# Patient Record
Sex: FEMALE | Race: WHITE | NOT HISPANIC OR LATINO | Employment: OTHER | URBAN - METROPOLITAN AREA
[De-identification: names, ages, dates, MRNs, and addresses within clinical notes are randomized per-mention and may not be internally consistent; named-entity substitution may affect disease eponyms.]

---

## 2017-01-20 ENCOUNTER — ALLSCRIPTS OFFICE VISIT (OUTPATIENT)
Dept: OTHER | Facility: OTHER | Age: 77
End: 2017-01-20

## 2017-01-20 ENCOUNTER — TRANSCRIBE ORDERS (OUTPATIENT)
Dept: ADMINISTRATIVE | Facility: HOSPITAL | Age: 77
End: 2017-01-20

## 2017-01-20 DIAGNOSIS — R33.9 RETENTION OF URINE, UNSPECIFIED: Primary | ICD-10-CM

## 2017-05-03 ENCOUNTER — APPOINTMENT (EMERGENCY)
Dept: RADIOLOGY | Facility: HOSPITAL | Age: 77
End: 2017-05-03
Payer: MEDICARE

## 2017-05-03 ENCOUNTER — HOSPITAL ENCOUNTER (EMERGENCY)
Facility: HOSPITAL | Age: 77
Discharge: HOME | End: 2017-05-03
Attending: EMERGENCY MEDICINE | Admitting: EMERGENCY MEDICINE
Payer: MEDICARE

## 2017-05-03 VITALS
WEIGHT: 216.05 LBS | BODY MASS INDEX: 38.27 KG/M2 | OXYGEN SATURATION: 97 % | DIASTOLIC BLOOD PRESSURE: 72 MMHG | HEART RATE: 71 BPM | SYSTOLIC BLOOD PRESSURE: 156 MMHG | TEMPERATURE: 98 F | RESPIRATION RATE: 18 BRPM

## 2017-05-03 DIAGNOSIS — R26.2 AMBULATORY DYSFUNCTION: ICD-10-CM
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
## 2017-05-03 LAB
ANION GAP SERPL CALCULATED.3IONS-SCNC: 2 MMOL/L (ref 4–13)
ATRIAL RATE: 277 BPM
BASOPHILS # BLD AUTO: 0.02 THOUSANDS/ΜL (ref 0–0.1)
BASOPHILS NFR BLD AUTO: 0 % (ref 0–1)
BUN SERPL-MCNC: 9 MG/DL (ref 5–25)
CALCIUM SERPL-MCNC: 8.8 MG/DL (ref 8.3–10.1)
CHLORIDE SERPL-SCNC: 102 MMOL/L (ref 100–108)
CO2 SERPL-SCNC: 33 MMOL/L (ref 21–32)
CREAT SERPL-MCNC: 0.93 MG/DL (ref 0.6–1.3)
EOSINOPHIL # BLD AUTO: 0.2 THOUSAND/ΜL (ref 0–0.61)
EOSINOPHIL NFR BLD AUTO: 4 % (ref 0–6)
ERYTHROCYTE [DISTWIDTH] IN BLOOD BY AUTOMATED COUNT: 14.3 % (ref 11.6–15.1)
GFR SERPL CREATININE-BSD FRML MDRD: 58.3 ML/MIN/1.73SQ M
GLUCOSE SERPL-MCNC: 125 MG/DL (ref 65–140)
HCT VFR BLD AUTO: 37.4 % (ref 34.8–46.1)
HGB BLD-MCNC: 12.3 G/DL (ref 11.5–15.4)
LYMPHOCYTES # BLD AUTO: 1.56 THOUSANDS/ΜL (ref 0.6–4.47)
LYMPHOCYTES NFR BLD AUTO: 33 % (ref 14–44)
MCH RBC QN AUTO: 28.9 PG (ref 26.8–34.3)
MCHC RBC AUTO-ENTMCNC: 32.9 G/DL (ref 31.4–37.4)
MCV RBC AUTO: 88 FL (ref 82–98)
MONOCYTES # BLD AUTO: 0.37 THOUSAND/ΜL (ref 0.17–1.22)
MONOCYTES NFR BLD AUTO: 8 % (ref 4–12)
NEUTROPHILS # BLD AUTO: 2.54 THOUSANDS/ΜL (ref 1.85–7.62)
NEUTS SEG NFR BLD AUTO: 55 % (ref 43–75)
NRBC BLD AUTO-RTO: 0 /100 WBCS
PLATELET # BLD AUTO: 126 THOUSANDS/UL (ref 149–390)
PMV BLD AUTO: 10.2 FL (ref 8.9–12.7)
POTASSIUM SERPL-SCNC: 4.2 MMOL/L (ref 3.5–5.3)
QRS AXIS: 39 DEGREES
QRSD INTERVAL: 74 MS
QT INTERVAL: 412 MS
QTC INTERVAL: 425 MS
RBC # BLD AUTO: 4.26 MILLION/UL (ref 3.81–5.12)
SODIUM SERPL-SCNC: 137 MMOL/L (ref 136–145)
T WAVE AXIS: 35 DEGREES
VENTRICULAR RATE: 64 BPM
WBC # BLD AUTO: 4.69 THOUSAND/UL (ref 4.31–10.16)

## 2017-05-03 PROCEDURE — 36415 COLL VENOUS BLD VENIPUNCTURE: CPT | Performed by: EMERGENCY MEDICINE

## 2017-05-03 PROCEDURE — 80048 BASIC METABOLIC PNL TOTAL CA: CPT | Performed by: EMERGENCY MEDICINE

## 2017-05-03 PROCEDURE — 99284 EMERGENCY DEPT VISIT MOD MDM: CPT

## 2017-05-03 PROCEDURE — 93005 ELECTROCARDIOGRAM TRACING: CPT | Performed by: EMERGENCY MEDICINE

## 2017-05-03 PROCEDURE — 71020 HB CHEST X-RAY 2VW FRONTAL&LATL: CPT

## 2017-05-03 PROCEDURE — 85025 COMPLETE CBC W/AUTO DIFF WBC: CPT | Performed by: EMERGENCY MEDICINE

## 2017-05-23 ENCOUNTER — APPOINTMENT (EMERGENCY)
Dept: RADIOLOGY | Facility: HOSPITAL | Age: 77
End: 2017-05-23
Payer: MEDICARE

## 2017-05-23 ENCOUNTER — HOSPITAL ENCOUNTER (EMERGENCY)
Facility: HOSPITAL | Age: 77
Discharge: HOME | End: 2017-05-23
Attending: EMERGENCY MEDICINE | Admitting: EMERGENCY MEDICINE
Payer: MEDICARE

## 2017-05-23 VITALS
OXYGEN SATURATION: 97 % | SYSTOLIC BLOOD PRESSURE: 139 MMHG | HEART RATE: 73 BPM | RESPIRATION RATE: 18 BRPM | DIASTOLIC BLOOD PRESSURE: 78 MMHG | TEMPERATURE: 98.4 F

## 2017-05-23 DIAGNOSIS — M79.672 BILATERAL FOOT PAIN: ICD-10-CM

## 2017-05-23 DIAGNOSIS — S92.302A FRACTURE OF METATARSAL BONE OF LEFT FOOT: ICD-10-CM

## 2017-05-23 DIAGNOSIS — M25.562 BILATERAL KNEE PAIN: ICD-10-CM
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
## 2017-05-23 DIAGNOSIS — M79.671 BILATERAL FOOT PAIN: ICD-10-CM

## 2017-05-23 DIAGNOSIS — M25.561 BILATERAL KNEE PAIN: ICD-10-CM

## 2017-05-23 LAB
AMORPH URATE CRY URNS QL MICRO: ABNORMAL /HPF
ANION GAP SERPL CALCULATED.3IONS-SCNC: 3 MMOL/L (ref 4–13)
BACTERIA UR QL AUTO: ABNORMAL /HPF
BASOPHILS # BLD AUTO: 0.02 THOUSANDS/ΜL (ref 0–0.1)
BASOPHILS NFR BLD AUTO: 0 % (ref 0–1)
BILIRUB UR QL STRIP: NEGATIVE
BUN SERPL-MCNC: 10 MG/DL (ref 5–25)
CALCIUM SERPL-MCNC: 8.7 MG/DL (ref 8.3–10.1)
CHLORIDE SERPL-SCNC: 100 MMOL/L (ref 100–108)
CLARITY UR: CLEAR
CO2 SERPL-SCNC: 35 MMOL/L (ref 21–32)
COLOR UR: YELLOW
CREAT SERPL-MCNC: 0.91 MG/DL (ref 0.6–1.3)
EOSINOPHIL # BLD AUTO: 0.18 THOUSAND/ΜL (ref 0–0.61)
EOSINOPHIL NFR BLD AUTO: 4 % (ref 0–6)
ERYTHROCYTE [DISTWIDTH] IN BLOOD BY AUTOMATED COUNT: 13.7 % (ref 11.6–15.1)
GFR SERPL CREATININE-BSD FRML MDRD: 59.8 ML/MIN/1.73SQ M
GLUCOSE SERPL-MCNC: 121 MG/DL (ref 65–140)
GLUCOSE UR STRIP-MCNC: NEGATIVE MG/DL
HCT VFR BLD AUTO: 38.9 % (ref 34.8–46.1)
HGB BLD-MCNC: 13 G/DL (ref 11.5–15.4)
HGB UR QL STRIP.AUTO: ABNORMAL
KETONES UR STRIP-MCNC: NEGATIVE MG/DL
LEUKOCYTE ESTERASE UR QL STRIP: ABNORMAL
LYMPHOCYTES # BLD AUTO: 1.48 THOUSANDS/ΜL (ref 0.6–4.47)
LYMPHOCYTES NFR BLD AUTO: 29 % (ref 14–44)
MCH RBC QN AUTO: 29.2 PG (ref 26.8–34.3)
MCHC RBC AUTO-ENTMCNC: 33.4 G/DL (ref 31.4–37.4)
MCV RBC AUTO: 87 FL (ref 82–98)
MONOCYTES # BLD AUTO: 0.41 THOUSAND/ΜL (ref 0.17–1.22)
MONOCYTES NFR BLD AUTO: 8 % (ref 4–12)
NEUTROPHILS # BLD AUTO: 2.97 THOUSANDS/ΜL (ref 1.85–7.62)
NEUTS SEG NFR BLD AUTO: 59 % (ref 43–75)
NITRITE UR QL STRIP: POSITIVE
NON-SQ EPI CELLS URNS QL MICRO: ABNORMAL /HPF
NRBC BLD AUTO-RTO: 0 /100 WBCS
PH UR STRIP.AUTO: 5 [PH] (ref 4.5–8)
PLATELET # BLD AUTO: 128 THOUSANDS/UL (ref 149–390)
PMV BLD AUTO: 10.8 FL (ref 8.9–12.7)
POTASSIUM SERPL-SCNC: 4.1 MMOL/L (ref 3.5–5.3)
PROT UR STRIP-MCNC: NEGATIVE MG/DL
RBC # BLD AUTO: 4.45 MILLION/UL (ref 3.81–5.12)
RBC #/AREA URNS AUTO: ABNORMAL /HPF
SODIUM SERPL-SCNC: 138 MMOL/L (ref 136–145)
SP GR UR STRIP.AUTO: <=1.005 (ref 1–1.03)
UROBILINOGEN UR QL STRIP.AUTO: 0.2 E.U./DL
WBC # BLD AUTO: 5.06 THOUSAND/UL (ref 4.31–10.16)
WBC #/AREA URNS AUTO: ABNORMAL /HPF

## 2017-05-23 PROCEDURE — 85025 COMPLETE CBC W/AUTO DIFF WBC: CPT | Performed by: EMERGENCY MEDICINE

## 2017-05-23 PROCEDURE — 80048 BASIC METABOLIC PNL TOTAL CA: CPT | Performed by: EMERGENCY MEDICINE

## 2017-05-23 PROCEDURE — 36415 COLL VENOUS BLD VENIPUNCTURE: CPT | Performed by: EMERGENCY MEDICINE

## 2017-05-23 PROCEDURE — 93005 ELECTROCARDIOGRAM TRACING: CPT | Performed by: EMERGENCY MEDICINE

## 2017-05-23 PROCEDURE — 73564 X-RAY EXAM KNEE 4 OR MORE: CPT

## 2017-05-23 PROCEDURE — 73590 X-RAY EXAM OF LOWER LEG: CPT

## 2017-05-23 PROCEDURE — 99284 EMERGENCY DEPT VISIT MOD MDM: CPT

## 2017-05-23 PROCEDURE — 73630 X-RAY EXAM OF FOOT: CPT

## 2017-05-23 PROCEDURE — 81001 URINALYSIS AUTO W/SCOPE: CPT

## 2017-05-23 PROCEDURE — 81002 URINALYSIS NONAUTO W/O SCOPE: CPT | Performed by: EMERGENCY MEDICINE

## 2017-05-23 RX ORDER — NAPROXEN 500 MG/1
500 TABLET ORAL 2 TIMES DAILY PRN
Qty: 14 TABLET | Refills: 0 | Status: SHIPPED | OUTPATIENT
Start: 2017-05-23 | End: 2018-08-02 | Stop reason: ALTCHOICE

## 2017-05-23 RX ORDER — ACETAMINOPHEN 325 MG/1
650 TABLET ORAL ONCE
Status: COMPLETED | OUTPATIENT
Start: 2017-05-23 | End: 2017-05-23

## 2017-05-23 RX ADMIN — ACETAMINOPHEN 650 MG: 325 TABLET, FILM COATED ORAL at 13:06

## 2017-05-24 LAB
ATRIAL RATE: 234 BPM
QRS AXIS: -7 DEGREES
QRSD INTERVAL: 74 MS
QT INTERVAL: 424 MS
QTC INTERVAL: 437 MS
T WAVE AXIS: 15 DEGREES
VENTRICULAR RATE: 64 BPM

## 2017-06-05 ENCOUNTER — APPOINTMENT (EMERGENCY)
Dept: RADIOLOGY | Facility: HOSPITAL | Age: 77
DRG: 563 | End: 2017-06-05
Payer: MEDICARE

## 2017-06-05 ENCOUNTER — HOSPITAL ENCOUNTER (INPATIENT)
Facility: HOSPITAL | Age: 77
LOS: 3 days | Discharge: NURSING, REHAB OR LONG-TERM CARE | DRG: 563 | End: 2017-06-08
Attending: EMERGENCY MEDICINE | Admitting: INTERNAL MEDICINE
Payer: MEDICARE

## 2017-06-05 DIAGNOSIS — S92.919A FRACTURE OF TOE: ICD-10-CM

## 2017-06-05 DIAGNOSIS — N39.0 UTI (URINARY TRACT INFECTION): Primary | ICD-10-CM

## 2017-06-05 DIAGNOSIS — M21.961 DEFORMITY OF RIGHT FOOT: Chronic | ICD-10-CM

## 2017-06-05 DIAGNOSIS — R26.2 AMBULATORY DYSFUNCTION: ICD-10-CM
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
## 2017-06-05 PROBLEM — Z97.8 CHRONIC INDWELLING FOLEY CATHETER: Status: ACTIVE | Noted: 2017-06-05

## 2017-06-05 LAB
ANION GAP SERPL CALCULATED.3IONS-SCNC: 5 MMOL/L (ref 4–13)
APTT PPP: 35 SECONDS (ref 23–35)
BACTERIA UR QL AUTO: ABNORMAL /HPF
BASOPHILS # BLD AUTO: 0.02 THOUSANDS/ΜL (ref 0–0.1)
BASOPHILS NFR BLD AUTO: 0 % (ref 0–1)
BILIRUB UR QL STRIP: NEGATIVE
BUN SERPL-MCNC: 11 MG/DL (ref 5–25)
CALCIUM SERPL-MCNC: 8.9 MG/DL (ref 8.3–10.1)
CHLORIDE SERPL-SCNC: 97 MMOL/L (ref 100–108)
CLARITY UR: ABNORMAL
CO2 SERPL-SCNC: 32 MMOL/L (ref 21–32)
COLOR UR: YELLOW
COLOR, POC: ABNORMAL
COLOR, POC: NORMAL
CREAT SERPL-MCNC: 0.9 MG/DL (ref 0.6–1.3)
EOSINOPHIL # BLD AUTO: 0.17 THOUSAND/ΜL (ref 0–0.61)
EOSINOPHIL NFR BLD AUTO: 2 % (ref 0–6)
ERYTHROCYTE [DISTWIDTH] IN BLOOD BY AUTOMATED COUNT: 13.1 % (ref 11.6–15.1)
GFR SERPL CREATININE-BSD FRML MDRD: >60 ML/MIN/1.73SQ M
GLUCOSE SERPL-MCNC: 139 MG/DL (ref 65–140)
GLUCOSE UR STRIP-MCNC: NEGATIVE MG/DL
HCT VFR BLD AUTO: 37.9 % (ref 34.8–46.1)
HGB BLD-MCNC: 12.8 G/DL (ref 11.5–15.4)
HGB UR QL STRIP.AUTO: ABNORMAL
INR PPP: 1.23 (ref 0.86–1.16)
KETONES UR STRIP-MCNC: NEGATIVE MG/DL
LEUKOCYTE ESTERASE UR QL STRIP: ABNORMAL
LYMPHOCYTES # BLD AUTO: 1.7 THOUSANDS/ΜL (ref 0.6–4.47)
LYMPHOCYTES NFR BLD AUTO: 19 % (ref 14–44)
MCH RBC QN AUTO: 28.7 PG (ref 26.8–34.3)
MCHC RBC AUTO-ENTMCNC: 33.8 G/DL (ref 31.4–37.4)
MCV RBC AUTO: 85 FL (ref 82–98)
MONOCYTES # BLD AUTO: 0.6 THOUSAND/ΜL (ref 0.17–1.22)
MONOCYTES NFR BLD AUTO: 7 % (ref 4–12)
NEUTROPHILS # BLD AUTO: 6.28 THOUSANDS/ΜL (ref 1.85–7.62)
NEUTS SEG NFR BLD AUTO: 72 % (ref 43–75)
NITRITE UR QL STRIP: NEGATIVE
NITRITE UR QL STRIP: POSITIVE
NITRITE UR QL STRIP: POSITIVE
NON-SQ EPI CELLS URNS QL MICRO: ABNORMAL /HPF
NRBC BLD AUTO-RTO: 0 /100 WBCS
PH UR STRIP.AUTO: 7 [PH] (ref 4.5–8)
PH UR STRIP.AUTO: 7.5 [PH] (ref 4.5–8)
PH UR STRIP.AUTO: 8 [PH] (ref 4.5–8)
PLATELET # BLD AUTO: 121 THOUSANDS/UL (ref 149–390)
PMV BLD AUTO: 10.9 FL (ref 8.9–12.7)
POTASSIUM SERPL-SCNC: 3.9 MMOL/L (ref 3.5–5.3)
PROT UR STRIP-MCNC: ABNORMAL MG/DL
PROTHROMBIN TIME: 15.6 SECONDS (ref 12.1–14.4)
RBC # BLD AUTO: 4.46 MILLION/UL (ref 3.81–5.12)
RBC #/AREA URNS AUTO: ABNORMAL /HPF
SODIUM SERPL-SCNC: 134 MMOL/L (ref 136–145)
SP GR UR STRIP.AUTO: 1.01 (ref 1–1.03)
UROBILINOGEN UR QL STRIP.AUTO: 1 E.U./DL
UROBILINOGEN UR QL STRIP.AUTO: 1 E.U./DL
UROBILINOGEN UR QL STRIP.AUTO: 2 E.U./DL
WBC # BLD AUTO: 8.77 THOUSAND/UL (ref 4.31–10.16)
WBC #/AREA URNS AUTO: ABNORMAL /HPF

## 2017-06-05 PROCEDURE — 73630 X-RAY EXAM OF FOOT: CPT

## 2017-06-05 PROCEDURE — 36415 COLL VENOUS BLD VENIPUNCTURE: CPT | Performed by: EMERGENCY MEDICINE

## 2017-06-05 PROCEDURE — 87186 SC STD MICRODIL/AGAR DIL: CPT

## 2017-06-05 PROCEDURE — 73564 X-RAY EXAM KNEE 4 OR MORE: CPT

## 2017-06-05 PROCEDURE — 85610 PROTHROMBIN TIME: CPT | Performed by: EMERGENCY MEDICINE

## 2017-06-05 PROCEDURE — 81001 URINALYSIS AUTO W/SCOPE: CPT

## 2017-06-05 PROCEDURE — 87086 URINE CULTURE/COLONY COUNT: CPT

## 2017-06-05 PROCEDURE — 0T2BX0Z CHANGE DRAINAGE DEVICE IN BLADDER, EXTERNAL APPROACH: ICD-10-PCS | Performed by: INTERNAL MEDICINE

## 2017-06-05 PROCEDURE — 85025 COMPLETE CBC W/AUTO DIFF WBC: CPT | Performed by: EMERGENCY MEDICINE

## 2017-06-05 PROCEDURE — 87040 BLOOD CULTURE FOR BACTERIA: CPT | Performed by: EMERGENCY MEDICINE

## 2017-06-05 PROCEDURE — 72100 X-RAY EXAM L-S SPINE 2/3 VWS: CPT

## 2017-06-05 PROCEDURE — 81002 URINALYSIS NONAUTO W/O SCOPE: CPT | Performed by: EMERGENCY MEDICINE

## 2017-06-05 PROCEDURE — 87077 CULTURE AEROBIC IDENTIFY: CPT

## 2017-06-05 PROCEDURE — 80048 BASIC METABOLIC PNL TOTAL CA: CPT | Performed by: EMERGENCY MEDICINE

## 2017-06-05 PROCEDURE — 85730 THROMBOPLASTIN TIME PARTIAL: CPT | Performed by: EMERGENCY MEDICINE

## 2017-06-05 PROCEDURE — 72170 X-RAY EXAM OF PELVIS: CPT

## 2017-06-05 RX ADMIN — CEFAZOLIN SODIUM 2000 MG: 2 SOLUTION INTRAVENOUS at 22:59

## 2017-06-06 PROBLEM — S92.919A FRACTURE OF TOE: Status: ACTIVE | Noted: 2017-06-06

## 2017-06-06 LAB
ANION GAP SERPL CALCULATED.3IONS-SCNC: 6 MMOL/L (ref 4–13)
BUN SERPL-MCNC: 9 MG/DL (ref 5–25)
CALCIUM SERPL-MCNC: 8.4 MG/DL (ref 8.3–10.1)
CHLORIDE SERPL-SCNC: 99 MMOL/L (ref 100–108)
CO2 SERPL-SCNC: 30 MMOL/L (ref 21–32)
CREAT SERPL-MCNC: 0.87 MG/DL (ref 0.6–1.3)
ERYTHROCYTE [DISTWIDTH] IN BLOOD BY AUTOMATED COUNT: 13.3 % (ref 11.6–15.1)
GFR SERPL CREATININE-BSD FRML MDRD: >60 ML/MIN/1.73SQ M
GLUCOSE SERPL-MCNC: 109 MG/DL (ref 65–140)
HCT VFR BLD AUTO: 34.7 % (ref 34.8–46.1)
HGB BLD-MCNC: 11.6 G/DL (ref 11.5–15.4)
MCH RBC QN AUTO: 28.5 PG (ref 26.8–34.3)
MCHC RBC AUTO-ENTMCNC: 33.4 G/DL (ref 31.4–37.4)
MCV RBC AUTO: 85 FL (ref 82–98)
PLATELET # BLD AUTO: 122 THOUSANDS/UL (ref 149–390)
PMV BLD AUTO: 11.1 FL (ref 8.9–12.7)
POTASSIUM SERPL-SCNC: 3.8 MMOL/L (ref 3.5–5.3)
RBC # BLD AUTO: 4.07 MILLION/UL (ref 3.81–5.12)
SODIUM SERPL-SCNC: 135 MMOL/L (ref 136–145)
WBC # BLD AUTO: 8.28 THOUSAND/UL (ref 4.31–10.16)

## 2017-06-06 PROCEDURE — G8988 SELF CARE GOAL STATUS: HCPCS

## 2017-06-06 PROCEDURE — G8987 SELF CARE CURRENT STATUS: HCPCS

## 2017-06-06 PROCEDURE — 99285 EMERGENCY DEPT VISIT HI MDM: CPT

## 2017-06-06 PROCEDURE — 80048 BASIC METABOLIC PNL TOTAL CA: CPT | Performed by: INTERNAL MEDICINE

## 2017-06-06 PROCEDURE — 97166 OT EVAL MOD COMPLEX 45 MIN: CPT

## 2017-06-06 PROCEDURE — 97163 PT EVAL HIGH COMPLEX 45 MIN: CPT

## 2017-06-06 PROCEDURE — G8978 MOBILITY CURRENT STATUS: HCPCS

## 2017-06-06 PROCEDURE — G8979 MOBILITY GOAL STATUS: HCPCS

## 2017-06-06 PROCEDURE — 97110 THERAPEUTIC EXERCISES: CPT

## 2017-06-06 PROCEDURE — 85027 COMPLETE CBC AUTOMATED: CPT | Performed by: INTERNAL MEDICINE

## 2017-06-06 RX ORDER — BUSPIRONE HYDROCHLORIDE 5 MG/1
5 TABLET ORAL 3 TIMES DAILY
Status: DISCONTINUED | OUTPATIENT
Start: 2017-06-06 | End: 2017-06-08 | Stop reason: HOSPADM

## 2017-06-06 RX ORDER — ACETAMINOPHEN 325 MG/1
650 TABLET ORAL EVERY 6 HOURS PRN
Status: DISCONTINUED | OUTPATIENT
Start: 2017-06-06 | End: 2017-06-08 | Stop reason: HOSPADM

## 2017-06-06 RX ORDER — PANTOPRAZOLE SODIUM 20 MG/1
20 TABLET, DELAYED RELEASE ORAL
Status: DISCONTINUED | OUTPATIENT
Start: 2017-06-06 | End: 2017-06-08 | Stop reason: HOSPADM

## 2017-06-06 RX ORDER — ASPIRIN 81 MG/1
81 TABLET, CHEWABLE ORAL DAILY
Status: DISCONTINUED | OUTPATIENT
Start: 2017-06-06 | End: 2017-06-08 | Stop reason: HOSPADM

## 2017-06-06 RX ORDER — SPIRONOLACTONE 25 MG/1
25 TABLET ORAL DAILY
Status: DISCONTINUED | OUTPATIENT
Start: 2017-06-06 | End: 2017-06-08 | Stop reason: HOSPADM

## 2017-06-06 RX ORDER — DOCUSATE SODIUM 100 MG/1
100 CAPSULE, LIQUID FILLED ORAL 2 TIMES DAILY
Status: DISCONTINUED | OUTPATIENT
Start: 2017-06-06 | End: 2017-06-08 | Stop reason: HOSPADM

## 2017-06-06 RX ORDER — AMLODIPINE BESYLATE 5 MG/1
5 TABLET ORAL DAILY
Status: DISCONTINUED | OUTPATIENT
Start: 2017-06-06 | End: 2017-06-08 | Stop reason: HOSPADM

## 2017-06-06 RX ORDER — OXYCODONE HCL 10 MG/1
10 TABLET, FILM COATED, EXTENDED RELEASE ORAL EVERY 12 HOURS SCHEDULED
Status: DISCONTINUED | OUTPATIENT
Start: 2017-06-06 | End: 2017-06-08 | Stop reason: HOSPADM

## 2017-06-06 RX ORDER — ONDANSETRON 2 MG/ML
4 INJECTION INTRAMUSCULAR; INTRAVENOUS EVERY 6 HOURS PRN
Status: DISCONTINUED | OUTPATIENT
Start: 2017-06-06 | End: 2017-06-08 | Stop reason: HOSPADM

## 2017-06-06 RX ORDER — PROPRANOLOL HYDROCHLORIDE 10 MG/1
10 TABLET ORAL EVERY 12 HOURS SCHEDULED
Status: DISCONTINUED | OUTPATIENT
Start: 2017-06-06 | End: 2017-06-08 | Stop reason: HOSPADM

## 2017-06-06 RX ORDER — SODIUM CHLORIDE 9 MG/ML
100 INJECTION, SOLUTION INTRAVENOUS CONTINUOUS
Status: DISCONTINUED | OUTPATIENT
Start: 2017-06-06 | End: 2017-06-07

## 2017-06-06 RX ORDER — POTASSIUM CHLORIDE 750 MG/1
10 TABLET, EXTENDED RELEASE ORAL DAILY
Status: DISCONTINUED | OUTPATIENT
Start: 2017-06-06 | End: 2017-06-08 | Stop reason: HOSPADM

## 2017-06-06 RX ADMIN — PROPRANOLOL HYDROCHLORIDE 10 MG: 10 TABLET ORAL at 21:08

## 2017-06-06 RX ADMIN — CEFAZOLIN SODIUM 1000 MG: 1 SOLUTION INTRAVENOUS at 07:58

## 2017-06-06 RX ADMIN — BUSPIRONE HYDROCHLORIDE 5 MG: 5 TABLET ORAL at 08:17

## 2017-06-06 RX ADMIN — CEFAZOLIN SODIUM 1000 MG: 1 SOLUTION INTRAVENOUS at 15:38

## 2017-06-06 RX ADMIN — DOCUSATE SODIUM 100 MG: 100 CAPSULE, LIQUID FILLED ORAL at 17:09

## 2017-06-06 RX ADMIN — SODIUM CHLORIDE 100 ML/HR: 0.9 INJECTION, SOLUTION INTRAVENOUS at 22:39

## 2017-06-06 RX ADMIN — ACETAMINOPHEN 650 MG: 325 TABLET, FILM COATED ORAL at 14:37

## 2017-06-06 RX ADMIN — ENOXAPARIN SODIUM 40 MG: 40 INJECTION SUBCUTANEOUS at 08:17

## 2017-06-06 RX ADMIN — PROPRANOLOL HYDROCHLORIDE 10 MG: 10 TABLET ORAL at 08:16

## 2017-06-06 RX ADMIN — BUSPIRONE HYDROCHLORIDE 5 MG: 5 TABLET ORAL at 15:38

## 2017-06-06 RX ADMIN — POTASSIUM CHLORIDE 10 MEQ: 750 TABLET, EXTENDED RELEASE ORAL at 08:16

## 2017-06-06 RX ADMIN — SPIRONOLACTONE 25 MG: 25 TABLET, FILM COATED ORAL at 08:17

## 2017-06-06 RX ADMIN — OXYCODONE HYDROCHLORIDE 10 MG: 10 TABLET, FILM COATED, EXTENDED RELEASE ORAL at 08:16

## 2017-06-06 RX ADMIN — PROPRANOLOL HYDROCHLORIDE 10 MG: 10 TABLET ORAL at 01:30

## 2017-06-06 RX ADMIN — AMLODIPINE BESYLATE 5 MG: 5 TABLET ORAL at 08:16

## 2017-06-06 RX ADMIN — BUSPIRONE HYDROCHLORIDE 5 MG: 5 TABLET ORAL at 21:08

## 2017-06-06 RX ADMIN — ACETAMINOPHEN 650 MG: 325 TABLET, FILM COATED ORAL at 03:30

## 2017-06-06 RX ADMIN — OXYCODONE HYDROCHLORIDE 10 MG: 10 TABLET, FILM COATED, EXTENDED RELEASE ORAL at 21:07

## 2017-06-06 RX ADMIN — DOCUSATE SODIUM 100 MG: 100 CAPSULE, LIQUID FILLED ORAL at 08:17

## 2017-06-06 RX ADMIN — OXYCODONE HYDROCHLORIDE 10 MG: 10 TABLET, FILM COATED, EXTENDED RELEASE ORAL at 01:30

## 2017-06-06 RX ADMIN — CEFAZOLIN SODIUM 1000 MG: 1 SOLUTION INTRAVENOUS at 22:57

## 2017-06-06 RX ADMIN — PANTOPRAZOLE SODIUM 20 MG: 20 TABLET, DELAYED RELEASE ORAL at 07:58

## 2017-06-06 RX ADMIN — SODIUM CHLORIDE 100 ML/HR: 0.9 INJECTION, SOLUTION INTRAVENOUS at 01:08

## 2017-06-06 RX ADMIN — ASPIRIN 81 MG 81 MG: 81 TABLET ORAL at 08:17

## 2017-06-06 RX ADMIN — SODIUM CHLORIDE 100 ML/HR: 0.9 INJECTION, SOLUTION INTRAVENOUS at 11:40

## 2017-06-07 PROBLEM — I50.32: Status: ACTIVE | Noted: 2017-06-07

## 2017-06-07 RX ORDER — CEPHALEXIN 250 MG/1
250 CAPSULE ORAL EVERY 8 HOURS SCHEDULED
Qty: 10 CAPSULE | Refills: 0 | Status: SHIPPED | OUTPATIENT
Start: 2017-06-07 | End: 2017-06-10

## 2017-06-07 RX ORDER — CEPHALEXIN 250 MG/1
250 CAPSULE ORAL EVERY 8 HOURS SCHEDULED
Status: DISCONTINUED | OUTPATIENT
Start: 2017-06-07 | End: 2017-06-08 | Stop reason: HOSPADM

## 2017-06-07 RX ADMIN — DOCUSATE SODIUM 100 MG: 100 CAPSULE, LIQUID FILLED ORAL at 09:29

## 2017-06-07 RX ADMIN — SPIRONOLACTONE 25 MG: 25 TABLET, FILM COATED ORAL at 09:30

## 2017-06-07 RX ADMIN — PANTOPRAZOLE SODIUM 20 MG: 20 TABLET, DELAYED RELEASE ORAL at 06:30

## 2017-06-07 RX ADMIN — BUSPIRONE HYDROCHLORIDE 5 MG: 5 TABLET ORAL at 09:30

## 2017-06-07 RX ADMIN — ENOXAPARIN SODIUM 40 MG: 40 INJECTION SUBCUTANEOUS at 09:30

## 2017-06-07 RX ADMIN — POTASSIUM CHLORIDE 10 MEQ: 750 TABLET, EXTENDED RELEASE ORAL at 09:29

## 2017-06-07 RX ADMIN — AMLODIPINE BESYLATE 5 MG: 5 TABLET ORAL at 09:29

## 2017-06-07 RX ADMIN — BUSPIRONE HYDROCHLORIDE 5 MG: 5 TABLET ORAL at 15:59

## 2017-06-07 RX ADMIN — ASPIRIN 81 MG 81 MG: 81 TABLET ORAL at 09:30

## 2017-06-07 RX ADMIN — PROPRANOLOL HYDROCHLORIDE 10 MG: 10 TABLET ORAL at 09:30

## 2017-06-07 RX ADMIN — CEPHALEXIN 250 MG: 250 CAPSULE ORAL at 21:33

## 2017-06-07 RX ADMIN — CEFAZOLIN SODIUM 1000 MG: 1 SOLUTION INTRAVENOUS at 06:30

## 2017-06-07 RX ADMIN — OXYCODONE HYDROCHLORIDE 10 MG: 10 TABLET, FILM COATED, EXTENDED RELEASE ORAL at 21:33

## 2017-06-07 RX ADMIN — OXYCODONE HYDROCHLORIDE 10 MG: 10 TABLET, FILM COATED, EXTENDED RELEASE ORAL at 09:29

## 2017-06-07 RX ADMIN — DOCUSATE SODIUM 100 MG: 100 CAPSULE, LIQUID FILLED ORAL at 17:40

## 2017-06-07 RX ADMIN — PROPRANOLOL HYDROCHLORIDE 10 MG: 10 TABLET ORAL at 21:33

## 2017-06-07 RX ADMIN — CEPHALEXIN 250 MG: 250 CAPSULE ORAL at 15:59

## 2017-06-07 RX ADMIN — ACETAMINOPHEN 650 MG: 325 TABLET, FILM COATED ORAL at 15:59

## 2017-06-07 RX ADMIN — BUSPIRONE HYDROCHLORIDE 5 MG: 5 TABLET ORAL at 21:33

## 2017-06-07 RX ADMIN — SODIUM CHLORIDE 100 ML/HR: 0.9 INJECTION, SOLUTION INTRAVENOUS at 09:39

## 2017-06-08 ENCOUNTER — APPOINTMENT (INPATIENT)
Dept: PHYSICAL THERAPY | Facility: HOSPITAL | Age: 77
DRG: 563 | End: 2017-06-08
Payer: MEDICARE

## 2017-06-08 VITALS
BODY MASS INDEX: 38.16 KG/M2 | DIASTOLIC BLOOD PRESSURE: 73 MMHG | SYSTOLIC BLOOD PRESSURE: 134 MMHG | OXYGEN SATURATION: 94 % | WEIGHT: 215.39 LBS | HEIGHT: 63 IN | HEART RATE: 66 BPM | RESPIRATION RATE: 16 BRPM | TEMPERATURE: 97.5 F

## 2017-06-08 LAB — BACTERIA UR CULT: NORMAL

## 2017-06-08 PROCEDURE — 97530 THERAPEUTIC ACTIVITIES: CPT

## 2017-06-08 PROCEDURE — 97110 THERAPEUTIC EXERCISES: CPT

## 2017-06-08 RX ADMIN — DOCUSATE SODIUM 100 MG: 100 CAPSULE, LIQUID FILLED ORAL at 08:48

## 2017-06-08 RX ADMIN — PROPRANOLOL HYDROCHLORIDE 10 MG: 10 TABLET ORAL at 08:48

## 2017-06-08 RX ADMIN — AMLODIPINE BESYLATE 5 MG: 5 TABLET ORAL at 08:48

## 2017-06-08 RX ADMIN — POTASSIUM CHLORIDE 10 MEQ: 750 TABLET, EXTENDED RELEASE ORAL at 08:48

## 2017-06-08 RX ADMIN — OXYCODONE HYDROCHLORIDE 10 MG: 10 TABLET, FILM COATED, EXTENDED RELEASE ORAL at 08:47

## 2017-06-08 RX ADMIN — ACETAMINOPHEN 650 MG: 325 TABLET, FILM COATED ORAL at 16:39

## 2017-06-08 RX ADMIN — CEPHALEXIN 250 MG: 250 CAPSULE ORAL at 13:37

## 2017-06-08 RX ADMIN — SPIRONOLACTONE 25 MG: 25 TABLET, FILM COATED ORAL at 08:48

## 2017-06-08 RX ADMIN — BUSPIRONE HYDROCHLORIDE 5 MG: 5 TABLET ORAL at 08:48

## 2017-06-08 RX ADMIN — ACETAMINOPHEN 650 MG: 325 TABLET, FILM COATED ORAL at 00:57

## 2017-06-08 RX ADMIN — ENOXAPARIN SODIUM 40 MG: 40 INJECTION SUBCUTANEOUS at 08:48

## 2017-06-08 RX ADMIN — BUSPIRONE HYDROCHLORIDE 5 MG: 5 TABLET ORAL at 16:38

## 2017-06-08 RX ADMIN — CEPHALEXIN 250 MG: 250 CAPSULE ORAL at 05:41

## 2017-06-08 RX ADMIN — PANTOPRAZOLE SODIUM 20 MG: 20 TABLET, DELAYED RELEASE ORAL at 05:41

## 2017-06-08 RX ADMIN — ASPIRIN 81 MG 81 MG: 81 TABLET ORAL at 08:48

## 2017-06-11 LAB
BACTERIA BLD CULT: NORMAL
BACTERIA BLD CULT: NORMAL

## 2017-07-20 ENCOUNTER — HOSPITAL ENCOUNTER (OUTPATIENT)
Dept: ULTRASOUND IMAGING | Facility: HOSPITAL | Age: 77
Discharge: HOME | End: 2017-07-20
Attending: UROLOGY
Payer: COMMERCIAL

## 2017-07-20 DIAGNOSIS — R33.9 RETENTION OF URINE: ICD-10-CM

## 2017-07-20 DIAGNOSIS — R33.9 RETENTION OF URINE, UNSPECIFIED: ICD-10-CM

## 2017-07-20 PROCEDURE — 76770 US EXAM ABDO BACK WALL COMP: CPT

## 2017-08-04 ENCOUNTER — ALLSCRIPTS OFFICE VISIT (OUTPATIENT)
Dept: OTHER | Facility: OTHER | Age: 77
End: 2017-08-04

## 2017-08-21 ENCOUNTER — GENERIC CONVERSION - ENCOUNTER (OUTPATIENT)
Dept: OTHER | Facility: OTHER | Age: 77
End: 2017-08-21

## 2017-09-11 ENCOUNTER — GENERIC CONVERSION - ENCOUNTER (OUTPATIENT)
Dept: OTHER | Facility: OTHER | Age: 77
End: 2017-09-11

## 2018-01-13 VITALS
HEIGHT: 63 IN | SYSTOLIC BLOOD PRESSURE: 132 MMHG | BODY MASS INDEX: 35.48 KG/M2 | HEART RATE: 76 BPM | DIASTOLIC BLOOD PRESSURE: 84 MMHG | WEIGHT: 200.25 LBS

## 2018-01-13 VITALS — HEART RATE: 60 BPM | SYSTOLIC BLOOD PRESSURE: 122 MMHG | DIASTOLIC BLOOD PRESSURE: 80 MMHG

## 2018-01-16 NOTE — MISCELLANEOUS
Recorded as Task  Date: 01/04/2017 11:11 AM, Created By: John Key  Task Name: Care Coordination  Assigned To: Ann Phan  Regarding Patient: Helena Mata, Status: Active  Comment:   Kenisha Vickers - 04 Jan 2017  11:11 AM    TASK CREATED  Caller: jesus manuel ; Care Coordination  chest pain yesterday 2-3minutes or less when she put on her socks on  absolutely no distress today & vital signs were good  Simi Marrufo from [facility] visiting nurses just wanted on  record        Electronically signed by:Johana Tesfaye DO  Jan 4 2017  1:57PM EST

## 2018-01-17 NOTE — MISCELLANEOUS
Message   Recorded as Task   Date: 09/20/2017 03:19 PM, Created By: Renan Fitzpatrick   Task Name: Call Back   Assigned To:  Hallie Quiles   Regarding Patient: Fallon Hunt, Status: Active   CommentBecka Syed - 20 Sep 2017 3:19 PM     TASK CREATED  FYI :    PT VISITING NURSE CALLED IN AND STATED PT FELL THIS MORNING IN HER HOUSE TRYING TO GET OUT OF BED AND BURSED RIGHT ELBOW AND RIGHT KNEE AND REDNESS ON RIGHT LEG SHE IS SORE AND DOES NOT WISH  TO MAKE A APPT AT THIS TIME   Hallie Quiles - 20 Sep 2017 3:34 PM     TASK EDITED        Signatures   Electronically signed by : Jona Bradford DO; Sep 20 2017  3:35PM EST                       (Author)

## 2018-01-24 ENCOUNTER — DOCUMENTATION (OUTPATIENT)
Dept: FAMILY MEDICINE CLINIC | Facility: CLINIC | Age: 78
End: 2018-01-24

## 2018-02-16 ENCOUNTER — TELEPHONE (OUTPATIENT)
Dept: FAMILY MEDICINE CLINIC | Facility: CLINIC | Age: 78
End: 2018-02-16

## 2018-02-16 NOTE — TELEPHONE ENCOUNTER
Lakeshia from [facility] called  She wanted to make you aware that patient has two dime sized fluid filled blisters on left thigh  She is unsure of what to do  She might think it could be a reaction to her catheter  Please advise

## 2018-02-23 DIAGNOSIS — N32.81 OAB (OVERACTIVE BLADDER): Primary | ICD-10-CM

## 2018-02-23 RX ORDER — OXYBUTYNIN CHLORIDE 5 MG/1
TABLET, EXTENDED RELEASE ORAL
Qty: 30 TABLET | Refills: 5 | Status: SHIPPED | OUTPATIENT
Start: 2018-02-23 | End: 2018-09-17 | Stop reason: SDUPTHER

## 2018-03-01 ENCOUNTER — OFFICE VISIT (OUTPATIENT)
Dept: UROLOGY | Facility: CLINIC | Age: 78
End: 2018-03-01
Payer: MEDICARE

## 2018-03-01 VITALS — DIASTOLIC BLOOD PRESSURE: 74 MMHG | SYSTOLIC BLOOD PRESSURE: 130 MMHG

## 2018-03-01 DIAGNOSIS — R33.9 URINARY RETENTION: Primary | ICD-10-CM

## 2018-03-01 PROCEDURE — 99213 OFFICE O/P EST LOW 20 MIN: CPT | Performed by: PHYSICIAN ASSISTANT

## 2018-03-01 NOTE — PROGRESS NOTES
3/1/2018      Chief Complaint   Patient presents with    Urinary Retention     has wells- changed around 2/22/18       Assessment and Plan    76 y o  female managed by Dr Jim Carter    1  Urinary retention with chronic indwelling urethral Wells catheter  - maintain catheter to gravity drainage and continue monthly changes with the VNA    2  Left leg lesion  - discussed with the patient that this does not appear to be infected, she would have her PCP evaluate at her follow-up visit in 1 week    FU 6 months      History of Present Illness  Satya Moreno is a 76 y o  female here for follow up evaluation of urinary retention status post multiple failed void trials  The patient is doing well with her Wells catheter in continues to have monthly changes with her visiting nurse  Is still wheelchair-bound with limited mobility  States she has a blood blister on her left thigh for which the VNA has asked us to evaluate  Review of Systems   Constitutional: Negative for activity change, chills and fever  Gastrointestinal: Negative for abdominal distention and abdominal pain  Musculoskeletal: Negative for back pain and gait problem  Psychiatric/Behavioral: Negative for behavioral problems and confusion       Past Medical History  Past Medical History:   Diagnosis Date    Anxiety     Atrial fibrillation (HCC)     CHF (congestive heart failure) (HCC)     Depression     Opioid dependence (HCC)     Pneumonia        Past Social History  Past Surgical History:   Procedure Laterality Date    APPENDECTOMY      CHOLECYSTECTOMY      TONSILLECTOMY      TOTAL KNEE ARTHROPLASTY Bilateral     TUBAL LIGATION       History   Smoking Status    Former Smoker    Types: Cigarettes    Quit date: 6/6/1980   Smokeless Tobacco    Former User    Quit date: 1/1/2017       Past Family History  Family History   Problem Relation Age of Onset    Cancer Father     Hypertension Sister        Past Social history  Social History     Social History    Marital status: /Civil Union     Spouse name: N/A    Number of children: 3    Years of education: N/A     Occupational History    Not on file  Social History Main Topics    Smoking status: Former Smoker     Types: Cigarettes     Quit date: 6/6/1980    Smokeless tobacco: Former User     Quit date: 1/1/2017    Alcohol use No    Drug use: No    Sexual activity: Not on file     Other Topics Concern    Not on file     Social History Narrative    ACTIVE ADVANCED DIRECTIVES    ALWAYS USES SEATBELTS    CAFFEINE USE     DENIED HX OF DOMESTIC VIOLENCE     LACK OF EXERCISE     LIVES WITH     PT HAS LIVING WILL AND POA     SUPPORTIVE AND SAFE     NO Anglican STATUS            Current Medications  Current Outpatient Prescriptions   Medication Sig Dispense Refill    busPIRone (BUSPAR) 5 mg tablet Take 5 mg by mouth 3 (three) times a day        furosemide (LASIX) 40 mg tablet Take 40 mg by mouth daily   naproxen (NAPROSYN) 500 mg tablet Take 1 tablet by mouth 2 (two) times a day as needed for mild pain or moderate pain 14 tablet 0    Omeprazole 20 MG TBEC Omeprazole 20 MG Oral Capsule Delayed Release  TAKE 1 CAPSULE EVERY DAY   Quantity: 30;  Refills: 5       Christina Both DO;  Started 13-Dec-2013  Active      oxybutynin (DITROPAN-XL) 5 mg 24 hr tablet TAKE 1 TABLET DAILY 30 tablet 5    oxyCODONE (OXYCONTIN) 10 mg 12 hr tablet OxyCONTIN 10 MG Oral Tablet ER 12 Hour Abuse-Deterrent  TAKE 1 TABLET EVERY 12 HOURS DAILY  Quantity: 60;  Refills: 0       Christina Both DO;  Started 10-Sep-2014  Active      potassium chloride (KLOR-CON 10) 10 mEq tablet Take 10 mEq by mouth daily   propranolol (INDERAL) 10 mg tablet Propranolol HCl - 10 MG Oral Tablet  TAKE 1 TABLET TWICE DAILY  Quantity: 60;  Refills: 5       Christina Both DO;  Active      spironolactone (ALDACTONE) 25 mg tablet Spironolactone 25 MG Oral Tablet  Take 1 tablet daily   Quantity: 30;  Refills: 5 Nereyda Sarah ;  Started 26-Mar-2015  Active      amLODIPine (NORVASC) 5 mg tablet Take 1 tablet by mouth daily for 30 days 30 tablet 0    aspirin 81 mg chewable tablet Chew 1 tablet daily for 30 days 30 tablet 0     No current facility-administered medications for this visit  Allergies  Allergies   Allergen Reactions    Aspirin GI Intolerance    Warfarin          The following portions of the patient's history were reviewed and updated as appropriate: allergies, current medications, past medical history, past social history, past surgical history and problem list       Vitals  Vitals:    03/01/18 0954   BP: 130/74   BP Location: Left arm   Patient Position: Sitting       Physical Exam    Constitutional   General appearance: Patient is seated and in no acute distress, well appearing and well nourished  Head and Face   Head and face: Normal     Eyes   Conjunctiva and lids: No erythema, swelling or discharge  Ears, Nose, Mouth, and Throat   Hearing: Normal     Pulmonary   Respiratory effort: No increased work of breathing or signs of respiratory distress  Cardiovascular   Examination of extremities for edema and/or varicosities: Normal     Abdomen   Abdomen: Non-tender, no masses  Musculoskeletal   Gait and station: Wheelchair bound  Right left with a 3cm hematoma with no erythema, edema, or drainage  Skin   Skin and subcutaneous tissue: Warm, dry, and intact  No visible lesions or rashes  Psychiatric   Judgment and insight: Normal  Recent and remote memory:  Normal  Mood and affect: Normal        Results  No results found for this or any previous visit (from the past 1 hour(s))  ]  No results found for: PSA  Lab Results   Component Value Date    GLUCOSE 109 06/06/2017    CALCIUM 8 4 06/06/2017     (L) 06/06/2017    K 3 8 06/06/2017    CO2 30 06/06/2017    CL 99 (L) 06/06/2017    BUN 9 06/06/2017    CREATININE 0 87 06/06/2017     Lab Results   Component Value Date    WBC 8 28 06/06/2017 HGB 11 6 06/06/2017    HCT 34 7 (L) 06/06/2017    MCV 85 06/06/2017     (L) 06/06/2017           Orders  No orders of the defined types were placed in this encounter

## 2018-03-05 ENCOUNTER — OFFICE VISIT (OUTPATIENT)
Dept: FAMILY MEDICINE CLINIC | Facility: CLINIC | Age: 78
End: 2018-03-05
Payer: MEDICARE

## 2018-03-05 VITALS
RESPIRATION RATE: 16 BRPM | TEMPERATURE: 98 F | DIASTOLIC BLOOD PRESSURE: 90 MMHG | HEART RATE: 78 BPM | SYSTOLIC BLOOD PRESSURE: 122 MMHG

## 2018-03-05 DIAGNOSIS — T14.8XXA WOUND OF SKIN: ICD-10-CM

## 2018-03-05 DIAGNOSIS — G89.29 OTHER CHRONIC PAIN: Primary | ICD-10-CM

## 2018-03-05 PROCEDURE — 99213 OFFICE O/P EST LOW 20 MIN: CPT | Performed by: NURSE PRACTITIONER

## 2018-03-05 RX ORDER — POTASSIUM CHLORIDE 750 MG/1
1 TABLET, FILM COATED, EXTENDED RELEASE ORAL DAILY
COMMUNITY
Start: 2014-09-16 | End: 2018-09-14 | Stop reason: SDUPTHER

## 2018-03-05 RX ORDER — SENNA PLUS 8.6 MG/1
1 TABLET ORAL
COMMUNITY
End: 2020-03-20 | Stop reason: HOSPADM

## 2018-03-05 RX ORDER — OXYCODONE HCL 10 MG/1
10 TABLET, FILM COATED, EXTENDED RELEASE ORAL EVERY 12 HOURS SCHEDULED
Qty: 60 TABLET | Refills: 0 | Status: SHIPPED | OUTPATIENT
Start: 2018-03-05 | End: 2018-04-04

## 2018-03-05 RX ORDER — OMEPRAZOLE 20 MG/1
2 CAPSULE, DELAYED RELEASE ORAL DAILY
COMMUNITY
Start: 2013-12-13 | End: 2018-04-21 | Stop reason: SDUPTHER

## 2018-03-05 RX ORDER — TRAMADOL HYDROCHLORIDE 50 MG/1
50 TABLET ORAL EVERY 8 HOURS PRN
Qty: 30 TABLET | Refills: 0 | Status: SHIPPED | OUTPATIENT
Start: 2018-03-05 | End: 2018-04-09 | Stop reason: SDUPTHER

## 2018-03-05 RX ORDER — TRAMADOL HYDROCHLORIDE 50 MG/1
1 TABLET ORAL AS NEEDED
COMMUNITY
Start: 2016-08-09 | End: 2018-03-05 | Stop reason: SDUPTHER

## 2018-03-05 RX ORDER — NYSTATIN 100000 [USP'U]/G
1 POWDER TOPICAL 3 TIMES DAILY
COMMUNITY
End: 2018-03-25 | Stop reason: SDUPTHER

## 2018-03-05 RX ORDER — LORAZEPAM 0.5 MG/1
1 TABLET ORAL 3 TIMES DAILY
COMMUNITY
End: 2018-08-02 | Stop reason: ALTCHOICE

## 2018-03-05 NOTE — PROGRESS NOTES
Chief Complaint   Patient presents with    Follow-up     Pt here for hospital and [facility] follow up  Pt also states she is breaking out in blood blisters  Assessment/Plan:  PDMP checked, verified  Prescription request appropriate  Wound of skin  Appears to be adhesive tape abrasions of her left thigh  Chronic pain  Uses tramadol as needed and oxycontin 10mg every 12 hours       Diagnoses and all orders for this visit:    Other chronic pain  -     traMADol (ULTRAM) 50 mg tablet; Take 1 tablet (50 mg total) by mouth every 8 (eight) hours as needed for moderate pain Every 4-6 hours  -     oxyCODONE (OXYCONTIN) 10 mg 12 hr tablet; Take 1 tablet (10 mg total) by mouth every 12 (twelve) hours for 30 days Max Daily Amount: 20 mg    Wound of skin  -     neomycin-polymyxin-pramoxine (ANTIBIOTIC CREAM PLUS PAIN RELIEF) 1 % cream; Apply topically 2 (two) times a day To left thigh wound and other open areas          Subjective:      Patient ID: Constantine Tello is a 76 y o  female  Patient  states patient is due for pain medication  Per patient she has "blood blisters" they have appeared on her legs  She thought it was related to tape so she states she changed tape and still they appear  The following portions of the patient's history were reviewed and updated as appropriate: allergies, current medications, past family history, past medical history, past social history, past surgical history and problem list     Review of Systems   Constitutional: Negative for chills and fatigue  Respiratory: Negative for cough and chest tightness  Skin: Positive for wound  Hematological: Bruises/bleeds easily  Objective:      /90 (BP Location: Left arm, Patient Position: Sitting, Cuff Size: Adult)   Pulse 78   Temp 98 °F (36 7 °C) (Temporal)   Resp 16          Physical Exam   Constitutional: Vital signs are normal  She appears well-developed and well-nourished     Cardiovascular: Normal rate, regular rhythm, S1 normal and S2 normal     Pulmonary/Chest: Effort normal and breath sounds normal    Lymphadenopathy:     She has no cervical adenopathy  Skin: Abrasion (left thigh, dry and healed  ) noted  No bruising and no ecchymosis noted  There is erythema (healed)  Skin wound of the left thigh, suspect tape abrasion from adhesive and fragility of skin  Recommend less abrasive tape  Even with paper tape patient still gets abrasion  Recommend using adhesive remover at all times

## 2018-03-05 NOTE — PATIENT INSTRUCTIONS
Problem List Items Addressed This Visit        Musculoskeletal and Integument    Wound of skin     Appears to be adhesive tape abrasions of her left thigh            Relevant Medications    neomycin-polymyxin-pramoxine (ANTIBIOTIC CREAM PLUS PAIN RELIEF) 1 % cream       Other    Chronic pain - Primary     Uses tramadol as needed and oxycontin 10mg every 12 hours         Relevant Medications    traMADol (ULTRAM) 50 mg tablet    oxyCODONE (OXYCONTIN) 10 mg 12 hr tablet

## 2018-03-13 DIAGNOSIS — I10 ESSENTIAL HYPERTENSION: Primary | ICD-10-CM

## 2018-03-14 ENCOUNTER — TELEPHONE (OUTPATIENT)
Dept: FAMILY MEDICINE CLINIC | Facility: CLINIC | Age: 78
End: 2018-03-14

## 2018-03-14 RX ORDER — SPIRONOLACTONE 25 MG/1
TABLET ORAL
Qty: 30 TABLET | Refills: 5 | Status: SHIPPED | OUTPATIENT
Start: 2018-03-14 | End: 2018-09-17 | Stop reason: SDUPTHER

## 2018-03-14 RX ORDER — PROPRANOLOL HYDROCHLORIDE 10 MG/1
TABLET ORAL
Qty: 60 TABLET | Refills: 5 | Status: SHIPPED | OUTPATIENT
Start: 2018-03-14 | End: 2018-09-17 | Stop reason: SDUPTHER

## 2018-03-16 DIAGNOSIS — Z74.2 NEED FOR HOME HEALTH CARE: Primary | ICD-10-CM

## 2018-03-16 NOTE — TELEPHONE ENCOUNTER
I looked under pt 's media tab to see what facility was involved in her home care and it was [facility]

## 2018-03-19 NOTE — TELEPHONE ENCOUNTER
[place] paperwork has been printed out, needs to be completed sign and faxed  Dr Alison Byers can you pls advise, paperwork in your misc folder

## 2018-03-21 ENCOUNTER — TELEPHONE (OUTPATIENT)
Dept: FAMILY MEDICINE CLINIC | Facility: CLINIC | Age: 78
End: 2018-03-21

## 2018-03-21 NOTE — TELEPHONE ENCOUNTER
Brayden Ruano from [facility] called following up on referral for re-certification for pt to continue home care for pt  Please generate script and fax to [phone]  Any questions Brayden Ruano can be reached at [phone]  Pls advise

## 2018-03-22 ENCOUNTER — TELEPHONE (OUTPATIENT)
Dept: FAMILY MEDICINE CLINIC | Facility: CLINIC | Age: 78
End: 2018-03-22

## 2018-03-22 NOTE — TELEPHONE ENCOUNTER
Plan of care Form was completed and faxed to [facility]  Patient was notified  Patient requested a copy of the form which was mailed to her

## 2018-03-25 DIAGNOSIS — B36.9 FUNGAL DERMATITIS: Primary | ICD-10-CM

## 2018-03-27 RX ORDER — NYSTATIN 100000 [USP'U]/G
POWDER TOPICAL
Qty: 15 G | Refills: 2 | Status: SHIPPED | OUTPATIENT
Start: 2018-03-27 | End: 2018-08-02 | Stop reason: ALTCHOICE

## 2018-04-09 DIAGNOSIS — G89.29 OTHER CHRONIC PAIN: ICD-10-CM

## 2018-04-09 DIAGNOSIS — E87.6 HYPOKALEMIA: Primary | ICD-10-CM

## 2018-04-09 RX ORDER — POTASSIUM CHLORIDE 750 MG/1
CAPSULE, EXTENDED RELEASE ORAL
Qty: 30 CAPSULE | Refills: 5 | Status: SHIPPED | OUTPATIENT
Start: 2018-04-09 | End: 2018-10-10 | Stop reason: SDUPTHER

## 2018-04-09 RX ORDER — TRAMADOL HYDROCHLORIDE 50 MG/1
50 TABLET ORAL EVERY 8 HOURS PRN
Qty: 90 TABLET | Refills: 5 | Status: SHIPPED | OUTPATIENT
Start: 2018-04-09 | End: 2018-05-09

## 2018-04-09 NOTE — TELEPHONE ENCOUNTER
Patient's  came in requesting medication refill on Tramadol 50 Mg  To be sent to [facility] on [address]

## 2018-04-12 DIAGNOSIS — R60.9 EDEMA, UNSPECIFIED TYPE: Primary | ICD-10-CM

## 2018-04-12 RX ORDER — FUROSEMIDE 40 MG/1
TABLET ORAL
Qty: 45 TABLET | Refills: 2 | Status: SHIPPED | OUTPATIENT
Start: 2018-04-12 | End: 2018-10-10 | Stop reason: SDUPTHER

## 2018-04-21 DIAGNOSIS — K21.9 GASTROESOPHAGEAL REFLUX DISEASE, ESOPHAGITIS PRESENCE NOT SPECIFIED: Primary | ICD-10-CM

## 2018-04-23 RX ORDER — OMEPRAZOLE 20 MG/1
CAPSULE, DELAYED RELEASE ORAL
Qty: 60 CAPSULE | Refills: 3 | Status: SHIPPED | OUTPATIENT
Start: 2018-04-23 | End: 2018-08-15 | Stop reason: SDUPTHER

## 2018-05-18 ENCOUNTER — TELEPHONE (OUTPATIENT)
Dept: FAMILY MEDICINE CLINIC | Facility: CLINIC | Age: 78
End: 2018-05-18

## 2018-05-18 DIAGNOSIS — I10 ESSENTIAL HYPERTENSION: Primary | ICD-10-CM

## 2018-05-18 DIAGNOSIS — R52 PAIN: Primary | ICD-10-CM

## 2018-05-18 RX ORDER — BUSPIRONE HYDROCHLORIDE 5 MG/1
TABLET ORAL
Qty: 90 TABLET | Refills: 5 | Status: SHIPPED | OUTPATIENT
Start: 2018-05-18 | End: 2018-09-18 | Stop reason: SDUPTHER

## 2018-05-18 RX ORDER — OXYCODONE HCL 10 MG/1
10 TABLET, FILM COATED, EXTENDED RELEASE ORAL EVERY 12 HOURS SCHEDULED
Qty: 60 TABLET | Refills: 0 | Status: SHIPPED | OUTPATIENT
Start: 2018-05-18 | End: 2018-05-18 | Stop reason: SDUPTHER

## 2018-05-18 RX ORDER — OXYCODONE HCL 10 MG/1
10 TABLET, FILM COATED, EXTENDED RELEASE ORAL EVERY 12 HOURS SCHEDULED
Qty: 60 TABLET | Refills: 0 | Status: SHIPPED | OUTPATIENT
Start: 2018-05-18 | End: 2018-07-16 | Stop reason: SDUPTHER

## 2018-05-18 RX ORDER — AMLODIPINE BESYLATE 5 MG/1
TABLET ORAL
Qty: 30 TABLET | Refills: 3 | Status: SHIPPED | OUTPATIENT
Start: 2018-05-18 | End: 2018-09-17 | Stop reason: SDUPTHER

## 2018-05-18 NOTE — TELEPHONE ENCOUNTER
Needs a refill on her Oxycodone 10mg [facility] [address] [facility]  Patient would like a call back when the Doctor calls in  He is used to picking up the prescription  I told him, Dr Thiago Salinas can now call it in

## 2018-05-23 ENCOUNTER — TELEPHONE (OUTPATIENT)
Dept: FAMILY MEDICINE CLINIC | Facility: CLINIC | Age: 78
End: 2018-05-23

## 2018-05-23 NOTE — TELEPHONE ENCOUNTER
Kendra's from [facility] called she is re-certifying pt  For 60 days for wells catheter management  They wanted to make you aware

## 2018-07-13 ENCOUNTER — TELEPHONE (OUTPATIENT)
Dept: FAMILY MEDICINE CLINIC | Facility: CLINIC | Age: 78
End: 2018-07-13

## 2018-07-13 DIAGNOSIS — R33.9 URINARY RETENTION: Primary | ICD-10-CM

## 2018-07-13 DIAGNOSIS — Z46.6 ENCOUNTER FOR FITTING AND ADJUSTMENT OF URINARY DEVICE: ICD-10-CM

## 2018-07-13 NOTE — TELEPHONE ENCOUNTER
[facility] to [facility] pt for another 60 days of care  She does accept verbals but if you could please generate script and fax it to [phone]

## 2018-07-16 DIAGNOSIS — F11.20 UNCOMPLICATED OPIOID DEPENDENCE (HCC): Primary | Chronic | ICD-10-CM

## 2018-07-16 DIAGNOSIS — R52 PAIN: ICD-10-CM

## 2018-07-16 NOTE — TELEPHONE ENCOUNTER
Pt called in for refill on   oxyCODONE (OxyCONTIN) 10 mg 12 hr tablet  30 days 60 qty   Tramadol 50 mg  30 days 90 qty   Sent to [facility] on file   Last ov was 03/05/18 with rober

## 2018-07-17 RX ORDER — TRAMADOL HYDROCHLORIDE 50 MG/1
50 TABLET ORAL EVERY 6 HOURS PRN
Qty: 90 TABLET | Refills: 0 | Status: SHIPPED | OUTPATIENT
Start: 2018-07-17 | End: 2018-10-04 | Stop reason: CLARIF

## 2018-07-17 RX ORDER — OXYCODONE HCL 10 MG/1
10 TABLET, FILM COATED, EXTENDED RELEASE ORAL EVERY 12 HOURS SCHEDULED
Qty: 60 TABLET | Refills: 0 | Status: SHIPPED | OUTPATIENT
Start: 2018-07-17 | End: 2018-09-20 | Stop reason: SDUPTHER

## 2018-07-18 ENCOUNTER — TELEPHONE (OUTPATIENT)
Dept: FAMILY MEDICINE CLINIC | Facility: CLINIC | Age: 78
End: 2018-07-18

## 2018-07-18 NOTE — TELEPHONE ENCOUNTER
Lakeshia from home care called requesting a new order to re-certify pt for continueous home care visit with priscilla loving management  New order should be faxed to [phone]  Pls advise when order has been generated

## 2018-07-18 NOTE — TELEPHONE ENCOUNTER
Pt spouse called following up med refill request  Spouse was advised that request was approved ans sent to [facility] pharmacy

## 2018-08-02 DIAGNOSIS — K59.00 CONSTIPATION, UNSPECIFIED CONSTIPATION TYPE: Primary | ICD-10-CM

## 2018-08-02 DIAGNOSIS — I10 HYPERTENSION, UNSPECIFIED TYPE: ICD-10-CM

## 2018-08-02 DIAGNOSIS — R05.9 COUGH: ICD-10-CM

## 2018-08-02 DIAGNOSIS — K30 INDIGESTION: ICD-10-CM

## 2018-08-02 DIAGNOSIS — K59.00 CONSTIPATION, UNSPECIFIED CONSTIPATION TYPE: ICD-10-CM

## 2018-08-02 DIAGNOSIS — R52 MILD PAIN: ICD-10-CM

## 2018-08-02 RX ORDER — GUAIFENESIN DEXTROMETHORPHAN HYDROBROMIDE ORAL SOLUTION 10; 100 MG/5ML; MG/5ML
5 SOLUTION ORAL EVERY 12 HOURS
Qty: 10 ML | Refills: 0 | Status: CANCELLED | OUTPATIENT
Start: 2018-08-02

## 2018-08-02 RX ORDER — POLYETHYLENE GLYCOL 3350 17 G/17G
17 POWDER, FOR SOLUTION ORAL DAILY
Qty: 14 EACH | Refills: 0 | Status: CANCELLED | OUTPATIENT
Start: 2018-08-02

## 2018-08-02 RX ORDER — DOCUSATE SODIUM 100 MG/1
100 CAPSULE, LIQUID FILLED ORAL 2 TIMES DAILY
Qty: 10 CAPSULE | Refills: 0 | Status: CANCELLED | OUTPATIENT
Start: 2018-08-02

## 2018-08-02 RX ORDER — FAMOTIDINE 10 MG
10 TABLET ORAL 2 TIMES DAILY
Qty: 30 TABLET | Refills: 0 | Status: CANCELLED | OUTPATIENT
Start: 2018-08-02

## 2018-08-15 DIAGNOSIS — K21.9 GASTROESOPHAGEAL REFLUX DISEASE, ESOPHAGITIS PRESENCE NOT SPECIFIED: ICD-10-CM

## 2018-08-15 RX ORDER — OMEPRAZOLE 20 MG/1
CAPSULE, DELAYED RELEASE ORAL
Qty: 60 CAPSULE | Refills: 3 | Status: SHIPPED | OUTPATIENT
Start: 2018-08-15 | End: 2018-11-08 | Stop reason: SDUPTHER

## 2018-08-24 DIAGNOSIS — B36.9 FUNGAL DERMATITIS: ICD-10-CM

## 2018-08-24 RX ORDER — NYSTATIN 100000 [USP'U]/G
POWDER TOPICAL
Qty: 15 G | Refills: 2 | Status: SHIPPED | OUTPATIENT
Start: 2018-08-24 | End: 2018-12-10 | Stop reason: SDUPTHER

## 2018-09-13 DIAGNOSIS — N32.81 OAB (OVERACTIVE BLADDER): ICD-10-CM

## 2018-09-13 DIAGNOSIS — I10 ESSENTIAL HYPERTENSION: ICD-10-CM

## 2018-09-14 ENCOUNTER — OFFICE VISIT (OUTPATIENT)
Dept: UROLOGY | Facility: CLINIC | Age: 78
End: 2018-09-14
Payer: MEDICARE

## 2018-09-14 VITALS — DIASTOLIC BLOOD PRESSURE: 70 MMHG | SYSTOLIC BLOOD PRESSURE: 130 MMHG | HEART RATE: 60 BPM

## 2018-09-14 DIAGNOSIS — I63.9 CEREBROVASCULAR ACCIDENT (CVA), UNSPECIFIED MECHANISM (HCC): ICD-10-CM

## 2018-09-14 DIAGNOSIS — R33.9 URINARY RETENTION: Primary | ICD-10-CM

## 2018-09-14 PROCEDURE — 99213 OFFICE O/P EST LOW 20 MIN: CPT | Performed by: UROLOGY

## 2018-09-14 RX ORDER — SPIRONOLACTONE 25 MG/1
TABLET ORAL
Qty: 30 TABLET | Refills: 5 | OUTPATIENT
Start: 2018-09-14

## 2018-09-14 RX ORDER — OXYBUTYNIN CHLORIDE 5 MG/1
TABLET, EXTENDED RELEASE ORAL
Qty: 30 TABLET | Refills: 5 | OUTPATIENT
Start: 2018-09-14

## 2018-09-14 RX ORDER — PROPRANOLOL HYDROCHLORIDE 10 MG/1
TABLET ORAL
Qty: 60 TABLET | Refills: 5 | OUTPATIENT
Start: 2018-09-14

## 2018-09-14 RX ORDER — AMLODIPINE BESYLATE 5 MG/1
TABLET ORAL
Qty: 30 TABLET | Refills: 3 | OUTPATIENT
Start: 2018-09-14

## 2018-09-14 NOTE — PROGRESS NOTES
UROLOGY NEW CONSULT NOTE     CHIEF COMPLAINT   Rajni Arriaga is a 76 y o  female with a complaint of   Chief Complaint   Patient presents with    Urinary Retention       History of Present Illness:     76 y o  female with a history of stroke and fall  She initially presented from her nursing facility  She is wheelchair bound and non ambulatory  She has been catheter dependent since 2016 and been doing very well  There is a visiting nurse that comes to her house for catheter exchanges  Patient has not had significant urinary tract infection is and is relatively comfortable with the catheter  She does have some occasional mild bladder spasms which is treated with Ditropan  Her bowels are regular  Past Medical History:     Past Medical History:   Diagnosis Date    Anxiety     Atrial fibrillation ([facility] )     CHF (congestive heart failure) ([facility] )     Depression     Opioid dependence (HCC)     Pneumonia        PAST SURGICAL HISTORY:     Past Surgical History:   Procedure Laterality Date    APPENDECTOMY      CHOLECYSTECTOMY      TONSILLECTOMY      TOTAL KNEE ARTHROPLASTY Bilateral     TUBAL LIGATION         CURRENT MEDICATIONS:     Current Outpatient Prescriptions   Medication Sig Dispense Refill    amLODIPine (NORVASC) 5 mg tablet TAKE 1 TABLET BY MOUTH EVERY DAY 30 tablet 3    aspirin 81 MG tablet Take 1 tablet by mouth daily      busPIRone (BUSPAR) 5 mg tablet TAKE 1 TABLET 3 TIMES DAILY 90 tablet 5    furosemide (LASIX) 40 mg tablet TAKE 1 5 TABLETS DAILY 45 tablet 2    nystatin (MYCOSTATIN) powder APPLY 2-3 TIMES DAILY TO AFFECTED AREA(S)   15 g 2    omeprazole (PriLOSEC) 20 mg delayed release capsule TAKE 2 CAPSULES EVERY DAY 60 capsule 3    oxybutynin (DITROPAN-XL) 5 mg 24 hr tablet TAKE 1 TABLET DAILY 30 tablet 5    oxyCODONE (OxyCONTIN) 10 mg 12 hr tablet Take 1 tablet (10 mg total) by mouth every 12 (twelve) hours Max Daily Amount: 20 mg 60 tablet 0    potassium chloride (MICRO-K) 10 MEQ CR capsule TAKE 1 CAPSULE DAILY 30 capsule 5    propranolol (INDERAL) 10 mg tablet TAKE 1 TABLET TWICE A DAY 60 tablet 5    senna (SENOKOT) 8 6 MG tablet Take 2 tablets by mouth daily at bedtime      spironolactone (ALDACTONE) 25 mg tablet TAKE 1 TABLET DAILY 30 tablet 5    traMADol (ULTRAM) 50 mg tablet Take 1 tablet (50 mg total) by mouth every 6 (six) hours as needed for moderate pain 90 tablet 0     No current facility-administered medications for this visit  ALLERGIES:     Allergies   Allergen Reactions    Aspirin GI Intolerance    Warfarin        SOCIAL HISTORY:     Social History     Social History    Marital status: /Civil Union     Spouse name: N/A    Number of children: 3    Years of education: N/A     Occupational History    flower       Social History Main Topics    Smoking status: Former Smoker     Types: Cigarettes     Quit date: 6/6/1980    Smokeless tobacco: Former User     Quit date: 1/1/2017    Alcohol use No    Drug use: No    Sexual activity: Not Asked     Other Topics Concern    None     Social History Narrative    ACTIVE ADVANCED DIRECTIVES    ALWAYS USES SEATBELTS    CAFFEINE USE     DENIED HX OF DOMESTIC VIOLENCE     LACK OF EXERCISE     LIVES WITH     PT HAS LIVING WILL AND POA     SUPPORTIVE AND SAFE     NO Roman Catholic STATUS            SOCIAL HISTORY:     Family History   Problem Relation Age of Onset    Cancer Father     Hypertension Sister        REVIEW OF SYSTEMS:     Review of Systems   Constitutional: Positive for activity change  Respiratory: Negative  Cardiovascular: Positive for leg swelling  Gastrointestinal: Positive for constipation  Musculoskeletal: Positive for gait problem  Skin: Negative  Psychiatric/Behavioral: Negative  PHYSICAL EXAM:     /70   Pulse 60     General:  Elderly wheelchair-bound female They have a normal affect    There is not appear to be any gross neurologic defects or abnormalities  HEENT:  Normocephalic, atraumatic  Neck is supple without any palpable lymphadenopathy  Cardiovascular:  Patient has normal palpable distal radial pulses  There is no significant peripheral edema  No JVD is noted  Respiratory:  Patient has unlabored respirations  There is no audible wheeze or rhonchi  Abdomen:   Abdomen is soft and nontender  There is no tympany  Inguinal and umbilical hernia are not appreciated  Genitourinary:  Catheter in place draining clear urine    Musculoskeletal:  Wheelchair-bound  Dermatologic:  Patient has no skin abnormalities or rashes  LABS:     CBC:   Lab Results   Component Value Date    WBC 8 28 2017    HGB 11 6 2017    HCT 34 7 (L) 2017    MCV 85 2017     (L) 2017       BMP:   Lab Results   Component Value Date    GLUCOSE 123 09/15/2014    CALCIUM 8 4 2017     (L) 2017    K 3 8 2017    CO2 30 2017    CL 99 (L) 2017    BUN 9 2017    CREATININE 0 87 2017       IMAGIN/20/17  RENAL ULTRASOUND     INDICATION: Urinary urgency and frequency  Davies catheter placed over the last several months      COMPARISON: None      TECHNIQUE:   Ultrasound of the retroperitoneum was performed with a curvilinear transducer utilizing volumetric sweeps and still imaging techniques       FINDINGS:     KIDNEYS:  Symmetric and normal size      Right kidney:  10 1 x 5 0 cm  Normal echogenicity and contour  No suspicious masses detected  No hydronephrosis  No shadowing calculi  No perinephric fluid collections      Left kidney:  11 0 x 5 7 cm  Normal echogenicity and contour  No suspicious masses detected  No hydronephrosis  No shadowing calculi  No perinephric fluid collections      URETERS:  Nonvisualized      BLADDER:   There is a Davies catheter within a collapsed bladder  The bladder cannot be evaluated      IMPRESSION:     No hydronephrosis  No nephrolithiasis  No discrete mass      Incomplete evaluation of the bladder which is decompressed by a Davies catheter  ASSESSMENT:     76 y o  female status post stroke now catheter dependent    PLAN:     Patient remains catheter dependent as she is non ambulatory  Her catheter was exchanged yesterday  This should be continued on a monthly basis  Patient has not had a recent renal ultrasound so I will recommend 1 now and again in 1 year when she can return for follow-up  She can remain on Ditropan extended release as this helped to control spasms  Should her constipation worsen, we would consider transitioning her to Myrbetriq    She is not having significant infections and will return to see me in 1 year

## 2018-09-14 NOTE — TELEPHONE ENCOUNTER
Can do house call a week from [address] [facility] [address] she want 30day rxes to hold her over until then?

## 2018-09-17 DIAGNOSIS — I10 ESSENTIAL HYPERTENSION: ICD-10-CM

## 2018-09-17 DIAGNOSIS — N32.81 OAB (OVERACTIVE BLADDER): ICD-10-CM

## 2018-09-17 RX ORDER — OXYBUTYNIN CHLORIDE 5 MG/1
5 TABLET, EXTENDED RELEASE ORAL DAILY
Qty: 30 TABLET | Refills: 0 | Status: SHIPPED | OUTPATIENT
Start: 2018-09-17 | End: 2018-10-18 | Stop reason: SDUPTHER

## 2018-09-17 RX ORDER — SPIRONOLACTONE 25 MG/1
25 TABLET ORAL DAILY
Qty: 30 TABLET | Refills: 0 | Status: SHIPPED | OUTPATIENT
Start: 2018-09-17 | End: 2018-10-18 | Stop reason: SDUPTHER

## 2018-09-17 RX ORDER — PROPRANOLOL HYDROCHLORIDE 10 MG/1
10 TABLET ORAL 2 TIMES DAILY
Qty: 60 TABLET | Refills: 0 | Status: SHIPPED | OUTPATIENT
Start: 2018-09-17 | End: 2018-10-18 | Stop reason: SDUPTHER

## 2018-09-17 RX ORDER — AMLODIPINE BESYLATE 5 MG/1
5 TABLET ORAL DAILY
Qty: 30 TABLET | Refills: 0 | Status: SHIPPED | OUTPATIENT
Start: 2018-09-17 | End: 2018-10-18 | Stop reason: SDUPTHER

## 2018-09-18 DIAGNOSIS — F41.9 ANXIETY: Primary | Chronic | ICD-10-CM

## 2018-09-18 DIAGNOSIS — I10 ESSENTIAL HYPERTENSION: ICD-10-CM

## 2018-09-18 NOTE — TELEPHONE ENCOUNTER
Patient's pharmacy called in requesting medication refill on her Buspirone to be sent to the [facility] pharmacy on file  Last OV was 3/5/2018

## 2018-09-19 ENCOUNTER — HOSPITAL ENCOUNTER (OUTPATIENT)
Dept: ULTRASOUND IMAGING | Facility: HOSPITAL | Age: 78
Discharge: HOME | End: 2018-09-19
Attending: UROLOGY
Payer: MEDICARE

## 2018-09-19 DIAGNOSIS — R33.9 URINARY RETENTION: ICD-10-CM

## 2018-09-19 PROCEDURE — 76770 US EXAM ABDO BACK WALL COMP: CPT

## 2018-09-19 RX ORDER — BUSPIRONE HYDROCHLORIDE 5 MG/1
5 TABLET ORAL 3 TIMES DAILY
Qty: 270 TABLET | Refills: 0 | Status: SHIPPED | OUTPATIENT
Start: 2018-09-19 | End: 2018-12-13 | Stop reason: SDUPTHER

## 2018-09-20 ENCOUNTER — TELEPHONE (OUTPATIENT)
Dept: FAMILY MEDICINE CLINIC | Facility: CLINIC | Age: 78
End: 2018-09-20

## 2018-09-20 DIAGNOSIS — R52 PAIN: ICD-10-CM

## 2018-09-20 DIAGNOSIS — F11.20 UNCOMPLICATED OPIOID DEPENDENCE (HCC): Chronic | ICD-10-CM

## 2018-09-20 RX ORDER — OXYCODONE HCL 10 MG/1
10 TABLET, FILM COATED, EXTENDED RELEASE ORAL EVERY 12 HOURS SCHEDULED
Qty: 60 TABLET | Refills: 0 | Status: SHIPPED | OUTPATIENT
Start: 2018-09-20 | End: 2018-11-19 | Stop reason: SDUPTHER

## 2018-09-20 NOTE — TELEPHONE ENCOUNTER
Pt spouse called for pt med refill for oxycodone 10mg to be sent to [facility] pharmacy   Pt last ov 3/5/18 last bw 6/6/17

## 2018-09-20 NOTE — TELEPHONE ENCOUNTER
Home care service called to let you know patient was re certify for home care for the next 60 days for folic acid management

## 2018-09-24 ENCOUNTER — TELEPHONE (OUTPATIENT)
Dept: FAMILY MEDICINE CLINIC | Facility: CLINIC | Age: 78
End: 2018-09-24

## 2018-09-24 NOTE — TELEPHONE ENCOUNTER
Froilan  from [facility] called following up on physician order faxed for Dr Zamzam Harrington to sign  Froilan  was advised order has not been received as of yet

## 2018-09-27 ENCOUNTER — OFFICE VISIT (OUTPATIENT)
Dept: FAMILY MEDICINE CLINIC | Facility: CLINIC | Age: 78
End: 2018-09-27
Payer: MEDICARE

## 2018-09-27 DIAGNOSIS — G89.29 OTHER CHRONIC PAIN: ICD-10-CM

## 2018-09-27 DIAGNOSIS — M79.672 LEFT FOOT PAIN: ICD-10-CM

## 2018-09-27 DIAGNOSIS — R26.2 AMBULATORY DYSFUNCTION: Chronic | ICD-10-CM

## 2018-09-27 DIAGNOSIS — I10 ESSENTIAL HYPERTENSION: Primary | ICD-10-CM

## 2018-09-27 PROCEDURE — 99348 HOME/RES VST EST LOW MDM 30: CPT | Performed by: FAMILY MEDICINE

## 2018-10-04 ENCOUNTER — TELEPHONE (OUTPATIENT)
Dept: FAMILY MEDICINE CLINIC | Facility: CLINIC | Age: 78
End: 2018-10-04

## 2018-10-04 VITALS — DIASTOLIC BLOOD PRESSURE: 80 MMHG | HEART RATE: 72 BPM | RESPIRATION RATE: 16 BRPM | SYSTOLIC BLOOD PRESSURE: 120 MMHG

## 2018-10-04 PROBLEM — T14.8XXA WOUND OF SKIN: Status: RESOLVED | Noted: 2017-08-08 | Resolved: 2018-10-04

## 2018-10-04 RX ORDER — DOCUSATE SODIUM 100 MG/1
CAPSULE, LIQUID FILLED ORAL EVERY 24 HOURS
COMMUNITY
End: 2020-03-20 | Stop reason: HOSPADM

## 2018-10-04 RX ORDER — SULFAMETHOXAZOLE AND TRIMETHOPRIM 400; 80 MG/1; MG/1
TABLET ORAL EVERY 12 HOURS
COMMUNITY
End: 2018-12-17 | Stop reason: CLARIF

## 2018-10-04 RX ORDER — TAMSULOSIN HYDROCHLORIDE 0.4 MG/1
CAPSULE ORAL EVERY 24 HOURS
COMMUNITY

## 2018-10-04 NOTE — PATIENT INSTRUCTIONS
Pt is doing well , needs lab, await podiatry f/u for foot injury-will get  an ACE wrap, rec ice and elevation

## 2018-10-10 ENCOUNTER — TELEPHONE (OUTPATIENT)
Dept: FAMILY MEDICINE CLINIC | Facility: CLINIC | Age: 78
End: 2018-10-10

## 2018-10-10 DIAGNOSIS — R60.9 EDEMA, UNSPECIFIED TYPE: ICD-10-CM

## 2018-10-10 DIAGNOSIS — E87.6 HYPOKALEMIA: ICD-10-CM

## 2018-10-10 RX ORDER — POTASSIUM CHLORIDE 750 MG/1
10 CAPSULE, EXTENDED RELEASE ORAL DAILY
Qty: 30 CAPSULE | Refills: 5 | Status: SHIPPED | OUTPATIENT
Start: 2018-10-10 | End: 2019-04-03 | Stop reason: SDUPTHER

## 2018-10-10 RX ORDER — FUROSEMIDE 40 MG/1
60 TABLET ORAL DAILY
Qty: 145 TABLET | Refills: 0 | Status: SHIPPED | OUTPATIENT
Start: 2018-10-10 | End: 2019-01-15 | Stop reason: SDUPTHER

## 2018-10-10 NOTE — TELEPHONE ENCOUNTER
----- Message from Walker Adames MD sent at 10/10/2018  6:13 PM EDT -----  Lab stable,no changes meds, recheck TSH, CMP, CBC  6  months-arrange for home draw in April

## 2018-10-18 DIAGNOSIS — N32.81 OAB (OVERACTIVE BLADDER): ICD-10-CM

## 2018-10-18 DIAGNOSIS — I10 ESSENTIAL HYPERTENSION: ICD-10-CM

## 2018-10-18 RX ORDER — AMLODIPINE BESYLATE 5 MG/1
TABLET ORAL
Qty: 30 TABLET | Refills: 5 | Status: SHIPPED | OUTPATIENT
Start: 2018-10-18 | End: 2018-10-19 | Stop reason: SDUPTHER

## 2018-10-18 RX ORDER — SPIRONOLACTONE 25 MG/1
TABLET ORAL
Qty: 30 TABLET | Refills: 5 | Status: SHIPPED | OUTPATIENT
Start: 2018-10-18 | End: 2018-10-19 | Stop reason: SDUPTHER

## 2018-10-18 RX ORDER — PROPRANOLOL HYDROCHLORIDE 10 MG/1
TABLET ORAL
Qty: 60 TABLET | Refills: 5 | Status: SHIPPED | OUTPATIENT
Start: 2018-10-18 | End: 2018-10-19 | Stop reason: SDUPTHER

## 2018-10-18 RX ORDER — OXYBUTYNIN CHLORIDE 5 MG/1
TABLET, EXTENDED RELEASE ORAL
Qty: 30 TABLET | Refills: 5 | Status: SHIPPED | OUTPATIENT
Start: 2018-10-18 | End: 2018-10-19 | Stop reason: SDUPTHER

## 2018-10-19 DIAGNOSIS — I10 ESSENTIAL HYPERTENSION: ICD-10-CM

## 2018-10-19 DIAGNOSIS — N32.81 OAB (OVERACTIVE BLADDER): ICD-10-CM

## 2018-10-19 RX ORDER — PROPRANOLOL HYDROCHLORIDE 10 MG/1
TABLET ORAL
Qty: 60 TABLET | Refills: 5 | Status: SHIPPED | OUTPATIENT
Start: 2018-10-19 | End: 2018-10-23 | Stop reason: SDUPTHER

## 2018-10-19 RX ORDER — SPIRONOLACTONE 25 MG/1
TABLET ORAL
Qty: 30 TABLET | Refills: 5 | Status: SHIPPED | OUTPATIENT
Start: 2018-10-19 | End: 2018-10-23 | Stop reason: SDUPTHER

## 2018-10-19 RX ORDER — AMLODIPINE BESYLATE 5 MG/1
TABLET ORAL
Qty: 30 TABLET | Refills: 5 | Status: SHIPPED | OUTPATIENT
Start: 2018-10-19 | End: 2018-10-23 | Stop reason: SDUPTHER

## 2018-10-19 RX ORDER — OXYBUTYNIN CHLORIDE 5 MG/1
TABLET, EXTENDED RELEASE ORAL
Qty: 30 TABLET | Refills: 5 | Status: SHIPPED | OUTPATIENT
Start: 2018-10-19 | End: 2018-10-23 | Stop reason: SDUPTHER

## 2018-10-23 DIAGNOSIS — I10 ESSENTIAL HYPERTENSION: ICD-10-CM

## 2018-10-23 DIAGNOSIS — N32.81 OAB (OVERACTIVE BLADDER): ICD-10-CM

## 2018-10-23 RX ORDER — PROPRANOLOL HYDROCHLORIDE 10 MG/1
10 TABLET ORAL 2 TIMES DAILY
Qty: 60 TABLET | Refills: 5 | Status: SHIPPED | OUTPATIENT
Start: 2018-10-23 | End: 2019-04-16 | Stop reason: SDUPTHER

## 2018-10-23 RX ORDER — SPIRONOLACTONE 25 MG/1
25 TABLET ORAL DAILY
Qty: 30 TABLET | Refills: 5 | Status: SHIPPED | OUTPATIENT
Start: 2018-10-23 | End: 2019-04-24 | Stop reason: SDUPTHER

## 2018-10-23 RX ORDER — AMLODIPINE BESYLATE 5 MG/1
5 TABLET ORAL DAILY
Qty: 30 TABLET | Refills: 0 | OUTPATIENT
Start: 2018-10-23

## 2018-10-23 RX ORDER — OXYBUTYNIN CHLORIDE 5 MG/1
5 TABLET, EXTENDED RELEASE ORAL DAILY
Qty: 30 TABLET | Refills: 0 | OUTPATIENT
Start: 2018-10-23

## 2018-10-23 RX ORDER — OXYBUTYNIN CHLORIDE 5 MG/1
5 TABLET, EXTENDED RELEASE ORAL DAILY
Qty: 30 TABLET | Refills: 5 | Status: SHIPPED | OUTPATIENT
Start: 2018-10-23 | End: 2019-04-24 | Stop reason: SDUPTHER

## 2018-10-23 RX ORDER — PROPRANOLOL HYDROCHLORIDE 10 MG/1
10 TABLET ORAL 2 TIMES DAILY
Qty: 60 TABLET | Refills: 0 | OUTPATIENT
Start: 2018-10-23

## 2018-10-23 RX ORDER — SPIRONOLACTONE 25 MG/1
25 TABLET ORAL DAILY
Qty: 30 TABLET | Refills: 0 | OUTPATIENT
Start: 2018-10-23

## 2018-10-23 RX ORDER — AMLODIPINE BESYLATE 5 MG/1
5 TABLET ORAL DAILY
Qty: 30 TABLET | Refills: 5 | Status: SHIPPED | OUTPATIENT
Start: 2018-10-23 | End: 2019-04-24 | Stop reason: SDUPTHER

## 2018-10-23 NOTE — TELEPHONE ENCOUNTER
Pt called for Rx refill  Confirmed medication, sig, quantity and pharmacy on file with pt   Last seen: 09/2018

## 2018-11-08 DIAGNOSIS — K21.9 GASTROESOPHAGEAL REFLUX DISEASE, ESOPHAGITIS PRESENCE NOT SPECIFIED: ICD-10-CM

## 2018-11-08 RX ORDER — OMEPRAZOLE 20 MG/1
20 CAPSULE, DELAYED RELEASE ORAL 2 TIMES DAILY
Qty: 60 CAPSULE | Refills: 2 | Status: SHIPPED | OUTPATIENT
Start: 2018-11-08 | End: 2018-11-08 | Stop reason: SDUPTHER

## 2018-11-08 RX ORDER — OMEPRAZOLE 20 MG/1
20 CAPSULE, DELAYED RELEASE ORAL 2 TIMES DAILY
Qty: 60 CAPSULE | Refills: 3 | Status: SHIPPED | OUTPATIENT
Start: 2018-11-08 | End: 2018-11-08 | Stop reason: SDUPTHER

## 2018-11-08 RX ORDER — OMEPRAZOLE 20 MG/1
20 CAPSULE, DELAYED RELEASE ORAL 2 TIMES DAILY
Qty: 60 CAPSULE | Refills: 2 | Status: SHIPPED | OUTPATIENT
Start: 2018-11-08 | End: 2019-02-04 | Stop reason: SDUPTHER

## 2018-11-19 DIAGNOSIS — F11.20 UNCOMPLICATED OPIOID DEPENDENCE (HCC): Chronic | ICD-10-CM

## 2018-11-19 DIAGNOSIS — R52 PAIN: ICD-10-CM

## 2018-11-19 RX ORDER — OXYCODONE HCL 10 MG/1
10 TABLET, FILM COATED, EXTENDED RELEASE ORAL EVERY 12 HOURS SCHEDULED
Qty: 60 TABLET | Refills: 0 | Status: SHIPPED | OUTPATIENT
Start: 2018-11-19 | End: 2018-12-17 | Stop reason: CLARIF

## 2018-11-19 NOTE — TELEPHONE ENCOUNTER
Patient's  called in requesting medication refill on her Oxycodone to be sent to her [facility] pharmacy on [address]    Last ov was 9/27/2018

## 2018-11-29 DIAGNOSIS — F41.9 ANXIETY: Chronic | ICD-10-CM

## 2018-11-29 NOTE — TELEPHONE ENCOUNTER
Pt pharmacy faxed med refill request for pt buspirone hcl 5 mg to be sent to [facility] pharmacy   Pt last ov 9/27/18

## 2018-11-30 DIAGNOSIS — F41.9 ANXIETY: Primary | ICD-10-CM

## 2018-11-30 DIAGNOSIS — F41.9 ANXIETY: ICD-10-CM

## 2018-11-30 RX ORDER — ESCITALOPRAM OXALATE 5 MG/1
5 TABLET ORAL DAILY
Qty: 30 TABLET | Refills: 5 | Status: SHIPPED | OUTPATIENT
Start: 2018-11-30 | End: 2018-11-30 | Stop reason: SDUPTHER

## 2018-11-30 RX ORDER — ESCITALOPRAM OXALATE 5 MG/1
5 TABLET ORAL DAILY
Qty: 30 TABLET | Refills: 5 | Status: SHIPPED | OUTPATIENT
Start: 2018-11-30 | End: 2020-02-21 | Stop reason: HOSPADM

## 2018-11-30 RX ORDER — BUSPIRONE HYDROCHLORIDE 5 MG/1
5 TABLET ORAL 3 TIMES DAILY
Qty: 270 TABLET | Refills: 0 | OUTPATIENT
Start: 2018-11-30

## 2018-11-30 NOTE — TELEPHONE ENCOUNTER
I have no idea how long it will  Be on back order-only pharmacy will know that, does pt want to see how she does off med?

## 2018-11-30 NOTE — TELEPHONE ENCOUNTER
Patient would like to know how long the medication is going to be on backorder would like to stay on this medication called pharmacy and they do not have a date that it will be in

## 2018-12-10 ENCOUNTER — TELEPHONE (OUTPATIENT)
Dept: FAMILY MEDICINE CLINIC | Facility: CLINIC | Age: 78
End: 2018-12-10

## 2018-12-10 DIAGNOSIS — B36.9 FUNGAL DERMATITIS: ICD-10-CM

## 2018-12-10 RX ORDER — NYSTATIN 100000 [USP'U]/G
1 POWDER TOPICAL 2 TIMES DAILY
Qty: 15 G | Refills: 5 | Status: SHIPPED | OUTPATIENT
Start: 2018-12-10 | End: 2019-11-07 | Stop reason: SDUPTHER

## 2018-12-13 DIAGNOSIS — F41.9 ANXIETY: Chronic | ICD-10-CM

## 2018-12-14 RX ORDER — BUSPIRONE HYDROCHLORIDE 5 MG/1
5 TABLET ORAL 3 TIMES DAILY
Qty: 270 TABLET | Refills: 0 | Status: SHIPPED | OUTPATIENT
Start: 2018-12-14 | End: 2019-05-06 | Stop reason: SDUPTHER

## 2018-12-14 NOTE — TELEPHONE ENCOUNTER
Helio Charles in requesting medication refill on patient's Tramadol 50 mg to be sent to her [facility] pharmacy on [facility] for a 90 day supply    Last ov was:9/27/2018

## 2018-12-16 RX ORDER — TRAMADOL HYDROCHLORIDE 50 MG/1
50 TABLET ORAL EVERY 6 HOURS PRN
Qty: 360 TABLET | Refills: 0 | OUTPATIENT
Start: 2018-12-16 | End: 2019-03-16

## 2018-12-17 DIAGNOSIS — G89.4 CHRONIC PAIN SYNDROME: Primary | ICD-10-CM

## 2018-12-17 RX ORDER — TRAMADOL HYDROCHLORIDE 50 MG/1
50 TABLET ORAL EVERY 6 HOURS PRN
Qty: 120 TABLET | Refills: 0 | Status: SHIPPED | OUTPATIENT
Start: 2018-12-17 | End: 2019-03-15 | Stop reason: SDUPTHER

## 2018-12-17 NOTE — TELEPHONE ENCOUNTER
How often is pt taking tramadol so I know how much to send, cannot do this med for 90 day supply anymore

## 2018-12-17 NOTE — TELEPHONE ENCOUNTER
We do not have record of tramadol only percocet-confirm with pharmacy who rxed tramadol and when rxed

## 2018-12-17 NOTE — TELEPHONE ENCOUNTER
As per [employer] this Rx was prescribed by Dr Shawna Kelley for M25 569 Acute Knee Pain 30 day 60 qty 1 refill

## 2018-12-17 NOTE — TELEPHONE ENCOUNTER
Pt's  called back to check on med refill  Pt's  confirmed that it is the Tramadol the Pt needs  He stated that Pt does not need/use the Percocet  Pt's  requested a return call once the Tramadol is ordered    Thank you

## 2018-12-20 ENCOUNTER — TELEPHONE (OUTPATIENT)
Dept: FAMILY MEDICINE CLINIC | Facility: CLINIC | Age: 78
End: 2018-12-20

## 2018-12-24 DIAGNOSIS — M79.606 PAIN OF LOWER EXTREMITY, UNSPECIFIED LATERALITY: Primary | ICD-10-CM

## 2019-01-04 ENCOUNTER — TELEPHONE (OUTPATIENT)
Dept: FAMILY MEDICINE CLINIC | Facility: CLINIC | Age: 79
End: 2019-01-04

## 2019-01-04 NOTE — TELEPHONE ENCOUNTER
Karuna Beauchamp pt PT called [facility] that pt has slid of her bed and landed on her left leg on 1/1/19  Pt has been taking pain med and icing  Pt has complained of pain during PT exercise   Just and FYI

## 2019-01-07 ENCOUNTER — TELEPHONE (OUTPATIENT)
Dept: FAMILY MEDICINE CLINIC | Facility: CLINIC | Age: 79
End: 2019-01-07

## 2019-01-07 DIAGNOSIS — Z46.6 URINARY CATHETER (FOLEY) CHANGE REQUIRED: Primary | ICD-10-CM

## 2019-01-09 ENCOUNTER — TELEPHONE (OUTPATIENT)
Dept: FAMILY MEDICINE CLINIC | Facility: CLINIC | Age: 79
End: 2019-01-09

## 2019-01-09 DIAGNOSIS — G89.4 CHRONIC PAIN SYNDROME: Primary | ICD-10-CM

## 2019-01-09 RX ORDER — OXYCODONE HCL 10 MG/1
10 TABLET, FILM COATED, EXTENDED RELEASE ORAL 2 TIMES DAILY PRN
Qty: 60 TABLET | Refills: 0 | Status: SHIPPED | OUTPATIENT
Start: 2019-01-09 | End: 2019-01-31 | Stop reason: SDUPTHER

## 2019-01-09 NOTE — TELEPHONE ENCOUNTER
Pt came in for oxycontin 10mg script, it was D/C in the system by error but pt still takes on a PRN basis   Last fill per pdmp was 11/19/2018

## 2019-01-14 ENCOUNTER — TELEPHONE (OUTPATIENT)
Dept: FAMILY MEDICINE CLINIC | Facility: CLINIC | Age: 79
End: 2019-01-14

## 2019-01-14 NOTE — TELEPHONE ENCOUNTER
Kendra from [facility] called to notify you that the patient is going to continue [address] and they will provide a POC soon

## 2019-01-15 ENCOUNTER — TELEPHONE (OUTPATIENT)
Dept: FAMILY MEDICINE CLINIC | Facility: CLINIC | Age: 79
End: 2019-01-15

## 2019-01-15 DIAGNOSIS — R60.9 EDEMA, UNSPECIFIED TYPE: ICD-10-CM

## 2019-01-15 RX ORDER — FUROSEMIDE 40 MG/1
60 TABLET ORAL DAILY
Qty: 135 TABLET | Refills: 0 | Status: SHIPPED | OUTPATIENT
Start: 2019-01-15 | End: 2019-07-17 | Stop reason: SDUPTHER

## 2019-01-15 NOTE — TELEPHONE ENCOUNTER
Lety Boyer called for his wife regarding the Oxycontin 10 mg  He went to the pharmacy to pick it up and it was $47 00  He said if we call [facility] to verify that she does need this medication, they will give them a discount  He mentioned that this is done every year

## 2019-01-31 DIAGNOSIS — G89.4 CHRONIC PAIN SYNDROME: ICD-10-CM

## 2019-02-04 DIAGNOSIS — K21.9 GASTROESOPHAGEAL REFLUX DISEASE, ESOPHAGITIS PRESENCE NOT SPECIFIED: ICD-10-CM

## 2019-02-04 RX ORDER — OXYCODONE HCL 10 MG/1
10 TABLET, FILM COATED, EXTENDED RELEASE ORAL 2 TIMES DAILY PRN
Qty: 60 TABLET | Refills: 0 | Status: SHIPPED | OUTPATIENT
Start: 2019-02-04 | End: 2019-03-15 | Stop reason: SDUPTHER

## 2019-02-04 RX ORDER — OMEPRAZOLE 20 MG/1
20 CAPSULE, DELAYED RELEASE ORAL 2 TIMES DAILY
Qty: 60 CAPSULE | Refills: 2 | Status: SHIPPED | OUTPATIENT
Start: 2019-02-04 | End: 2019-04-29 | Stop reason: SDUPTHER

## 2019-02-28 ENCOUNTER — OFFICE VISIT (OUTPATIENT)
Dept: FAMILY MEDICINE CLINIC | Facility: CLINIC | Age: 79
End: 2019-02-28
Payer: MEDICARE

## 2019-02-28 DIAGNOSIS — M54.5 CHRONIC BILATERAL LOW BACK PAIN, WITH SCIATICA PRESENCE UNSPECIFIED: Primary | ICD-10-CM

## 2019-02-28 DIAGNOSIS — G89.29 CHRONIC BILATERAL LOW BACK PAIN, WITH SCIATICA PRESENCE UNSPECIFIED: Primary | ICD-10-CM

## 2019-02-28 DIAGNOSIS — F11.20 UNCOMPLICATED OPIOID DEPENDENCE (HCC): Chronic | ICD-10-CM

## 2019-02-28 DIAGNOSIS — I10 ESSENTIAL HYPERTENSION: ICD-10-CM

## 2019-02-28 PROCEDURE — 99348 HOME/RES VST EST LOW MDM 30: CPT | Performed by: FAMILY MEDICINE

## 2019-03-11 ENCOUNTER — TELEPHONE (OUTPATIENT)
Dept: FAMILY MEDICINE CLINIC | Facility: CLINIC | Age: 79
End: 2019-03-11

## 2019-03-11 VITALS — HEART RATE: 80 BPM | DIASTOLIC BLOOD PRESSURE: 80 MMHG | SYSTOLIC BLOOD PRESSURE: 120 MMHG

## 2019-03-11 PROBLEM — S92.919A FRACTURE OF TOE: Status: RESOLVED | Noted: 2017-06-06 | Resolved: 2019-03-11

## 2019-03-11 NOTE — PROGRESS NOTES
Assessment/Plan:    Back pain  Sx stable    Uncomplicated opioid dependence (HCC)  Stable on meds    Essential hypertension  BP stable    Chronic diastolic heart failure of unknown etiology ([facility] [address] )  Stable on meds       Diagnoses and all orders for this visit:    Chronic bilateral low back pain, with sciatica presence unspecified    Essential hypertension    Uncomplicated opioid dependence ([facility] [address] )    Other orders  -     Cancel: PNEUMOCOCCAL POLYSACCHARIDE VACCINE 23-VALENT =>1YO SQ IM  -     Cancel: PREFERRED: influenza vaccine, [id number], quadrivalent, recombinant, PF, 0 5 mL, for patients 18 yr+ (FLUBLOK)          Subjective:      Patient ID: Felipa Cervantes is a [de-identified] y o  female  F/u htn and chronic low back pain-sx stable, denies CP, SOB, headache      The following portions of the patient's history were reviewed and updated as appropriate: allergies, current medications, past family history, past medical history, past social history, past surgical history and problem list     Review of Systems   Constitutional: Negative for activity change and appetite change  Eyes: Negative for visual disturbance  Genitourinary: Positive for difficulty urinating  Musculoskeletal: Positive for back pain  Neurological: Negative for headaches  Objective: There were no vitals taken for this visit  Physical Exam   Constitutional: She appears well-developed and well-nourished  Neck: No thyromegaly present  Cardiovascular: Normal rate and regular rhythm  Pulmonary/Chest: Effort normal and breath sounds normal    Musculoskeletal: She exhibits no edema  Lymphadenopathy:     She has no cervical adenopathy  Neurological: She displays normal reflexes  She exhibits normal muscle tone  Vitals reviewed

## 2019-03-11 NOTE — TELEPHONE ENCOUNTER
Two things    1) pt not  No show 2/28-I made house call-please fix this      2) needs home draw as wella s lab-since [facility]  Goes in to do catheter changes can they do lab and urine?

## 2019-03-14 ENCOUNTER — TELEPHONE (OUTPATIENT)
Dept: FAMILY MEDICINE CLINIC | Facility: CLINIC | Age: 79
End: 2019-03-14

## 2019-03-14 DIAGNOSIS — I63.9 CEREBROVASCULAR ACCIDENT (CVA), UNSPECIFIED MECHANISM (HCC): ICD-10-CM

## 2019-03-14 DIAGNOSIS — R33.9 URINARY RETENTION: Primary | ICD-10-CM

## 2019-03-14 NOTE — TELEPHONE ENCOUNTER
MARNIE FROM [facility] CALLED & SAID THAT SHE WOULD NEED A NEW ORDER FROM US TO [facility] FOR MANAGEMENT OF PATIENT'S SUNSHINE CATHETER  WE CAN FAX THIS BACK TO HER -[phone]  THANK YOU!

## 2019-03-15 DIAGNOSIS — G89.4 CHRONIC PAIN SYNDROME: ICD-10-CM

## 2019-03-15 RX ORDER — OXYCODONE HCL 10 MG/1
10 TABLET, FILM COATED, EXTENDED RELEASE ORAL 2 TIMES DAILY PRN
Qty: 60 TABLET | Refills: 0 | Status: SHIPPED | OUTPATIENT
Start: 2019-03-15 | End: 2019-05-13 | Stop reason: SDUPTHER

## 2019-03-15 RX ORDER — TRAMADOL HYDROCHLORIDE 50 MG/1
50 TABLET ORAL EVERY 6 HOURS PRN
Qty: 120 TABLET | Refills: 0 | Status: SHIPPED | OUTPATIENT
Start: 2019-03-15 | End: 2019-08-15 | Stop reason: SDUPTHER

## 2019-03-15 NOTE — TELEPHONE ENCOUNTER
Please sign/auth Rx, thanks    Please make  aware when this has been completed at [phone], thanks

## 2019-03-19 ENCOUNTER — TELEPHONE (OUTPATIENT)
Dept: FAMILY MEDICINE CLINIC | Facility: CLINIC | Age: 79
End: 2019-03-19

## 2019-03-19 NOTE — TELEPHONE ENCOUNTER
[facility] to inform you the she will recertify pt for home care service for wells cathater care   Will faxed orders

## 2019-03-29 ENCOUNTER — TELEPHONE (OUTPATIENT)
Dept: FAMILY MEDICINE CLINIC | Facility: CLINIC | Age: 79
End: 2019-03-29

## 2019-04-01 ENCOUNTER — TELEPHONE (OUTPATIENT)
Dept: UROLOGY | Facility: MEDICAL CENTER | Age: 79
End: 2019-04-01

## 2019-04-03 DIAGNOSIS — E87.6 HYPOKALEMIA: ICD-10-CM

## 2019-04-03 RX ORDER — POTASSIUM CHLORIDE 750 MG/1
10 CAPSULE, EXTENDED RELEASE ORAL DAILY
Qty: 90 CAPSULE | Refills: 1 | Status: SHIPPED | OUTPATIENT
Start: 2019-04-03 | End: 2019-07-08 | Stop reason: SDUPTHER

## 2019-04-03 RX ORDER — POTASSIUM CHLORIDE 750 MG/1
10 CAPSULE, EXTENDED RELEASE ORAL DAILY
Qty: 90 CAPSULE | Refills: 1 | OUTPATIENT
Start: 2019-04-03

## 2019-04-16 DIAGNOSIS — I10 ESSENTIAL HYPERTENSION: ICD-10-CM

## 2019-04-16 RX ORDER — PROPRANOLOL HYDROCHLORIDE 10 MG/1
10 TABLET ORAL 2 TIMES DAILY
Qty: 180 TABLET | Refills: 1 | Status: SHIPPED | OUTPATIENT
Start: 2019-04-16 | End: 2019-07-08 | Stop reason: SDUPTHER

## 2019-04-24 DIAGNOSIS — N32.81 OAB (OVERACTIVE BLADDER): ICD-10-CM

## 2019-04-24 DIAGNOSIS — I10 ESSENTIAL HYPERTENSION: ICD-10-CM

## 2019-04-24 RX ORDER — AMLODIPINE BESYLATE 5 MG/1
5 TABLET ORAL DAILY
Qty: 30 TABLET | Refills: 5 | Status: SHIPPED | OUTPATIENT
Start: 2019-04-24 | End: 2019-10-12 | Stop reason: SDUPTHER

## 2019-04-24 RX ORDER — OXYBUTYNIN CHLORIDE 5 MG/1
5 TABLET, EXTENDED RELEASE ORAL DAILY
Qty: 30 TABLET | Refills: 5 | Status: SHIPPED | OUTPATIENT
Start: 2019-04-24 | End: 2019-10-11 | Stop reason: SDUPTHER

## 2019-04-24 RX ORDER — SPIRONOLACTONE 25 MG/1
25 TABLET ORAL DAILY
Qty: 30 TABLET | Refills: 5 | Status: SHIPPED | OUTPATIENT
Start: 2019-04-24 | End: 2019-10-11 | Stop reason: SDUPTHER

## 2019-04-29 DIAGNOSIS — K21.9 GASTROESOPHAGEAL REFLUX DISEASE, ESOPHAGITIS PRESENCE NOT SPECIFIED: ICD-10-CM

## 2019-04-29 RX ORDER — OMEPRAZOLE 20 MG/1
CAPSULE, DELAYED RELEASE ORAL
Qty: 60 CAPSULE | Refills: 2 | Status: SHIPPED | OUTPATIENT
Start: 2019-04-29 | End: 2019-05-06 | Stop reason: SDUPTHER

## 2019-05-06 DIAGNOSIS — K21.9 GASTROESOPHAGEAL REFLUX DISEASE, ESOPHAGITIS PRESENCE NOT SPECIFIED: ICD-10-CM

## 2019-05-06 DIAGNOSIS — F41.9 ANXIETY: Chronic | ICD-10-CM

## 2019-05-06 RX ORDER — OMEPRAZOLE 20 MG/1
20 CAPSULE, DELAYED RELEASE ORAL 2 TIMES DAILY
Qty: 90 CAPSULE | Refills: 1 | Status: SHIPPED | OUTPATIENT
Start: 2019-05-06 | End: 2020-01-14 | Stop reason: SDUPTHER

## 2019-05-06 RX ORDER — BUSPIRONE HYDROCHLORIDE 5 MG/1
5 TABLET ORAL 3 TIMES DAILY
Qty: 270 TABLET | Refills: 1 | Status: SHIPPED | OUTPATIENT
Start: 2019-05-06 | End: 2019-11-03 | Stop reason: SDUPTHER

## 2019-05-13 DIAGNOSIS — G89.4 CHRONIC PAIN SYNDROME: ICD-10-CM

## 2019-05-13 RX ORDER — OXYCODONE HCL 10 MG/1
10 TABLET, FILM COATED, EXTENDED RELEASE ORAL 2 TIMES DAILY PRN
Qty: 60 TABLET | Refills: 0 | Status: SHIPPED | OUTPATIENT
Start: 2019-05-13 | End: 2019-07-08 | Stop reason: SDUPTHER

## 2019-05-18 NOTE — PROGRESS NOTES
Assessment/Plan:    Ambulatory dysfunction  Getting around house with walker, still with occasional fall    HTN (hypertension)  BP stable       Diagnoses and all orders for this visit:    Essential hypertension    Ambulatory dysfunction    Other chronic pain    Left foot pain  Comments:  took fall, firemen came out to help her up a few days before I came out and one of them accidentally stepped on 1st toe-pt has podiatry coming out to house 10/4    Other orders  -     sulfamethoxazole-trimethoprim (BACTRIM) 400-80 mg per tablet; every 12 (twelve) hours  -     docusate sodium (COLACE) 100 mg capsule; every 24 hours  -     tamsulosin (FLOMAX) 0 4 mg; every 24 hours          Subjective:      Patient ID: Yvonne Reyna is a 76 y o  female  F/u BP and urinary issues-these are stable, has foot pain from  stepping on toe while helping her up after fall-has podiatry coming to house 104      Hypertension   This is a chronic problem  The current episode started more than 1 year ago  The problem is unchanged  The problem is controlled  Associated symptoms include anxiety and peripheral edema  Pertinent negatives include no blurred vision, chest pain, headaches or shortness of breath  There are no associated agents to hypertension  Risk factors for coronary artery disease include sedentary lifestyle, obesity and post-menopausal state  Past treatments include beta blockers and diuretics  The current treatment provides moderate improvement  There are no compliance problems  The following portions of the patient's history were reviewed and updated as appropriate: allergies, current medications, past family history, past medical history, past social history, past surgical history and problem list     Review of Systems   Constitutional: Negative for activity change, appetite change and unexpected weight change  Eyes: Negative for blurred vision  Respiratory: Negative for shortness of breath      Cardiovascular: Negative for chest pain  Genitourinary: Positive for difficulty urinating  Has indwelling catheter due to urinary retention   Musculoskeletal: Positive for arthralgias  Foot pain   Neurological: Positive for weakness  Negative for headaches  Psychiatric/Behavioral: The patient is not nervous/anxious  Objective: There were no vitals taken for this visit  Physical Exam   Constitutional: She appears well-developed and well-nourished  Neck: No thyromegaly present  Cardiovascular: Normal rate, regular rhythm and normal heart sounds  Pulmonary/Chest: Breath sounds normal    Abdominal: Soft  Bowel sounds are normal    Musculoskeletal: She exhibits edema and tenderness  Bruising and swelling of l 1st toe, good rom , mildly tender   Lymphadenopathy:     She has no cervical adenopathy  Vitals reviewed  70

## 2019-06-13 ENCOUNTER — TELEPHONE (OUTPATIENT)
Dept: UROLOGY | Facility: MEDICAL CENTER | Age: 79
End: 2019-06-13

## 2019-07-08 DIAGNOSIS — G89.4 CHRONIC PAIN SYNDROME: ICD-10-CM

## 2019-07-08 DIAGNOSIS — I10 ESSENTIAL HYPERTENSION: ICD-10-CM

## 2019-07-08 DIAGNOSIS — E87.6 HYPOKALEMIA: ICD-10-CM

## 2019-07-08 RX ORDER — OXYCODONE HCL 10 MG/1
10 TABLET, FILM COATED, EXTENDED RELEASE ORAL 2 TIMES DAILY PRN
Qty: 60 TABLET | Refills: 0 | Status: SHIPPED | OUTPATIENT
Start: 2019-07-08 | End: 2019-09-06 | Stop reason: SDUPTHER

## 2019-07-08 RX ORDER — PROPRANOLOL HYDROCHLORIDE 10 MG/1
10 TABLET ORAL 2 TIMES DAILY
Qty: 180 TABLET | Refills: 1 | Status: SHIPPED | OUTPATIENT
Start: 2019-07-08 | End: 2020-01-09

## 2019-07-08 RX ORDER — POTASSIUM CHLORIDE 750 MG/1
10 CAPSULE, EXTENDED RELEASE ORAL DAILY
Qty: 90 CAPSULE | Refills: 1 | Status: SHIPPED | OUTPATIENT
Start: 2019-07-08 | End: 2020-03-04 | Stop reason: HOSPADM

## 2019-07-11 ENCOUNTER — TELEPHONE (OUTPATIENT)
Dept: FAMILY MEDICINE CLINIC | Facility: CLINIC | Age: 79
End: 2019-07-11

## 2019-07-11 NOTE — TELEPHONE ENCOUNTER
Call from Constantine Dela Cruz with [facility] requesting a new order to recertify pt for home care wells catheter care    Fax to [phone]

## 2019-07-12 DIAGNOSIS — R33.9 URINARY RETENTION: ICD-10-CM

## 2019-07-12 DIAGNOSIS — N32.81 OAB (OVERACTIVE BLADDER): Primary | ICD-10-CM

## 2019-07-12 DIAGNOSIS — Z46.6 URINARY CATHETER (FOLEY) CHANGE REQUIRED: ICD-10-CM

## 2019-07-17 DIAGNOSIS — R60.9 EDEMA, UNSPECIFIED TYPE: ICD-10-CM

## 2019-07-17 RX ORDER — FUROSEMIDE 40 MG/1
60 TABLET ORAL DAILY
Qty: 135 TABLET | Refills: 1 | Status: SHIPPED | OUTPATIENT
Start: 2019-07-17 | End: 2020-03-04 | Stop reason: HOSPADM

## 2019-07-22 DIAGNOSIS — G89.4 CHRONIC PAIN SYNDROME: ICD-10-CM

## 2019-08-03 DIAGNOSIS — K21.9 GASTROESOPHAGEAL REFLUX DISEASE, ESOPHAGITIS PRESENCE NOT SPECIFIED: ICD-10-CM

## 2019-08-04 RX ORDER — OMEPRAZOLE 20 MG/1
CAPSULE, DELAYED RELEASE ORAL
Qty: 180 CAPSULE | Refills: 0 | Status: SHIPPED | OUTPATIENT
Start: 2019-08-04 | End: 2019-10-30 | Stop reason: SDUPTHER

## 2019-08-14 DIAGNOSIS — G89.4 CHRONIC PAIN SYNDROME: ICD-10-CM

## 2019-08-14 RX ORDER — TRAMADOL HYDROCHLORIDE 50 MG/1
50 TABLET ORAL EVERY 6 HOURS PRN
Qty: 120 TABLET | Refills: 0 | OUTPATIENT
Start: 2019-08-14

## 2019-08-15 DIAGNOSIS — G89.4 CHRONIC PAIN SYNDROME: ICD-10-CM

## 2019-08-15 RX ORDER — TRAMADOL HYDROCHLORIDE 50 MG/1
50 TABLET ORAL EVERY 6 HOURS PRN
Qty: 120 TABLET | Refills: 0 | Status: SHIPPED | OUTPATIENT
Start: 2019-08-15 | End: 2020-02-21 | Stop reason: HOSPADM

## 2019-08-15 NOTE — TELEPHONE ENCOUNTER
I called and spoke to pt's   The alst Tramadol RX given 6 months ago has lasted this long  He states he only gives the patient a Tramadol once in a while if her pain is not controlled by the Oxy on her " bad days" He is asking we fill this, he rarely uses but does not it for her once in awhile

## 2019-09-04 ENCOUNTER — TELEPHONE (OUTPATIENT)
Dept: FAMILY MEDICINE CLINIC | Facility: CLINIC | Age: 79
End: 2019-09-04

## 2019-09-04 ENCOUNTER — TELEPHONE (OUTPATIENT)
Dept: UROLOGY | Facility: MEDICAL CENTER | Age: 79
End: 2019-09-04

## 2019-09-04 NOTE — TELEPHONE ENCOUNTER
Patient of Dr Car Toribio seen in the [facility] end office  Sunil ANDRES advised that she is calling for an home care order for the patient cathter  Please advise

## 2019-09-04 NOTE — TELEPHONE ENCOUNTER
Patient managed by Dr Car Toribio, seen in the [facility] office  Patient with history of urinary retention, managed with chronic wells catheter, last seen in the office on 9/14/18  Returned call to [employer] with [employer]  Per [employer], they need an order to renew homecare services for routine and as needed wells catheter care  Provided with verbal order per Dr Car Asher to fax order to office for signature  Patient has pending annual follow up scheduled for 9/20/19 with Dr Car Toribio

## 2019-09-04 NOTE — TELEPHONE ENCOUNTER
Rec'd calll from [facility] asking if they can have verbal OK to continue Home care for Davies Cath management for pt   Verbal given and we will rec a fax to sign

## 2019-09-05 DIAGNOSIS — G89.4 CHRONIC PAIN SYNDROME: ICD-10-CM

## 2019-09-05 RX ORDER — OXYCODONE HCL 10 MG/1
10 TABLET, FILM COATED, EXTENDED RELEASE ORAL 2 TIMES DAILY PRN
Qty: 60 TABLET | Refills: 0 | Status: CANCELLED | OUTPATIENT
Start: 2019-09-05

## 2019-09-06 RX ORDER — OXYCODONE HCL 10 MG/1
10 TABLET, FILM COATED, EXTENDED RELEASE ORAL 2 TIMES DAILY PRN
Qty: 60 TABLET | Refills: 0 | Status: SHIPPED | OUTPATIENT
Start: 2019-09-06 | End: 2019-10-30 | Stop reason: SDUPTHER

## 2019-09-06 RX ORDER — OXYCODONE HYDROCHLORIDE 15 MG/1
10 TABLET, FILM COATED, EXTENDED RELEASE ORAL 2 TIMES DAILY PRN
Refills: 0 | Status: CANCELLED | OUTPATIENT
Start: 2019-09-06

## 2019-09-12 NOTE — TELEPHONE ENCOUNTER
Contacted and spoke with Olesya Gaviria at [facility] [phone]  to inform her Dr Haywood Brothers is out of town until next week

## 2019-09-12 NOTE — TELEPHONE ENCOUNTER
Javi Summers from [facility] called for update on orders  I requested her to refax them to [phone] for Dr Janki Boothe to fill out  Please advise

## 2019-09-30 ENCOUNTER — TELEPHONE (OUTPATIENT)
Dept: FAMILY MEDICINE CLINIC | Facility: CLINIC | Age: 79
End: 2019-09-30

## 2019-09-30 ENCOUNTER — HOSPITAL ENCOUNTER (OUTPATIENT)
Dept: ULTRASOUND IMAGING | Facility: HOSPITAL | Age: 79
Discharge: HOME | End: 2019-09-30
Attending: UROLOGY
Payer: MEDICARE

## 2019-09-30 DIAGNOSIS — R33.9 URINARY RETENTION: ICD-10-CM

## 2019-09-30 PROCEDURE — 76770 US EXAM ABDO BACK WALL COMP: CPT

## 2019-10-10 ENCOUNTER — OFFICE VISIT (OUTPATIENT)
Dept: FAMILY MEDICINE CLINIC | Facility: CLINIC | Age: 79
End: 2019-10-10
Payer: MEDICARE

## 2019-10-10 DIAGNOSIS — F11.20 UNCOMPLICATED OPIOID DEPENDENCE (HCC): Chronic | ICD-10-CM

## 2019-10-10 DIAGNOSIS — I10 ESSENTIAL HYPERTENSION: ICD-10-CM

## 2019-10-10 DIAGNOSIS — K74.60 CIRRHOSIS OF LIVER WITHOUT ASCITES, UNSPECIFIED HEPATIC CIRRHOSIS TYPE (HCC): Primary | ICD-10-CM

## 2019-10-10 PROCEDURE — 99348 HOME/RES VST EST LOW MDM 30: CPT | Performed by: FAMILY MEDICINE

## 2019-10-11 ENCOUNTER — TELEPHONE (OUTPATIENT)
Dept: FAMILY MEDICINE CLINIC | Facility: CLINIC | Age: 79
End: 2019-10-11

## 2019-10-11 DIAGNOSIS — I10 ESSENTIAL HYPERTENSION: ICD-10-CM

## 2019-10-11 DIAGNOSIS — I10 ESSENTIAL HYPERTENSION: Primary | ICD-10-CM

## 2019-10-11 DIAGNOSIS — N32.81 OAB (OVERACTIVE BLADDER): ICD-10-CM

## 2019-10-11 RX ORDER — SPIRONOLACTONE 25 MG/1
TABLET ORAL
Qty: 90 TABLET | Refills: 1 | Status: SHIPPED | OUTPATIENT
Start: 2019-10-11 | End: 2020-03-04 | Stop reason: HOSPADM

## 2019-10-11 RX ORDER — OXYBUTYNIN CHLORIDE 5 MG/1
TABLET, EXTENDED RELEASE ORAL
Qty: 90 TABLET | Refills: 1 | Status: SHIPPED | OUTPATIENT
Start: 2019-10-11 | End: 2020-03-04 | Stop reason: HOSPADM

## 2019-10-11 NOTE — PROGRESS NOTES
House call pt from 10/10 needs home draw for lab-lab rx printed, please make arrangements for home draw

## 2019-10-12 DIAGNOSIS — I10 ESSENTIAL HYPERTENSION: ICD-10-CM

## 2019-10-12 RX ORDER — AMLODIPINE BESYLATE 5 MG/1
TABLET ORAL
Qty: 90 TABLET | Refills: 1 | Status: SHIPPED | OUTPATIENT
Start: 2019-10-12 | End: 2020-03-20 | Stop reason: HOSPADM

## 2019-10-17 ENCOUNTER — TELEPHONE (OUTPATIENT)
Dept: FAMILY MEDICINE CLINIC | Facility: CLINIC | Age: 79
End: 2019-10-17

## 2019-10-17 VITALS — HEART RATE: 80 BPM | DIASTOLIC BLOOD PRESSURE: 80 MMHG | SYSTOLIC BLOOD PRESSURE: 130 MMHG

## 2019-10-17 NOTE — PROGRESS NOTES
Assessment/Plan:    Cirrhosis of liver (HCC)  Stable on diuretics    GERD (gastroesophageal reflux disease)  Stable on meds    Essential hypertension  bp stable    Uncomplicated opioid dependence ([facility] [address] )  On meds due to djd back  And discomfort from Davies catheter, takes Tramadol as needed for mild pain and percocet for severe pain-30 pills lasts for several months       Diagnoses and all orders for this visit:    Cirrhosis of liver without ascites, unspecified hepatic cirrhosis type ([facility] [address] )    Uncomplicated opioid dependence ([facility] [address] )    Essential hypertension          Subjective:      Patient ID: Dorn Koyanagi is a [de-identified] y o  female  hc due to ambulatory dysfunction/imdwelling catheter-is able to go out for urology appointments but it a major challenge for her and her , doing fairly well-taking minimal pain meds, no cp/sob/headaches      The following portions of the patient's history were reviewed and updated as appropriate: allergies, current medications, past family history, past medical history, past social history, past surgical history and problem list     Review of Systems   Constitutional: Positive for fatigue  Negative for activity change, appetite change and unexpected weight change  Respiratory: Negative for shortness of breath  Cardiovascular: Negative for chest pain  Genitourinary: Positive for difficulty urinating  Neurological: Negative for dizziness and headaches           Objective:      /80 (BP Location: Left arm, Patient Position: Sitting, Cuff Size: Standard)   Pulse 80          Physical Exam

## 2019-10-17 NOTE — ASSESSMENT & PLAN NOTE
On meds due to djd back  And discomfort from Davies catheter, takes Tramadol as needed for mild pain and percocet for severe pain-30 pills lasts for several months

## 2019-10-21 NOTE — PROGRESS NOTES
UROLOGY FOLLOWUP NOTE     CHIEF COMPLAINT   Emma Soni is a [de-identified] y o  female with a complaint of   Chief Complaint   Patient presents with    Urinary Retention       History of Present Illness:     [de-identified] y o  female with a history of stroke and fall  She initially presented from her nursing facility  She is wheelchair bound and non ambulatory  She has been catheter dependent since 2016 and been doing very well  There is a visiting nurse that comes to her house for catheter exchanges  Patient has not had significant urinary tract infection is and is relatively comfortable with the catheter  She does have some occasional mild bladder spasms which is treated with Ditropan  Her bowels are regular  Returns at one year interval  Patient has had no urinary tract infections  She has had no hematuria  She continues to undergo regular examinations and changes by the visiting nurse staff  Her constipation is well controlled according to her       Past Medical History:     Past Medical History:   Diagnosis Date    Anxiety     Atrial fibrillation ([facility] )     CHF (congestive heart failure) ([facility] )     Depression     HTN (hypertension) 11/9/2016    Opioid dependence ([place] [address] )     Pneumonia        PAST SURGICAL HISTORY:     Past Surgical History:   Procedure Laterality Date    APPENDECTOMY      CHOLECYSTECTOMY      TONSILLECTOMY      TOTAL KNEE ARTHROPLASTY Bilateral     TUBAL LIGATION         CURRENT MEDICATIONS:     Current Outpatient Medications   Medication Sig Dispense Refill    amLODIPine (NORVASC) 5 mg tablet TAKE 1 TABLET BY MOUTH EVERY DAY 90 tablet 1    aspirin 81 MG tablet Take 1 tablet by mouth daily      busPIRone (BUSPAR) 5 mg tablet Take 1 tablet (5 mg total) by mouth 3 (three) times a day 270 tablet 1    docusate sodium (COLACE) 100 mg capsule every 24 hours      escitalopram (LEXAPRO) 5 mg tablet Take 1 tablet (5 mg total) by mouth daily 30 tablet 5    nystatin (MYCOSTATIN) powder Apply 1 application topically 2 (two) times a day 2-3 times daily to affected area(s) 15 g 5    omeprazole (PriLOSEC) 20 mg delayed release capsule Take 1 capsule (20 mg total) by mouth 2 (two) times a day 90 capsule 1    omeprazole (PriLOSEC) 20 mg delayed release capsule TAKE 1 CAPSULE BY MOUTH TWICE A  capsule 0    oxybutynin (DITROPAN-XL) 5 mg 24 hr tablet TAKE 1 TABLET BY MOUTH EVERY DAY 90 tablet 1    oxyCODONE (OXYCONTIN) 10 mg 12 hr tablet Take 1 tablet (10 mg total) by mouth 2 (two) times a day as needed for severe painMax Daily Amount: 20 mg 60 tablet 0    potassium chloride (MICRO-K) 10 MEQ CR capsule Take 1 capsule (10 mEq total) by mouth daily 90 capsule 1    propranolol (INDERAL) 10 mg tablet Take 1 tablet (10 mg total) by mouth 2 (two) times a day 180 tablet 1    senna (SENOKOT) 8 6 MG tablet Take 2 tablets by mouth daily at bedtime      spironolactone (ALDACTONE) 25 mg tablet TAKE 1 TABLET BY MOUTH EVERY DAY 90 tablet 1    tamsulosin (FLOMAX) 0 4 mg every 24 hours      traMADol (ULTRAM) 50 mg tablet Take 1 tablet (50 mg total) by mouth every 6 (six) hours as needed for moderate pain 120 tablet 0    furosemide (LASIX) 40 mg tablet Take 1 5 tablets (60 mg total) by mouth daily for 90 days 135 tablet 1     No current facility-administered medications for this visit          ALLERGIES:     Allergies   Allergen Reactions    Aspirin GI Intolerance    Warfarin        SOCIAL HISTORY:     Social History     Socioeconomic History    Marital status: /Civil Union     Spouse name: None    Number of children: 3    Years of education: None    Highest education level: None   Occupational History    Occupation: flower      Comment: retired   Social Needs    Financial resource strain: None    Food insecurity:     Worry: None     Inability: None    Transportation needs:     Medical: None     Non-medical: None   Tobacco Use    Smoking status: Former Smoker     Types: Cigarettes     Last attempt to quit: 1980     Years since quittin 4    Smokeless tobacco: Former User     Quit date: 2017   Substance and Sexual Activity    Alcohol use: No    Drug use: No    Sexual activity: Not Currently   Lifestyle    Physical activity:     Days per week: None     Minutes per session: None    Stress: None   Relationships    Social connections:     Talks on phone: None     Gets together: None     Attends Christianity service: None     Active member of club or organization: None     Attends meetings of clubs or organizations: None     Relationship status: None    Intimate partner violence:     Fear of current or ex partner: None     Emotionally abused: None     Physically abused: None     Forced sexual activity: None   Other Topics Concern    None   Social History Narrative    ACTIVE ADVANCED DIRECTIVES    ALWAYS USES [employer]    CAFFEINE USE     DENIED HX OF DOMESTIC VIOLENCE     LACK OF EXERCISE     LIVES WITH  in row home, uses only 1st floor due to ambulatory dysfunction    PT HAS LIVING WILL AND POA     SUPPORTIVE AND SAFE     NO Hinduism STATUS     3 children, 2 in [place]       SOCIAL HISTORY:     Family History   Problem Relation Age of Onset    Cancer Father     Hypertension Sister        REVIEW OF SYSTEMS:     Review of Systems   Constitutional: Positive for activity change  Respiratory: Negative  Cardiovascular: Positive for leg swelling  Gastrointestinal: Positive for constipation  Musculoskeletal: Positive for gait problem  Skin: Negative  Psychiatric/Behavioral: Positive for confusion  PHYSICAL EXAM:     /70   Pulse 65     General:  Elderly wheelchair-bound female They have a normal affect  There is not appear to be any gross neurologic defects or abnormalities  HEENT:  Normocephalic, atraumatic  Neck is supple without any palpable lymphadenopathy  Cardiovascular:  Patient has normal palpable distal radial pulses    There is no significant peripheral edema  No JVD is noted  Respiratory:  Patient has unlabored respirations  There is no audible wheeze or rhonchi  Abdomen:   Abdomen is soft and nontender  There is no tympany  Inguinal and umbilical hernia are not appreciated  Genitourinary:  Catheter in place draining clear urine    Musculoskeletal:  Wheelchair-bound  Dermatologic:  Patient has no skin abnormalities or rashes  LABS:     CBC:   Lab Results   Component Value Date    WBC 4 6 10/22/2019    HGB 13 3 10/22/2019    HCT 40 4 10/22/2019    MCV 90 0 10/22/2019     10/22/2019       BMP:   Lab Results   Component Value Date    GLUCOSE 123 09/15/2014    CALCIUM 9 3 10/22/2019     (L) 09/15/2014    K 4 4 10/22/2019    CO2 31 10/22/2019    CL 97 (L) 10/22/2019    BUN 10 10/22/2019    CREATININE 0 80 10/22/2019       IMAGIN/30/19  RENAL ULTRASOUND     INDICATION:   R33 9: Retention of urine, unspecified      COMPARISON: 2018     TECHNIQUE:   Ultrasound of the retroperitoneum was performed with a curvilinear transducer utilizing volumetric sweeps and still imaging techniques       FINDINGS:     Limited scanning in stable position      KIDNEYS:  There is asymmetric right renal atrophy with measurements below  Right kidney:  8 0 x 4 9 cm  Left kidney:  9 7 x 5 3 cm      Right kidney  Normal echogenicity and contour  No suspicious masses detected  No hydronephrosis  No shadowing calculi  No perinephric fluid collections      Left kidney  Normal echogenicity and contour  No suspicious masses detected  No hydronephrosis  No shadowing calculi  No perinephric fluid collections      URETERS:  Nonvisualized      BLADDER:   The bladder is collapsed with a Davies catheter      IMPRESSION:     Limited study with with mild right kidney atrophy  No hydronephrosis  Bladder collapsed       ASSESSMENT:     [de-identified] y o  female status post stroke now catheter dependent    PLAN:     Patient remains catheter dependent as she is non ambulatory  Her catheter should continue to be exchanged on a monthly basis  She continues on Ditropan  Her  monitors closely for constipation  I would like to see her back in 1 year with repeat ultrasound  I discussed the recommendation for intermittent cystoscopies in patients with chronic indwelling catheters  I discussed with the  and patient that the risk underlying is a bladder malignancy  I offered the patient and her  the option of cystoscopy but at this point to have deferred  If they experience any symptoms of concern, I would be happy to revisit the plan for intermittent cystoscopies  Otherwise we will continue to see them on a yearly basis

## 2019-10-23 ENCOUNTER — OFFICE VISIT (OUTPATIENT)
Dept: UROLOGY | Facility: CLINIC | Age: 79
End: 2019-10-23
Payer: MEDICARE

## 2019-10-23 VITALS — DIASTOLIC BLOOD PRESSURE: 70 MMHG | HEART RATE: 65 BPM | SYSTOLIC BLOOD PRESSURE: 124 MMHG

## 2019-10-23 DIAGNOSIS — R33.9 URINARY RETENTION: Primary | ICD-10-CM

## 2019-10-23 LAB
ALBUMIN SERPL-MCNC: 3.4 G/DL (ref 3.6–5.1)
ALBUMIN/GLOB SERPL: 1.3 (CALC) (ref 1–2.5)
ALP SERPL-CCNC: 113 U/L (ref 33–130)
ALT SERPL-CCNC: 7 U/L (ref 6–29)
AST SERPL-CCNC: 28 U/L (ref 10–35)
BASOPHILS # BLD AUTO: 41 CELLS/UL (ref 0–200)
BASOPHILS NFR BLD AUTO: 0.9 %
BILIRUB SERPL-MCNC: 1.1 MG/DL (ref 0.2–1.2)
BUN SERPL-MCNC: 10 MG/DL (ref 7–25)
BUN/CREAT SERPL: ABNORMAL (CALC) (ref 6–22)
CALCIUM SERPL-MCNC: 9.3 MG/DL (ref 8.6–10.4)
CHLORIDE SERPL-SCNC: 97 MMOL/L (ref 98–110)
CO2 SERPL-SCNC: 31 MMOL/L (ref 20–32)
CREAT SERPL-MCNC: 0.8 MG/DL (ref 0.6–0.88)
EOSINOPHIL # BLD AUTO: 216 CELLS/UL (ref 15–500)
EOSINOPHIL NFR BLD AUTO: 4.7 %
ERYTHROCYTE [DISTWIDTH] IN BLOOD BY AUTOMATED COUNT: 13.3 % (ref 11–15)
GLOBULIN SER CALC-MCNC: 2.7 G/DL (CALC) (ref 1.9–3.7)
GLUCOSE SERPL-MCNC: 104 MG/DL (ref 65–99)
HCT VFR BLD AUTO: 40.4 % (ref 35–45)
HGB BLD-MCNC: 13.3 G/DL (ref 11.7–15.5)
LYMPHOCYTES # BLD AUTO: 2052 CELLS/UL (ref 850–3900)
LYMPHOCYTES NFR BLD AUTO: 44.6 %
MCH RBC QN AUTO: 29.6 PG (ref 27–33)
MCHC RBC AUTO-ENTMCNC: 32.9 G/DL (ref 32–36)
MCV RBC AUTO: 90 FL (ref 80–100)
MONOCYTES # BLD AUTO: 437 CELLS/UL (ref 200–950)
MONOCYTES NFR BLD AUTO: 9.5 %
NEUTROPHILS # BLD AUTO: 1854 CELLS/UL (ref 1500–7800)
NEUTROPHILS NFR BLD AUTO: 40.3 %
PLATELET # BLD AUTO: 148 THOUSAND/UL (ref 140–400)
PMV BLD REES-ECKER: 10.7 FL (ref 7.5–12.5)
POTASSIUM SERPL-SCNC: 4.4 MMOL/L (ref 3.5–5.3)
PROT SERPL-MCNC: 6.1 G/DL (ref 6.1–8.1)
RBC # BLD AUTO: 4.49 MILLION/UL (ref 3.8–5.1)
SL AMB EGFR AFRICAN AMERICAN: 81 ML/MIN/1.73M2
SL AMB EGFR NON AFRICAN AMERICAN: 70 ML/MIN/1.73M2
SODIUM SERPL-SCNC: 135 MMOL/L (ref 135–146)
TSH SERPL-ACNC: 1.69 MIU/L (ref 0.4–4.5)
WBC # BLD AUTO: 4.6 THOUSAND/UL (ref 3.8–10.8)

## 2019-10-23 PROCEDURE — 99213 OFFICE O/P EST LOW 20 MIN: CPT | Performed by: UROLOGY

## 2019-10-23 NOTE — PATIENT INSTRUCTIONS
Davies Catheter Placement and Care   WHAT YOU NEED TO KNOW:   A Davies catheter is a sterile tube that is inserted into your bladder to drain urine  It is also called an indwelling urinary catheter  The tip of the catheter has a small balloon filled with solution that holds the catheter inside your bladder  DISCHARGE INSTRUCTIONS:   Seek care immediately if:   · Your catheter comes out  · You suddenly have material that looks like sand in the tubing or drainage bag  · No urine is draining into the bag and you have checked the system  · You have pain in your hip, back, pelvis, or lower abdomen  · You are confused or cannot think clearly  Contact your healthcare provider if:   · You have a fever  · You have bladder spasms for more than 1 day after the catheter is placed  · You see blood in the tubing or drainage bag  · You have a rash or itching where the catheter tube is secured to your skin  · Urine leaks from or around the catheter, tubing, or drainage bag  · The closed drainage system has accidently come open or apart  · You see a layer of crystals inside the tubing  · You have questions or concerns about your condition or care  Care for your Davies catheter:   · Clean your genital area 2 times every day  Clean your catheter and the area around where it was inserted  Use soap and water  Clean your anal opening and catheter area after every bowel movement  · Secure the catheter tube  so you do not pull or move the catheter  This helps prevent pain and bladder spasms  Healthcare providers will show you how to use medical tape or a strap to secure the catheter tube to your body  · Keep a closed drainage system  Your Davies catheter should always be attached to the drainage bag to form a closed system  Do not disconnect any part of the closed system unless you need to change the bag  Care for your drainage bag:   · Ask if a leg bag is right for you    A leg bag can be worn under your clothes  Ask your healthcare provider for more information about a leg bag  · Keep the drainage bag below the level of your waist   This helps stop urine from moving back up the tubing and into your bladder  Do not loop or kink the tubing  This can cause urine to back up and collect in your bladder  Do not let the drainage bag touch or lie on the floor  · Empty the drainage bag when needed  The weight of a full drainage bag can be painful  Empty the drainage bag every 3 to 6 hours or when it is ? full  · Clean and change the drainage bag as directed  Ask your healthcare provider how often you should change the drainage bag and what cleaning solution to use  Wear disposable gloves when you change the bag  Do not allow the end of the catheter or tubing to touch anything  Clean the ends with an alcohol pad before you reconnect them  What to do if problems develop:   · No urine is draining into the bag:      ¨ Check for kinks in the tubing and straighten them out  ¨ Check the tape or strap used to secure the catheter tube to your skin  Make sure it is not blocking the tube  ¨ Make sure you are not sitting or lying on the tubing  ¨ Make sure the urine bag is hanging below the level of your waist     · Urine leaks from or around the catheter, tubing, or drainage bag:  Check if the closed drainage system has accidently come open or apart  Clean the catheter and tubing ends with a new alcohol pad and reconnect them  Prevent an infection:   · Wash your hands often  Wash before and after you touch your catheter, tubing, or drainage bag  Use soap and water  Wear clean disposable gloves when you care for your catheter or disconnect the drainage bag  Wash your hands before you prepare or eat food  · Drink liquids as directed  Ask your healthcare provider how much liquid to drink each day and which liquids are best for you   Liquids will help flush your kidneys and bladder to help prevent infection  Follow up with your healthcare provider as directed:  Write down your questions so you remember to ask them during your visits  © 2017 [address] [employer] Information is for End User's use only and may not be sold, redistributed or otherwise used for commercial purposes  All illustrations and images included in CareNotes® are the copyrighted property of A D A M , Inc  or Dallin Walters  The above information is an  only  It is not intended as medical advice for individual conditions or treatments  Talk to your doctor, nurse or pharmacist before following any medical regimen to see if it is safe and effective for you

## 2019-10-30 DIAGNOSIS — K21.9 GASTROESOPHAGEAL REFLUX DISEASE, ESOPHAGITIS PRESENCE NOT SPECIFIED: ICD-10-CM

## 2019-10-30 DIAGNOSIS — G89.4 CHRONIC PAIN SYNDROME: ICD-10-CM

## 2019-10-30 RX ORDER — OMEPRAZOLE 20 MG/1
20 CAPSULE, DELAYED RELEASE ORAL 2 TIMES DAILY
Qty: 180 CAPSULE | Refills: 1 | Status: SHIPPED | OUTPATIENT
Start: 2019-10-30 | End: 2020-03-04 | Stop reason: HOSPADM

## 2019-10-30 RX ORDER — OXYCODONE HCL 10 MG/1
10 TABLET, FILM COATED, EXTENDED RELEASE ORAL 2 TIMES DAILY PRN
Qty: 60 TABLET | Refills: 0 | Status: SHIPPED | OUTPATIENT
Start: 2019-10-30 | End: 2019-12-11 | Stop reason: SDUPTHER

## 2019-11-03 DIAGNOSIS — F41.9 ANXIETY: Chronic | ICD-10-CM

## 2019-11-03 RX ORDER — BUSPIRONE HYDROCHLORIDE 5 MG/1
TABLET ORAL
Qty: 270 TABLET | Refills: 1 | Status: SHIPPED | OUTPATIENT
Start: 2019-11-03 | End: 2020-03-20 | Stop reason: HOSPADM

## 2019-11-04 ENCOUNTER — TELEPHONE (OUTPATIENT)
Dept: UROLOGY | Facility: MEDICAL CENTER | Age: 79
End: 2019-11-04

## 2019-11-04 ENCOUNTER — TELEPHONE (OUTPATIENT)
Dept: FAMILY MEDICINE CLINIC | Facility: CLINIC | Age: 79
End: 2019-11-04

## 2019-11-04 NOTE — TELEPHONE ENCOUNTER
Patient of Dr Martha Nolasco seen at [facility]  Adry Black from [facility] called advising that they are closing the facility within the next few weeks  Patient has chosen [facility] for her care  Adry Black would like to speak to clinical about transferring care to [facility]  She can be reached at [phone]

## 2019-11-04 NOTE — TELEPHONE ENCOUNTER
MF: RONEL CRISOSTOMO, FROM [facility] ([phone]) CALLING ASKING IF SOMEONE CAN GIVE HER A CALL BACK AND IF SHE ISN'T THERE PLEASE TALK WITH ALONA SQUIRES  [facility] WILL BE CLOSING THERE DOORS AFTER 20 YEARS OF SERVICE AND PT, CORKY STEEL IS IN [place] AND SHE WILL BE TRANSFERRED TO [facility] CARE  PT'S EPISODE ENDS ON 11- WITH [facility]  THEY HAVE BEEN CHANGING HER CATH EVERY MONTH AND THEY HAVE CALLED THE UROLOGIST OFFICE TO DISCUSS DETAILS WITH THEM AND WOULD LIKE TO SPEAK TO SOMEONE AT PCP TO DISCUSS DETAILS  AGAIN, RONEL CRISOSTOMO OR ALONA SQUIRES [facility] [phone]) FROM [facility] [place]

## 2019-11-04 NOTE — TELEPHONE ENCOUNTER
Contacted and spoke with Lin Mcgrath at [facility] since Earnstine Clause is unavailable  Lin Mcgrath states Earnstine Clause just wanted to inform our office that patient's care is being transferred to [facility]

## 2019-11-05 ENCOUNTER — TELEPHONE (OUTPATIENT)
Dept: FAMILY MEDICINE CLINIC | Facility: CLINIC | Age: 79
End: 2019-11-05

## 2019-11-05 NOTE — TELEPHONE ENCOUNTER
NOLVIA FROM [facility] CALLED  EVARISTO ROCHA SPOKE WITH DR Juwan Enrique YESTERDAY REGARDING THIS  [facility] IS CLOSING DOWN THEIR AGENCY, AND [facility] WILL BE TAKING OVER  [facility] NEEDS A VALID H & P FACE TO FACE WITH DEMOGRAPHICS AND HOME CARE ORDER EFFECTIVE 11/15/19 FROM DR Juwan Enrique  PT WILL BE DISCHARGED FROM [facility] ON 11/14/19 AND BOTH THEY AND [facility] DO NOT WANT A DISRUPTION IN CARE  PATIENT CARE MANAGER OF [facility] IS HERLINDA COONEY AND HER FAX NUMBER [phone] AND HER EMAIL IS [email or web address]  IF ANY QUESTIONS PLEASE REACH OUT  [facility]  THANK YOU

## 2019-11-06 DIAGNOSIS — Z46.6 URINARY CATHETER (FOLEY) CHANGE REQUIRED: Primary | ICD-10-CM

## 2019-11-07 ENCOUNTER — TELEPHONE (OUTPATIENT)
Dept: FAMILY MEDICINE CLINIC | Facility: CLINIC | Age: 79
End: 2019-11-07

## 2019-11-07 DIAGNOSIS — B36.9 FUNGAL DERMATITIS: ICD-10-CM

## 2019-11-07 RX ORDER — NYSTATIN 100000 [USP'U]/G
1 POWDER TOPICAL 2 TIMES DAILY
Qty: 15 G | Refills: 5 | Status: SHIPPED | OUTPATIENT
Start: 2019-11-07 | End: 2020-03-20 | Stop reason: HOSPADM

## 2019-11-07 NOTE — TELEPHONE ENCOUNTER
Printed both referral and last OV and I and spoke with Chaparrita Flores and she will give our office a call for confirmation when she has received the faxes

## 2019-11-13 ENCOUNTER — TELEPHONE (OUTPATIENT)
Dept: FAMILY MEDICINE CLINIC | Facility: CLINIC | Age: 79
End: 2019-11-13

## 2019-11-13 NOTE — TELEPHONE ENCOUNTER
MF: Meagan Sanchez, [facility] [phone]), LETTING YOU KNOW THAT SHE IS OPENING THE PT UP TODAY FOR [facility] FOR NURSING SERVICES AND PT AND OT AND THEN WILL BE FOLLOWING UP MONTHLY  IF YOU HAVE ANY QUESTIONS CONTACT Kamilla Trujilol @ [phone]

## 2019-12-02 ENCOUNTER — TELEPHONE (OUTPATIENT)
Dept: OTHER | Facility: OTHER | Age: 79
End: 2019-12-02

## 2019-12-02 NOTE — TELEPHONE ENCOUNTER
Called pt and gave pt message that tramadol is not covered  Pt states she will call dr Larry Blackmon after hours  And hung up

## 2019-12-03 NOTE — TELEPHONE ENCOUNTER
If tramadol denied can only send rx for oxycodone, as previously mentioned neurontin could be an option

## 2019-12-03 NOTE — TELEPHONE ENCOUNTER
Called patient due to a message from [facility] [address], they said that the reason for the call was regarding the Tramadol  I told them that it was denied and they aren't sure what to do now  Is there something else that could be called in? Thank you!

## 2019-12-03 NOTE — TELEPHONE ENCOUNTER
MF, Pts  called requesting a refill for pts Tramadol and her pain meds, I did try to explain that pts Tramadol was Denied as per insurance and there needs to be an alternative

## 2019-12-06 NOTE — TELEPHONE ENCOUNTER
Patient's  called to check on status of refill request  Can we please call him to update him as to what is happening with this

## 2019-12-11 DIAGNOSIS — G89.29 OTHER CHRONIC PAIN: Primary | ICD-10-CM

## 2019-12-11 DIAGNOSIS — G89.4 CHRONIC PAIN SYNDROME: ICD-10-CM

## 2019-12-11 RX ORDER — GABAPENTIN 100 MG/1
100 CAPSULE ORAL 3 TIMES DAILY PRN
Qty: 90 CAPSULE | Refills: 0 | Status: SHIPPED | OUTPATIENT
Start: 2019-12-11 | End: 2020-01-04

## 2019-12-11 RX ORDER — OXYCODONE HCL 10 MG/1
10 TABLET, FILM COATED, EXTENDED RELEASE ORAL 2 TIMES DAILY PRN
Qty: 60 TABLET | Refills: 0 | Status: SHIPPED | OUTPATIENT
Start: 2019-12-11 | End: 2020-02-07 | Stop reason: SDUPTHER

## 2019-12-11 NOTE — TELEPHONE ENCOUNTER
Pt notified and she would like to try the Gabapentin  She only wants you to send a couple of pills so she can see if they work  If they work she will call for a monthly supply

## 2020-01-03 DIAGNOSIS — G89.29 OTHER CHRONIC PAIN: ICD-10-CM

## 2020-01-04 RX ORDER — GABAPENTIN 100 MG/1
100 CAPSULE ORAL 3 TIMES DAILY PRN
Qty: 90 CAPSULE | Refills: 0 | Status: SHIPPED | OUTPATIENT
Start: 2020-01-04 | End: 2020-01-14

## 2020-01-09 DIAGNOSIS — I10 ESSENTIAL HYPERTENSION: ICD-10-CM

## 2020-01-09 RX ORDER — PROPRANOLOL HYDROCHLORIDE 10 MG/1
TABLET ORAL
Qty: 180 TABLET | Refills: 1 | Status: SHIPPED | OUTPATIENT
Start: 2020-01-09 | End: 2020-03-20 | Stop reason: HOSPADM

## 2020-01-14 DIAGNOSIS — G89.29 OTHER CHRONIC PAIN: ICD-10-CM

## 2020-01-14 RX ORDER — GABAPENTIN 100 MG/1
100 CAPSULE ORAL 3 TIMES DAILY PRN
Qty: 270 CAPSULE | Refills: 1 | Status: SHIPPED | OUTPATIENT
Start: 2020-01-14

## 2020-01-14 RX ORDER — GABAPENTIN 100 MG/1
100 CAPSULE ORAL 3 TIMES DAILY PRN
Qty: 270 CAPSULE | Refills: 0 | Status: SHIPPED | OUTPATIENT
Start: 2020-01-14 | End: 2020-01-14 | Stop reason: SDUPTHER

## 2020-02-07 DIAGNOSIS — G89.4 CHRONIC PAIN SYNDROME: ICD-10-CM

## 2020-02-09 RX ORDER — OXYCODONE HCL 10 MG/1
10 TABLET, FILM COATED, EXTENDED RELEASE ORAL 2 TIMES DAILY PRN
Qty: 60 TABLET | Refills: 0 | Status: SHIPPED | OUTPATIENT
Start: 2020-02-09 | End: 2020-02-21 | Stop reason: HOSPADM

## 2020-02-18 ENCOUNTER — HOSPITAL ENCOUNTER (INPATIENT)
Facility: HOSPITAL | Age: 80
LOS: 3 days | Discharge: NURSING, REHAB OR LONG-TERM CARE | DRG: 964 | End: 2020-02-21
Attending: SURGERY | Admitting: SURGERY
Payer: MEDICARE

## 2020-02-18 ENCOUNTER — APPOINTMENT (EMERGENCY)
Dept: RADIOLOGY | Facility: HOSPITAL | Age: 80
DRG: 964 | End: 2020-02-18
Payer: MEDICARE

## 2020-02-18 ENCOUNTER — HOSPITAL ENCOUNTER (EMERGENCY)
Facility: HOSPITAL | Age: 80
DRG: 964 | End: 2020-02-18
Attending: EMERGENCY MEDICINE | Admitting: EMERGENCY MEDICINE
Payer: MEDICARE

## 2020-02-18 ENCOUNTER — APPOINTMENT (EMERGENCY)
Dept: CT IMAGING | Facility: HOSPITAL | Age: 80
DRG: 964 | End: 2020-02-18
Payer: MEDICARE

## 2020-02-18 VITALS
DIASTOLIC BLOOD PRESSURE: 64 MMHG | HEART RATE: 79 BPM | OXYGEN SATURATION: 96 % | TEMPERATURE: 98.3 F | BODY MASS INDEX: 37.37 KG/M2 | SYSTOLIC BLOOD PRESSURE: 133 MMHG | RESPIRATION RATE: 18 BRPM | WEIGHT: 210.98 LBS

## 2020-02-18 DIAGNOSIS — S00.01XA ABRASION OF SCALP, INITIAL ENCOUNTER: ICD-10-CM

## 2020-02-18 DIAGNOSIS — S01.01XA LACERATION OF SCALP, INITIAL ENCOUNTER: ICD-10-CM
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
## 2020-02-18 DIAGNOSIS — N39.0 UTI (URINARY TRACT INFECTION): ICD-10-CM

## 2020-02-18 DIAGNOSIS — R26.2 AMBULATORY DYSFUNCTION: Chronic | ICD-10-CM

## 2020-02-18 DIAGNOSIS — S72.491A OTHER CLOSED FRACTURE OF DISTAL END OF RIGHT FEMUR, INITIAL ENCOUNTER (HCC): ICD-10-CM

## 2020-02-18 DIAGNOSIS — S06.5X9A SUBDURAL HEMATOMA (HCC): Primary | ICD-10-CM

## 2020-02-18 DIAGNOSIS — F11.20 UNCOMPLICATED OPIOID DEPENDENCE (HCC): Chronic | ICD-10-CM

## 2020-02-18 DIAGNOSIS — S72.91XA CLOSED FRACTURE OF RIGHT FEMUR, UNSPECIFIED FRACTURE MORPHOLOGY, INITIAL ENCOUNTER (HCC): ICD-10-CM

## 2020-02-18 DIAGNOSIS — S01.01XA OCCIPITAL SCALP LACERATION, INITIAL ENCOUNTER: ICD-10-CM

## 2020-02-18 DIAGNOSIS — S72.401A CLOSED FRACTURE OF RIGHT DISTAL FEMUR (HCC): ICD-10-CM

## 2020-02-18 PROBLEM — T83.511A URINARY TRACT INFECTION ASSOCIATED WITH INDWELLING URETHRAL CATHETER (HCC): Status: ACTIVE | Noted: 2020-02-18

## 2020-02-18 PROBLEM — Z79.82 ASPIRIN LONG-TERM USE: Status: ACTIVE | Noted: 2020-02-18

## 2020-02-18 PROBLEM — R82.71 ASYMPTOMATIC BACTERIURIA: Status: ACTIVE | Noted: 2020-02-18

## 2020-02-18 LAB
ABO GROUP BLD: NORMAL
ALBUMIN SERPL BCP-MCNC: 3 G/DL (ref 3.5–5)
ALP SERPL-CCNC: 336 U/L (ref 46–116)
ALT SERPL W P-5'-P-CCNC: 31 U/L (ref 12–78)
ANION GAP SERPL CALCULATED.3IONS-SCNC: 7 MMOL/L (ref 4–13)
APTT PPP: 30 SECONDS (ref 23–37)
AST SERPL W P-5'-P-CCNC: 159 U/L (ref 5–45)
ATRIAL RATE: 83 BPM
BACTERIA UR QL AUTO: ABNORMAL /HPF
BASOPHILS # BLD AUTO: 0.03 THOUSANDS/ΜL (ref 0–0.1)
BASOPHILS NFR BLD AUTO: 1 % (ref 0–1)
BILIRUB SERPL-MCNC: 2.12 MG/DL (ref 0.2–1)
BILIRUB UR QL STRIP: ABNORMAL
BLD GP AB SCN SERPL QL: NEGATIVE
BLD GP AB SCN SERPL QL: POSITIVE
BUN SERPL-MCNC: 10 MG/DL (ref 5–25)
CALCIUM SERPL-MCNC: 9.6 MG/DL (ref 8.3–10.1)
CHLORIDE SERPL-SCNC: 93 MMOL/L (ref 100–108)
CLARITY UR: ABNORMAL
CO2 SERPL-SCNC: 32 MMOL/L (ref 21–32)
COLOR UR: ABNORMAL
CREAT SERPL-MCNC: 0.77 MG/DL (ref 0.6–1.3)
EOSINOPHIL # BLD AUTO: 0.15 THOUSAND/ΜL (ref 0–0.61)
EOSINOPHIL NFR BLD AUTO: 3 % (ref 0–6)
ERYTHROCYTE [DISTWIDTH] IN BLOOD BY AUTOMATED COUNT: 15.6 % (ref 11.6–15.1)
GFR SERPL CREATININE-BSD FRML MDRD: 73 ML/MIN/1.73SQ M
GLUCOSE SERPL-MCNC: 142 MG/DL (ref 65–140)
GLUCOSE UR STRIP-MCNC: NEGATIVE MG/DL
HCT VFR BLD AUTO: 44.6 % (ref 34.8–46.1)
HGB BLD-MCNC: 14.3 G/DL (ref 11.5–15.4)
HGB UR QL STRIP.AUTO: ABNORMAL
IMM GRANULOCYTES # BLD AUTO: 0.03 THOUSAND/UL (ref 0–0.2)
IMM GRANULOCYTES NFR BLD AUTO: 1 % (ref 0–2)
INR PPP: 1.06 (ref 0.84–1.19)
KETONES UR STRIP-MCNC: NEGATIVE MG/DL
LEUKOCYTE ESTERASE UR QL STRIP: ABNORMAL
LYMPHOCYTES # BLD AUTO: 1.31 THOUSANDS/ΜL (ref 0.6–4.47)
LYMPHOCYTES NFR BLD AUTO: 23 % (ref 14–44)
MCH RBC QN AUTO: 29.7 PG (ref 26.8–34.3)
MCHC RBC AUTO-ENTMCNC: 32.1 G/DL (ref 31.4–37.4)
MCV RBC AUTO: 93 FL (ref 82–98)
MONOCYTES # BLD AUTO: 0.54 THOUSAND/ΜL (ref 0.17–1.22)
MONOCYTES NFR BLD AUTO: 10 % (ref 4–12)
NEUTROPHILS # BLD AUTO: 3.65 THOUSANDS/ΜL (ref 1.85–7.62)
NEUTS SEG NFR BLD AUTO: 62 % (ref 43–75)
NITRITE UR QL STRIP: POSITIVE
NON-SQ EPI CELLS URNS QL MICRO: ABNORMAL /HPF
NRBC BLD AUTO-RTO: 0 /100 WBCS
P AXIS: 218 DEGREES
PH UR STRIP.AUTO: 5 [PH]
PLATELET # BLD AUTO: 151 THOUSANDS/UL (ref 149–390)
PMV BLD AUTO: 10.9 FL (ref 8.9–12.7)
POTASSIUM SERPL-SCNC: 4.6 MMOL/L (ref 3.5–5.3)
PROT SERPL-MCNC: 7.5 G/DL (ref 6.4–8.2)
PROT UR STRIP-MCNC: ABNORMAL MG/DL
PROTHROMBIN TIME: 13.9 SECONDS (ref 11.6–14.5)
QRS AXIS: 8 DEGREES
QRSD INTERVAL: 64 MS
QT INTERVAL: 376 MS
QTC INTERVAL: 408 MS
RBC # BLD AUTO: 4.82 MILLION/UL (ref 3.81–5.12)
RBC #/AREA URNS AUTO: ABNORMAL /HPF
RH BLD: POSITIVE
SODIUM SERPL-SCNC: 132 MMOL/L (ref 136–145)
SP GR UR STRIP.AUTO: >=1.03 (ref 1–1.03)
SPECIMEN EXPIRATION DATE: NORMAL
SPECIMEN EXPIRATION DATE: NORMAL
T WAVE AXIS: -60 DEGREES
UROBILINOGEN UR QL STRIP.AUTO: 2 E.U./DL
VENTRICULAR RATE: 71 BPM
WBC # BLD AUTO: 5.71 THOUSAND/UL (ref 4.31–10.16)
WBC #/AREA URNS AUTO: ABNORMAL /HPF

## 2020-02-18 PROCEDURE — 70450 CT HEAD/BRAIN W/O DYE: CPT

## 2020-02-18 PROCEDURE — 73552 X-RAY EXAM OF FEMUR 2/>: CPT

## 2020-02-18 PROCEDURE — 86850 RBC ANTIBODY SCREEN: CPT | Performed by: EMERGENCY MEDICINE

## 2020-02-18 PROCEDURE — 93005 ELECTROCARDIOGRAM TRACING: CPT

## 2020-02-18 PROCEDURE — 73502 X-RAY EXAM HIP UNI 2-3 VIEWS: CPT

## 2020-02-18 PROCEDURE — 86901 BLOOD TYPING SEROLOGIC RH(D): CPT | Performed by: EMERGENCY MEDICINE

## 2020-02-18 PROCEDURE — 73560 X-RAY EXAM OF KNEE 1 OR 2: CPT

## 2020-02-18 PROCEDURE — 86900 BLOOD TYPING SEROLOGIC ABO: CPT | Performed by: EMERGENCY MEDICINE

## 2020-02-18 PROCEDURE — 99291 CRITICAL CARE FIRST HOUR: CPT | Performed by: SURGERY

## 2020-02-18 PROCEDURE — 99285 EMERGENCY DEPT VISIT HI MDM: CPT

## 2020-02-18 PROCEDURE — 80053 COMPREHEN METABOLIC PANEL: CPT | Performed by: EMERGENCY MEDICINE

## 2020-02-18 PROCEDURE — 85730 THROMBOPLASTIN TIME PARTIAL: CPT | Performed by: EMERGENCY MEDICINE

## 2020-02-18 PROCEDURE — NS001 PR NO SIGNATURE OR ATTESTATION: Performed by: ORTHOPAEDIC SURGERY

## 2020-02-18 PROCEDURE — 90471 IMMUNIZATION ADMIN: CPT

## 2020-02-18 PROCEDURE — NC001 PR NO CHARGE: Performed by: EMERGENCY MEDICINE

## 2020-02-18 PROCEDURE — 36415 COLL VENOUS BLD VENIPUNCTURE: CPT | Performed by: EMERGENCY MEDICINE

## 2020-02-18 PROCEDURE — 93010 ELECTROCARDIOGRAM REPORT: CPT | Performed by: INTERNAL MEDICINE

## 2020-02-18 PROCEDURE — 72125 CT NECK SPINE W/O DYE: CPT

## 2020-02-18 PROCEDURE — 12001 RPR S/N/AX/GEN/TRNK 2.5CM/<: CPT | Performed by: EMERGENCY MEDICINE

## 2020-02-18 PROCEDURE — 85610 PROTHROMBIN TIME: CPT | Performed by: EMERGENCY MEDICINE

## 2020-02-18 PROCEDURE — 99284 EMERGENCY DEPT VISIT MOD MDM: CPT | Performed by: EMERGENCY MEDICINE

## 2020-02-18 PROCEDURE — 86905 BLOOD TYPING RBC ANTIGENS: CPT

## 2020-02-18 PROCEDURE — 85025 COMPLETE CBC W/AUTO DIFF WBC: CPT | Performed by: EMERGENCY MEDICINE

## 2020-02-18 PROCEDURE — 96365 THER/PROPH/DIAG IV INF INIT: CPT

## 2020-02-18 PROCEDURE — 71045 X-RAY EXAM CHEST 1 VIEW: CPT

## 2020-02-18 PROCEDURE — 81001 URINALYSIS AUTO W/SCOPE: CPT | Performed by: EMERGENCY MEDICINE

## 2020-02-18 PROCEDURE — 90715 TDAP VACCINE 7 YRS/> IM: CPT | Performed by: EMERGENCY MEDICINE

## 2020-02-18 RX ORDER — HYDROMORPHONE HCL/PF 1 MG/ML
0.5 SYRINGE (ML) INJECTION EVERY 4 HOURS PRN
Status: DISCONTINUED | OUTPATIENT
Start: 2020-02-18 | End: 2020-02-21 | Stop reason: HOSPADM

## 2020-02-18 RX ORDER — SODIUM CHLORIDE, SODIUM GLUCONATE, SODIUM ACETATE, POTASSIUM CHLORIDE, MAGNESIUM CHLORIDE, SODIUM PHOSPHATE, DIBASIC, AND POTASSIUM PHOSPHATE .53; .5; .37; .037; .03; .012; .00082 G/100ML; G/100ML; G/100ML; G/100ML; G/100ML; G/100ML; G/100ML
75 INJECTION, SOLUTION INTRAVENOUS CONTINUOUS
Status: DISCONTINUED | OUTPATIENT
Start: 2020-02-19 | End: 2020-02-19

## 2020-02-18 RX ORDER — BUSPIRONE HYDROCHLORIDE 5 MG/1
5 TABLET ORAL 3 TIMES DAILY
Status: DISCONTINUED | OUTPATIENT
Start: 2020-02-18 | End: 2020-02-19

## 2020-02-18 RX ORDER — METHOCARBAMOL 500 MG/1
500 TABLET, FILM COATED ORAL EVERY 6 HOURS
Status: DISCONTINUED | OUTPATIENT
Start: 2020-02-18 | End: 2020-02-19

## 2020-02-18 RX ORDER — OXYCODONE HYDROCHLORIDE 5 MG/1
2.5 TABLET ORAL EVERY 4 HOURS PRN
Status: DISCONTINUED | OUTPATIENT
Start: 2020-02-18 | End: 2020-02-18

## 2020-02-18 RX ORDER — DOCUSATE SODIUM 100 MG/1
100 CAPSULE, LIQUID FILLED ORAL DAILY
Status: DISCONTINUED | OUTPATIENT
Start: 2020-02-19 | End: 2020-02-21 | Stop reason: HOSPADM

## 2020-02-18 RX ORDER — DOCUSATE SODIUM 100 MG/1
100 CAPSULE, LIQUID FILLED ORAL 2 TIMES DAILY
Status: DISCONTINUED | OUTPATIENT
Start: 2020-02-18 | End: 2020-02-18

## 2020-02-18 RX ORDER — SODIUM CHLORIDE, SODIUM GLUCONATE, SODIUM ACETATE, POTASSIUM CHLORIDE, MAGNESIUM CHLORIDE, SODIUM PHOSPHATE, DIBASIC, AND POTASSIUM PHOSPHATE .53; .5; .37; .037; .03; .012; .00082 G/100ML; G/100ML; G/100ML; G/100ML; G/100ML; G/100ML; G/100ML
75 INJECTION, SOLUTION INTRAVENOUS CONTINUOUS
Status: DISCONTINUED | OUTPATIENT
Start: 2020-02-18 | End: 2020-02-18

## 2020-02-18 RX ORDER — FUROSEMIDE 20 MG/1
20 TABLET ORAL DAILY
Status: DISCONTINUED | OUTPATIENT
Start: 2020-02-19 | End: 2020-02-19

## 2020-02-18 RX ORDER — PROPRANOLOL HYDROCHLORIDE 10 MG/1
10 TABLET ORAL 2 TIMES DAILY
Status: DISCONTINUED | OUTPATIENT
Start: 2020-02-18 | End: 2020-02-21 | Stop reason: HOSPADM

## 2020-02-18 RX ORDER — CHLORHEXIDINE GLUCONATE 0.12 MG/ML
15 RINSE ORAL EVERY 12 HOURS SCHEDULED
Status: DISCONTINUED | OUTPATIENT
Start: 2020-02-18 | End: 2020-02-20

## 2020-02-18 RX ORDER — OXYCODONE HYDROCHLORIDE 5 MG/1
5 TABLET ORAL EVERY 4 HOURS PRN
Status: DISCONTINUED | OUTPATIENT
Start: 2020-02-18 | End: 2020-02-18

## 2020-02-18 RX ORDER — OXYCODONE HYDROCHLORIDE 5 MG/1
5 TABLET ORAL EVERY 4 HOURS PRN
Status: DISCONTINUED | OUTPATIENT
Start: 2020-02-18 | End: 2020-02-21 | Stop reason: HOSPADM

## 2020-02-18 RX ORDER — OXYCODONE HCL 10 MG/1
10 TABLET, FILM COATED, EXTENDED RELEASE ORAL 2 TIMES DAILY PRN
Status: DISCONTINUED | OUTPATIENT
Start: 2020-02-18 | End: 2020-02-18

## 2020-02-18 RX ORDER — AMOXICILLIN 250 MG
1 CAPSULE ORAL
Status: DISCONTINUED | OUTPATIENT
Start: 2020-02-18 | End: 2020-02-21 | Stop reason: HOSPADM

## 2020-02-18 RX ORDER — SPIRONOLACTONE 25 MG/1
25 TABLET ORAL DAILY
Status: DISCONTINUED | OUTPATIENT
Start: 2020-02-19 | End: 2020-02-18

## 2020-02-18 RX ORDER — PANTOPRAZOLE SODIUM 40 MG/1
40 TABLET, DELAYED RELEASE ORAL
Status: DISCONTINUED | OUTPATIENT
Start: 2020-02-19 | End: 2020-02-21 | Stop reason: HOSPADM

## 2020-02-18 RX ORDER — ESCITALOPRAM OXALATE 5 MG/1
5 TABLET ORAL DAILY
Status: DISCONTINUED | OUTPATIENT
Start: 2020-02-19 | End: 2020-02-19

## 2020-02-18 RX ORDER — OXYBUTYNIN CHLORIDE 5 MG/1
5 TABLET, EXTENDED RELEASE ORAL DAILY
Status: DISCONTINUED | OUTPATIENT
Start: 2020-02-19 | End: 2020-02-21 | Stop reason: HOSPADM

## 2020-02-18 RX ORDER — GABAPENTIN 100 MG/1
100 CAPSULE ORAL 3 TIMES DAILY
Status: DISCONTINUED | OUTPATIENT
Start: 2020-02-18 | End: 2020-02-21 | Stop reason: HOSPADM

## 2020-02-18 RX ORDER — OXYCODONE HYDROCHLORIDE 10 MG/1
10 TABLET ORAL EVERY 4 HOURS PRN
Status: DISCONTINUED | OUTPATIENT
Start: 2020-02-18 | End: 2020-02-21 | Stop reason: HOSPADM

## 2020-02-18 RX ORDER — ONDANSETRON 2 MG/ML
4 INJECTION INTRAMUSCULAR; INTRAVENOUS EVERY 4 HOURS PRN
Status: DISCONTINUED | OUTPATIENT
Start: 2020-02-18 | End: 2020-02-21 | Stop reason: HOSPADM

## 2020-02-18 RX ORDER — OXYCODONE HCL 10 MG/1
10 TABLET, FILM COATED, EXTENDED RELEASE ORAL EVERY 12 HOURS SCHEDULED
Status: DISCONTINUED | OUTPATIENT
Start: 2020-02-18 | End: 2020-02-18

## 2020-02-18 RX ORDER — AMLODIPINE BESYLATE 5 MG/1
5 TABLET ORAL DAILY
Status: DISCONTINUED | OUTPATIENT
Start: 2020-02-19 | End: 2020-02-21 | Stop reason: HOSPADM

## 2020-02-18 RX ADMIN — CHLORHEXIDINE GLUCONATE 0.12% ORAL RINSE 15 ML: 1.2 LIQUID ORAL at 20:26

## 2020-02-18 RX ADMIN — OXYCODONE HYDROCHLORIDE 10 MG: 10 TABLET ORAL at 20:26

## 2020-02-18 RX ADMIN — TETANUS TOXOID, REDUCED DIPHTHERIA TOXOID AND ACELLULAR PERTUSSIS VACCINE, ADSORBED 0.5 ML: 5; 2.5; 8; 8; 2.5 SUSPENSION INTRAMUSCULAR at 14:29

## 2020-02-18 RX ADMIN — DESMOPRESSIN ACETATE 28.8 MCG: 4 SOLUTION INTRAVENOUS at 14:36

## 2020-02-18 RX ADMIN — LEVETIRACETAM 500 MG: 100 INJECTION, SOLUTION INTRAVENOUS at 19:17

## 2020-02-18 RX ADMIN — METHOCARBAMOL TABLETS 500 MG: 500 TABLET, COATED ORAL at 20:26

## 2020-02-18 RX ADMIN — SENNOSIDES AND DOCUSATE SODIUM 1 TABLET: 8.6; 5 TABLET ORAL at 22:21

## 2020-02-18 RX ADMIN — BUSPIRONE HYDROCHLORIDE 5 MG: 5 TABLET ORAL at 20:26

## 2020-02-18 RX ADMIN — GABAPENTIN 100 MG: 100 CAPSULE ORAL at 20:26

## 2020-02-18 RX ADMIN — PROPRANOLOL HYDROCHLORIDE 10 MG: 10 TABLET ORAL at 20:27

## 2020-02-18 NOTE — H&P
H&P- Una Baxter 1940, 78 y o  female MRN: [id number]    Unit/Bed#: ED 10 Encounter: [id number]    Primary Care Provider: Jed Jennings MD   Date and time admitted to hospital: 2/18/2020  4:43 PM        Asymptomatic bacteriuria  Assessment & Plan  Urinalysis demonstrated positive nitrite, 20-30 RBC, 2-4 WBC, moderate bacteria  Given chronic indwelling catheter, more likely colonization rather than true UTI  Patient without suprapubic pain, fever, or other symptoms of UTI  Patient given 1 dose of Rocephin IV at [facility] - CLOSED  Do not recommend treatment at this time given that this more likely represents asymptomatic bacteriuria/colonization of the bladder rather UTI  Closed fracture of right distal femur [facility])  Assessment & Plan  Patient sustained a fracture of her right distal femur  Fracture angulated  Fracture in proximity to proximal aspect of 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  Patient neurovascularly intact distally  No skin compromise  Orthopedic Surgery consultation  Patient will need to be cleared by Neurosurgery prior to operative repair  Aspirin long-term use  Assessment & Plan  Reversed with DDAVP due to subdural hematoma  Hold during hospitalization and consider holding indefinitely  Fall from standing  Assessment & Plan  Patient sustained a fall when she was transferring into her wheelchair using a walker  Patient is essentially wheelchair bound due to chronic back pain and deconditioning  Patient fell onto her right side and hit her head  PT and OT evaluations when patient medically stable  Chronic indwelling Davies catheter  Assessment & Plan  Patient has a chronic indwelling Davies catheter  She sees Urology as an outpatient  Continue home oxybutynin  Ambulatory dysfunction  Assessment & Plan  Patient has a history of ambulatory dysfunction  She is essentially wheelchair-bound      Will have PT and OT work with the patient once she is medically stable  Uncomplicated opioid dependence [facility])  Assessment & Plan  Patient on OxyContin 10 mg twice daily prescribed by her PCP  Prescription last filled on February 9th for 60 tablets  Review of PDMP does not demonstrate any aberrant behavior  Patient also on PRN tramadol 50 mg every 6 hours  Patient takes opioids for chronic back pain  Will continue OxyContin as patient reports that she takes this every day  Will add on PRN oxycodone IR and Dilaudid for breakthrough pain  * Subdural hematoma ([address] )  Assessment & Plan  Patient sustained a right parafalcine subdural hematoma measuring 2 1 x 0 7 cm  No mass effect or midline shift  Patient given DDAVP to reverse antiplatelet affect of aspirin which he takes chronically  Neurosurgery consult  Hold all antiplatelets or anticoagulants  STAT CT head GCS declines by greater than 2  Recheck CT head tomorrow  H&P Exam - Trauma   Bandar Fox Hindman 78 y o  female MRN: [id number]  Unit/Bed#: ED 10 Encounter: [id number]    Assessment/Plan   Trauma Alert: Other Transfer, [address] of Arrival: Ambulance  Trauma Team: Attending Raine Adames and Diamante Fox  Consultants: Orthopedic Surgery and Neurosurgery      Chief Complaint:  Right knee pain    History of Present Illness   HPI:  Ekaterina Nuno is an 40-year-old female with past medical history of diastolic CHF, atrial fibrillation not on anticoagulation, chronic back pain on OxyContin twice daily, chronic Davies catheter, depression, and ambulatory dysfunction essentially wheelchair-bound at baseline who is presenting as a transfer from [facility]  Patient reports that she was using a walker to transfer into her wheelchair when she fell onto her right side, striking her head on the ground  Fall was witnessed by her   She was brought to the ER  She had an occipital scalp laceration which was repaired with staples    CT head demonstrated a right parafalcine subdural hematoma  DDAVP was given as the patient takes aspirin daily  CT cervical spine was negative for acute pathology  X-ray of the right femur demonstrated a fracture of the distal right femur  Patient was transferred to this facility for evaluation    At this time, the patient is complaining primarily of right knee pain  She denies any numbness or tingling of her right foot  She denies any headache, vision changes, focal numbness or weakness, neck pain or neck stiffness, chest pain or pressure, shortness of breath, nausea, vomiting, or abdominal pain  She has a chronic Davies catheter at baseline but denies any suprapubic pain, fever, or chills  Review of Systems   Constitutional: Negative for diaphoresis, fever and unexpected weight change  HENT: Negative for congestion, rhinorrhea and sore throat  Eyes: Negative for pain, discharge and visual disturbance  Respiratory: Negative for cough, shortness of breath and wheezing  Cardiovascular: Negative for chest pain, palpitations and leg swelling  Gastrointestinal: Negative for abdominal pain, blood in stool, constipation, diarrhea, nausea and vomiting  Genitourinary: Negative for dysuria, flank pain and hematuria  Musculoskeletal: Positive for arthralgias (right knee and thigh)  Negative for joint swelling  Skin: Negative for rash and wound  Allergic/Immunologic: Negative for environmental allergies and food allergies  Neurological: Negative for dizziness, seizures, weakness and numbness  Hematological: Negative for adenopathy  Psychiatric/Behavioral: Negative for confusion and hallucinations  12-point, complete review of systems was reviewed and negative except as stated above       Past Medical History:   Diagnosis Date    Anxiety     [facility]     CHF (congestive heart failure) (HCC)     Depression     HTN (hypertension) 11/9/2016    Opioid dependence ([facility] )     Pneumonia      Past Surgical History:   Procedure Laterality Date    APPENDECTOMY      CHOLECYSTECTOMY      TONSILLECTOMY      TOTAL KNEE ARTHROPLASTY Bilateral     TUBAL LIGATION       Social History   Social History     Substance and Sexual Activity   Alcohol Use No     Social History     Substance and Sexual Activity   Drug Use No     Social History     Tobacco Use   Smoking Status Former Smoker    Types: Cigarettes    Last attempt to quit: 1980    Years since quittin 7   Smokeless Tobacco Former User    Quit date: 2017     Immunization History   Administered Date(s) Administered    INFLUENZA 2006    Influenza Split High Dose Preservative Free IM 10/13/2014    Influenza TIV (IM) 2011, 10/16/2012, 2013    Pneumococcal Polysaccharide PPV23 10/01/2005    Tdap 2016, 2020     Last Tetanus:  Updated today  Family History: Non-contributory      Meds/Allergies     Prior to Admission Medications   Prescriptions Last Dose Informant Patient Reported? Taking?    amLODIPine (NORVASC) 5 mg tablet   No No   Sig: TAKE 1 TABLET BY MOUTH EVERY DAY   aspirin 81 MG tablet  Spouse/Significant Other Yes No   Sig: Take 1 tablet by mouth daily   busPIRone (BUSPAR) 5 mg tablet   No No   Sig: TAKE 1 TABLET BY MOUTH THREE TIMES A DAY   docusate sodium (COLACE) 100 mg capsule   Yes No   Sig: every 24 hours   escitalopram (LEXAPRO) 5 mg tablet   No No   Sig: Take 1 tablet (5 mg total) by mouth daily   furosemide (LASIX) 40 mg tablet   No No   Sig: Take 1 5 tablets (60 mg total) by mouth daily for 90 days   gabapentin (NEURONTIN) 100 mg capsule   No No   Sig: Take 1 capsule (100 mg total) by mouth 3 (three) times a day as needed (pain)   nystatin (MYCOSTATIN) powder   No No   Sig: APPLY 1 APPLICATION TOPICALLY 2 (TWO) TIMES A DAY 2-3 TIMES DAILY TO AFFECTED AREA(S)   omeprazole (PriLOSEC) 20 mg delayed release capsule   No No   Sig: Take 1 capsule (20 mg total) by mouth 2 (two) times a day   oxyCODONE (OXYCONTIN) 10 mg 12 hr tablet   No No   Sig: Take 1 tablet (10 mg total) by mouth 2 (two) times a day as needed for severe painMax Daily Amount: 20 mg   oxybutynin (DITROPAN-XL) 5 mg 24 hr tablet   No No   Sig: TAKE 1 TABLET BY MOUTH EVERY DAY   potassium chloride (MICRO-K) 10 MEQ CR capsule   No No   Sig: Take 1 capsule (10 mEq total) by mouth daily   propranolol (INDERAL) 10 mg tablet   No No   Sig: TAKE 1 TABLET BY MOUTH TWICE A DAY   senna (SENOKOT) 8 6 MG tablet  Spouse/Significant Other Yes No   Sig: Take 2 tablets by mouth daily at bedtime   spironolactone (ALDACTONE) 25 mg tablet   No No   Sig: TAKE 1 TABLET BY MOUTH EVERY DAY   tamsulosin (FLOMAX) 0 4 mg   Yes No   Sig: every 24 hours   traMADol (ULTRAM) 50 mg tablet   No No   Sig: Take 1 tablet (50 mg total) by mouth every 6 (six) hours as needed for moderate pain      Facility-Administered Medications: None       Allergies   Allergen Reactions    Aspirin GI Intolerance    Warfarin          PHYSICAL EXAM    Objective   Vitals:   First set: Pulse: 76 (02/18/20 1652)  Respirations: 18 (02/18/20 1652)  Blood Pressure: (!) 181/74 (02/18/20 1652)    Primary Survey:   (A) Airway:  Intact  (B) Breathing:  Equal bilaterally  (C) Circulation: Pulses:  Normal  (D) Disabliity:  GCS Total:  15  (E) Expose:  Completed    Secondary Survey:   Physical Exam   Constitutional: She is oriented to person, place, and time  She appears well-nourished  No distress  Morbidly obese, chronically ill-appearing female in no acute distress  HENT:   Head: Normocephalic  Right Ear: External ear normal    Left Ear: External ear normal    Occipital scalp laceration with repaired with 1 staple  Abrasion/ecchymosis to the right temple  No signs of basilar skull fracture  No tenderness around the orbits or zygomatic arches bilaterally  No mandibular tenderness  Eyes: Pupils are equal, round, and reactive to light   Conjunctivae and EOM are normal    Neck: Normal range of motion  Neck supple  No midline cervical spine tenderness  Cardiovascular: Normal rate, regular rhythm and normal heart sounds  No murmur heard  Pulmonary/Chest: Effort normal and breath sounds normal  No respiratory distress  She has no wheezes  She has no rales  Abdominal: Soft  Bowel sounds are normal  She exhibits no distension  There is no tenderness  There is no guarding  Musculoskeletal: She exhibits tenderness and deformity  Right lower extremity shortened and externally rotated  Tenderness to palpation of distal femur  Right foot neurovascularly intact with palpable DP pulse and intact sensation  Neurological: She is alert and oriented to person, place, and time  No gross motor deficits noted  Cranial nerves II-XII are intact  Speech is fluent without dysarthria or aphasia  Skin: Skin is warm and dry  Capillary refill takes less than 2 seconds  Psychiatric: She has a normal mood and affect  Her behavior is normal    Nursing note and vitals reviewed        Invasive Devices     Peripheral Intravenous Line            Peripheral IV 02/18/20 Left Antecubital less than 1 day    Peripheral IV 02/18/20 Right Antecubital less than 1 day          Drain            Urethral Catheter Latex 16 Fr  987 days                Lab Results:   BMP/CMP:   Lab Results   Component Value Date    SODIUM 132 (L) 02/18/2020    K 4 6 02/18/2020    CL 93 (L) 02/18/2020    CO2 32 02/18/2020    BUN 10 02/18/2020    CREATININE 0 77 02/18/2020    CALCIUM 9 6 02/18/2020     (H) 02/18/2020    ALT 31 02/18/2020    ALKPHOS 336 (H) 02/18/2020    EGFR 73 02/18/2020   , CBC:   Lab Results   Component Value Date    WBC 5 71 02/18/2020    HGB 14 3 02/18/2020    HCT 44 6 02/18/2020    MCV 93 02/18/2020     02/18/2020    MCH 29 7 02/18/2020    MCHC 32 1 02/18/2020    RDW 15 6 (H) 02/18/2020    MPV 10 9 02/18/2020    NRBC 0 02/18/2020   , Coagulation:   Lab Results   Component Value Date    INR 1 06 02/18/2020    and Urinalysis:   Lab Results   Component Value Date    COLORU Dk Yellow 02/18/2020    CLARITYU Cloudy 02/18/2020    SPECGRAV >=1 030 02/18/2020    PHUR 5 0 02/18/2020    LEUKOCYTESUR Trace (A) 02/18/2020    NITRITE Positive (A) 02/18/2020    GLUCOSEU Negative 02/18/2020    KETONESU Negative 02/18/2020    BILIRUBINUR Interference- unable to analyze (A) 02/18/2020    BLOODU Moderate (A) 02/18/2020     Imaging: Results: I have personally reviewed pertinent films in PACS  Xr Hip/pelv 2-3 Vws Right    Result Date: 2/18/2020  Impression: Displaced angulated fracture through the diametaphysis of the distal femur at the level of the proximal tip of the right knee prosthesis  The study was marked in [facility] for immediate notification  Workstation performed: [id number]     Xr Femur 2 Views Right    Result Date: 2/18/2020  Impression: Displaced angulated fracture through the diametaphysis of the distal femur at the level of the proximal tip of the right knee prosthesis  The study was marked in [facility] for immediate notification  Workstation performed: [id number]     Xr Knee 1 Or 2 Vw Right    Result Date: 2/18/2020  Impression: Displaced angulated fracture through the diametaphysis of the distal femur at the level of the proximal tip of the right knee prosthesis  The study was marked in [facility] for immediate notification  Workstation performed: [id number]     Ct Head Without Contrast    Addendum Date: 2/18/2020    ADDENDUM: Please note the impression should read small right parafalcine subdural hemorrhage without mass effect  Result Date: 2/18/2020  Impression: No acute intracranial abnormality  I personally discussed this study with Dr Ann-Marie Laurent on 2/18/2020 at 2:16 PM  Workstation performed: [id number]     Ct Cervical Spine Without Contrast    Result Date: 2/18/2020  Impression: No cervical spine fracture or traumatic malalignment    Workstation performed: [id number]     Xr Chest 1 View    Result Date: 2/18/2020  Impression: No acute cardiopulmonary disease   Workstation performed: [id number]     Other Studies:  EKG pending    Code Status: Level 3 - DNAR and DNI  Advance Directive and Living Will:      Power of :    POLST:

## 2020-02-18 NOTE — ASSESSMENT & PLAN NOTE
Patient sustained a right parafalcine subdural hematoma measuring 2 1 x 0 7 cm  No mass effect or midline shift  Patient given DDAVP to reverse antiplatelet affect of aspirin which he takes chronically  Neurosurgery consult  Hold all antiplatelets or anticoagulants  STAT CT head GCS declines by greater than 2  Recheck CT head tomorrow

## 2020-02-18 NOTE — ED NOTES
Patients posterior head lac washed with peroxide by this RN and stapled by MD corbin at this time        Ajith Mendoza RN  02/18/20 0859

## 2020-02-18 NOTE — ASSESSMENT & PLAN NOTE
Patient has a history of ambulatory dysfunction  She is essentially wheelchair-bound  Will have PT and OT work with the patient once she is medically stable

## 2020-02-18 NOTE — ED NOTES
New leg bag placed on pt, will get urine sample when there is enough in the bag     Gideon Pedroza, RN  64/[id number]/98 [id number]

## 2020-02-18 NOTE — ASSESSMENT & PLAN NOTE
Patient has a chronic indwelling Davies catheter  She sees Urology as an outpatient  Continue home oxybutynin

## 2020-02-18 NOTE — H&P
Acceptance Note - Critical Care   Martin Liter 78 y o  female MRN: [id number]  Unit/Bed#: ED 10 Encounter: [id number]    Impression:  Principal Problem:    Subdural hematoma (HCC)  Active Problems:    Uncomplicated opioid dependence ([address] )    Ambulatory dysfunction    Chronic indwelling Wells catheter    Fall from standing    Aspirin long-term use    Closed fracture of right distal femur (HCC)    Asymptomatic bacteriuria    Occipital scalp laceration, initial encounter      Assessment and Plan:    Neuro:   · Best Exam:  · GCS: [id number]  · Moves Extremites x4, intact cough, gag, CN II-XII intact  · Continue to trend neuro exam  · Analgesia  · Scheduled:  · Robaxin 500 mg, gabapentin 100 mg TID  · PRN:  · Hydromorphone 0 5 mg q4h PRN, oxy 5 q4h PRN, oxy 10 q4h PRN  · Delirium precautions  · CAM-ICU daily  · Regulate sleep/wake cycle  Dx R parafalcine SDH  · Neurosurgery consult placed  · GCS 15, normal neuro exam  · Keppra prophylaxis  · AM CT  · Holding aspirin, received DDAVP  Dx Anxiety, depression  · Home buspirone, lexapro   Dx Opioid dependence  · Will manage pain and dependence as appropriate  · Oxycodone and dilaudid PRN     CV:   · Rhythm: Atrial Fibrillation  · Follow rhythm on telemetry  · Recent ECHO: 12/2016  · MAPs >65  · Dx HTN  · Continue norvasc 5 mg  · Dx Atrial fibrillation  · Propranolol  · Holding aspirin (received DDAVP on arrival)  · CHF  · Lasix 20 mg PO (reduced from home dose pending possible surgical intervention)    Pulmonary:   · SpO2 goal >92%  · Last CXR: No acute disease  · Chlorhexidine ordered: yes  · Dx No active issues    GI:   · Stress ulcer prophylaxis: Protonix PO  · Bowel regimen: Sennakot  · Nausea PRN: Zofran  · Dx No active issues    :   · Trend UOP and BUN/creat  · Strict I and O  · Dx Chronic indwelling wells catheter  · UA consistent with colonization, patient has no UTI symptoms at this time    · No abx at this time  · Dx Diuretics for CHF  · Lasix 20mg PO (reduced from home dose)    FEN:   Fluids  · UOP: Will follow  · I/O: Will follow  · Maintenance fluids: 75 mL/hr at midnight  · Diuretics: lasix 20 mg PO    Electrolytes  · Replete electrolytes with goals: K >4 0, Mag >2 0, and Phos >3 0    Nutrition  · Cardiac diet, sodium 2g, fluid restriction, NPO at midnight for surgery tomorrow    ID:   · No active infection  · UA results likely represent colonization  · Will continue to follow WBC and temp    Heme:   · Trend hgb and plts  · Transfuse as needed for goal hgb >7  · Dx Aspirin use  · Received DDAVP    Endo:   · No active issues    MSK/Skin:  · PT consult: yes  · OT consult: yes  · Wounds: Posterior head laceration, received staples  · Local wound care as needed  · Frequent turning and pressure off-loading  · Dx Distal femur fracture  · Hx of bilateral knee arthroplasty  · Displaced angulated fracture through the diametaphysis of the distal femur at the level of the proximal tip of the right knee prosthesis  · Patient made NWB in [facility] knee immobilizer  Plan to transition to [facility] tomorrow  · Pain control  · Will follow ortho surgery recs    Lines:  · Chronic indwelling wells catheter  · 2x peripheral IV lines    VTE Prophylaxis:  · Pharmacologic Prophylaxis: Pharmacologic VTE Prophylaxis contraindicated due to SDH  · Mechanical Prophylaxis:     Disposition: Continue ICU care    Code Status: Level 3     Treatment Team/Consultants: Neurosurgery, orthopedic surgery    Date of admission: 2/18/2020    Reason for Admission: R parafalcine hematoma and distal femur fracture    HPI/24hr events: "Aleah Summers is an 42-year-old female with past medical history of diastolic CHF, atrial fibrillation not on anticoagulation, chronic back pain on OxyContin twice daily, chronic Wells catheter, depression, and ambulatory dysfunction essentially wheelchair-bound at baseline who is presenting as a transfer from [facility]    Patient reports that she was using a walker to transfer into her wheelchair when she fell onto her right side, striking her head on the ground  Fall was witnessed by her   She was brought to the ER  She had an occipital scalp laceration which was repaired with staples  CT head demonstrated a right parafalcine subdural hematoma  DDAVP was given as the patient takes aspirin daily  CT cervical spine was negative for acute pathology  X-ray of the right femur demonstrated a fracture of the distal right femur  Patient was transferred to this facility for evaluation" Per Dr Domingo Persons    During my evaluation, her main complaint was right knee pain  She did not describe any numbness, weakness, tingling, no changes in vision or hearing, no neuros symptoms  Was in knee immobilizer  ROS: 14 point ROS is negative and/or as stated in the HPI  Physical Exam:    Physical Exam   Constitutional: She is oriented to person, place, and time  She appears well-developed and well-nourished  HENT:   Head: Normocephalic and atraumatic  Eyes: Pupils are equal, round, and reactive to light  EOM are normal    Neck: Normal range of motion  Neck supple  Cardiovascular: Normal rate, regular rhythm and normal heart sounds  Exam reveals no friction rub  No murmur heard  Pulmonary/Chest: Effort normal and breath sounds normal  No respiratory distress  She has no wheezes  She has no rales  Abdominal: Soft  Bowel sounds are normal  She exhibits no distension  There is no tenderness  Musculoskeletal: She exhibits no edema  Patient is in R knee immobilizer currently  Has good DP's, PTs, normal sensation, and normal strength in plantarflexion and dorsiflexion in BLLEs   Neurological: She is alert and oriented to person, place, and time  No cranial nerve deficit or sensory deficit  She exhibits normal muscle tone  Coordination normal    No neuro deficits   Skin: Skin is warm  Posterior head laceration noted and stapled  Psychiatric: She has a normal mood and affect  Her behavior is normal  Judgment and thought content normal        Vitals:   Vitals:    20 1652 20 1730 20 1800   BP: (!) 181/74 164/84 165/72   Pulse: 76 76 78   Resp:    Temp:   98 1 °F (36 7 °C)   TempSrc:   Oral   SpO2: 96% 95% 96%   Weight: 95 3 kg (210 lb)         Arterial Line:          Temperature: Temp (24hrs), Av 2 °F (36 8 °C), Min:98 1 °F (36 7 °C), Max:98 3 °F (36 8 °C)  Current: Temperature: 98 1 °F (36 7 °C)    Weights: IBW: -92 5 kg  Body mass index is 37 2 kg/m²  Hemodynamic Monitoring:  N/A, PAP:  , PAP mean:   , CVP:  , PCWP:   , SvO2:   , ScvO2:  , CO:  , CI:  , SVR:  , SVRI:  , PVR:  , SV:  , SVI:  , ICP Mean:  , CPP:         Respiratory:  SpO2: SpO2: 96 %, SpO2 Activity: SpO2 Activity: At Rest, SpO2 Device: O2 Device: None (Room air), Capnography:  ,        Intake and Outputs:No intake or output data in the 24 hours ending 20  No intake/output data recorded  Nutrition:        Diet Orders   (From admission, onward)             Start     Ordered    20 0001  Diet NPO; Sips with meds  Diet effective midnight     Question Answer Comment   Diet Type NPO    NPO Except: Sips with meds    RD to adjust diet per protocol? No        20 1749    20 1839  Diet Cardiovascular; Sodium 2 GM; Fluid Restriction 1500 ML  Diet effective now     Question Answer Comment   Diet Type Cardiovascular    Cardiac Sodium 2 GM    Other Restriction(s): Fluid Restriction 1500 ML    RD to adjust diet per protocol?  Yes        20 1840                Labs:   Results from last 7 days   Lab Units 20  1408   WBC Thousand/uL 5 71   HEMOGLOBIN g/dL 14 3   HEMATOCRIT % 44 6   PLATELETS Thousands/uL 151   NEUTROS PCT % 62   MONOS PCT % 10     Results from last 7 days   Lab Units 20  1408   SODIUM mmol/L 132*   POTASSIUM mmol/L 4 6   CHLORIDE mmol/L 93*   CO2 mmol/L 32   BUN mg/dL 10   CREATININE mg/dL 0 77   CALCIUM mg/dL 9 6   ALK PHOS U/L 336*   ALT U/L 31   AST U/L 159*              Results from last 7 days   Lab Units 02/18/20  1408   INR  1 06   PTT seconds 30                     No results found for: [facility]             Imaging:   I have personally reviewed pertinent reports  and I have personally reviewed pertinent films in PACS    Micro:   Blood Culture:   Lab Results   Component Value Date    BLOODCX No Growth After 5 Days  06/05/2017    BLOODCX No Growth After 5 Days  06/05/2017     Urine Culture:   Lab Results   Component Value Date    URINECX >100,000 cfu/ml Escherichia coli 06/05/2017    URINECX >100,000 cfu/ml Klebsiella pneumoniae 11/01/2016     Sputum Culture: No components found for: SPUTUMCX  Wound Culure: No results found for: WOUNDCULT        Allergies:    Allergies   Allergen Reactions    Aspirin GI Intolerance    Warfarin        Medications:   Scheduled Meds:  Current Facility-Administered Medications:  [START ON 2/19/2020] amLODIPine 5 mg Oral Daily Vikas Patrick MD   busPIRone 5 mg Oral TID Vikas Patrick MD   [START ON 2/19/2020] docusate sodium 100 mg Oral Daily Vikas Patrick MD   [START ON 2/19/2020] escitalopram 5 mg Oral Daily Vikas Patrick MD   [START ON 2/19/2020] furosemide 20 mg Oral Daily Clara Reaper Petit-Me   gabapentin 100 mg Oral TID Vikas Patrick MD   HYDROmorphone 0 5 mg Intravenous Q4H PRN Vikas Patrick MD   levETIRAcetam 500 mg Intravenous BID Vikas Patrick MD   methocarbamol 500 mg Oral Q6H Clara Reaper Petit-Me   [START ON 2/19/2020] multi-electrolyte 75 mL/hr Intravenous Continuous Clara Reaper Petit-Me   [START ON 2/19/2020] oxybutynin 5 mg Oral Daily Vikas Patrick MD   oxyCODONE 10 mg Oral Q4H PRN Clara Reaper Petit-Me   oxyCODONE 5 mg Oral Q4H PRN Clara Reaper Petit-Me   [START ON 2/19/2020] pantoprazole 40 mg Oral Early Morning Vikas Patrick MD   propranolol 10 mg Oral BID Vikas Patrick MD   senna-docusate sodium 1 tablet Oral HS Vikas Patrick MD Continuous Infusions:  [START ON 2/19/2020] multi-electrolyte 75 mL/hr     PRN Meds:    HYDROmorphone 0 5 mg Q4H PRN   oxyCODONE 10 mg Q4H PRN   oxyCODONE 5 mg Q4H PRN       Invasive lines and devices:   Invasive Devices     Peripheral Intravenous Line            Peripheral IV 02/18/20 Left Antecubital less than 1 day    Peripheral IV 02/18/20 Right Antecubital less than 1 day          Drain            Urethral Catheter Latex 16 Fr  987 days                Code Status: Level 3 - DNAR and DNI    SIGNATURE: Parth Masters MD  DATE: February 18, 2020  TIME: 6:47 PM

## 2020-02-18 NOTE — EMTALA/ACUTE CARE TRANSFER
[facility]  [address] [address]  Dept: [phone]      EMTALA TRANSFER CONSENT    NAME Aris Parker                                         1940                              MRN [id number]    I have been informed of my rights regarding examination, treatment, and transfer   by Dr Karly DO    Benefits: Specialized equipment and/or services available at the receiving facility (Include comment)________________________    Risks: Potential for delay in receiving treatment, Potential deterioration of medical condition, Possible worsening of condition or death during transfer      Consent for Transfer:  I acknowledge that my medical condition has been evaluated and explained to me by the emergency department physician or other qualified medical person and/or my attending physician, who has recommended that I be transferred to the service of  Accepting Physician: Negin Collins at [address] Name, [address] : [address]  The above potential benefits of such transfer, the potential risks associated with such transfer, and the probable risks of not being transferred have been explained to me, and I fully understand them  The doctor has explained that, in my case, the benefits of transfer outweigh the risks  I agree to be transferred  I authorize the performance of emergency medical procedures and treatments upon me in both transit and upon arrival at the receiving facility  Additionally, I authorize the release of any and all medical records to the receiving facility and request they be transported with me, if possible  I understand that the safest mode of transportation during a medical emergency is an ambulance and that the Hospital advocates the use of this mode of transport   Risks of traveling to the receiving facility by car, including absence of medical control, life sustaining equipment, such as oxygen, and medical personnel has been explained to me and I fully understand them  (DANYELLE CORRECT BOX BELOW)  [ x ]  I consent to the stated transfer and to be transported by ambulance/helicopter  [  ]  I consent to the stated transfer, but refuse transportation by ambulance and accept full responsibility for my transportation by car  I understand the risks of non-ambulance transfers and I exonerate the Hospital and its staff from any deterioration in my condition that results from this refusal     X___________________________________________    DATE  20  TIME________  Signature of patient or legally responsible individual signing on patient behalf           RELATIONSHIP TO PATIENT_________________________          Provider Certification    NAME Hunter Maynard                                         1940                              MRN [id number]    A medical screening exam was performed on the above named patient  Based on the examination:    Condition Necessitating Transfer The primary encounter diagnosis was Subdural hematoma ([address] )  A diagnosis of Closed fracture of right femur, unspecified fracture morphology, initial encounter [facility]) was also pertinent to this visit      Patient Condition: The patient has been stabilized such that within reasonable medical probability, no material deterioration of the patient condition or the condition of the unborn child(viky) is likely to result from the transfer    Reason for Transfer: Level of Care needed not available at this facility    Transfer Requirements: [address]   · Space available and qualified personnel available for treatment as acknowledged by  PACS  · Agreed to accept transfer and to provide appropriate medical treatment as acknowledged by       Savita Acharya  · Appropriate medical records of the examination and treatment of the patient are provided at the time of transfer   [address] _______  · Transfer will be performed by qualified personnel from   [facility] and appropriate transfer equipment as required, including the use of necessary and appropriate life support measures  Provider Certification: I have examined the patient and explained the following risks and benefits of being transferred/refusing transfer to the patient/family:  General risk, such as traffic hazards, adverse weather conditions, rough terrain or turbulence, possible failure of equipment (including vehicle or aircraft), or consequences of actions of persons outside the control of the transport personnel      Based on these reasonable risks and benefits to the patient and/or the unborn child(viky), and based upon the information available at the time of the patients examination, I certify that the medical benefits reasonably to be expected from the provision of appropriate medical treatments at another medical facility outweigh the increasing risks, if any, to the individuals medical condition, and in the case of labor to the unborn child, from effecting the transfer      X____________________________________________ DATE 02/18/20        TIME_______      ORIGINAL - SEND TO MEDICAL RECORDS   COPY - SEND WITH PATIENT DURING TRANSFER

## 2020-02-18 NOTE — ED NOTES
Pt is unable to remember the name of medications she takes other than aspirin and oxycodone      Sabiha Khan RN  03/33/31 3881

## 2020-02-18 NOTE — ASSESSMENT & PLAN NOTE
Urinalysis demonstrated positive nitrite, 20-30 RBC, 2-4 WBC, moderate bacteria  Given chronic indwelling catheter, more likely colonization rather than true UTI  Patient without suprapubic pain, fever, or other symptoms of UTI  Patient given 1 dose of Roce[facility]in [facility]  Do not recommend treatment at this time given that this more likely represents asymptomatic bacteriuria/colonization of the bladder rather UTI

## 2020-02-18 NOTE — ASSESSMENT & PLAN NOTE
Patient on OxyContin 10 mg twice daily prescribed by her PCP  Prescription last filled on February 9th for 60 tablets  Review of PDMP does not demonstrate any aberrant behavior  Patient also on PRN tramadol 50 mg every 6 hours  Patient takes opioids for chronic back pain  Will continue OxyContin as patient reports that she takes this every day  Will add on PRN oxycodone IR and Dilaudid for breakthrough pain

## 2020-02-18 NOTE — EMTALA/ACUTE CARE TRANSFER
[facility]  [address] [facility] [address]  Dept: [phone]      EMTALA TRANSFER CONSENT    NAME Cadence Pritchard                                         1940                              MRN [id number]    I have been informed of my rights regarding examination, treatment, and transfer   by Dr Karly DO    Benefits: Specialized equipment and/or services available at the receiving facility (Include comment)________________________    Risks: Potential for delay in receiving treatment, Potential deterioration of medical condition, Possible worsening of condition or death during transfer      Consent for Transfer:  I acknowledge that my medical condition has been evaluated and explained to me by the emergency department physician or other qualified medical person and/or my attending physician, who has recommended that I be transferred to the service of  Accepting Physician: Radha Vicente at [address] Name, [address] : [address]  The above potential benefits of such transfer, the potential risks associated with such transfer, and the probable risks of not being transferred have been explained to me, and I fully understand them  The doctor has explained that, in my case, the benefits of transfer outweigh the risks  I agree to be transferred  I authorize the performance of emergency medical procedures and treatments upon me in both transit and upon arrival at the receiving facility  Additionally, I authorize the release of any and all medical records to the receiving facility and request they be transported with me, if possible  I understand that the safest mode of transportation during a medical emergency is an ambulance and that the Hospital advocates the use of this mode of transport   Risks of traveling to the receiving facility by car, including absence of medical control, life sustaining equipment, such as oxygen, and medical personnel has been explained to me and I fully understand them  (DANYELLE CORRECT BOX BELOW)  [  ]  I consent to the stated transfer and to be transported by ambulance/helicopter  [  ]  I consent to the stated transfer, but refuse transportation by ambulance and accept full responsibility for my transportation by car  I understand the risks of non-ambulance transfers and I exonerate the Hospital and its staff from any deterioration in my condition that results from this refusal     X___________________________________________    DATE  20  TIME________  Signature of patient or legally responsible individual signing on patient behalf           RELATIONSHIP TO PATIENT_________________________          Provider Certification    NAME Apolonia Race                                         1940                              MRN [id number]    A medical screening exam was performed on the above named patient  Based on the examination:    Condition Necessitating Transfer The primary encounter diagnosis was Subdural hematoma ([address] )  A diagnosis of Closed fracture of right femur, unspecified fracture morphology, initial encounter [facility]) was also pertinent to this visit      Patient Condition: The patient has been stabilized such that within reasonable medical probability, no material deterioration of the patient condition or the condition of the unborn child(viky) is likely to result from the transfer    Reason for Transfer: Level of Care needed not available at this facility    Transfer Requirements: [address]   · Space available and qualified personnel available for treatment as acknowledged by    · Agreed to accept transfer and to provide appropriate medical treatment as acknowledged by       Trini Chang  · Appropriate medical records of the examination and treatment of the patient are provided at the time of transfer   [address] _______  · Transfer will be performed by qualified personnel from    and appropriate transfer equipment as required, including the use of necessary and appropriate life support measures  Provider Certification: I have examined the patient and explained the following risks and benefits of being transferred/refusing transfer to the patient/family:  General risk, such as traffic hazards, adverse weather conditions, rough terrain or turbulence, possible failure of equipment (including vehicle or aircraft), or consequences of actions of persons outside the control of the transport personnel      Based on these reasonable risks and benefits to the patient and/or the unborn child(viky), and based upon the information available at the time of the patients examination, I certify that the medical benefits reasonably to be expected from the provision of appropriate medical treatments at another medical facility outweigh the increasing risks, if any, to the individuals medical condition, and in the case of labor to the unborn child, from effecting the transfer      X____________________________________________ DATE 02/18/20        TIME_______      ORIGINAL - SEND TO MEDICAL RECORDS   COPY - SEND WITH PATIENT DURING TRANSFER

## 2020-02-18 NOTE — ASSESSMENT & PLAN NOTE
Patient sustained a fall when she was transferring into her wheelchair using a walker  Patient is essentially wheelchair bound due to chronic back pain and deconditioning  Patient fell onto her right side and hit her head  PT and OT evaluations when patient medically stable

## 2020-02-18 NOTE — ASSESSMENT & PLAN NOTE
Patient sustained a right parafalcine subdural hematoma measuring 2 1 x 0 7 cm  No mass effect or midline shift  Patient given DDAVP to reverse antiplatelet affect of aspirin which he takes chronically  Neurosurgery consult  Hold all antiplatelets or anticoagulants  STAT CT head GCS declines by greater than 2  Patient placed on Keppra 500 mg BID  Will continue for 7 days  Recheck CT head today

## 2020-02-18 NOTE — ED PROVIDER NOTES
History  Chief Complaint   Patient presents with   Amelia Roberts Fall     arrived via ems, pt reports her socks" sticking" to the floor and tripping  Right knee is swolling and leg is rotated  Pt reports u anble to move it  Laceration noted to back of head  denies LOC takes aspirin     40-year-old female presenting for evaluation after a fall at home  Patient states she gets around minimally with a walker but is centrally wheelchair-bound  Patient states today she was attempting to get to her wheelchair when her foot or sock got caught on something she is unable provide any additional history of she does not know and she slipped before her  was able to come up behind her with the wheelchair  Patient fell onto her right side she denies any her head but does have a small laceration to her occiput she is on aspirin daily but no other blood thinners  Patient has history of bilateral knee replacements she has significant pain above the right knee decreased range of motion is held in flexion and external rotation  She denies any hip pain she has normal sensation throughout the leg she has good distal pulses  She also has neck pain on rotation of her neck on exam   Unknown last tetanus shot        History provided by:  Patient   used: No    Fall   Mechanism of injury: fall    Injury location:  Head/neck and leg  Leg injury location:  R knee  Arrived directly from scene: yes    Fall:     Fall occurred:  Standing    Impact surface:  [address] of impact:  Head and knees    Entrapped after fall: no    Suspicion of alcohol use: no    Suspicion of drug use: no    Tetanus status:  Unknown  Prior to arrival data:     Bystander interventions:  None    Patient ambulatory at scene: no      Blood loss:  Minimal    Responsiveness at scene:  Alert    Loss of consciousness: no      Airway interventions:  None    Breathing interventions:  None  Associated symptoms: no abdominal pain, no chest pain, no nausea and no vomiting        Prior to Admission Medications   Prescriptions Last Dose Informant Patient Reported? Taking?    amLODIPine (NORVASC) 5 mg tablet   No No   Sig: TAKE 1 TABLET BY MOUTH EVERY DAY   aspirin 81 MG tablet 2/18/2020 at   Spouse/Significant Other Yes Yes   Sig: Take 1 tablet by mouth daily   busPIRone (BUSPAR) 5 mg tablet   No No   Sig: TAKE 1 TABLET BY MOUTH THREE TIMES A DAY   docusate sodium (COLACE) 100 mg capsule   Yes No   Sig: every 24 hours   escitalopram (LEXAPRO) 5 mg tablet   No No   Sig: Take 1 tablet (5 mg total) by mouth daily   furosemide (LASIX) 40 mg tablet   No No   Sig: Take 1 5 tablets (60 mg total) by mouth daily for 90 days   gabapentin (NEURONTIN) 100 mg capsule   No No   Sig: Take 1 capsule (100 mg total) by mouth 3 (three) times a day as needed (pain)   nystatin (MYCOSTATIN) powder   No No   Sig: APPLY 1 APPLICATION TOPICALLY 2 (TWO) TIMES A DAY 2-3 TIMES DAILY TO AFFECTED AREA(S)   omeprazole (PriLOSEC) 20 mg delayed release capsule   No No   Sig: Take 1 capsule (20 mg total) by mouth 2 (two) times a day   oxyCODONE (OXYCONTIN) 10 mg 12 hr tablet 2/17/2020 at Unknown time  No Yes   Sig: Take 1 tablet (10 mg total) by mouth 2 (two) times a day as needed for severe painMax Daily Amount: 20 mg   oxybutynin (DITROPAN-XL) 5 mg 24 hr tablet   No No   Sig: TAKE 1 TABLET BY MOUTH EVERY DAY   potassium chloride (MICRO-K) 10 MEQ CR capsule   No No   Sig: Take 1 capsule (10 mEq total) by mouth daily   propranolol (INDERAL) 10 mg tablet   No No   Sig: TAKE 1 TABLET BY MOUTH TWICE A DAY   senna (SENOKOT) 8 6 MG tablet  Spouse/Significant Other Yes No   Sig: Take 2 tablets by mouth daily at bedtime   spironolactone (ALDACTONE) 25 mg tablet   No No   Sig: TAKE 1 TABLET BY MOUTH EVERY DAY   tamsulosin (FLOMAX) 0 4 mg   Yes No   Sig: every 24 hours   traMADol (ULTRAM) 50 mg tablet   No No   Sig: Take 1 tablet (50 mg total) by mouth every 6 (six) hours as needed for moderate pain Facility-Administered Medications: None       Past Medical History:   Diagnosis Date    Anxiety     Atrial fibrillation (HCC)     CHF (congestive heart failure) (HCC)     Depression     HTN (hypertension) 2016    Opioid dependence ([facility] [address] )     Pneumonia        Past Surgical History:   Procedure Laterality Date    APPENDECTOMY      CHOLECYSTECTOMY      TONSILLECTOMY      TOTAL KNEE ARTHROPLASTY Bilateral     TUBAL LIGATION         Family History   Problem Relation Age of Onset    Cancer Father     Hypertension Sister      I have reviewed and agree with the history as documented  Social History     Tobacco Use    Smoking status: Former Smoker     Types: Cigarettes     Last attempt to quit: 1980     Years since quittin 7    Smokeless tobacco: Former User     Quit date: 2017   Substance Use Topics    Alcohol use: No    Drug use: No        Review of Systems   Constitutional: Negative for chills and fever  HENT: Negative for sore throat  Eyes: Negative for visual disturbance  Respiratory: Negative for chest tightness, shortness of breath and wheezing  Cardiovascular: Negative for chest pain  Gastrointestinal: Negative for abdominal pain, constipation, diarrhea, nausea and vomiting  Genitourinary: Negative for dysuria and hematuria  Musculoskeletal: Positive for arthralgias  Negative for myalgias  Skin: Positive for wound  Negative for color change  Neurological: Positive for weakness  Negative for light-headedness  Hematological: Negative for adenopathy  Psychiatric/Behavioral: Negative for agitation and behavioral problems  All other systems reviewed and are negative        Physical Exam  ED Triage Vitals   Temperature Pulse Respirations Blood Pressure SpO2   20 1356 20 1255 20 1255 20 1255 20 1255   98 3 °F (36 8 °C) 70 18 (!) 180/91 95 %      Temp Source Heart Rate Source Patient Position - Orthostatic VS BP Location FiO2 (%) 02/18/20 1356 02/18/20 1255 02/18/20 1426 02/18/20 1255 --   Rectal Monitor Lying Right arm       Pain Score       02/18/20 1255       Worst Possible Pain             Orthostatic Vital Signs  Vitals:    02/18/20 1255 02/18/20 1426 02/18/20 1527   BP: (!) 180/91 160/72 133/64   Pulse: 70 65 79   Patient Position - Orthostatic VS:  Lying        Physical Exam   Constitutional: She is oriented to person, place, and time  She appears well-developed and well-nourished  No distress  HENT:   Head: Normocephalic and atraumatic  Eyes: Conjunctivae and EOM are normal  No scleral icterus  Neck: Normal range of motion  Neck supple  Cardiovascular: Normal rate, regular rhythm and normal heart sounds  No murmur heard  Pulmonary/Chest: Effort normal and breath sounds normal  No respiratory distress  Abdominal: Soft  Bowel sounds are normal  There is no tenderness  Musculoskeletal: Normal range of motion  She exhibits tenderness and deformity  Neurological: She is alert and oriented to person, place, and time  Skin: Skin is warm and dry  Posterior scalp laceration measuring less than 1 cm  Psychiatric: She has a normal mood and affect  Her behavior is normal    Nursing note and vitals reviewed        ED Medications  Medications   ceftriaxone (ROCEPHIN) 1 g/50 mL in dextrose IVPB (has no administration in time range)   tetanus-diphtheria-acellular pertussis (BOOSTRIX) IM injection 0 5 mL (0 5 mL Intramuscular Given 2/18/20 1429)   desmopressin (DDAVP) 28 8 mcg in sodium chloride 0 9 % 50 mL IVPB (0 mcg/kg × 95 7 kg Intravenous Stopped 2/18/20 1506)       Diagnostic Studies  Results Reviewed     Procedure Component Value Units Date/Time    UA w Reflex to Microscopic w Reflex to Culture [[id number]]  (Abnormal) Collected:  02/18/20 1535    Lab Status:  Final result Specimen:  Urine, Other Updated:  02/18/20 1555     Color, UA Dk Yellow     Clarity, UA Cloudy     Specific Gravity, UA >=1 030     pH, UA 5 0 Leukocytes, UA Trace     Nitrite, UA Positive     Protein,  (2+) mg/dl      Glucose, UA Negative mg/dl      Ketones, UA Negative mg/dl      Urobilinogen, UA 2 0 E U /dl      Bilirubin, UA Interference- unable to analyze     Blood, UA Moderate    Urine Microscopic [[id number]] Collected:  02/18/20 1535    Lab Status:   In process Specimen:  Urine, Other Updated:  02/18/20 1548    Comprehensive metabolic panel [[id number]]  (Abnormal) Collected:  02/18/20 1408    Lab Status:  Final result Specimen:  Blood from Arm, Right Updated:  02/18/20 1432     Sodium 132 mmol/L      Potassium 4 6 mmol/L      Chloride 93 mmol/L      CO2 32 mmol/L      ANION GAP 7 mmol/L      BUN 10 mg/dL      Creatinine 0 77 mg/dL      Glucose 142 mg/dL      Calcium 9 6 mg/dL       U/L      ALT 31 U/L      Alkaline Phosphatase 336 U/L      Total Protein 7 5 g/dL      Albumin 3 0 g/dL      Total Bilirubin 2 12 mg/dL      eGFR 73 ml/min/1 73sq m     Narrative:       [place] guidelines for Chronic Kidney Disease (CKD):     Stage 1 with normal or high GFR (GFR > 90 mL/min/1 73 square meters)    Stage 2 Mild CKD (GFR = 60-89 mL/min/1 73 square meters)    Stage 3A Moderate CKD (GFR = 45-59 mL/min/1 73 square meters)    Stage 3B Moderate CKD (GFR = 30-44 mL/min/1 73 square meters)    Stage 4 Severe CKD (GFR = 15-29 mL/min/1 73 square meters)    Stage 5 End Stage CKD (GFR <15 mL/min/1 73 square meters)  Note: GFR calculation is accurate only with a steady state creatinine    Protime-INR [[id number]]  (Normal) Collected:  02/18/20 1408    Lab Status:  Final result Specimen:  Blood from Arm, Right Updated:  02/18/20 1428     Protime 13 9 seconds      INR 1 06    APTT [[id number]]  (Normal) Collected:  02/18/20 1408    Lab Status:  Final result Specimen:  Blood from Arm, Right Updated:  02/18/20 1428     PTT 30 seconds     CBC and differential [[id number]]  (Abnormal) Collected:  02/18/20 1408    Lab Status:  Final result Specimen:  Blood from Arm, Right Updated:  02/18/20 1414     WBC 5 71 Thousand/uL      RBC 4 82 Million/uL      Hemoglobin 14 3 g/dL      Hematocrit 44 6 %      MCV 93 fL      MCH 29 7 pg      MCHC 32 1 g/dL      RDW 15 6 %      MPV 10 9 fL      Platelets 926 Thousands/uL      nRBC 0 /100 WBCs      Neutrophils Relative 62 %      Immat GRANS % 1 %      Lymphocytes Relative 23 %      Monocytes Relative 10 %      Eosinophils Relative 3 %      Basophils Relative 1 %      Neutrophils Absolute 3 65 Thousands/µL      Immature Grans Absolute 0 03 Thousand/uL      Lymphocytes Absolute 1 31 Thousands/µL      Monocytes Absolute 0 54 Thousand/µL      Eosinophils Absolute 0 15 Thousand/µL      Basophils Absolute 0 03 Thousands/µL                  CT cervical spine without contrast   Final Result by Cheo Hayes MD (02/18 1412)      No cervical spine fracture or traumatic malalignment  Workstation performed: [id number]         CT head without contrast   Final Result by  (02/18 1602)   Addendum 1 of 1 by Vinicio Palomares MD (02/18 1319)   ADDENDUM:      Please note the impression should read small right parafalcine subdural    hemorrhage without mass effect  Final      XR chest 1 view   Final Result by Jesus Burkett MD (02/18 1415)      No acute cardiopulmonary disease  Workstation performed: [id number]         XR femur 2 views RIGHT   ED Interpretation by Rebecca Michelle MD (02/18 1353)   Abnormal   Angulated, displaced periprosthetic femur fracture  No hip fracture  Final Result by Jesus Burkett MD (02/18 1412)      Displaced angulated fracture through the diametaphysis of the distal femur at the level of the proximal tip of the right knee prosthesis  The study was marked in [facility] for immediate notification              Workstation performed: [id number]         XR hip/pelv 2-3 vws right   Final Result by Jesus Burkett MD (02/18 1412)      Displaced angulated fracture through the diametaphysis of the distal femur at the level of the proximal tip of the right knee prosthesis  The study was marked in [facility] for immediate notification  Workstation performed: [id number]         XR knee 1 or 2 vw right   Final Result by Jeramy Freeman MD (02/18 1412)      Displaced angulated fracture through the diametaphysis of the distal femur at the level of the proximal tip of the right knee prosthesis  The study was marked in [facility] for immediate notification  Workstation performed: [id number]               Procedures  ECG 12 Lead Documentation Only  Date/Time: 2/18/2020 2:01 PM  Performed by: Anastasiya Fermin MD  Authorized by: Anastasiya Fermin MD     ECG reviewed by me, the ED Provider: yes    Patient location:  ED  Previous ECG:     Previous ECG:  Compared to current    Similarity:  No change  Interpretation:     Interpretation: normal    Rate:     ECG rate assessment: normal    Rhythm:     Rhythm: atrial fibrillation    Ectopy:     Ectopy: none    QRS:     QRS axis:  Normal  Conduction:     Conduction: normal    ST segments:     ST segments:  Normal  T waves:     T waves: flattening      Flattening:  V4 and V3    Laceration repair  Date/Time: 2/18/2020 4:01 PM  Performed by: Anastasiya Fermin MD  Authorized by: Anastasiya Fermin MD   Consent: Verbal consent obtained    Consent given by: patient  Patient understanding: patient states understanding of the procedure being performed  Patient identity confirmed: verbally with patient  Body area: head/neck  Location details: scalp  Laceration length: 1 cm  Tendon involvement: none  Nerve involvement: none    Sedation:  Patient sedated: no      Wound Dehiscence:  Superficial Wound Dehiscence: simple closure      Procedure Details:  Amount of cleaning: standard  Skin closure: staples  Number of sutures: 1  Technique: simple  Approximation: close            ED Course  ED Course as of Feb 18 1602 Tue Feb 18, 2020   1419 Within CT head read: A 2 1 x 0 7 cm focus of right parafalcine subdural hemorrhage is noted  No significant mass effect is present  Impression listed as normal                                  MDM  Number of Diagnoses or Management Options  Abrasion of scalp, initial encounter: new and requires workup  Closed fracture of right femur, unspecified fracture morphology, initial encounter [facility]): new and requires workup  Fall, initial encounter: new and requires workup  Laceration of scalp, initial encounter: new and requires workup  Subdural hematoma [facility]): new and requires workup  UTI (urinary tract infection): new and requires workup  Diagnosis management comments: 78-year-old female presenting after a fall at home, will order laboratories as well as urinalysis and CT head neck and x-rays of the right knee and femur and hip  Patient found to have a periprosthetic femur fracture, urinary tract infection and small subdural hematoma  Bedside repair of laceration as above  [place] [facility] [place] and possible intensive care  Given DDAVP and Rocephin [place] [facility]         Amount and/or Complexity of Data Reviewed  Clinical lab tests: ordered and reviewed  Tests in the radiology section of CPT®: ordered and reviewed  Tests in the medicine section of CPT®: ordered and reviewed  Review and summarize past medical records: yes  Discuss the patient with other providers: yes  Independent visualization of images, tracings, or specimens: yes          Disposition  Final diagnoses:   Subdural hematoma ([address] )   Closed fracture of right femur, unspecified fracture morphology, initial encounter ([facility] [address] )   Laceration of scalp, initial encounter   Fall, initial encounter   Abrasion of scalp, initial encounter   UTI (urinary tract infection)     Time reflects when diagnosis was documented in both MDM as applicable and the Disposition within this note     Time User Action Codes Description Comment    2/18/2020  2:24 PM Patricia Rouse Add [Y20 1G0M] Subdural hematoma ([facility] [address] )     2/18/2020  2:25 PM Verena Loja Add [S72 91XA] Closed fracture of right femur, unspecified fracture morphology, initial encounter ([facility] [address] )     2/18/2020  3:06 PM Sue Del Add [S01 01XA] Laceration of scalp, initial encounter     2/18/2020  3:06 PM Sue Del Add [M50  Elsa Nan Fall, initial encounter     2/18/2020  3:06 PM Sue Del Add [S00 01XA] Abrasion of scalp, initial encounter     2/18/2020  3:57 PM Sue Del Add [N39 0] UTI (urinary tract infection)       ED Disposition     ED Disposition Condition Date/Time Comment    Transfer to Another Facility-In Network  Tu Feb 18, 2020  2:42 PM Aleah Summers should be transferred out to [facility] - trauma Dr Estevan Sainz MD Documentation      Most Recent Value   Patient Condition  The patient has been stabilized such that within reasonable medical probability, no material deterioration of the patient condition or the condition of the unborn child(viky) is likely to result from the transfer   Reason for Transfer  Level of Care needed not available at this facility   Benefits of Transfer  Specialized equipment and/or services available at the receiving facility (Include comment)________________________   Risks of Transfer  Potential for delay in receiving treatment, Potential deterioration of medical condition, Possible worsening of condition or death during transfer   Accepting Physician  [facility] Name, Alee Loja   Provider Certification  General risk, such as traffic hazards, adverse weather conditions, rough terrain or turbulence, possible failure of equipment (including vehicle or aircraft), or consequences of actions of persons outside the control of the transport personnel      RN Documentation      Most [address] Name, [facility] & [address] [facility]      Follow-up Information    None         Patient's Medications   Discharge Prescriptions    No medications on file     No discharge procedures on file  ED Provider  Attending physically available and evaluated Yarelis Womack I managed the patient along with the ED Attending      Electronically Signed by         Ruth Martinez MD  02/18/20 [id number]

## 2020-02-18 NOTE — ASSESSMENT & PLAN NOTE
Reversed with DDAVP due to subdural hematoma  Hold during hospitalization and consider holding indefinitely

## 2020-02-18 NOTE — ASSESSMENT & PLAN NOTE
Patient sustained a fracture of her right distal femur  Fracture angulated  Fracture in proximity to proximal aspect of 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  Patient neurovascularly intact distally  No skin compromise  Orthopedic Surgery consultation  Patient will need to be cleared by Neurosurgery prior to operative repair

## 2020-02-18 NOTE — CONSULTS
Orthopedics   Vandana Car 78 y o  female MRN: [id number]  Unit/Bed#: ED 10      Chief Complaint:   right thigh pain    HPI:   78 y  o female status post fall while transferring to her wheelchair, complaining of right knee pain  Pain is made worse with motion or contact to the area  At baseline patient is primarily bed bound with only occasional transfers to her wheelchair  Patient has a hx of right TKA done at an OSH though she does not recall the exact timing  She denies any numbness or tingling and denies any further orthopaedic complaints       Review Of Systems:   · Skin: Normal  · Neuro: See HPI  · Musculoskeletal: See HPI  · 14 point review of systems negative except as stated above     Past Medical History:   Past Medical History:   Diagnosis Date    Anxiety     [facility]     CHF [facility]congestive [facility] failure) ([facility] )     Depression     HTN (hypertension) 2016    Opioid dependence ([facility] )     Pneumonia        Past Surgical History:   Past Surgical History:   Procedure Laterality Date    APPENDECTOMY      CHOLECYSTECTOMY      TONSILLECTOMY      TOTAL KNEE ARTHROPLASTY Bilateral     TUBAL LIGATION         Family History:  Family history reviewed and non-contributory  Family History   Problem Relation Age of Onset    Cancer Father     Hypertension Sister        Social History:  Social History     Socioeconomic History    Marital status: /Civil Union     Spouse name: None    Number of children: 3    Years of education: None    Highest education level: None   Occupational History    Occupation: flower      Comment: retired   Social Needs    Financial resource strain: None    Food insecurity:     Worry: None     Inability: None    Transportation needs:     Medical: None     Non-medical: None   Tobacco Use    Smoking status: Former Smoker     Types: Cigarettes     Last attempt to quit: 1980     Years since quittin 7    Smokeless tobacco: Former User Quit date: 1/1/2017   Substance and Sexual Activity    Alcohol use: No    Drug use: No    Sexual activity: Not Currently   Lifestyle    Physical activity:     Days per week: None     Minutes per session: None    Stress: None   Relationships    Social connections:     Talks on phone: None     Gets together: None     Attends Alevism service: None     Active member of club or organization: None     Attends meetings of clubs or organizations: None     Relationship status: None    Intimate partner violence:     Fear of current or ex partner: None     Emotionally abused: None     Physically abused: None     Forced sexual activity: None   Other Topics Concern    None   Social History Narrative    ACTIVE ADVANCED DIRECTIVES    ALWAYS USES [employer]    CAFFEINE USE     DENIED HX OF DOMESTIC VIOLENCE     LACK OF EXERCISE     LIVES WITH  in row home, uses only 1st floor due to ambulatory dysfunction    PT HAS LIVING WILL AND POA     SUPPORTIVE AND SAFE     NO Tenriism STATUS     3 children, 2 in [place]       Allergies:    Allergies   Allergen Reactions    Aspirin GI Intolerance    Warfarin            Labs:  0   Lab Value Date/Time    HCT 44 6 02/18/2020 1408    HCT 40 4 10/22/2019 1207    HCT 34 7 (L) 06/06/2017 0444    HCT 37 9 06/05/2017 2032    HGB 14 3 02/18/2020 1408    HGB 13 3 10/22/2019 1207    HGB 11 6 06/06/2017 0444    HGB 12 8 06/05/2017 2032    INR 1 06 02/18/2020 1408    WBC 5 71 02/18/2020 1408    WBC 4 6 10/22/2019 1207    WBC 8 28 06/06/2017 0444    WBC 8 77 06/05/2017 2032       Meds:    Current Facility-Administered Medications:     [START ON 2/19/2020] amLODIPine (NORVASC) tablet 5 mg, 5 mg, Oral, Daily, Karma Donis MD    busPIRone (BUSPAR) tablet 5 mg, 5 mg, Oral, TID, Karma Donis MD    [START ON 2/19/2020] docusate sodium (COLACE) capsule 100 mg, 100 mg, Oral, Daily, Karma Donis MD    [START ON 2/19/2020] escitalopram (LEXAPRO) tablet 5 mg, 5 mg, Oral, Daily, Jeri Whitlock MD  [facility] [place] Beal ON 2/19/2020] furosemide (LASIX) tablet 60 mg, 60 mg, Oral, Daily, Jeri Whitlock MD    gabapentin (NEURONTIN) capsule 100 mg, 100 mg, Oral, TID, Jeri Whitlock MD    HYDROmorphone (DILAUDID) injection 0 5 mg, 0 5 mg, Intravenous, Q4H PRN, Jeri Whitlock MD    levETIRAcetam (KEPPRA) 500 mg in sodium chloride 0 9 % 100 mL IVPB, 500 mg, Intravenous, BID, Jeri Whitlock MD    multi-electrolyte (PLASMALYTE-A/ISOLYTE-S PH 7 4) IV solution, 75 mL/hr, Intravenous, Continuous, Jeri Whitlock MD    [START ON 2/19/2020] oxybutynin (DITROPAN-XL) 24 hr tablet 5 mg, 5 mg, Oral, Daily, Jeri Whitlock MD    oxyCODONE (OxyCONTIN) 12 hr tablet 10 mg, 10 mg, Oral, Q12H [facility], Jeri Whitlock MD    oxyCODONE (ROXICODONE) IR tablet 2 5 mg, 2 5 mg, Oral, Q4H PRN, Jeri Whitlock MD    oxyCODONE (ROXICODONE) IR tablet 5 mg, 5 mg, Oral, Q4H PRN, Jeri Whitlock MD  [facility] [place] ON 2/19/2020] pantoprazole (PROTONIX) EC tablet 40 mg, 40 mg, Oral, Early Morning, Jeri Whitlock MD    propranolol (INDERAL) tablet 10 mg, 10 mg, Oral, BID, Jeri Whitlock MD    senna-docusate sodium (SENOKOT S) 8 6-50 mg per tablet 1 tablet, 1 tablet, Oral, HS, Jeri Whitlock MD  [facility] [place] ON 2/19/2020] spironolactone (ALDACTONE) tablet 25 mg, 25 mg, Oral, Daily, Jeri Whitlock MD    Current Outpatient Medications:     amLODIPine (NORVASC) 5 mg tablet, TAKE 1 TABLET BY MOUTH EVERY DAY, Disp: 90 tablet, Rfl: 1    aspirin 81 MG tablet, Take 1 tablet by mouth daily, Disp: , Rfl:     busPIRone (BUSPAR) 5 mg tablet, TAKE 1 TABLET BY MOUTH THREE TIMES A DAY, Disp: 270 tablet, Rfl: 1    docusate sodium (COLACE) 100 mg capsule, every 24 hours, Disp: , Rfl:     escitalopram (LEXAPRO) 5 mg tablet, Take 1 tablet (5 mg total) by mouth daily, Disp: 30 tablet, Rfl: 5    furosemide (LASIX) 40 mg tablet, Take 1 5 tablets (60 mg total) by mouth daily for 90 days, Disp: 135 tablet, Rfl: 1    gabapentin (NEURONTIN) 100 mg capsule, Take 1 capsule (100 mg total) by mouth 3 (three) times a day as needed (pain), Disp: 270 capsule, Rfl: 1    nystatin (MYCOSTATIN) powder, APPLY 1 APPLICATION TOPICALLY 2 (TWO) TIMES A DAY 2-3 TIMES DAILY TO AFFECTED AREA(S), Disp: 15 g, Rfl: 5    omeprazole (PriLOSEC) 20 mg delayed release capsule, Take 1 capsule (20 mg total) by mouth 2 (two) times a day, Disp: 180 capsule, Rfl: 1    oxybutynin (DITROPAN-XL) 5 mg 24 hr tablet, TAKE 1 TABLET BY MOUTH EVERY DAY, Disp: 90 tablet, Rfl: 1    oxyCODONE (OXYCONTIN) 10 mg 12 hr tablet, Take 1 tablet (10 mg total) by mouth 2 (two) times a day as needed for severe painMax Daily Amount: 20 mg, Disp: 60 tablet, Rfl: 0    potassium chloride (MICRO-K) 10 MEQ CR capsule, Take 1 capsule (10 mEq total) by mouth daily, Disp: 90 capsule, Rfl: 1    propranolol (INDERAL) 10 mg tablet, TAKE 1 TABLET BY MOUTH TWICE A DAY, Disp: 180 tablet, Rfl: 1    senna (SENOKOT) 8 6 MG tablet, Take 2 tablets by mouth daily at bedtime, Disp: , Rfl:     spironolactone (ALDACTONE) 25 mg tablet, TAKE 1 TABLET BY MOUTH EVERY DAY, Disp: 90 tablet, Rfl: 1    tamsulosin (FLOMAX) 0 4 mg, every 24 hours, Disp: , Rfl:     traMADol (ULTRAM) 50 mg tablet, Take 1 tablet (50 mg total) by mouth every 6 (six) hours as needed for moderate pain, Disp: 120 tablet, Rfl: 0    Blood Culture:   Lab Results   Component Value Date    BLOODCX No Growth After 5 Days  06/05/2017       Wound Culture:   No results found for: WOUNDCULT    Ins and Outs:  No intake/output data recorded  Physical Exam:   /72   Pulse 78   Temp 98 1 °F (36 7 °C) (Oral)   Resp 18   Wt 95 3 kg (210 lb)   SpO2 96%   BMI 37 20 kg/m²   Gen: Alert and oriented to person, place  HEENT: EOMI, eyes clear, moist mucus membranes, hearing intact  Respiratory: Bilateral chest rise   No audible wheezing found  Cardiovascular: Regular Rate and intact distal pulses   Abdomen: soft nontender/nondistended  Musculoskeletal: right lower extremity  · Skin intact  · Tender to palpation over distal thigh, thigh remains soft and compressible   · Painful knee range of motion  · Patient able to perform SLR  · Sensation intact L3-S1  · Positive ankle dorsi/plantar flexion, EHL/FHL  · Brisk capillary refill to the foot and toes     Radiology:   I personally reviewed the films  X-rays right knee shows periprosthetic femur fracture     _*_*_*_*_*_*_*_*_*_*_*_*_*_*_*_*_*_*_*_*_*_*_*_*_*_*_*_*_*_*_*_*_*_*_*_*_*_*_*_*_*    Assessment:  78 y  o female status post fall while transferring to wheelchair with right distal femur periprosthetic fracture, given patients baseline functional status will pursue conservative management for this injury       Plan:   · Non weight bearing right lower extremity in knee immobilizer  · Will transition to [address] tomorrow   · Analgesics for pain  · PT/OT evaluation   · Dispo: Ortho will follow      Odilia Louis MD

## 2020-02-18 NOTE — ED NOTES
Report called to [phone]  Rn made aware patient will need dose of rocephin for UTI        Gardenia Quiros, MONISHA  02/18/20 0898

## 2020-02-18 NOTE — ED PROCEDURE NOTE
PROCEDURE  CriticalCare Time  Performed by: Blu Torres DO  Authorized by: Blu Torres DO     Critical care provider statement:     Critical care time (minutes):  35    Critical care time was exclusive of:  Separately billable procedures and treating other patients and teaching time    Critical care was necessary to treat or prevent imminent or life-threatening deterioration of the following conditions:  Trauma    Critical care was time spent personally by me on the following activities:  Blood draw for specimens, obtaining history from patient or surrogate, development of treatment plan with patient or surrogate, discussions with consultants, evaluation of patient's response to treatment, examination of patient, ordering and review of laboratory studies and ordering and review of radiographic studies    I assumed direction of critical care for this patient from another provider in my specialty: no           Blu Torres DO  02/18/20 2803

## 2020-02-18 NOTE — ED ATTENDING ATTESTATION
2/18/2020  IVerena DO, saw and evaluated the patient  I have discussed the patient with the resident/non-physician practitioner and agree with the resident's/non-physician practitioner's findings, Plan of Care, and MDM as documented in the resident's/non-physician practitioner's note, except where noted  All available labs and Radiology studies were reviewed  I was present for key portions of any procedure(s) performed by the resident/non-physician practitioner and I was immediately available to provide assistance  At this point I agree with the current assessment done in the Emergency Department  I have conducted an independent evaluation of this patient a history and physical is as follows:    ED Course     78 y o  F w/h/o CHF, CVA, cirrhosis, opioid dependence p/w fall  Pt was transferring to wheelchair and her foot got caught  Donal Kid on right side  Having right knee pain since  Also hit her head and has scalp lac  Denies LOC  Pt is on 81mg ASA  Denies HA, CP, SOB, abd pain  Pt has chronic indwelling wells  On exam, pt in NAD  Abd nontender  Right knee swollen  Right leg external rotated  Plan: Will check labs, xrays of right hip/femur/knee, CT head/Cspine Updated Tetanus      Critical Care Time  Procedures

## 2020-02-19 ENCOUNTER — TELEPHONE (OUTPATIENT)
Dept: NEUROSURGERY | Facility: CLINIC | Age: 80
End: 2020-02-19

## 2020-02-19 ENCOUNTER — APPOINTMENT (INPATIENT)
Dept: RADIOLOGY | Facility: HOSPITAL | Age: 80
DRG: 964 | End: 2020-02-19
Payer: MEDICARE

## 2020-02-19 LAB
ANION GAP SERPL CALCULATED.3IONS-SCNC: 5 MMOL/L (ref 4–13)
ATRIAL RATE: 288 BPM
BASOPHILS # BLD AUTO: 0.04 THOUSANDS/ΜL (ref 0–0.1)
BASOPHILS NFR BLD AUTO: 1 % (ref 0–1)
BUN SERPL-MCNC: 10 MG/DL (ref 5–25)
CALCIUM SERPL-MCNC: 9 MG/DL (ref 8.3–10.1)
CHLORIDE SERPL-SCNC: 96 MMOL/L (ref 100–108)
CO2 SERPL-SCNC: 31 MMOL/L (ref 21–32)
CREAT SERPL-MCNC: 0.65 MG/DL (ref 0.6–1.3)
E AG RBC QL: NEGATIVE
EOSINOPHIL # BLD AUTO: 0.13 THOUSAND/ΜL (ref 0–0.61)
EOSINOPHIL NFR BLD AUTO: 2 % (ref 0–6)
ERYTHROCYTE [DISTWIDTH] IN BLOOD BY AUTOMATED COUNT: 16 % (ref 11.6–15.1)
GFR SERPL CREATININE-BSD FRML MDRD: 84 ML/MIN/1.73SQ M
GLUCOSE SERPL-MCNC: 111 MG/DL (ref 65–140)
HCT VFR BLD AUTO: 37.1 % (ref 34.8–46.1)
HGB BLD-MCNC: 11.9 G/DL (ref 11.5–15.4)
IMM GRANULOCYTES # BLD AUTO: 0.02 THOUSAND/UL (ref 0–0.2)
IMM GRANULOCYTES NFR BLD AUTO: 0 % (ref 0–2)
LYMPHOCYTES # BLD AUTO: 1.77 THOUSANDS/ΜL (ref 0.6–4.47)
LYMPHOCYTES NFR BLD AUTO: 32 % (ref 14–44)
MCH RBC QN AUTO: 29.5 PG (ref 26.8–34.3)
MCHC RBC AUTO-ENTMCNC: 32.1 G/DL (ref 31.4–37.4)
MCV RBC AUTO: 92 FL (ref 82–98)
MONOCYTES # BLD AUTO: 0.56 THOUSAND/ΜL (ref 0.17–1.22)
MONOCYTES NFR BLD AUTO: 10 % (ref 4–12)
NEUTROPHILS # BLD AUTO: 3 THOUSANDS/ΜL (ref 1.85–7.62)
NEUTS SEG NFR BLD AUTO: 55 % (ref 43–75)
NRBC BLD AUTO-RTO: 0 /100 WBCS
PLATELET # BLD AUTO: 112 THOUSANDS/UL (ref 149–390)
PMV BLD AUTO: 11.4 FL (ref 8.9–12.7)
POTASSIUM SERPL-SCNC: 4.5 MMOL/L (ref 3.5–5.3)
QRS AXIS: 26 DEGREES
QRSD INTERVAL: 74 MS
QT INTERVAL: 420 MS
QTC INTERVAL: 450 MS
RBC # BLD AUTO: 4.03 MILLION/UL (ref 3.81–5.12)
SODIUM SERPL-SCNC: 132 MMOL/L (ref 136–145)
T WAVE AXIS: -14 DEGREES
VENTRICULAR RATE: 69 BPM
WBC # BLD AUTO: 5.52 THOUSAND/UL (ref 4.31–10.16)

## 2020-02-19 PROCEDURE — NC001 PR NO CHARGE: Performed by: PHYSICIAN ASSISTANT

## 2020-02-19 PROCEDURE — 97163 PT EVAL HIGH COMPLEX 45 MIN: CPT

## 2020-02-19 PROCEDURE — 97530 THERAPEUTIC ACTIVITIES: CPT

## 2020-02-19 PROCEDURE — 99222 1ST HOSP IP/OBS MODERATE 55: CPT | Performed by: NEUROLOGICAL SURGERY

## 2020-02-19 PROCEDURE — 85025 COMPLETE CBC W/AUTO DIFF WBC: CPT | Performed by: EMERGENCY MEDICINE

## 2020-02-19 PROCEDURE — 93010 ELECTROCARDIOGRAM REPORT: CPT | Performed by: INTERNAL MEDICINE

## 2020-02-19 PROCEDURE — 80048 BASIC METABOLIC PNL TOTAL CA: CPT | Performed by: EMERGENCY MEDICINE

## 2020-02-19 PROCEDURE — 97167 OT EVAL HIGH COMPLEX 60 MIN: CPT

## 2020-02-19 PROCEDURE — 70450 CT HEAD/BRAIN W/O DYE: CPT

## 2020-02-19 PROCEDURE — 99222 1ST HOSP IP/OBS MODERATE 55: CPT | Performed by: INTERNAL MEDICINE

## 2020-02-19 PROCEDURE — 99233 SBSQ HOSP IP/OBS HIGH 50: CPT | Performed by: SURGERY

## 2020-02-19 RX ORDER — SPIRONOLACTONE 25 MG/1
25 TABLET ORAL DAILY
Status: DISCONTINUED | OUTPATIENT
Start: 2020-02-19 | End: 2020-02-21 | Stop reason: HOSPADM

## 2020-02-19 RX ORDER — HEPARIN SODIUM 5000 [USP'U]/ML
5000 INJECTION, SOLUTION INTRAVENOUS; SUBCUTANEOUS EVERY 8 HOURS SCHEDULED
Status: DISCONTINUED | OUTPATIENT
Start: 2020-02-19 | End: 2020-02-21 | Stop reason: HOSPADM

## 2020-02-19 RX ORDER — TAMSULOSIN HYDROCHLORIDE 0.4 MG/1
0.4 CAPSULE ORAL EVERY 24 HOURS
Status: DISCONTINUED | OUTPATIENT
Start: 2020-02-19 | End: 2020-02-21 | Stop reason: HOSPADM

## 2020-02-19 RX ORDER — LEVETIRACETAM 500 MG/1
500 TABLET ORAL EVERY 12 HOURS SCHEDULED
Status: DISCONTINUED | OUTPATIENT
Start: 2020-02-19 | End: 2020-02-21 | Stop reason: HOSPADM

## 2020-02-19 RX ORDER — BUSPIRONE HYDROCHLORIDE 5 MG/1
5 TABLET ORAL 2 TIMES DAILY
Status: DISCONTINUED | OUTPATIENT
Start: 2020-02-19 | End: 2020-02-21 | Stop reason: HOSPADM

## 2020-02-19 RX ORDER — METHOCARBAMOL 500 MG/1
250 TABLET, FILM COATED ORAL EVERY 6 HOURS
Status: DISCONTINUED | OUTPATIENT
Start: 2020-02-19 | End: 2020-02-21 | Stop reason: HOSPADM

## 2020-02-19 RX ADMIN — METHOCARBAMOL TABLETS 500 MG: 500 TABLET, COATED ORAL at 06:03

## 2020-02-19 RX ADMIN — METHOCARBAMOL TABLETS 500 MG: 500 TABLET, COATED ORAL at 12:40

## 2020-02-19 RX ADMIN — ESCITALOPRAM 5 MG: 5 TABLET, FILM COATED ORAL at 08:59

## 2020-02-19 RX ADMIN — OXYCODONE HYDROCHLORIDE 5 MG: 5 TABLET ORAL at 21:22

## 2020-02-19 RX ADMIN — BUSPIRONE HYDROCHLORIDE 5 MG: 5 TABLET ORAL at 08:56

## 2020-02-19 RX ADMIN — AMLODIPINE BESYLATE 5 MG: 5 TABLET ORAL at 08:54

## 2020-02-19 RX ADMIN — BUSPIRONE HYDROCHLORIDE 5 MG: 5 TABLET ORAL at 17:48

## 2020-02-19 RX ADMIN — OXYBUTYNIN 5 MG: 5 TABLET, FILM COATED, EXTENDED RELEASE ORAL at 08:56

## 2020-02-19 RX ADMIN — OXYCODONE HYDROCHLORIDE 10 MG: 10 TABLET ORAL at 08:50

## 2020-02-19 RX ADMIN — LEVETIRACETAM 500 MG: 500 TABLET, FILM COATED ORAL at 08:57

## 2020-02-19 RX ADMIN — GABAPENTIN 100 MG: 100 CAPSULE ORAL at 21:22

## 2020-02-19 RX ADMIN — HYDROMORPHONE HYDROCHLORIDE 0.5 MG: 1 INJECTION, SOLUTION INTRAMUSCULAR; INTRAVENOUS; SUBCUTANEOUS at 23:07

## 2020-02-19 RX ADMIN — SENNOSIDES AND DOCUSATE SODIUM 1 TABLET: 8.6; 5 TABLET ORAL at 21:22

## 2020-02-19 RX ADMIN — GABAPENTIN 100 MG: 100 CAPSULE ORAL at 17:36

## 2020-02-19 RX ADMIN — HEPARIN SODIUM 5000 UNITS: 5000 INJECTION INTRAVENOUS; SUBCUTANEOUS at 21:22

## 2020-02-19 RX ADMIN — CHLORHEXIDINE GLUCONATE 0.12% ORAL RINSE 15 ML: 1.2 LIQUID ORAL at 09:09

## 2020-02-19 RX ADMIN — TAMSULOSIN HYDROCHLORIDE 0.4 MG: 0.4 CAPSULE ORAL at 12:39

## 2020-02-19 RX ADMIN — SODIUM CHLORIDE, SODIUM GLUCONATE, SODIUM ACETATE, POTASSIUM CHLORIDE, MAGNESIUM CHLORIDE, SODIUM PHOSPHATE, DIBASIC, AND POTASSIUM PHOSPHATE 75 ML/HR: .53; .5; .37; .037; .03; .012; .00082 INJECTION, SOLUTION INTRAVENOUS at 01:00

## 2020-02-19 RX ADMIN — PANTOPRAZOLE SODIUM 40 MG: 40 TABLET, DELAYED RELEASE ORAL at 06:03

## 2020-02-19 RX ADMIN — GABAPENTIN 100 MG: 100 CAPSULE ORAL at 08:54

## 2020-02-19 RX ADMIN — DOCUSATE SODIUM 100 MG: 100 CAPSULE, LIQUID FILLED ORAL at 08:57

## 2020-02-19 RX ADMIN — METHOCARBAMOL TABLETS 250 MG: 500 TABLET, COATED ORAL at 17:48

## 2020-02-19 RX ADMIN — LEVETIRACETAM 500 MG: 500 TABLET, FILM COATED ORAL at 21:22

## 2020-02-19 RX ADMIN — METHOCARBAMOL TABLETS 500 MG: 500 TABLET, COATED ORAL at 01:20

## 2020-02-19 RX ADMIN — SPIRONOLACTONE 25 MG: 25 TABLET ORAL at 12:39

## 2020-02-19 RX ADMIN — PROPRANOLOL HYDROCHLORIDE 10 MG: 10 TABLET ORAL at 08:59

## 2020-02-19 RX ADMIN — PROPRANOLOL HYDROCHLORIDE 10 MG: 10 TABLET ORAL at 17:36

## 2020-02-19 RX ADMIN — HYDROMORPHONE HYDROCHLORIDE 0.5 MG: 1 INJECTION, SOLUTION INTRAMUSCULAR; INTRAVENOUS; SUBCUTANEOUS at 00:51

## 2020-02-19 RX ADMIN — CHLORHEXIDINE GLUCONATE 0.12% ORAL RINSE 15 ML: 1.2 LIQUID ORAL at 21:22

## 2020-02-19 RX ADMIN — FUROSEMIDE 60 MG: 40 TABLET ORAL at 08:51

## 2020-02-19 RX ADMIN — HEPARIN SODIUM 5000 UNITS: 5000 INJECTION INTRAVENOUS; SUBCUTANEOUS at 14:08

## 2020-02-19 NOTE — ASSESSMENT & PLAN NOTE
Right parafalcine SDH s/p mechanical ground level fall at home on aspirin  · Reversed with DDAVP in ED  · No headache, but complaining of "feeling woozy"  · Neurologically nonfocal on exam, GCS 15    Imaging:  · CT head w/o 2/18/20: small right parafalcine subdural hemorrhage without mass effect  · CT head w/o, 2/19/20: redemonstration of right parafalcine SDH, slightly larger in size likely due to redistrubution    Plan:  · STAT CTH if GCS declines 2 points in 1 hour  · Hold AC/AP - hold aspirin for at least 2 weeks  · SBP <160  · Keppra 500mg BID x7 days  · Frequent neuro check  · PT/OT  · Pain control per primary team  · DVT ppx: SCDs, ok to start pharm ppx  · No neurosurgical intervention is anticipated    Neurosurgery will sign off  Patient should follow up in 2 weeks with repeat CTH  Please don't hesitate to contact us with any additional questions or concerns

## 2020-02-19 NOTE — CONSULTS
Consult- Darryl Lisa 1940, 78 y o  female MRN: [id number]    Unit/Bed#: ICU 06 Encounter: [id number]    Primary Care Provider: Hailey Grant MD   Date and time admitted to hospital: 2/18/2020  4:43 PM      Inpatient consult to Neurosurgery  Consult performed by: Yazmin Almendarez PA-C  Consult ordered by: Gustavo Lancaster MD      Imaging personally reviewed with attending 2/19/20 at 7:45am  Patient examined at bedside 2/19/20 at 9:15am    * Subdural hematoma [facility])  Assessment & Plan  Right parafalcine SDH s/p mechanical ground level fall at home on aspirin  · Reversed with DDAVP in ED  · No headache, but complaining of "feeling woozy"  · Neurologically nonfocal on exam, GCS 15    Imaging:  · CT head w/o 2/18/20: small right parafalcine subdural hemorrhage without mass effect  · CT head w/o, 2/19/20: redemonstration of right parafalcine SDH, slightly larger in size likely due to redistrubution    Plan:  · STAT CTH if GCS declines 2 points in 1 hour  · Hold AC/AP - hold aspirin for at least 2 weeks  · SBP <160  · Keppra 500mg BID x7 days  · Frequent neuro check  · PT/OT  · Pain control per primary team  · DVT ppx: SCDs, ok to start pharm ppx  · No neurosurgical intervention is anticipated    Neurosurgery will sign off  Patient should follow up in 2 weeks with repeat CTH  Please don't hesitate to contact us with any additional questions or concerns  Closed fracture of right distal femur ([address] )  Assessment & Plan  S/p fall  Ortho following, recommending conservative management  Patient in brace    History of Present Illness     HPI: Darryl Lisa is a 78y o  year old female with PMH including CHF, Afib not on AC, chronic back pain, chronic indwelling wells catheter, depression who presented after a ground level fall at home  She states that she was trying to use her walker to transfer to a wheelchair when she fell  This was witnessed by her   She hit her head but did not lose consciousness  Imaging in the ED revealed a right parafalcine SDH  Repeat imaging did show slight enlargement, possibly secondary to redistrubution  She does take baby aspirin daily and DDVAP was given in the ED  She also has a right distal femur fracture  Currently she c/o lightheadedness and nausea  She is hungry  She denies headache, confusion, focal weakness, vision or speech difficulty  Review of Systems   Constitutional: Negative  HENT: Negative  Eyes: Negative  Respiratory: Negative  Negative for chest tightness and shortness of breath  Cardiovascular: Negative  Negative for chest pain  Gastrointestinal: Positive for nausea  Endocrine: Negative  Genitourinary: Negative  Musculoskeletal: Positive for arthralgias (right leg pain)  Skin: Negative  Allergic/Immunologic: Negative  Neurological: Positive for light-headedness  Negative for headaches  Hematological: Negative  Psychiatric/Behavioral: Negative          Historical Information   Past Medical History:   Diagnosis Date    Anxiety     [facility]     CHF (congestive heart failure) ([facility])     Depression     HTN (hypertension) 2016    Opioid dependence [facility]     Pneumonia      Past Surgical History:   Procedure Laterality Date    APPENDECTOMY      CHOLECYSTECTOMY      TONSILLECTOMY      TOTAL KNEE ARTHROPLASTY Bilateral     TUBAL LIGATION       Social History     Substance and Sexual Activity   Alcohol Use Never    Frequency: Never    Binge frequency: Never     Social History     Substance and Sexual Activity   Drug Use Never     Social History     Tobacco Use   Smoking Status Former Smoker    Types: Cigarettes    Last attempt to quit: 1980    Years since quittin 7   Smokeless Tobacco Never Used     Family History   Problem Relation Age of Onset    Cancer Father     Hypertension Sister        Meds/Allergies   all current active meds have been reviewed, current meds:   Current Facility-Administered Medications   Medication Dose Route Frequency    amLODIPine (NORVASC) tablet 5 mg  5 mg Oral Daily    busPIRone (BUSPAR) tablet 5 mg  5 mg Oral TID    chlorhexidine (PERIDEX) 0 12 % oral rinse 15 mL  15 mL Swish & Spit Q12H [address]    docusate sodium (COLACE) capsule 100 mg  100 mg Oral Daily    escitalopram (LEXAPRO) tablet 5 mg  5 mg Oral Daily    furosemide (LASIX) tablet 60 mg  60 mg Oral Daily    gabapentin (NEURONTIN) capsule 100 mg  100 mg Oral TID    HYDROmorphone (DILAUDID) injection 0 5 mg  0 5 mg Intravenous Q4H PRN    levETIRAcetam (KEPPRA) tablet 500 mg  500 mg Oral Q12H [address]    methocarbamol (ROBAXIN) tablet 500 mg  500 mg Oral Q6H    ondansetron (ZOFRAN) injection 4 mg  4 mg Intravenous Q4H PRN    oxybutynin (DITROPAN-XL) 24 hr tablet 5 mg  5 mg Oral Daily    oxyCODONE (ROXICODONE) immediate release tablet 10 mg  10 mg Oral Q4H PRN    oxyCODONE (ROXICODONE) IR tablet 5 mg  5 mg Oral Q4H PRN    pantoprazole (PROTONIX) EC tablet 40 mg  40 mg Oral Early Morning    propranolol (INDERAL) tablet 10 mg  10 mg Oral BID    senna-docusate sodium (SENOKOT S) 8 6-50 mg per tablet 1 tablet  1 tablet Oral HS    spironolactone (ALDACTONE) tablet 25 mg  25 mg Oral Daily    and PTA meds:   Prior to Admission Medications   Prescriptions Last Dose Informant Patient Reported? Taking?    amLODIPine (NORVASC) 5 mg tablet   No No   Sig: TAKE 1 TABLET BY MOUTH EVERY DAY   aspirin 81 MG tablet  Spouse/Significant Other Yes No   Sig: Take 1 tablet by mouth daily   busPIRone (BUSPAR) 5 mg tablet   No No   Sig: TAKE 1 TABLET BY MOUTH THREE TIMES A DAY   docusate sodium (COLACE) 100 mg capsule   Yes No   Sig: every 24 hours   escitalopram (LEXAPRO) 5 mg tablet   No No   Sig: Take 1 tablet (5 mg total) by mouth daily   furosemide (LASIX) 40 mg tablet   No No   Sig: Take 1 5 tablets (60 mg total) by mouth daily for 90 days   gabapentin (NEURONTIN) 100 mg capsule   No No   Sig: Take 1 capsule (100 mg total) by mouth 3 (three) times a day as needed (pain)   nystatin (MYCOSTATIN) powder   No No   Sig: APPLY 1 APPLICATION TOPICALLY 2 (TWO) TIMES A DAY 2-3 TIMES DAILY TO AFFECTED AREA(S)   omeprazole (PriLOSEC) 20 mg delayed release capsule   No No   Sig: Take 1 capsule (20 mg total) by mouth 2 (two) times a day   oxyCODONE (OXYCONTIN) 10 mg 12 hr tablet   No No   Sig: Take 1 tablet (10 mg total) by mouth 2 (two) times a day as needed for severe painMax Daily Amount: 20 mg   oxybutynin (DITROPAN-XL) 5 mg 24 hr tablet   No No   Sig: TAKE 1 TABLET BY MOUTH EVERY DAY   potassium chloride (MICRO-K) 10 MEQ CR capsule   No No   Sig: Take 1 capsule (10 mEq total) by mouth daily   propranolol (INDERAL) 10 mg tablet   No No   Sig: TAKE 1 TABLET BY MOUTH TWICE A DAY   senna (SENOKOT) 8 6 MG tablet  Spouse/Significant Other Yes No   Sig: Take 2 tablets by mouth daily at bedtime   spironolactone (ALDACTONE) 25 mg tablet   No No   Sig: TAKE 1 TABLET BY MOUTH EVERY DAY   tamsulosin (FLOMAX) 0 4 mg   Yes No   Sig: every 24 hours   traMADol (ULTRAM) 50 mg tablet   No No   Sig: Take 1 tablet (50 mg total) by mouth every 6 (six) hours as needed for moderate pain      Facility-Administered Medications: None     Allergies   Allergen Reactions    Aspirin GI Intolerance    Warfarin        Objective   I/O       02/17 0701 - 02/18 0700 02/18 0701 - 02/19 0700 02/19 0701 - 02/20 0700    P  O   120     I V  (mL/kg)  392 5 (4 4) 67 5 (0 8)    Total Intake(mL/kg)  512 5 (5 7) 67 5 (0 8)    Urine (mL/kg/hr)  475 10 (0)    Total Output  475 10    Net  +37 5 +57 5                 Physical Exam   Constitutional: She is oriented to person, place, and time  She appears well-developed and well-nourished  HENT:   Head: Normocephalic and atraumatic  Eyes: Pupils are equal, round, and reactive to light  EOM are normal    Neck: Normal range of motion  Cardiovascular: Normal rate  Pulmonary/Chest: Effort normal  No respiratory distress  Abdominal: Soft  Musculoskeletal: Normal range of motion  Neurological: She is alert and oriented to person, place, and time  She has a normal Finger-Nose-Finger Test    Reflex Scores:       Tricep reflexes are 1+ on the right side and 1+ on the left side  Bicep reflexes are 1+ on the right side and 1+ on the left side  Brachioradialis reflexes are 1+ on the right side and 1+ on the left side  Patellar reflexes are 1+ on the right side and 1+ on the left side  Achilles reflexes are 1+ on the right side and 1+ on the left side  Skin: Skin is warm and dry  Psychiatric: She has a normal mood and affect  Her speech is normal and behavior is normal  Judgment and thought content normal    Nursing note and vitals reviewed  Neurologic Exam     Mental Status   Oriented to person, place, and time  Follows 2 step commands  Attention: normal  Concentration: normal    Speech: speech is normal   Level of consciousness: alert  Knowledge: good  Able to perform simple calculations  Able to repeat  Normal comprehension  Cranial Nerves   Cranial nerves II through XII intact  CN III, IV, VI   Pupils are equal, round, and reactive to light  Extraocular motions are normal      Motor Exam   Muscle bulk: normal  Overall muscle tone: normal  Right arm pronator drift: absent  Left arm pronator drift: absent    Strength   Strength 5/5 except as noted     Right iliopsoas: 0/5  Right quadriceps: 0/5  Right hamstrin/5    Sensory Exam   Light touch normal    Pinprick normal    DST and JPS intact bilaterally     Gait, Coordination, and Reflexes     Coordination   Finger to nose coordination: normal    Tremor   Resting tremor: absent    Reflexes   Right brachioradialis: 1+  Left brachioradialis: 1+  Right biceps: 1+  Left biceps: 1+  Right triceps: 1+  Left triceps: 1+  Right patellar: 1+  Left patellar: 1+  Right achilles: 1+  Left achilles: 1+  Right : 1+  Left : 1+  Right Rogers: absent  Left Rogers: absent  Right ankle clonus: absent  Left ankle clonus: absent        Vitals:Blood pressure 150/81, pulse 72, temperature 97 6 °F (36 4 °C), temperature source Oral, resp  rate 18, height 5' 2" (1 575 m), weight 89 7 kg (197 lb 12 oz), SpO2 96 %  ,Body mass index is 36 17 kg/m²  Lab Results:   Results from last 7 days   Lab Units 02/19/20  0605 02/18/20  1408   WBC Thousand/uL 5 52 5 71   HEMOGLOBIN g/dL 11 9 14 3   HEMATOCRIT % 37 1 44 6   PLATELETS Thousands/uL 112* 151   NEUTROS PCT % 55 62   MONOS PCT % 10 10     Results from last 7 days   Lab Units 02/19/20  0605 02/18/20  1408   POTASSIUM mmol/L 4 5 4 6   CHLORIDE mmol/L 96* 93*   CO2 mmol/L 31 32   BUN mg/dL 10 10   CREATININE mg/dL 0 65 0 77   CALCIUM mg/dL 9 0 9 6   ALK PHOS U/L  --  336*   ALT U/L  --  31   AST U/L  --  159*             Results from last 7 days   Lab Units 02/18/20  1408   INR  1 06   PTT seconds 30     No results found for: TROPONINT  ABG:No results found for: PHART, YOD6SPM, PO2ART, SAW7EOK, D7EGJUAV, BEART, SOURCE    Imaging Studies: I have personally reviewed pertinent reports  and I have personally reviewed pertinent films in PACS    EKG, Pathology, and Other Studies: I have personally reviewed pertinent reports  VTE Prophylaxis: Sequential compression device Karishma Mejia)     Code Status: Level 3 - DNAR and DNI  Advance Directive and Living Will:      Power of :    POLST:      Counseling / Coordination of Care  I spent 45 minutes with the patient

## 2020-02-19 NOTE — PROGRESS NOTES
ICU Transfer Note:    Code Status: Level 3 - DNAR and DNI  POA:    POLST:      Reason for ICU admission:   Traumatic SDH    Active problems:   Principal Problem:    Subdural hematoma ([facility] [address] )  Active Problems:    Uncomplicated opioid dependence ([facility] [address] )    Ambulatory dysfunction    Chronic indwelling Wells catheter    Fall from standing    Aspirin long-term use    Closed fracture of right distal femur (HCC)    Asymptomatic bacteriuria    Occipital scalp laceration, initial encounter  Resolved Problems:    * No resolved hospital problems  *      Consultants:   NS  Geriatrics  Orthopedic surgery     History of Present Illness:   78F with a PMH of CHF, Afib not on AC, chronic back pain on oxycontin, chronic wells, and depression that presented yesterday to [facility] after a fall and found to have a traumatic parafalcine SDH and R distal femur fracture  Patient was on ASA and received a dose of DDAVP     Summary of clinical course:   Patient admitted to ICU  Repeat CT imaging unchanged  She was evaluated by orthopedic surgery  The patient and surgical team elected a non surgical approach  The patient was placed in a HKB and made NWB on her RLE     Recent or scheduled procedures:   NA    Outstanding/pending diagnostics:   NA    Cultures:   NA       Mobilization Plan:   NWB RLE  Mobilize with PT/OT    Nutrition Plan:   General diet    VTE Pharmacologic Prophylaxis: Heparin- Cleared with NS  VTE Mechanical Prophylaxis: sequential compression device    Discharge Plan:   Patient should be ready for discharge pending PT/OT recommendations and placement needs    Initial Physical Therapy Recommendations: Pending   Initial Occupational Therapy Recommendations: Pending   Initial /Plan: Pending     Home medications that are not reordered and reason why:   NA      Spoke with Hiram Champion PA-C  regarding transfer  Please call [phone] with any questions or concerns       Portions of the record may have been created with voice recognition software  Occasional wrong word or "sound a like" substitutions may have occurred due to the inherent limitations of voice recognition software  Read the chart carefully and recognize, using context, where substitutions have occurred      Kesha Tong PA-C

## 2020-02-19 NOTE — PHYSICAL THERAPY NOTE
PHYSICAL THERAPY Evaluation NOTE    Patient Name: Darryl Lisa  NTCAJ'E Date: 2/19/2020     AGE:   78 y o  Mrn:   [id number]  ADMIT DX:  Subdural hematoma ([place] [address] ) [S06 [id number]]  Occipital scalp laceration, initial encounter [S01 01XA]  Other closed fracture of distal end of right femur, initial encounter ([facility] ) [S72 491A]  Unspecified multiple injuries, initial encounter [T07  XXXA]    Past Medical History:   Diagnosis Date    Anxiety     Atrial fibrillation (HCC)     CHF (congestive heart failure) (HCC)     Depression     HTN (hypertension) 11/9/2016    Opioid dependence ([place] [address] )     Pneumonia      Length Of Stay: 1  PHYSICAL THERAPY EVALUATION :    02/19/20 0916   Note Type   Note type Eval/Treat   Pain Assessment   Pain Assessment 0-10   Pain Score 8   Pain Location Knee   Pain Orientation Right   Home Living   Type of Home House  (2 SH, 6+1 SARA, first floor setup)   Home Layout Two level;Stairs to enter with rails; Performs ADLs on one level   Bathroom Shower/Tub   (Does not use)   Bathroom Toilet   (BSC or Bed pan only)   [address]; [facility]  (Transport Chair only)   Additional Comments Pt  lives with spouse and Son in a 2 SH, 1st floor setup with commode only, pt reports she does not use a bathroom  Prior Function   Level of [place] Needs assistance with IADLs; Needs assistance with ADLs and functional mobility   Lives With Spouse   Receives Help From Family   ADL Assistance Needs assistance   IADLs Needs assistance   Falls in the last 6 months 1 to 4   Comments PTA, Pt  reports assistance with ADLs, IADLs and functional mobility with use of Ax1 and SPT for OOB mobility to Transport chair      Restrictions/Precautions   Weight Bearing Precautions Per Order Yes   RLE Weight Bearing Per Order NWB   LLE Weight Bearing Per Order   (In HKB, locked in extension)   Braces or Orthoses LE Braces  (HKB)   Other Precautions Cognitive; Chair Alarm; Bed Alarm;Telemetry; Fall Risk  ([place] )   General   Additional Pertinent History Pt  is an 79 yo F who presents s/p fall OOB with subsequent subdural hematoma and R distal femur Fx  NWB RLE in HKB    Family/Caregiver Present No   Cognition   Overall Cognitive Status Impaired   Arousal/Participation Cooperative   Orientation Level Oriented X4   Memory Decreased short term memory;Decreased recall of recent events   Following Commands Follows one step commands with increased time or repetition   Comments Pt  was identified with full name and birthdate   RLE Assessment   RLE Assessment   (Limited MMT due to RLE Fx)   LLE Assessment   LLE Assessment   (functionally > 3+/5)   Coordination   Movements are Fluid and Coordinated 1   Sensation WFL   Light Touch   RLE Light Touch Grossly intact   LLE Light Touch Grossly intact   Bed Mobility   Supine to Sit 2  Maximal assistance   Additional items Assist x 2   Sit to Supine Unable to assess   Additional Comments Pt  seated OOB in recliner following PT session   Transfers   Sit pivot 2  Maximal assistance   Additional items Assist x 3; Increased time required;Verbal cues  (for sequencing x4 to attain OOB in chair)   Balance   Static Sitting Poor +   Dynamic Sitting Poor   Endurance Deficit   Endurance Deficit Yes   Endurance Deficit Description postural and gait degradation noted with fatigue   Activity Tolerance   Activity Tolerance Patient limited by fatigue;Patient limited by pain   Medical Staff Made Aware Spoke to [facility] team, Richy Medina OT, and Kelly Suarez Mobility coordinator  Nurse Made Aware Spoke to RN   Assessment   Prognosis Good   Problem List Decreased strength;Decreased range of motion;Decreased endurance;Decreased mobility; Impaired balance;Decreased coordination;Decreased safety awareness;Decreased cognition;Pain;Orthopedic restrictions;Decreased skin integrity;Obesity   Assessment Pt  is an 79 yo F who presents s/p fall OOB with subsequent subdural hematoma and R distal femur Fx  NWB RLE in [email or web address]  order placed for PT eval and tx, w/ activity order of up as tolerated and NWB R LE  pt presents w/ comorbidities of uncomplicated opoid dependence, Ambulatory dysfunction, Chronic indwelling wells, Fall from standing, Closed Fx of R Distal femur, occipital scalp lacteration, Anxiety, Demformity of R foot, htn, Osteopenia, CVA, back pain and personal factors of living in 2 story house, mobilizing w/ assistive device, stair(s) to enter home, inability to ambulate household distances, inability to navigate community distances, inability to navigate level surfaces w/o external assistance, unable to perform dynamic tasks in community, decreased cognition, positive fall history, anxiety, depression, inability to perform current job functions, unable to perform physical activity, limited insight into impairments, inability to perform IADLs, inability to perform ADLs and inability to live alone  pt presents w/ pain, weakness, decreased ROM, decreased endurance, impaired cognition, impaired balance, gait deviations, impaired coordination, decreased safety awareness and orthopedic restrictions  these impairments are evident in findings from physical examination (weakness, impaired coordination, impaired skin integrity and edema of extremities), mobility assessment (need for MaxAx3 assist w/ all phases of mobility when usually mobilizing independently, tolerance to only 0 feet of ambulation and need for cueing for mobility technique), and Barthel Index: 30/100  pt needed input for task focus and mobility technique  pt is at risk for falls due to physical and safety awareness deficits   pt's clinical presentation is unstable/unpredictable (evident in need for assist w/ all phases of mobility when usually mobilizing independently, tolerance to only 0 feet of ambulation, need for input for mobility technique, need for input for task focus and mobility technique and need for input for mobility technique/safety)  pt needs inpatient PT tx to improve mobility deficits  discharge recommendation is for inpatient rehab to reduce fall risk and maximize level of functional independence  Goals   Patient Goals to get home    STG Expiration Date 02/29/20   Short Term Goal #1 pt will: Increase bilateral LE strength 1/2 grade to facilitate independent mobility, Perform all bed mobility tasks w/ minx1 to decrease fall risk factors, Perform all transfers w/ minx1 to improve independence, Increase all balance 1/2 grade to decrease risk for falls and Improve Barthel Index score to 55 or greater to facilitate independence   PT Treatment Day 0   Plan   Treatment/Interventions Functional transfer training;LE strengthening/ROM; Therapeutic exercise; Endurance training;Cognitive reorientation;Patient/family training;Equipment eval/education; Bed mobility;Spoke to MD;Spoke to nursing;Spoke to advanced practitioner;OT   PT Frequency   (3-5x/wk)   Recommendation   Recommendation Post acute IP rehab   Equipment Recommended Wheelchair   PT - OK to Discharge Yes   Additional Comments to rehab when medically appropriate   Barthel Index   Feeding 10   Bathing 0   Grooming Score 0   Dressing Score 0   Bladder Score 0   Bowels Score 10   Toilet Use Score 5   Transfers (Bed/Chair) Score 5   Mobility (Level Surface) Score 0   Stairs Score 0   Barthel Index Score 30      02/19/20 0916   Note Type   Note type Eval/Treat   Pain Assessment   Pain Assessment 0-10   Pain Score 8   Pain Location Knee   Pain Orientation Right   Home Living   Type of Home House  (2 SH, 6+1 SARA, first floor setup)   Home Layout Two level;Stairs to enter with rails; Performs ADLs on one level   Bathroom Shower/Tub   (Does not use)   Bathroom Toilet   (BSC or Bed pan only)   [address]; Wheelchair-manual  (Transport Chair only)   Additional Comments Pt  lives with spouse and Son in a 2 SH, 1st floor setup with commode only, pt reports she does not use a bathroom  Prior Function   Level of [place] Needs assistance with IADLs; Needs assistance with ADLs and functional mobility   Lives With Spouse   Receives Help From Family   ADL Assistance Needs assistance   IADLs Needs assistance   Falls in the last 6 months 1 to 4   Comments PTA, Pt  reports assistance with ADLs, IADLs and functional mobility with use of Ax1 and SPT for OOB mobility to Transport chair  Restrictions/Precautions   Weight Bearing Precautions Per Order Yes   RLE Weight Bearing Per Order NWB   LLE Weight Bearing Per Order   (In HKB, locked in extension)   Braces or Orthoses LE Braces  (HKB)   Other Precautions Cognitive; Chair Alarm; Bed Alarm;Telemetry; Fall Risk  ([place] )   General   Additional Pertinent History Pt  is an 77 yo F who presents s/p fall OOB with subsequent subdural hematoma and R distal femur Fx  NWB RLE in HKB    Family/Caregiver Present No   Cognition   Overall Cognitive Status Impaired   Arousal/Participation Cooperative   Orientation Level Oriented X4   Memory Decreased short term memory;Decreased recall of recent events   Following Commands Follows one step commands with increased time or repetition   Comments Pt  was identified with full name and birthdate   RLE Assessment   RLE Assessment   (Limited MMT due to RLE Fx)   LLE Assessment   LLE Assessment   (functionally > 3+/5)   Coordination   Movements are Fluid and Coordinated 1   Sensation WFL   Light Touch   RLE Light Touch Grossly intact   LLE Light Touch Grossly intact   Bed Mobility   Supine to Sit 2  Maximal assistance   Additional items Assist x 2   Sit to Supine Unable to assess   Additional Comments Pt  seated OOB in recliner following PT session   Transfers   Sit pivot 2  Maximal assistance   Additional items Assist x 3; Increased time required;Verbal cues  (for sequencing x4 to attain OOB in chair)   Balance   Static Sitting Poor +   Dynamic Sitting Poor   Endurance Deficit   Endurance Deficit Yes   Endurance Deficit Description postural and gait degradation noted with fatigue   Activity Tolerance   Activity Tolerance Patient limited by fatigue;Patient limited by pain   Medical Staff Made Aware Spoke to [facility] team, Jonah Hdez OT, and Rajeev Ghotra  Nurse Made Aware Spoke to RN   Assessment   Prognosis Good   Problem List Decreased strength;Decreased range of motion;Decreased endurance;Decreased mobility; Impaired balance;Decreased coordination;Decreased safety awareness;Decreased cognition;Pain;Orthopedic restrictions;Decreased skin integrity;Obesity   Assessment Pt  is an 77 yo F who presents s/p fall OOB with subsequent subdural hematoma and R distal femur Fx  NWB RLE in [employer] [employer]  order placed for PT eval and tx, w/ activity order of up as tolerated and NWB R LE  pt presents w/ comorbidities of uncomplicated opoid dependence, Ambulatory dysfunction, Chronic indwelling wells, Fall from standing, Closed Fx of R Distal femur, occipital scalp lacteration, Anxiety, Demformity of R foot, htn, Osteopenia, CVA, back pain and personal factors of living in 2 story house, mobilizing w/ assistive device, stair(s) to enter home, inability to ambulate household distances, inability to navigate community distances, inability to navigate level surfaces w/o external assistance, unable to perform dynamic tasks in community, decreased cognition, positive fall history, anxiety, depression, inability to perform current job functions, unable to perform physical activity, limited insight into impairments, inability to perform IADLs, inability to perform ADLs and inability to live alone  pt presents w/ pain, weakness, decreased ROM, decreased endurance, impaired cognition, impaired balance, gait deviations, impaired coordination, decreased safety awareness and orthopedic restrictions   these impairments are evident in findings from physical examination (weakness, impaired coordination, impaired skin integrity and edema of extremities), mobility assessment (need for MaxAx3 assist w/ all phases of mobility when usually mobilizing independently, tolerance to only 0 feet of ambulation and need for cueing for mobility technique), and Barthel Index: 30/100  pt needed input for task focus and mobility technique  pt is at risk for falls due to physical and safety awareness deficits  pt's clinical presentation is unstable/unpredictable (evident in need for assist w/ all phases of mobility when usually mobilizing independently, tolerance to only 0 feet of ambulation, need for input for mobility technique, need for input for task focus and mobility technique and need for input for mobility technique/safety)  pt needs inpatient PT tx to improve mobility deficits  discharge recommendation is for inpatient rehab to reduce fall risk and maximize level of functional independence  Goals   Patient Goals to get home    STG Expiration Date 02/29/20   Short Term Goal #1 pt will: Increase bilateral LE strength 1/2 grade to facilitate independent mobility, Perform all bed mobility tasks w/ minx1 to decrease fall risk factors, Perform all transfers w/ minx1 to improve independence, Increase all balance 1/2 grade to decrease risk for falls and Improve Barthel Index score to 55 or greater to facilitate independence   PT Treatment Day 0   Plan   Treatment/Interventions Functional transfer training;LE strengthening/ROM; Therapeutic exercise; Endurance training;Cognitive reorientation;Patient/family training;Equipment eval/education; Bed mobility;Spoke to MD;Spoke to nursing;Spoke to advanced practitioner;OT   PT Frequency   (3-5x/wk)   Recommendation   Recommendation Post acute IP rehab   Equipment Recommended Wheelchair   PT - OK to Discharge Yes   Additional Comments to rehab when medically appropriate   Barthel Index   Feeding 10   Bathing 0   Grooming Score 0   Dressing Score 0   Bladder Score 0   Bowels Score 10   Toilet Use Score 5   Transfers (Bed/Chair) Score 5   Mobility (Level Surface) Score 0   Stairs Score 0   Barthel Index Score 30     Skilled PT recommended while in hospital and upon DC to progress pt toward treatment goals  [id number] Treatment Note    S: Pt  Agreeable to PT session    O: Assisted Mobility coordinator with fitting and explanation of importance of Hinged knee brace for immobilization of RLE  WBS discussed and importance of NWB reinforced to patient  Extensive amount of time provided to patient to discuss and reinforce, evaluation, POC, and treatment goals  Discharge recommendation discussed with patient however also discussed CM to follow up and provide continued detail  A: Pt  Continues to demonstrate deficits as outlined above and will benefit from continued skilled therapy to increase functional independence and decrease burden of care as well as allow patient to return to PLOF  P: Continue PT POC as outlined above         Abbey Coleman, PT, DPT 2/19/2020

## 2020-02-19 NOTE — OCCUPATIONAL THERAPY NOTE
Occupational Therapy Evaluation     Patient Name: Isadora Ray  ZHLDU'O Date: 2/19/2020  Problem List  Principal Problem:    Subdural hematoma ([facility] )  Active Problems:    Uncomplicated opioid dependence ([facility] )    Ambulatory dysfunction    Chronic indwelling Davies catheter    Fall from standing    Aspirin long-term use    Closed fracture of right distal femur (HCC)    Asymptomatic bacteriuria    Occipital scalp laceration, initial encounter    Past Medical History  Past Medical History:   Diagnosis Date    Anxiety     Atrial fibrillation ([facility] )     CHF (congestive heart failure) ([facility] )     Depression     HTN (hypertension) 11/9/2016    Opioid dependence ([facility] )     Pneumonia      Past Surgical History  Past Surgical History:   Procedure Laterality Date    APPENDECTOMY      CHOLECYSTECTOMY      TONSILLECTOMY      TOTAL KNEE ARTHROPLASTY Bilateral     TUBAL LIGATION               02/19/20 0915   Note Type   Note type Eval/Treat   Restrictions/Precautions   Weight Bearing Precautions Per Order Yes   RLE Weight Bearing Per Order NWB   Braces or Orthoses LE Braces; Other (Comment)  (hinge knee brace)   Other Precautions Cognitive; Chair Alarm; Bed Alarm;Multiple lines;Telemetry; Fall Risk;Pain   Pain Assessment   Pain Assessment 0-10   Pain Score 8   Pain Location Knee   Pain Orientation Right   Pain Descriptors Aching;Discomfort   Pain Frequency Constant/continuous   Pain Onset Ongoing   Clinical Progression Not changed   Patient's Stated Pain Goal No pain   Hospital Pain Intervention(s) Repositioned; Ambulation/increased activity; Emotional support   Response to Interventions tolerated   Home Living   Type of [address] Two level; Other (Comment); Performs ADLs on one level; Able to live on main level with bedroom/bathroom  ([facility] home; 6+1 SARA)   Bathroom Shower/Tub   (does not use shower; sponge bathes)   Bathroom Toilet   (does not use toilet; occasional use of BSC)   [address] [place] Caitlin; Other (Comment)  (transport chair)   Additional Comments Pt reports living in 2 story row home, has 6+1 SARA, 1st floor setup  Uses ambulance to leave the home  Prior Function   Level of [place] Needs assistance with IADLs; Needs assistance with ADLs and functional mobility   Lives With Spouse   Receives Help From Family   ADL Assistance Needs assistance   IADLs Needs assistance   Falls in the last 6 months 1 to 4   Vocational Retired   Comments Pt reports needing assist w/ all ADLS and IADLS (from spouse) and was mainly bed bound; able to stand-transfer w/ help from spouse w/ use RW  Only transports to transport chair or RW  Sponge bathes in bed  Uses BSC  Lifestyle   Autonomy Assist ADLS/IADLS, transfers and functional mobility   Reciprocal Relationships Pt lives w/ spouse who is home and assists 24/7; pt's son lives upstairs   Service to Others Pt is retired   Intrinsic Gratification Pt enjoys TV; sedentary PTA   Psychosocial   Psychosocial (WDL) WDL   ADL   Eating Assistance 5  Supervision/Setup   Grooming Assistance 4  Minimal Assistance   UB [address] 3  Moderate Assistance   LB [address] 2  Maximal Assistance   [address] 3  Moderate Assistance    [address] 2  Maximal Assistance   LB Dressing Deficit Don/doff R sock; Don/doff L sock   Toileting Assistance  2  Maximal Assistance   Functional Assistance 2  Maximal Assistance   Functional Deficit Steadying;Verbal cueing;Supervision/safety; Increased time to complete  (AX3)   Bed Mobility   Supine to Sit 2  Maximal assistance   Additional items Assist x 2;HOB elevated; Increased time required;Verbal cues;LE management   Sit to Supine Unable to assess   Additional Comments pt went from supine to sit w/ Max A x2 for UB support and LE management, Pt sat EOB w/ CTG for sitting balance/trunk control   Transfers   Sit pivot 2  Maximal assistance   Additional items Assist x 3; Increased time required;Verbal cues   Additional Comments Pt performed sit-pivot from EOB to chair w/ Max A x3 to drop arm recliner  Pt needed manual assist and verbal cues to maintain NWB in RLE  Functional Mobility   Additional Comments Not appropriate beyond sit pivot transfer at this time   Balance   Static Sitting Poor +   Dynamic Sitting Poor   Activity Tolerance   Activity Tolerance Patient limited by fatigue;Patient limited by pain   Medical Staff Made Aware PT, Orthotic Specialist, Gretchen Draper (fit [facility])   Nurse Made Aware yes, Cecilia Brennan Assessment   RUE Assessment X  (grossly 4/5)   LUE Assessment   LUE Assessment X  (grossly 4/5)   Hand Function   Gross Motor Coordination Functional   Fine Motor Coordination Functional   Sensation   Light Touch No apparent deficits   Cognition   Overall Cognitive Status Impaired   Arousal/Participation Responsive; Cooperative   Attention Attends with cues to redirect   Orientation Level Oriented X4   Memory Decreased recall of precautions;Decreased recall of recent events;Decreased short term memory   Following Commands Follows one step commands without difficulty   Comments Pt is pleasant and cooperative; needs occasional safety cues; needs assist to carryover WBS of RLE  Has some decreased insight into deficits   Assessment   Limitation Decreased ADL status; Decreased Safe judgement during ADL;Decreased cognition;Decreased endurance;Decreased self-care trans;Decreased high-level ADLs; Decreased UE strength   Prognosis Fair   Assessment Pt is a 77 y/o female seen for OT eval s/p adm to [facility] as a transfer from [facility] after a fall while transferring OOB to w/c w/ RW  CT showed right parafalcine subdural hematoma  Pt is also dx'd w/ R distal femur periprosthetic fracture and is NWB in R LE w/ [facility]  Pt  has a past medical history of Anxiety, Atrial fibrillation ([facility] ), CHF (congestive heart failure) ([facility] ), Depression, HTN (hypertension) (11/9/2016), Opioid dependence ([facility] ), and Pneumonia   Pt with active OT orders and up in chair orders  Pt lives with spouse in 2 SH, 6+1 SARA, 1st floor setup  Pt required assist w/ all ADLS and IADLS (from spouse); was essentially bed-bound, would stand and transfer w/ RW and Ax1 to transport chair or BSC, does not drive (called ambulance to leave the home), & required use of DME PTA including RW, transport chair and BSC  Pt is currently demonstrating the following occupational deficits: Mod A UB ADLS, Max A LB ADLS, Max A x2 bed mobility, Max A x3 sit-pivot transfer w/ +VC for safety/proper technique  These deficits that are impacting pt's baseline areas of occupation are a result of the following impairments: pain, endurance, activity tolerance, functional mobility, forward functional reach, balance, trunk control, functional standing tolerance, decreased I w/ ADLS/IADLS, strength, decreased safety awareness and decreased insight into deficits  The following Occupational Performance Areas to address include: eating, grooming, bathing/shower, toilet hygiene, dressing, medication management, socialization, health maintenance, functional mobility and clothing management  Pt scored overall 30/100 on the Barthel Index  Based on the aforementioned OT evaluation, functional performance deficits, and assessments, pt has been identified as a high complexity evaluation  Recommend STR upon D/C, when medically stable  Pt to continue to benefit from acute immediate OT services to address the following goals 3-5x/week to  w/in 10-14 days:   Goals   Patient Goals to get home   LTG Time Frame 10-   Long Term Goal #1 see below listed goals   Plan   Treatment Interventions ADL retraining;Functional transfer training;UE strengthening/ROM; Endurance training;Cognitive reorientation;Equipment evaluation/education;Patient/family training; Compensatory technique education;Continued evaluation; Energy conservation; Activityengagement   Goal Expiration Date 20   OT Frequency 3-5x/wk   Recommendation OT Discharge Recommendation Short Term Rehab   OT - OK to Discharge Yes  (when medically stable)   Barthel Index   Feeding 10   Bathing 0   Grooming Score 0   Dressing Score 0   Bladder Score 0   Bowels Score 10   Toilet Use Score 5   Transfers (Bed/Chair) Score 5   Mobility (Level Surface) Score 0   Stairs Score 0   Barthel Index Score 30     GOALS    1) Pt will complete rolling left/right in bed with Mod assist, as prerequisite for further engagement in ADLS   2) Pt will complete supine to sit transfer with Mod A using B/L UEs to initiate bed mobility   3) Pt will tolerate sitting at EOB 20 minutes with S assist and stable vital signs, as prerequisite for further engagement in ADLS   4) Pt will complete grooming task with S assist and increased time to increase independence in functional tasks  5) Pt will increase B/L UE ROM 1/2 MMT to increase functional UB use during ADLS   6) Pt will complete UB ADLS with Min A and use of AD/DME as needed to increase independence in functional tasks  7) Pt will complete LB ADLS with Min A and use of AD/DME as needed to increase independence in functional tasks  8) Pt will complete sit to stand transfer / sit pivot transfer / stand pivot transfer with Mod assist x2 on/off all ADL surfaces   9) Pt will follow 100% simple one step verbal commands and be A/Ox4 consistently t/o use of external environmental cues to increased awareness for functional tasks  10) Pt will demonstrate 100% carryover of WBS and E C  techniques t/o fx'l I/ADL/ leisure tasks w/o cues s/p skilled education     Mana Garcia MS, OTR/L

## 2020-02-19 NOTE — PLAN OF CARE
Problem: OCCUPATIONAL THERAPY ADULT  Goal: Performs self-care activities at highest level of function for planned discharge setting  See evaluation for individualized goals  Description  Treatment Interventions: ADL retraining, Functional transfer training, UE strengthening/ROM, Endurance training, Cognitive reorientation, Equipment evaluation/education, Patient/family training, Compensatory technique education, Continued evaluation, Energy conservation, Activityengagement          See flowsheet documentation for full assessment, interventions and recommendations  Note:   Limitation: Decreased ADL status, Decreased Safe judgement during ADL, Decreased cognition, Decreased endurance, Decreased self-care trans, Decreased high-level ADLs, Decreased UE strength  Prognosis: Fair  Assessment: Pt is a 79 y/o female seen for OT eval s/p adm to [facility] as a transfer [facility] after a fall while transferring OOB to w/c w/ RW  CT showed right parafalcine subdural hematoma  Pt is also dx'd w/ R distal femur periprosthetic fracture and is NWB in R LE w/ [facility]  Pt  has a past medical history of Anxiety, Atrial fibrillation ([facility] ), CHF (congestive heart failure) ([facility] ), Depression, HTN (hypertension) (11/9/2016), Opioid dependence ([facility] ), and Pneumonia  Pt with active OT orders and up in chair orders  Pt lives with spouse in 2 SH, 6+1 SARA, 1st floor setup  Pt required assist w/ all ADLS and IADLS (from spouse); was essentially bed-bound, would stand and transfer w/ RW and Ax1 to transport chair or BSC, does not drive (called ambulance to leave the home), & required use of DME PTA including RW, transport chair and BSC  Pt is currently demonstrating the following occupational deficits: Mod A UB ADLS, Max A LB ADLS, Max A x2 bed mobility, Max A x3 sit-pivot transfer w/ +VC for safety/proper technique   These deficits that are impacting pt's baseline areas of occupation are a result of the following impairments: pain, endurance, activity tolerance, functional mobility, forward functional reach, balance, trunk control, functional standing tolerance, decreased I w/ ADLS/IADLS, strength, decreased safety awareness and decreased insight into deficits  The following Occupational Performance Areas to address include: eating, grooming, bathing/shower, toilet hygiene, dressing, medication management, socialization, health maintenance, functional mobility and clothing management  Pt scored overall 30/100 on the Barthel Index  Based on the aforementioned OT evaluation, functional performance deficits, and assessments, pt has been identified as a high complexity evaluation  Recommend STR upon D/C, when medically stable   Pt to continue to benefit from acute immediate OT services to address the following goals 3-5x/week to  w/in 10-14 days:     OT Discharge Recommendation: Short Term Rehab  OT - OK to Discharge: Yes(when medically stable)     Crissy Fierro MS, OTR/L

## 2020-02-19 NOTE — PROGRESS NOTES
The patient has low urine output  The Critical Care Surgical Team was notified of this  They recommended to continue monitoring the output @ this time

## 2020-02-19 NOTE — CONSULTS
Consultation - Geriatric Medicine   Hunter Maynard 78 y o  female MRN: [id number]  Unit/Bed#: ICU 06 Encounter: [id number]      Assessment/Plan     Subdural hematoma  -right parafalcine SDH confirmed on [facility] on admission 2/18/20  -patient on aspirin chronically, s/p DDAVP on admission  -neurosurgery recommending holding aspirin for at least 2 weeks  -Keppra 500 mg BID x7 days  -continue to monitor closely    Head laceration  -noted on admission  -s/p closure with staple  -local wound care    Right distal femur periprosthetic fracture  -confirmed on X are right femur 02/18/2020  -Orthopedics following, recommend immobilizer at this time  -continue acute pain control per geriatric pain protocol  -PT and OT pending  -continue fall precautions    Acute pain due to trauma  -recommend pain control per Geriatric pain protocol:  Tylenol 975mg Q8H scheduled  Roxicodone 2 5mg Q4H PRN moderate pain  Roxicodone 5mg Q4H PRN severe pain  Dilaudid 0 2mg Q4H PRN  -consider adjuncts such as Gabapentin 100mg HS and lidocaine patch topically  -encourage addition of non-pharmacologic pain treatment including ice and frequent repositioning  -consider bowel regimen including senna and Colace to prevent and treat constipation due to increased risk with acute pain and opiate pain medications  -of note patient is on Oxycontin ER 10mg BID at home,  reports that she does not tolerate both doses due to somnolence and confusion and he now only gives her the evening dose (PDMP checked-for 60 tabs 02/09/2020)    Ambulatory dysfunction with fall  -mostly wheelchair-dependent at baseline  -fall occurred when transferring from wheelchair using walker  -continue fall precautions  -assistance with all transfers  -keep personal items close to prevent reaching  -PT and OT pending    Deconditioning/debility/frailty  -multifactorial including age, congestive heart failure, chronic pain, and polypharmacy  -clinical frailty score stage 7, severely frail   -after speaking with patient and  they seem to be in need of significant increase in-home services for patient to be able to return home safely in the care of her  and might benefit from at least short-term rehab placement with re-evaluation of long-term care/appropriate level of care options  -pts  voices concerns that with previous VNA and short term home assistance she immediately regressed following cessation of services as he is unable to keep her motivated and this becomes a source of distress between them  -encourage good control chronic medical conditions, access to care due to transportation and patient's physical limitations seen to be very significant barrier as it is cost prohibitive to have her transported by wheelchair [employer] every time needs arise    Chronic indwelling Wells catheter  -continue daily wells care per protocol  -may benefit from additional home supports and care services on dc to assist with hygiene   -does not have regular follow up with Urology as o/p due to transportation    Depression  -previously on Lexapro, last prescribed 11/30/2018-no longer taking   -currently on buspirone 5 mg 3 times daily which is likely contributing to somnolence, consider reducing dose to twice daily with long-term goal of tapering to off  -due to confusion and somnolence patient unable to completely participate in evaluation of current depression symptoms, if persist following discharge consider addition of Zoloft 25 mg daily with close outpatient follow-up with blood work to ensure electrolytes remained stable as well as follow-up with PCP for further titration    Impaired vision  -patient reports that she requires glasses, encourage use of appropriate times  -keep personal items close to prevent reaching  -continue fall precautions  -assist with all transfers    Impaired mastication  -does not use dentures but has very poor dentition  -encourage annual follow-up with dentistry -ensure that meal consistency is appropriate for abilities    Impaired hearing  -patient noted to have impaired hearing, confirmed by patient and   -does not use hearing aids  -recommend use of hearing amplifier     Home medication review  Confirmed with [facility] and patients  at bedside    OxyContin ER 10 mg (02/09/2020 per PDMP)  Amlodipine 5 mg daily  BuSpar 5 mg 3 times daily (last filled 11/03/2019)  Furosemide 40 mg daily   Gabapentin 100 mg 3 times daily  Nystatin powder  Oxybutynin 5 mg daily  Omeprazole 20 mg twice daily  Potassium chloride 10 mEq daily  Propranolol 10 mg twice daily  Spironolactone 25 mg daily    ** on review of home medication list in Epic, medications listed which are prescription only and are NOT CURRENT are Tramadol, tamsulosin, and Lexapro as confirmed with last order submitted by patient's PCP (per Epic record) as well as patient's pharmacy [facility] on personal review of records dating 09/01/2019 through 02/19/2020**    History of Present Illness   Physician Requesting Consult: Merline Pancake, DO  Reason for Consult / Principal Problem: Ambulatory dysfunction with fall   Hx and PE limited by: Patient   Additional history obtained from:  at bedside    HPI: Apolonia Meehan is a 78y o  year old female with hypertensive heart disease, anxiety, chrnoic diastolic heart failure, chronic back pain, hepatic cirrhosis, good, urinary tension, chronic opiate dependence, anxiety, osteopenia, prior CVA, and atrial fibrillation  She is admitted to the trauma team following a mechanical fall at home in which she reports that she was attempting to stand from her bed and transfer with the use of her walker to her transport wheelchair when she felt like her right knee when out and she collapsed to the ground, her  at the bedside reports that on the way down she hit her head on the dresser    Following the fall she had immediate pain and was unable to get up despite the assistance of her  and EMS was called to bring her to possible  On admission she was found to have right distal femur fracture as well as subdural hematoma  She is being seen in consultation by Geriatrics for ambulatory dysfunction with fall and traumatic injuries with high risk of developing delirium  She seen examined at the bedside where she sitting resting comfortably  She reports that this morning she is tired and is having some conversational dyspnea  Due to word-finding difficulties and fatigue she is having difficulty explaining the events leading up to her admission  Her  at the bedside stable to assist with further history  He reports that they live in their own home in [place] [facility] and there adult son lives with them but is not always available to assist if needed  He states that about 1 year ago on return home from hospitalization Diamond Oswald had severe leg weakness and never regained her prior functional abilities  Prior to that time she was only using a cane for ambulation but since then he reports he continued to decline which was most notable when she completed formal physical therapy and did not continue to do the exercises on her own  She has continued to be deconditioned and debilitated to the point where she now requires the full assistance of 2 people to transfer to her transport wheelchair and is unable to even partially support her own weight  She now moves about the house in a transport wheelchair by being pushed by her  and cannot move herself about  She requires the assistance of a paid wheelchair transportation company to get her out of the house as her  is unable to lift her onto the portable wheelchair ramp out the stairs of their home or lift her into the car by himself  He assists her with all cares and she now washes only using a washcloth from the sink which is filled by her     He does all of grocery shopping, cooking, cleaning, and manages her medications  At baseline she is very forgetful, hard of hearing but does not use hearing aids, has extremely poor dentition, wears glasses, and does not use dentures  Inpatient consult to Gerontology  Consult performed by: Jasen Zelaya DO  Consult ordered by: Cindy OwensMe    Inpatient consult to Gerontology  Consult performed by: Jasen Zelaya DO  Consult ordered by: Catrina Ballesteros PA-C        Review of Systems   Constitutional: Negative for chills and fever  HENT: Positive for dental problem (missing multiple teeth but does not use dentures) and hearing loss  Negative for trouble swallowing  Eyes: Positive for visual disturbance (wears glasses)  Respiratory: Positive for shortness of breath and wheezing  Cardiovascular: Positive for leg swelling  Negative for chest pain and palpitations  Gastrointestinal: Negative for constipation, diarrhea, nausea and vomiting  Genitourinary: Negative for difficulty urinating  Chronic indwelling wells catheter for retention, does not have routine wells care at home and dose not follow with Urology regularly due to difficulty getting to appointments    Musculoskeletal: Positive for arthralgias, back pain (chronic) and gait problem (chronic, mostly non-ambulatory for one year)  Skin: Negative for color change  Neurological: Positive for weakness (BLE) and headaches (right sided temporal area)  Negative for dizziness and light-headedness  Hematological: Bruises/bleeds easily  Psychiatric/Behavioral: Positive for confusion and decreased concentration  Negative for sleep disturbance (pt reports could sleep all day,  states that she does)  The patient is not nervous/anxious  All other systems reviewed and are negative      Historical Information   Past Medical History:   Diagnosis Date    Anxiety     Atrial fibrillation ([address]     [facility]     Depression     HTN (hypertension) 2016    Opioid dependence [facility] [facility]     Pneumonia      Past Surgical History:   Procedure Laterality Date    APPENDECTOMY      CHOLECYSTECTOMY      TONSILLECTOMY      TOTAL KNEE ARTHROPLASTY Bilateral     TUBAL LIGATION       Social History   Social History     Substance and Sexual Activity   Alcohol Use Never    Frequency: Never    Binge frequency: Never     Social History     Substance and Sexual Activity   Drug Use Never     Social History     Tobacco Use   Smoking Status Former Smoker    Types: Cigarettes    Last attempt to quit: 1980    Years since quittin 7   Smokeless Tobacco Never Used     Family History:   Family History   Problem Relation Age of Onset    Cancer Father     Hypertension Sister      Meds/Allergies   all current active meds have been reviewed    Allergies   Allergen Reactions    Aspirin GI Intolerance    Warfarin      Objective     Intake/Output Summary (Last 24 hours) at 2020 0935  Last data filed at 2020 0825  Gross per 24 hour   Intake 580 ml   Output 485 ml   Net 95 ml     Invasive Devices     Peripheral Intravenous Line            Peripheral IV 20 Left Antecubital less than 1 day    Peripheral IV 20 Right Antecubital less than 1 day          Drain            Urethral Catheter Latex 16 Fr  988 days    Urethral Catheter less than 1 day              Physical Exam   Constitutional: She appears well-developed  She appears distressed  Appears older than stated age, unkempt and severely deconditioned   Conversational dyspnea   HENT:   Right Ear: External ear normal    Left Ear: External ear normal    Mouth/Throat: Mucous membranes are dry  Extremely poor dentition multiple missing and cracked/broken teeth     Eyes: Conjunctivae are normal  No scleral icterus  Neck: Neck supple  No JVD present  No tracheal deviation present  Cardiovascular: Normal rate  No murmur heard    Radial pulses appreciated bilaterally Pulmonary/Chest: She has wheezes  She has rales  Decreased breath sounds at bases bilaterally, rales and wheezes upper bilaterally   Abdominal: Soft  Bowel sounds are normal  There is no tenderness  Musculoskeletal: She exhibits edema  Significantly reduced overall muscle mass and tone   Neurological:   A/Ox3 but delayed and unable to answer same questions when rephrased   Significant word-finding difficulties appreciated   Skin: Skin is warm  She is not diaphoretic  There is pallor  Psychiatric: Her speech is delayed  She is slowed and withdrawn  Cognition and memory are impaired  She is inattentive  Nursing note and vitals reviewed      Lab Results:   I have personally reviewed pertinent lab results including the following:  -sodium 132, potassium 4 5, chloride 96, BUN 10, creatinine 0 65, glucose 111, calcium 9 0, , ALT 31, alk-phos 36, total protein 7 5, albumin 3 0, T bili 2 12, GFR 84  -hemoglobin 11 9, hematocrit 37 1, WBC 5 52, platelets 770  -UA on admission revealed moderate blood, positive nitrites, positive leukocytes, moderate bacteria    I have personally reviewed the following imaging study reports in PACS:  -XR femur right 02/18/2020 revealed displaced angulated fracture through diametaphysis of the distal femur at level proximal tip of the right knee prosthesis  -chest x-ray 02/18/2020 revealed no acute cardiopulmonary disease  -CT cervical spine without contrast 02/18/2020 revealed no cervical spine fracture or traumatic malalignment  -CT head 02/18/2020 revealed small right parafalcine subdural hemorrhage without mass effect  -CT head without contrast 02/19/2020 read demonstrated subdural hemorrhage in interhemispheric fissure extending to the right and measuring approximately 3 7 x 0 8 cm in AP and transverse dimensions, previously measuring 2 1 x 0 7 cm in AP and transverse dimensions, no significant mass effect or midline shift, small right frontal scalp hematoma interval improvement with no other significant interval change    Therapies:   PT:  Pending  OT:  Pending    VTE Prophylaxis: Sequential compression device Abeba Bond)     Code Status: Level 3 - DNAR and DNI  Advance Directive and Living Will:      Power of :    POLST:      Family and Social Support:   Living Arrangements: Spouse/significant other  Support Systems: Spouse/significant other;Children;[employer]/[facility]  Assistance Needed: marisol  Type of Current Residence: Private residence  Current Home Care Services: No    Goals of Care: Improve access to care and ease of required daily cares as she is mostly dependent on  for cares and unable to leave her home without the full assistance of two individuals

## 2020-02-19 NOTE — PROGRESS NOTES
The patient has had 90 ml of urine output in 4 hours  Roscoe Vasquez PA-C (from the Critical Care Surgical Team) was notified  The patient was Bladder Scanned, and there was 0 (Zero) ml present in the bladder (via the Bladder Scanning)  Instructed to encourage patient to eat and drink, and to continue monitoring the patient's urine output @ this time

## 2020-02-19 NOTE — PROGRESS NOTES
Pastoral Care Progress Note    2020  Patient: Darryl Lisa : 1940  Admission Date & Time: 2020 1643  MRN: [id number] CSN: [id number]                     Chaplaincy Interventions Utilized:   Empowerment: Encouraged self-care    Exploration: Explored emotional needs & resources, Explored relational needs & resources and Explored spiritual needs & resources    Collaboration: Encouraged adherence to treatment plan    Relationship Building: Cultivated a relationship of care and support and Listened empathically    Ritual: Provided prayer            Chaplaincy Outcomes Achieved:  Expressed gratitude          Spiritual Coping Strategies Utilized:   Spiritual comfort and Spiritual gratitude    Pt   [facility] called to let them know she was in the hospital

## 2020-02-19 NOTE — SOCIAL WORK
CM met with pt to discuss the role of CM  Pt lives with her  in a 1 story home which has 6STE  Pt doesn't drive and reports that she needs assistance with all ADLs  Pt owns a cane, walker, transport chair, BSC, and shower chair  Pt's pharmacy is [facility] on [address]  Pt has a living will  Pt' reports no hx of mental health or substance abuse  Pt [facility] at [facility] past  Pt used [facility] in the past but is unsure of the agency  Pt is recommended for IP rehab  CM will discuss this with pt and family  CM reviewed d/c planning process including the following: identifying help at home, patient preference for d/c planning needs, Discharge Lounge, [facility] Meds to Bed program, availability of treatment team to discuss questions or concerns patient and/or family may have regarding understanding medications and recognizing signs and symptoms once discharged  CM also encouraged patient to follow up with all recommended appointments after discharge  Patient advised of importance for patient and family to participate in managing patients medical well being

## 2020-02-19 NOTE — ORTHOTIC NOTE
Orthotic Note            Date: 2/19/2020      Patient Name: Darryl Lisa   Time: 8:50am       Reason for Consult:  Patient Active Problem List   Diagnosis    Anxiety    Uncomplicated opioid dependence ([facility] [address] )    Ambulatory dysfunction    Deformity of right foot    Urinary retention    Chronic pain    Essential hypertension    Chronic indwelling Davies catheter    Chronic diastolic heart failure of unknown etiology ([facility] )    Acquired deformity of right foot    [facility]    Osteopenia    GERD (gastroesophageal reflux disease)    Edema    Depression    CVA (cerebral vascular accident) ([facility]    Back pain    Subdural hematoma ([facility] )    Fall from standing    Aspirin long-term use    Closed fracture of right distal femur ([facility] )    Asymptomatic bacteriuria    Occipital scalp laceration, initial encounter   RLE Breg G3 Cool Hinged Knee Brace Locked   Per Ortho Resident Kiko Park) to place order    I measured, fit, and donned Breg G3 Cool Premier Hinged Knee BRace to patient's RLE locked whiel she was sitting up in bed  Instructions/adjustments reviewed during time of fitting  I also assisted PT/OT with transfer of patient from bed to chair  Please see PT/OT notes also  My contact information and instructions at bedside  RN present/aware and I will continue to follow up daily  Recommendations:  Please call Mobility Coordinator at ext  [phone] in regards to bracing instruction and/or adjustment  Maximo Bullock Mobility Coordinator LCFo, LCOF, ASOP R  O T, O B T

## 2020-02-19 NOTE — PLAN OF CARE
Problem: Prexisting or High Potential for Compromised Skin Integrity  Goal: Skin integrity is maintained or improved  Description  INTERVENTIONS:  - Identify patients at risk for skin breakdown  - Assess and monitor skin integrity  - Assess and monitor nutrition and hydration status  - Monitor labs   - Assess for incontinence   - Turn and reposition patient  - Assist with mobility/ambulation  - Relieve pressure over bony prominences  - Avoid friction and shearing  - Provide appropriate hygiene as needed including keeping skin clean and dry  - Evaluate need for skin moisturizer/barrier cream  - Collaborate with interdisciplinary team   - Patient/family teaching  - Consider wound care consult   Outcome: Progressing     Problem: PAIN - ADULT  Goal: Verbalizes/displays adequate comfort level or baseline comfort level  Description  Interventions:  - Encourage patient to monitor pain and request assistance  - Assess pain using appropriate pain scale  - Administer analgesics based on type and severity of pain and evaluate response  - Implement non-pharmacological measures as appropriate and evaluate response  - Consider cultural and social influences on pain and pain management  - Notify physician/advanced practitioner if interventions unsuccessful or patient reports new pain  Outcome: Progressing     Problem: INFECTION - ADULT  Goal: Absence or prevention of progression during hospitalization  Description  INTERVENTIONS:  - Assess and monitor for signs and symptoms of infection  - Monitor lab/diagnostic results  - Monitor all insertion sites, i e  indwelling lines, tubes, and drains  - Monitor endotracheal if appropriate and nasal secretions for changes in amount and color  - [place] appropriate cooling/warming therapies per order  - Administer medications as ordered  - Instruct and encourage patient and family to use good hand hygiene technique  - Identify and instruct in appropriate isolation precautions for identified infection/condition  Outcome: Progressing  Goal: Absence of fever/infection during neutropenic period  Description  INTERVENTIONS:  - Monitor WBC    Outcome: Progressing     Problem: SAFETY ADULT  Goal: Patient will remain free of falls  Description  INTERVENTIONS:  - Assess patient frequently for physical needs  -  Identify cognitive and physical deficits and behaviors that affect risk of falls    -  [place] fall precautions as indicated by assessment   - Educate patient/family on patient safety including physical limitations  - Instruct patient to call for assistance with activity based on assessment  - Modify environment to reduce risk of injury  - Consider OT/PT consult to assist with strengthening/mobility  Outcome: Progressing  Goal: Maintain or return to baseline ADL function  Description  INTERVENTIONS:  -  Assess patient's ability to carry out ADLs; assess patient's baseline for ADL function and identify physical deficits which impact ability to perform ADLs (bathing, care of mouth/teeth, toileting, grooming, dressing, etc )  - Assess/evaluate cause of self-care deficits   - Assess range of motion  - Assess patient's mobility; develop plan if impaired  - Assess patient's need for assistive devices and provide as appropriate  - Encourage maximum independence but intervene and supervise when necessary  - Involve family in performance of ADLs  - Assess for home care needs following discharge   - Consider OT consult to assist with ADL evaluation and planning for discharge  - Provide patient education as appropriate  Outcome: Progressing  Goal: Maintain or return mobility status to optimal level  Description  INTERVENTIONS:  - Assess patient's baseline mobility status (ambulation, transfers, stairs, etc )    - Identify cognitive and physical deficits and behaviors that affect mobility  - Identify mobility aids required to assist with transfers and/or ambulation (gait belt, sit-to-stand, lift, walker, cane, etc )  - [place] fall precautions as indicated by assessment  - Record patient progress and toleration of activity level on Mobility SBAR; progress patient to next Phase/Stage  - Instruct patient to call for assistance with activity based on assessment  - Consider rehabilitation consult to assist with strengthening/weightbearing, etc   Outcome: Progressing     Problem: DISCHARGE PLANNING  Goal: Discharge to home or other facility with appropriate resources  Description  INTERVENTIONS:  - Identify barriers to discharge w/patient and caregiver  - Arrange for needed discharge resources and transportation as appropriate  - Identify discharge learning needs (meds, wound care, etc )  - Arrange for interpretive services to assist at discharge as needed  - Refer to Case Management Department for coordinating discharge planning if the patient needs post-hospital services based on physician/advanced practitioner order or complex needs related to functional status, cognitive ability, or social support system  Outcome: Progressing     Problem: DISCHARGE PLANNING  Goal: Discharge to home or other facility with appropriate resources  Description  INTERVENTIONS:  - Identify barriers to discharge w/patient and caregiver  - Arrange for needed discharge resources and transportation as appropriate  - Identify discharge learning needs (meds, wound care, etc )  - Arrange for interpretive services to assist at discharge as needed  - Refer to Case Management Department for coordinating discharge planning if the patient needs post-hospital services based on physician/advanced practitioner order or complex needs related to functional status, cognitive ability, or social support system  Outcome: Progressing     Problem: NEUROSENSORY - ADULT  Goal: Achieves stable or improved neurological status  Description  INTERVENTIONS  - Monitor and report changes in neurological status  - Monitor vital signs such as temperature, blood pressure, glucose, and any other labs ordered   - Initiate measures to prevent increased intracranial pressure  - Monitor for seizure activity and implement precautions if appropriate      Outcome: Progressing  Goal: Remains free of injury related to seizures activity  Description  INTERVENTIONS  - Maintain airway, patient safety  and administer oxygen as ordered  - Monitor patient for seizure activity, document and report duration and description of seizure to physician/advanced practitioner  - If seizure occurs,  ensure patient safety during seizure  - Reorient patient post seizure  - Seizure pads on all 4 side rails  - Instruct patient/family to notify RN of any seizure activity including if an aura is experienced  - Instruct patient/family to call for assistance with activity based on nursing assessment  - Administer anti-seizure medications if ordered    Outcome: Progressing  Goal: Achieves maximal functionality and self care  Description  INTERVENTIONS  - Monitor swallowing and airway patency with patient fatigue and changes in neurological status  - Encourage and assist patient to increase activity and self care     - Encourage visually impaired, hearing impaired and aphasic patients to use assistive/communication devices  Outcome: Progressing     Problem: RESPIRATORY - ADULT  Goal: Achieves optimal ventilation and oxygenation  Description  INTERVENTIONS:  - Assess for changes in respiratory status  - Assess for changes in mentation and behavior  - Position to facilitate oxygenation and minimize respiratory effort  - Oxygen administered by appropriate delivery if ordered  - Initiate smoking cessation education as indicated  - Encourage broncho-pulmonary hygiene including cough, deep breathe, Incentive Spirometry  - Assess the need for suctioning and aspirate as needed  - Assess and instruct to report SOB or any respiratory difficulty  - Respiratory Therapy support as indicated  Outcome: Progressing     Problem: GENITOURINARY - ADULT  Goal: Maintains or returns to baseline urinary function  Description  INTERVENTIONS:  - Assess urinary function  - Encourage oral fluids to ensure adequate hydration if ordered  - Administer IV fluids as ordered to ensure adequate hydration  - Administer ordered medications as needed  - Offer frequent toileting  - Follow urinary retention protocol if ordered  Outcome: Progressing  Goal: Absence of urinary retention  Description  INTERVENTIONS:  - Assess patients ability to void and empty bladder  - Monitor I/O  - Bladder scan as needed  - Discuss with physician/AP medications to alleviate retention as needed  - Discuss catheterization for long term situations as appropriate  Outcome: Progressing  Goal: Urinary catheter remains patent  Description  INTERVENTIONS:  - Assess patency of urinary catheter  - If patient has a chronic wells, consider changing catheter if non-functioning  - Follow guidelines for intermittent irrigation of non-functioning urinary catheter  Outcome: Progressing     Problem: HEMATOLOGIC - ADULT  Goal: Maintains hematologic stability  Description  INTERVENTIONS  - Assess for signs and symptoms of bleeding or hemorrhage  - Monitor labs  - Administer supportive blood products/factors as ordered and appropriate  Outcome: Progressing

## 2020-02-19 NOTE — PROGRESS NOTES
Progress Note - Tertiary Trauma [place]   Julio Yusuf 78 y o  female MRN: [id number]  Unit/Bed#: ICU 06 Encounter: [id number]    Summary of Diagnosed Injuries: Traumatic parafalcine SDH  R distal femur fracture     Clinical Plan: Repeat imaging stable  Keppra x 7 days      Prophylaxis: Sequential compression device (Venodyne)  and Heparin    Disposition: Transfer to [facility]    Code status:  Level 3 - DNAR and DNI    Consultants: Neurosurgery   Geriatrics     Is the patient 65 years or older?: YES:    1  Before the illness or injury that brought you to the Emergency, did you need someone to help you on a regular basis? 1=Yes   2  Since the illness or injury that brought you to the Emergency, have you needed more help than usual to take care of yourself? 1=Yes   3  Have you been hospitalized for one or more nights during the past 6 months (excluding a stay in the Emergency Department)? 0=No   4  In general, do you see well? 0=Yes   5  In general, do you have serious problems with your memory? 1=Yes   6  Do you take more than three different medications everyday? 1=Yes   TOTAL   4     Did you order a geriatric consult if the score was 2 or greater?: yes    Identification of Seniors at Risk      Most Recent Value   (ISAR) Identification of Seniors at Risk   Before the illness or injury that brought you to the Emergency, did you need someone to help you on a regular basis? 0 Filed at: 02/18/2020 1647   In the last 24 hours, have you needed more help than usual?  0 Filed at: 02/18/2020 1647   Have you been hospitalized for one or more nights during the past 6 months? 1 Filed at: 02/18/2020 1647   In general, do you see well? 1 Filed at: 02/18/2020 1647   In general, do you have serious problems with your memory?   0 Filed at: 02/18/2020 1647   Do you take more than three different medications every day?  0 Filed at: 02/18/2020 1647   ISAR Score  2 Filed at: 02/18/2020 1647          SUBJECTIVE:     Transfer from: [facility]   Outside Films Received: not applicable  Tertiary Exam Due on: 2/19    Mechanism of Injury:Fall    Details related to Injury: Fall during transfer    Chief Complaint: R leg pain    HPI/Last 24 hour events: 79F with a PMH of dCHF, afib, HTN presented after a fall and found to have traumatic parafalcine SDH and R distal femur fracture     Active medications:           Current Facility-Administered Medications:     amLODIPine (NORVASC) tablet 5 mg, 5 mg, Oral, Daily, 5 mg at 02/19/20 0854    busPIRone (BUSPAR) tablet 5 mg, 5 mg, Oral, TID, 5 mg at 02/19/20 0856    chlorhexidine (PERIDEX) 0 12 % oral rinse 15 mL, 15 mL, Swish & Spit, Q12H [address], 15 mL at 02/19/20 0909    docusate sodium (COLACE) capsule 100 mg, 100 mg, Oral, Daily, 100 mg at 02/19/20 0857    escitalopram (LEXAPRO) tablet 5 mg, 5 mg, Oral, Daily, 5 mg at 02/19/20 0859    furosemide (LASIX) tablet 60 mg, 60 mg, Oral, Daily, 60 mg at 02/19/20 0851    gabapentin (NEURONTIN) capsule 100 mg, 100 mg, Oral, TID, 100 mg at 02/19/20 0854    heparin (porcine) subcutaneous injection 5,000 Units, 5,000 Units, Subcutaneous, Q8H [address]    HYDROmorphone (DILAUDID) injection 0 5 mg, 0 5 mg, Intravenous, Q4H PRN, 0 5 mg at 02/19/20 0051    levETIRAcetam (KEPPRA) tablet 500 mg, 500 mg, Oral, Q12H [address], 500 mg at 02/19/20 0857    methocarbamol (ROBAXIN) tablet 500 mg, 500 mg, Oral, Q6H, 500 mg at 02/19/20 0603    ondansetron (ZOFRAN) injection 4 mg, 4 mg, Intravenous, Q4H PRN    oxybutynin (DITROPAN-XL) 24 hr tablet 5 mg, 5 mg, Oral, Daily, 5 mg at 02/19/20 0856    oxyCODONE (ROXICODONE) immediate release tablet 10 mg, 10 mg, Oral, Q4H PRN, 10 mg at 02/19/20 0850    oxyCODONE (ROXICODONE) IR tablet 5 mg, 5 mg, Oral, Q4H PRN    pantoprazole (PROTONIX) EC tablet 40 mg, 40 mg, Oral, Early Morning, 40 mg at 02/19/20 0603    propranolol (INDERAL) tablet 10 mg, 10 mg, Oral, BID, 10 mg at 02/19/20 0859    senna-docusate sodium (SENOKOT S) 8 6-50 mg per tablet 1 tablet, 1 tablet, Oral, HS, 1 tablet at 02/18/20 2221    spironolactone (ALDACTONE) tablet 25 mg, 25 mg, Oral, Daily      OBJECTIVE:     Vitals:   Vitals:    02/19/20 0955   BP: 131/71   Pulse: 74   Resp: 18   Temp:    SpO2:        Physical Exam:   GENERAL APPEARANCE: NAD  NEURO: AAOx3  HEENT: Occipital scalp laceration   CV: IRR  LUNGS: CTA  GI: Soft  : Chronic wells  MSK: HKB in place, Thigh full but not tense, Skin intact  SKIN: Warm and dry       I/O:   I/O       02/17 0701 - 02/18 0700 02/18 0701 - 02/19 0700 02/19 0701 - 02/20 0700    P  O   120     I V  (mL/kg)  392 5 (4 4) 67 5 (0 8)    Total Intake(mL/kg)  512 5 (5 7) 67 5 (0 8)    Urine (mL/kg/hr)  475 10 (0)    Total Output  475 10    Net  +37 5 +57 5                 Invasive Devices: Invasive Devices     Peripheral Intravenous Line            Peripheral IV 02/18/20 Left Antecubital less than 1 day    Peripheral IV 02/18/20 Right Antecubital less than 1 day          Drain            Urethral Catheter Latex 16 Fr  988 days    Urethral Catheter less than 1 day                  Imaging:   Xr Hip/pelv 2-3 Vws Right    Result Date: 2/18/2020  Impression: Displaced angulated fracture through the diametaphysis of the distal femur at the level of the proximal tip of the right knee prosthesis  The study was marked in [facility] for immediate notification  Workstation performed: [id number]     Xr Femur 2 Views Right    Result Date: 2/18/2020  Impression: Displaced angulated fracture through the diametaphysis of the distal femur at the level of the proximal tip of the right knee prosthesis  The study was marked in [facility] for immediate notification  Workstation performed: [id number]     Xr Knee 1 Or 2 Vw Right    Result Date: 2/18/2020  Impression: Displaced angulated fracture through the diametaphysis of the distal femur at the level of the proximal tip of the right knee prosthesis  The study was marked in [facility] for immediate notification   Workstation performed: [id number]     Ct Head Without Contrast    Result Date: 2/19/2020  Impression: Redemonstration of a subdural hemorrhage in the interhemispheric fissure extending to the right and measuring approximately 3 7 x 0 8 cm in AP and transverse dimensions (previously measuring approximately 2 1 x 0 7 cm in AP and transverse dimensions)  No significant mass effect or midline shift  Small right frontal scalp hematoma is intervally improved  No other significant interval change  Workstation performed: [id number]     Ct Head Without Contrast    Addendum Date: 2/18/2020    ADDENDUM: Please note the impression should read small right parafalcine subdural hemorrhage without mass effect  Result Date: 2/18/2020  Impression: No acute intracranial abnormality  I personally discussed this study with Dr Hong Sutherland on 2/18/2020 at 2:16 PM  Workstation performed: [id number]     Ct Cervical Spine Without Contrast    Result Date: 2/18/2020  Impression: No cervical spine fracture or traumatic malalignment  Workstation performed: [id number]     Xr Chest 1 View    Result Date: 2/18/2020  Impression: No acute cardiopulmonary disease  Workstation performed: [id number]       Labs:   CBC:   Lab Results   Component Value Date    WBC 5 52 02/19/2020    HGB 11 9 02/19/2020    HCT 37 1 02/19/2020    MCV 92 02/19/2020     (L) 02/19/2020    MCH 29 5 02/19/2020    MCHC 32 1 02/19/2020    RDW 16 0 (H) 02/19/2020    MPV 11 4 02/19/2020    NRBC 0 02/19/2020     CMP:   Lab Results   Component Value Date    CL 96 (L) 02/19/2020    CO2 31 02/19/2020    BUN 10 02/19/2020    CREATININE 0 65 02/19/2020    CALCIUM 9 0 02/19/2020     (H) 02/18/2020    ALT 31 02/18/2020    ALKPHOS 336 (H) 02/18/2020    EGFR 84 02/19/2020         Tertiary exam complete  No additional injuries identified   Imaging reviewed   No incidental findings noted    David Potts PA-C

## 2020-02-19 NOTE — PLAN OF CARE
Problem: Prexisting or High Potential for Compromised Skin Integrity  Goal: Skin integrity is maintained or improved  Description  INTERVENTIONS:  - Identify patients at risk for skin breakdown  - Assess and monitor skin integrity  - Assess and monitor nutrition and hydration status  - Monitor labs   - Assess for incontinence   - Turn and reposition patient  - Assist with mobility/ambulation  - Relieve pressure over bony prominences  - Avoid friction and shearing  - Provide appropriate hygiene as needed including keeping skin clean and dry  - Evaluate need for skin moisturizer/barrier cream  - Collaborate with interdisciplinary team   - Patient/family teaching  - Consider wound care consult   Outcome: Progressing     Problem: PAIN - ADULT  Goal: Verbalizes/displays adequate comfort level or baseline comfort level  Description  Interventions:  - Encourage patient to monitor pain and request assistance  - Assess pain using appropriate pain scale  - Administer analgesics based on type and severity of pain and evaluate response  - Implement non-pharmacological measures as appropriate and evaluate response  - Consider cultural and social influences on pain and pain management  - Notify physician/advanced practitioner if interventions unsuccessful or patient reports new pain  Outcome: Progressing     Problem: INFECTION - ADULT  Goal: Absence or prevention of progression during hospitalization  Description  INTERVENTIONS:  - Assess and monitor for signs and symptoms of infection  - Monitor lab/diagnostic results  - Monitor all insertion sites, i e  indwelling lines, tubes, and drains  - Monitor endotracheal if appropriate and nasal secretions for changes in amount and color  - [place] appropriate cooling/warming therapies per order  - Administer medications as ordered  - Instruct and encourage patient and family to use good hand hygiene technique  - Identify and instruct in appropriate isolation precautions for identified infection/condition  Outcome: Progressing  Goal: Absence of fever/infection during neutropenic period  Description  INTERVENTIONS:  - Monitor WBC    Outcome: Progressing     Problem: SAFETY ADULT  Goal: Patient will remain free of falls  Description  INTERVENTIONS:  - Assess patient frequently for physical needs  -  Identify cognitive and physical deficits and behaviors that affect risk of falls    -  [place] fall precautions as indicated by assessment   - Educate patient/family on patient safety including physical limitations  - Instruct patient to call for assistance with activity based on assessment  - Modify environment to reduce risk of injury  - Consider OT/PT consult to assist with strengthening/mobility  Outcome: Progressing  Goal: Maintain or return to baseline ADL function  Description  INTERVENTIONS:  -  Assess patient's ability to carry out ADLs; assess patient's baseline for ADL function and identify physical deficits which impact ability to perform ADLs (bathing, care of mouth/teeth, toileting, grooming, dressing, etc )  - Assess/evaluate cause of self-care deficits   - Assess range of motion  - Assess patient's mobility; develop plan if impaired  - Assess patient's need for assistive devices and provide as appropriate  - Encourage maximum independence but intervene and supervise when necessary  - Involve family in performance of ADLs  - Assess for home care needs following discharge   - Consider OT consult to assist with ADL evaluation and planning for discharge  - Provide patient education as appropriate  Outcome: Progressing  Goal: Maintain or return mobility status to optimal level  Description  INTERVENTIONS:  - Assess patient's baseline mobility status (ambulation, transfers, stairs, etc )    - Identify cognitive and physical deficits and behaviors that affect mobility  - Identify mobility aids required to assist with transfers and/or ambulation (gait belt, sit-to-stand, lift, walker, cane, etc )  - [place] fall precautions as indicated by assessment  - Record patient progress and toleration of activity level on Mobility SBAR; progress patient to next Phase/Stage  - Instruct patient to call for assistance with activity based on assessment  - Consider rehabilitation consult to assist with strengthening/weightbearing, etc   Outcome: Progressing     Problem: DISCHARGE PLANNING  Goal: Discharge to home or other facility with appropriate resources  Description  INTERVENTIONS:  - Identify barriers to discharge w/patient and caregiver  - Arrange for needed discharge resources and transportation as appropriate  - Identify discharge learning needs (meds, wound care, etc )  - Arrange for interpretive services to assist at discharge as needed  - Refer to Case Management Department for coordinating discharge planning if the patient needs post-hospital services based on physician/advanced practitioner order or complex needs related to functional status, cognitive ability, or social support system  Outcome: Progressing     Problem: NEUROSENSORY - ADULT  Goal: Achieves stable or improved neurological status  Description  INTERVENTIONS  - Monitor and report changes in neurological status  - Monitor vital signs such as temperature, blood pressure, glucose, and any other labs ordered   - Initiate measures to prevent increased intracranial pressure  - Monitor for seizure activity and implement precautions if appropriate      Outcome: Progressing  Goal: Remains free of injury related to seizures activity  Description  INTERVENTIONS  - Maintain airway, patient safety  and administer oxygen as ordered  - Monitor patient for seizure activity, document and report duration and description of seizure to physician/advanced practitioner  - If seizure occurs,  ensure patient safety during seizure  - Reorient patient post seizure  - Seizure pads on all 4 side rails  - Instruct patient/family to notify RN of any seizure activity including if an aura is experienced  - Instruct patient/family to call for assistance with activity based on nursing assessment  - Administer anti-seizure medications if ordered    Outcome: Progressing  Goal: Achieves maximal functionality and self care  Description  INTERVENTIONS  - Monitor swallowing and airway patency with patient fatigue and changes in neurological status  - Encourage and assist patient to increase activity and self care     - Encourage visually impaired, hearing impaired and aphasic patients to use assistive/communication devices  Outcome: Progressing     Problem: RESPIRATORY - ADULT  Goal: Achieves optimal ventilation and oxygenation  Description  INTERVENTIONS:  - Assess for changes in respiratory status  - Assess for changes in mentation and behavior  - Position to facilitate oxygenation and minimize respiratory effort  - Oxygen administered by appropriate delivery if ordered  - Initiate smoking cessation education as indicated  - Encourage broncho-pulmonary hygiene including cough, deep breathe, Incentive Spirometry  - Assess the need for suctioning and aspirate as needed  - Assess and instruct to report SOB or any respiratory difficulty  - Respiratory Therapy support as indicated  Outcome: Progressing     Problem: GENITOURINARY - ADULT  Goal: Urinary catheter remains patent  Description  INTERVENTIONS:  - Assess patency of urinary catheter  - If patient has a chronic wells, consider changing catheter if non-functioning  - Follow guidelines for intermittent irrigation of non-functioning urinary catheter  Outcome: Progressing     Problem: HEMATOLOGIC - ADULT  Goal: Maintains hematologic stability  Description  INTERVENTIONS  - Assess for signs and symptoms of bleeding or hemorrhage  - Monitor labs  - Administer supportive blood products/factors as ordered and appropriate  Outcome: Progressing     Problem: MUSCULOSKELETAL - ADULT  Goal: Maintain or return mobility to safest level of function  Description  INTERVENTIONS:  - Assess patient's ability to carry out ADLs; assess patient's baseline for ADL function and identify physical deficits which impact ability to perform ADLs (bathing, care of mouth/teeth, toileting, grooming, dressing, etc )  - Assess/evaluate cause of self-care deficits   - Assess range of motion  - Assess patient's mobility  - Assess patient's need for assistive devices and provide as appropriate  - Encourage maximum independence but intervene and supervise when necessary  - Involve family in performance of ADLs  - Assess for home care needs following discharge   - Consider OT consult to assist with ADL evaluation and planning for discharge  - Provide patient education as appropriate  Outcome: Progressing  Goal: Maintain proper alignment of affected body part  Description  INTERVENTIONS:  - Support, maintain and protect limb and body alignment  - Provide patient/ family with appropriate education  Outcome: Progressing     Problem: Knowledge Deficit  Goal: Patient/family/caregiver demonstrates understanding of disease process, treatment plan, medications, and discharge instructions  Description  Complete learning assessment and assess knowledge base    Interventions:  - Provide teaching at level of understanding  - Provide teaching via preferred learning methods  Outcome: Progressing     Problem: CARDIOVASCULAR - ADULT  Goal: Maintains optimal cardiac output and hemodynamic stability  Description  INTERVENTIONS:  - Monitor I/O, vital signs and rhythm  - Monitor for S/S and trends of decreased cardiac output  - Administer and titrate ordered vasoactive medications to optimize hemodynamic stability  - Assess quality of pulses, skin color and temperature  - Assess for signs of decreased coronary artery perfusion  - Instruct patient to report change in severity of symptoms  Outcome: Progressing  Goal: Absence of cardiac dysrhythmias or at baseline rhythm  Description  INTERVENTIONS:  - Continuous cardiac monitoring, vital signs, obtain 12 lead EKG if ordered  - Administer antiarrhythmic and heart rate control medications as ordered  - Monitor electrolytes and administer replacement therapy as ordered  Outcome: Progressing     Problem: GASTROINTESTINAL - ADULT  Goal: Maintains or returns to baseline bowel function  Description  INTERVENTIONS:  - Assess bowel function  - Encourage oral fluids to ensure adequate hydration  - Administer IV fluids if ordered to ensure adequate hydration  - Administer ordered medications as needed  - Encourage mobilization and activity  - Consider nutritional services referral to assist patient with adequate nutrition and appropriate food choices  Outcome: Progressing  Goal: Maintains adequate nutritional intake  Description  INTERVENTIONS:  - Monitor percentage of each meal consumed  - Identify factors contributing to decreased intake, treat as appropriate  - Assist with meals as needed  - Monitor I&O, weight, and lab values if indicated  - Obtain nutrition services referral as needed  Outcome: Progressing     Problem: METABOLIC, FLUID AND ELECTROLYTES - ADULT  Goal: Electrolytes maintained within normal limits  Description  INTERVENTIONS:  - Monitor labs and assess patient for signs and symptoms of electrolyte imbalances  - Administer electrolyte replacement as ordered  - Monitor response to electrolyte replacements, including repeat lab results as appropriate  - Instruct patient on fluid and nutrition as appropriate  Outcome: Progressing  Goal: Fluid balance maintained  Description  INTERVENTIONS:  - Monitor labs   - Monitor I/O and WT  - Instruct patient on fluid and nutrition as appropriate  - Assess for signs & symptoms of volume excess or deficit  Outcome: Progressing     Problem: SKIN/TISSUE INTEGRITY - ADULT  Goal: Skin integrity remains intact  Description  INTERVENTIONS  - Identify patients at risk for skin breakdown  - Assess and monitor skin integrity  - Assess and monitor nutrition and hydration status  - Monitor labs (i e  albumin)  - Assess for incontinence   - Turn and reposition patient  - Assist with mobility/ambulation  - Relieve pressure over bony prominences  - Avoid friction and shearing  - Provide appropriate hygiene as needed including keeping skin clean and dry  - Evaluate need for skin moisturizer/barrier cream  - Collaborate with interdisciplinary team (i e  Nutrition, Rehabilitation, etc )   - Patient/family teaching  Outcome: Progressing

## 2020-02-19 NOTE — PLAN OF CARE
Problem: PHYSICAL THERAPY ADULT  Goal: Performs mobility at highest level of function for planned discharge setting  See evaluation for individualized goals  Description  Treatment/Interventions: Functional transfer training, LE strengthening/ROM, Therapeutic exercise, Endurance training, Cognitive reorientation, Patient/family training, Equipment eval/education, Bed mobility, Spoke to MD, Spoke to nursing, Spoke to advanced practitioner, OT  Equipment Recommended: Wheelchair       See flowsheet documentation for full assessment, interventions and recommendations  Outcome: Progressing  Note:   Prognosis: Good  Problem List: Decreased strength, Decreased range of motion, Decreased endurance, Decreased mobility, Impaired balance, Decreased coordination, Decreased safety awareness, Decreased cognition, Pain, Orthopedic restrictions, Decreased skin integrity, Obesity  Assessment: Pt  is an 77 yo F who presents s/p fall OOB with subsequent subdural hematoma and R distal femur Fx  NWB RLE in [employer] [employer]  order placed for PT eval and tx, w/ activity order of up as tolerated and NWB R LE  pt presents w/ comorbidities of uncomplicated opoid dependence, Ambulatory dysfunction, Chronic indwelling wells, Fall from standing, Closed Fx of R Distal femur, occipital scalp lacteration, Anxiety, Demformity of R foot, htn, Osteopenia, CVA, back pain and personal factors of living in 2 story house, mobilizing w/ assistive device, stair(s) to enter home, inability to ambulate household distances, inability to navigate community distances, inability to navigate level surfaces w/o external assistance, unable to perform dynamic tasks in community, decreased cognition, positive fall history, anxiety, depression, inability to perform current job functions, unable to perform physical activity, limited insight into impairments, inability to perform IADLs, inability to perform ADLs and inability to live alone   pt presents w/ pain, weakness, decreased ROM, decreased endurance, impaired cognition, impaired balance, gait deviations, impaired coordination, decreased safety awareness and orthopedic restrictions  these impairments are evident in findings from physical examination (weakness, impaired coordination, impaired skin integrity and edema of extremities), mobility assessment (need for MaxAx3 assist w/ all phases of mobility when usually mobilizing independently, tolerance to only 0 feet of ambulation and need for cueing for mobility technique), and Barthel Index: 30/100  pt needed input for task focus and mobility technique  pt is at risk for falls due to physical and safety awareness deficits  pt's clinical presentation is unstable/unpredictable (evident in need for assist w/ all phases of mobility when usually mobilizing independently, tolerance to only 0 feet of ambulation, need for input for mobility technique, need for input for task focus and mobility technique and need for input for mobility technique/safety)  pt needs inpatient PT tx to improve mobility deficits  discharge recommendation is for inpatient rehab to reduce fall risk and maximize level of functional independence  Recommendation: Post acute IP rehab     PT - OK to Discharge: Yes    See flowsheet documentation for full assessment

## 2020-02-19 NOTE — PROGRESS NOTES
Daily Progress Note - Critical Care   Apolonia Race 78 y o  female MRN: [id number]  Unit/Bed#: ICU 06 Encounter: [id number]        ----------------------------------------------------------------------------------------  HPI/24hr events: 79F with a PMH of CHF, Afib not on AC, chronic back pain on oxycontin, chronic wells, and depression that presented yesterday to [facility] after a fall and found to have a traumatic parafalcine SDH and R distal femur fracture  Patient was on ASA and received a dose of DDAVP     No significant events overnight      ---------------------------------------------------------------------------------------  SUBJECTIVE  Patient complains of R thigh pain  She also complains of some neck stiffness  Denies headaches, nausea, and vomiting, abdominal pain, chest pain     Review of Systems  Review of systems was reviewed and negative unless stated above in HPI/24-hour events   ---------------------------------------------------------------------------------------  Assessment and Plan:    Neuro:    Diagnosis: Traumatic parafalcine SDH  o Plan: NS consulted  o Repeat CT head this AM unchanged from admission imaging  o Continue Keppra for seizure prophylaxis x 7 days total  o Continue frequent neuro checks    Diagnosis: Chronic back pain  o Plan: Continue oxycodone prn   If does not control, can consider restarting home oxycontin if pain not controlled given good neurologic exam   o Continue home neurontin  o Continue robaxin for pain associated with muscle spasms    Diagnosis: Depression  o Plan: Continue home lexapro and buspar    CV:    Diagnosis: History of atrial fibrillation   o Plan: Continue inderal for rate control   o Not on AC   Diagnosis: History of dCHF  o Plan: Restart home lasix and continue spironolactone   o DC IVF and start PO diet    Diagnosis: Chronic HTN  o Plan: Continue home norvasc     Pulm:   Diagnosis: No acute issues  o Plan: Encourage IS and pulmonary toilet Goal Sp02 >90%    GI:    Diagnosis: Constipation   o Plan: Continue bowel regimen    Diagnosis: GERD  o Plan: Continue home PPI       :    Diagnosis: History of urinary retention with chronic wells  o Plan: Continue wells  o Continue oxybutyin  o Restart flomax   o Monitor I/O    F/E/N:    Plan: DC IVF and start on PO diet    Trend electrolytes       Heme/Onc:    Diagnosis: No acute issues      Endo:    Diagnosis: No acute issues      ID:    Diagnosis: No acute issues    MSK/Skin:    Diagnosis: R distal femur fracture  o Plan: No operative intervention per orthopedics  o NWB RLE  o Plan for HKB today   o PT/OT   Diagnosis: Ambulatory dysfunction at baseline  o Plan: Patient does not ambulate at baseline   o Fall occur during transfer to wheelchair       Disposition: Transfer to Med-Surg   Code Status: Level 3 - DNAR and DNI  ---------------------------------------------------------------------------------------  ICU CORE MEASURES    Prophylaxis   VTE Pharmacologic Prophylaxis: Pharmacologic VTE Prophylaxis contraindicated due to traumatic SDH  Will discuss starting at 24h if ok with NS  VTE Mechanical Prophylaxis: sequential compression device  Stress Ulcer Prophylaxis: Pantoprazole PO    ABCDE Protocol (if indicated)  Plan to perform spontaneous awakening trial today? Not applicable  Plan to perform spontaneous breathing trial today? Not applicable  Obvious barriers to extubation? Not applicable  CAM-ICU: Negative    Invasive Devices Review  Invasive Devices     Peripheral Intravenous Line            Peripheral IV 02/18/20 Left Antecubital less than 1 day    Peripheral IV 02/18/20 Right Antecubital less than 1 day          Drain            Urethral Catheter Latex 16 Fr  988 days    Urethral Catheter less than 1 day              Can any invasive devices be discontinued today?  No  ---------------------------------------------------------------------------------------  OBJECTIVE    Vitals   Vitals: 20 0200 20 0300 20 0400 20 0600   BP: 141/66 (!) 83/62 131/76 145/71   BP Location:   Left arm    Pulse: 68 64 64 62   Resp: 13 12 (!) 9 18   Temp:   98 3 °F (36 8 °C)    TempSrc:   Oral    SpO2: 96% 96% 96% 97%   Weight:    89 7 kg (197 lb 12 oz)   Height:         Temp (24hrs), Av 1 °F (36 7 °C), Min:97 7 °F (36 5 °C), Max:98 3 °F (36 8 °C)  Current: Temperature: 98 3 °F (36 8 °C)  HR: 62  BP: 145/71  RR: 18  SpO2: 97%    Physical Exam   Constitutional: She is oriented to person, place, and time  Elderly female lying in bed comfortably in no acute distress   HENT:   R occipital scalp laceration intact with staples    Eyes: Pupils are equal, round, and reactive to light  EOM are normal    Neck: Normal range of motion  No spinous process tenderness present  Cardiovascular: Normal rate  An irregular rhythm present  Pulmonary/Chest: Effort normal  No respiratory distress  Abdominal: Soft  She exhibits no distension  There is tenderness (Minimal generalized )  Genitourinary:   Genitourinary Comments: Chronic wells in place with clear yellow urine in the bag   Musculoskeletal:   R knee immobilizer in place  R distal thigh firm but not tense  Proximal thigh is soft  Skin intact   Neurological: She is alert and oriented to person, place, and time  No cranial nerve deficit  GCS eye subscore is 4  GCS verbal subscore is 5  GCS motor subscore is 6  Skin: Skin is warm and dry  Capillary refill takes less than 2 seconds  Psychiatric: She has a normal mood and affect  Vitals reviewed        Respiratory:  SpO2: SpO2: 97 %, SpO2 Activity: SpO2 Activity: At Rest, SpO2 Device: O2 Device: None (Room air)       Invasive/non-invasive ventilation settings   Respiratory    Lab Data (Last 4 hours)    None         O2/Vent Data (Last 4 hours)    None                Laboratory and Diagnostics:  Results from last 7 days   Lab Units 20  1408   WBC Thousand/uL 5 71   HEMOGLOBIN g/dL 14 3 HEMATOCRIT % 44 6   PLATELETS Thousands/uL 151   NEUTROS PCT % 62   MONOS PCT % 10     Results from last 7 days   Lab Units 02/19/20  0605 02/18/20  1408   SODIUM mmol/L 132* 132*   POTASSIUM mmol/L 4 5 4 6   CHLORIDE mmol/L 96* 93*   CO2 mmol/L 31 32   ANION GAP mmol/L 5 7   BUN mg/dL 10 10   CREATININE mg/dL 0 65 0 77   CALCIUM mg/dL 9 0 9 6   GLUCOSE RANDOM mg/dL 111 142*   ALT U/L  --  31   AST U/L  --  159*   ALK PHOS U/L  --  336*   ALBUMIN g/dL  --  3 0*   TOTAL BILIRUBIN mg/dL  --  2 12*          Results from last 7 days   Lab Units 02/18/20  1408   INR  1 06   PTT seconds 30              ABG:    VBG:          Micro        EKG: Reviewed from 2/18- Afib with VR of 71  Imaging: CT brain reviewed  Parafalcine SDH slightly larger but may just be leveling out per my read  I have personally reviewed pertinent films in PACS    Intake and Output  I/O     None         Height and Weights   Height: 5' 2" (157 5 cm)  IBW: 50 1 kg  Body mass index is 36 17 kg/m²  Weight (last 2 days)     Date/Time   Weight    02/19/20 0600   89 7 (197 75)    02/18/20 1937   89 6 (197 53)    02/18/20 1652   95 3 (210)                Nutrition       Diet Orders   (From admission, onward)             Start     Ordered    02/19/20 0001  Diet NPO; Sips with meds  Diet effective midnight     Question Answer Comment   Diet Type NPO    NPO Except: Sips with meds    RD to adjust diet per protocol?  No        02/18/20 9784                Active Medications  Scheduled Meds:    Current Facility-Administered Medications:  amLODIPine 5 mg Oral Daily Kristal James MD    busPIRone 5 mg Oral TID Kristal James MD    chlorhexidine 15 mL [employer] Q12H [address] [place] [address], MD    docusate sodium 100 mg Oral Daily Kristal James MD    escitalopram 5 mg Oral Daily Kristal James MD    furosemide 20 mg Oral Daily Rubi Cradle [facility]    gabapentin 100 mg Oral TID Kristal James MD    HYDROmorphone 0 5 mg Intravenous Q4H PRN Muna Sauceda MD    levETIRAcetam 500 mg Intravenous BID Muna Sauceda MD Last Rate: 500 mg (02/18/20 1917)   methocarbamol 500 mg Oral Q6H [facility]    multi-electrolyte 75 mL/hr Intravenous Continuous [facility] Last Rate: 75 mL/hr (02/19/20 0100)   ondansetron 4 mg Intravenous Q4H PRN Tiffanie Curry MD    oxybutynin 5 mg Oral Daily Muna Sauceda MD    oxyCODONE 10 mg Oral Q4H PRN [facility]    oxyCODONE 5 mg Oral Q4H PRN [facility]    pantoprazole 40 mg Oral Early Morning Muna Sauceda MD    propranolol 10 mg Oral BID Muna Sauceda MD    senna-docusate sodium 1 tablet Oral HS Muna Sauceda MD      Continuous Infusions:    multi-electrolyte 75 mL/hr Last Rate: 75 mL/hr (02/19/20 0100)     PRN Meds:     HYDROmorphone 0 5 mg Q4H PRN   ondansetron 4 mg Q4H PRN   oxyCODONE 10 mg Q4H PRN   oxyCODONE 5 mg Q4H PRN       Allergies   Allergies   Allergen Reactions    Aspirin GI Intolerance    Warfarin      ---------------------------------------------------------------------------------------  Advance Directive and Living Will:      Power of :    POLST:    ---------------------------------------------------------------------------------------  Care Time Delivered:   No Critical Care time spent     [place] NIKOLAS Brooks      Portions of the record may have been created with voice recognition software  Occasional wrong word or "sound a like" substitutions may have occurred due to the inherent limitations of voice recognition software    Read the chart carefully and recognize, using context, where substitutions have occurred

## 2020-02-19 NOTE — TELEPHONE ENCOUNTER
02/26/2020-CT SCHEDULED ON 03/03/2020 02/25/2020-CALLED [facility], SPOKE TO EDITH, CONFIRMED 03/06/2020 APT AND TO HAVE CT COMPLETED PRIOR TO APT  FAXED CT SCRIPT TO FACILITY TO Q#[phone]    WAITING FOR CT TO BE SCHEDULED  MEDICARE/AARP        02/21/2020-PT STILL IN HOSPITAL    02/20/2020-PT STILL IN HOSPITAL    02/19/2020-PT STILL IN HOSPITAL  03/06/2020 APT W/CT HEAD      ----- Message from Jazmin Xiong PA-C sent at 2/19/2020  9:44 AM EST -----  Can you schedule a 2 week hospital follow up with [facility]?   Thanks

## 2020-02-19 NOTE — PROGRESS NOTES
Patient report was called to Ramon Caruso RN from the [address] [place] [address] 6 Nursing Unit  The patient will be transferred to Room 631 as a Medical-Surgical status patient

## 2020-02-20 ENCOUNTER — APPOINTMENT (INPATIENT)
Dept: RADIOLOGY | Facility: HOSPITAL | Age: 80
DRG: 964 | End: 2020-02-20
Payer: MEDICARE

## 2020-02-20 PROCEDURE — 99233 SBSQ HOSP IP/OBS HIGH 50: CPT | Performed by: SURGERY

## 2020-02-20 PROCEDURE — 73600 X-RAY EXAM OF ANKLE: CPT

## 2020-02-20 PROCEDURE — 99232 SBSQ HOSP IP/OBS MODERATE 35: CPT | Performed by: INTERNAL MEDICINE

## 2020-02-20 RX ADMIN — HEPARIN SODIUM 5000 UNITS: 5000 INJECTION INTRAVENOUS; SUBCUTANEOUS at 13:05

## 2020-02-20 RX ADMIN — LEVETIRACETAM 500 MG: 500 TABLET, FILM COATED ORAL at 08:59

## 2020-02-20 RX ADMIN — LEVETIRACETAM 500 MG: 500 TABLET, FILM COATED ORAL at 21:08

## 2020-02-20 RX ADMIN — PROPRANOLOL HYDROCHLORIDE 10 MG: 10 TABLET ORAL at 17:37

## 2020-02-20 RX ADMIN — METHOCARBAMOL TABLETS 250 MG: 500 TABLET, COATED ORAL at 05:53

## 2020-02-20 RX ADMIN — PROPRANOLOL HYDROCHLORIDE 10 MG: 10 TABLET ORAL at 08:59

## 2020-02-20 RX ADMIN — GABAPENTIN 100 MG: 100 CAPSULE ORAL at 08:59

## 2020-02-20 RX ADMIN — TAMSULOSIN HYDROCHLORIDE 0.4 MG: 0.4 CAPSULE ORAL at 13:04

## 2020-02-20 RX ADMIN — SPIRONOLACTONE 25 MG: 25 TABLET ORAL at 08:59

## 2020-02-20 RX ADMIN — GABAPENTIN 100 MG: 100 CAPSULE ORAL at 21:08

## 2020-02-20 RX ADMIN — DOCUSATE SODIUM 100 MG: 100 CAPSULE, LIQUID FILLED ORAL at 08:59

## 2020-02-20 RX ADMIN — OXYCODONE HYDROCHLORIDE 10 MG: 10 TABLET ORAL at 13:05

## 2020-02-20 RX ADMIN — SENNOSIDES AND DOCUSATE SODIUM 1 TABLET: 8.6; 5 TABLET ORAL at 21:08

## 2020-02-20 RX ADMIN — OXYCODONE HYDROCHLORIDE 5 MG: 5 TABLET ORAL at 05:55

## 2020-02-20 RX ADMIN — HEPARIN SODIUM 5000 UNITS: 5000 INJECTION INTRAVENOUS; SUBCUTANEOUS at 21:08

## 2020-02-20 RX ADMIN — PANTOPRAZOLE SODIUM 40 MG: 40 TABLET, DELAYED RELEASE ORAL at 05:53

## 2020-02-20 RX ADMIN — BUSPIRONE HYDROCHLORIDE 5 MG: 5 TABLET ORAL at 17:36

## 2020-02-20 RX ADMIN — METHOCARBAMOL TABLETS 250 MG: 500 TABLET, COATED ORAL at 00:37

## 2020-02-20 RX ADMIN — AMLODIPINE BESYLATE 5 MG: 5 TABLET ORAL at 08:59

## 2020-02-20 RX ADMIN — METHOCARBAMOL TABLETS 250 MG: 500 TABLET, COATED ORAL at 17:36

## 2020-02-20 RX ADMIN — METHOCARBAMOL TABLETS 250 MG: 500 TABLET, COATED ORAL at 13:05

## 2020-02-20 RX ADMIN — OXYBUTYNIN 5 MG: 5 TABLET, FILM COATED, EXTENDED RELEASE ORAL at 09:00

## 2020-02-20 RX ADMIN — GABAPENTIN 100 MG: 100 CAPSULE ORAL at 17:35

## 2020-02-20 RX ADMIN — HEPARIN SODIUM 5000 UNITS: 5000 INJECTION INTRAVENOUS; SUBCUTANEOUS at 05:53

## 2020-02-20 RX ADMIN — BUSPIRONE HYDROCHLORIDE 5 MG: 5 TABLET ORAL at 08:59

## 2020-02-20 RX ADMIN — FUROSEMIDE 60 MG: 40 TABLET ORAL at 08:59

## 2020-02-20 NOTE — PROGRESS NOTES
Progress Note - Yomi Qureshi 1940, 78 y o  female MRN: [id number]    Unit/Bed#: [facility] 631-01 Encounter: [id number]    Primary Care Provider: Patria Chandler MD   Date and time admitted to hospital: 2/18/2020  4:43 PM        Occipital scalp laceration, initial encounter  Assessment & Plan  Repaired with staples at [facility]  Martin Helms will need to be removed on 2/28  Asymptomatic bacteriuria  Assessment & Plan  Urinalysis demonstrated positive nitrite, 20-30 RBC, 2-4 WBC, moderate bacteria  Given chronic indwelling catheter, more likely colonization rather than true UTI  Patient without suprapubic pain, fever, or other symptoms of UTI  Patient given 1 dose [facility]  Do not recommend treatment at this time given that this more likely represents asymptomatic bacteriuria/colonization of the bladder rather UTI  Closed fracture of right distal femur [facility])  Assessment & Plan  Patient sustained a fracture of her right distal femur  Fracture angulated  Fracture in proximity to proximal aspect of 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  Patient neurovascularly intact distally  No skin compromise  Orthopedic Surgery consultation recommending conservative management at this time  Patient will need to be cleared by Neurosurgery prior to operative repair  Aspirin long-term use  Assessment & Plan  Reversed with DDAVP due to subdural hematoma  Hold during hospitalization and consider holding indefinitely  Fall from standing  Assessment & Plan  Patient sustained a fall when she was transferring into her wheelchair using a walker  Patient is essentially wheelchair bound due to chronic back pain and deconditioning  Patient fell onto her right side and hit her head  PT and OT evaluations when patient medically stable  Chronic indwelling Davies catheter  Assessment & Plan  Patient has a chronic indwelling Davies catheter    She sees Urology as an outpatient  Continue home oxybutynin  Ambulatory dysfunction  Assessment & Plan  Patient has a history of ambulatory dysfunction  She is essentially wheelchair-bound  Will have PT and OT work with the patient once she is medically stable  Uncomplicated opioid dependence [facility])  Assessment & Plan  Patient on OxyContin 10 mg twice daily prescribed by her PCP  Prescription last filled on February 9th for 60 tablets  Review of PDMP does not demonstrate any aberrant behavior  Patient also on PRN tramadol 50 mg every 6 hours  Patient takes opioids for chronic back pain  Will continue OxyContin as patient reports that she takes this every day  Will add on PRN oxycodone IR and Dilaudid for breakthrough pain  * Subdural hematoma ([address] )  Assessment & Plan  Patient sustained a right parafalcine subdural hematoma measuring 2 1 x 0 7 cm  No mass effect or midline shift  Patient given DDAVP to reverse antiplatelet affect of aspirin which he takes chronically  Neurosurgery consult  Hold all antiplatelets or anticoagulants  STAT CT head GCS declines by greater than 2  Patient placed on Keppra 500 mg BID  Will continue for 7 days  Recheck CT head today  Disposition: inpatient      SUBJECTIVE:  Chief Complaint: "why did you wake me up?"    Subjective: Patient doing well this morning  Still complaining of pain at the distal femur but it's controlled with her current pain regimen  She denies any headache, vision changes, numbness, tingling, or weakness anywhere         OBJECTIVE:     Meds/Allergies     Current Facility-Administered Medications:     amLODIPine (NORVASC) tablet 5 mg, 5 mg, Oral, Daily, Quincy Dawn MD, 5 mg at 02/20/20 0859    busPIRone (BUSPAR) tablet 5 mg, 5 mg, Oral, BID, Quincy Dawn MD, 5 mg at 02/20/20 0859    chlorhexidine (PERIDEX) 0 12 % oral rinse 15 mL, 15 mL, Swish [employer] Shan, Q12H [address], Quincy Dawn MD, 15 mL at 02/19/20 2122    docusate sodium (COLACE) capsule 100 mg, 100 mg, Oral, Daily, Karena Zhang MD, 100 mg at 02/20/20 0859    furosemide (LASIX) tablet 60 mg, 60 mg, Oral, Daily, Karena Zhang MD, 60 mg at 02/20/20 0859    gabapentin (NEURONTIN) capsule 100 mg, 100 mg, Oral, TID, Karena Zhang MD, 100 mg at 02/20/20 0859    heparin (porcine) subcutaneous injection 5,000 Units, 5,000 Units, Subcutaneous, Q8H [address], Karena Zhang MD, 5,000 Units at 02/20/20 0553    HYDROmorphone (DILAUDID) injection 0 5 mg, 0 5 mg, Intravenous, Q4H PRN, Karena Zhang MD, 0 5 mg at 02/19/20 2307    levETIRAcetam (KEPPRA) tablet 500 mg, 500 mg, Oral, Q12H [address], Karena Zhang MD, 500 mg at 02/20/20 0859    methocarbamol (ROBAXIN) tablet 250 mg, 250 mg, Oral, Q6H, Karena Zhang MD, 250 mg at 02/20/20 0553    [facility] injection 4 mg, 4 mg, Intravenous, Q4H PRN, Karena Zhang MD    oxybutynin (DITROPAN-XL) 24 hr tablet 5 mg, 5 mg, Oral, Daily, Karena Zhang MD, 5 mg at 02/20/20 0900    oxyCODONE (ROXICODONE) immediate release tablet 10 mg, 10 mg, Oral, Q4H PRN, Karena Zhang MD, 10 mg at 02/19/20 0850    oxyCODONE (ROXICODONE) IR tablet 5 mg, 5 mg, Oral, Q4H PRN, Karena Zhang MD, 5 mg at 02/20/20 0555    pantoprazole (PROTONIX) EC tablet 40 mg, 40 mg, Oral, Early Morning, Karena Zhang MD, 40 mg at 02/20/20 0553    propranolol (INDERAL) tablet 10 mg, 10 mg, Oral, BID, Karena Zhang MD, 10 mg at 02/20/20 0859    senna-docusate sodium (SENOKOT S) 8 6-50 mg per tablet 1 tablet, 1 tablet, Oral, HS, Karena Zhang MD, 1 tablet at 02/19/20 2122    spironolactone (ALDACTONE) tablet 25 mg, 25 mg, Oral, Daily, Karena Zhang MD, 25 mg at 02/20/20 0859    tamsulosin (FLOMAX) capsule 0 4 mg, 0 4 mg, Oral, Q24H, Karena Zhang MD, 0 4 mg at 02/19/20 1239     Vitals:   Vitals:    02/20/20 0745   BP: 120/72   Pulse: 65   Resp:    Temp:    SpO2: 96% Intake/Output:  I/O       02/18 0701 - 02/19 0700 02/19 0701 - 02/20 0700 02/20 0701 - 02/21 0700    P  O  120 1200     I V  (mL/kg) 392 5 (4 4) 67 5 (0 8)     Total Intake(mL/kg) 512 5 (5 7) 1267 5 (14 1)     Urine (mL/kg/hr) 475 325 (0 2)     Total Output 475 325     Net +37 5 +942 5                   Nutrition/GI Proph/Bowel Reg: regular house; senna and colace    Physical Exam:   GENERAL APPEARANCE: well-appearing, NAD, well-nourished  NEURO: AOx3, normal strength and sensation bilaterally  HEENT: EOMI, PERRLA, MMM  CV: RRR no MRG  LUNGS: CTAB no WRR  GI: soft, NTND  MSK: +tenderness to distal femur and medial malleolus  2+ DP pulses bilaterally with cap refill <2s bilaterally      Invasive Devices     Peripheral Intravenous Line            Peripheral IV 02/18/20 Left Antecubital 1 day    Peripheral IV 02/18/20 Right Antecubital 1 day          Drain            Urethral Catheter Latex 16 Fr  989 days    Urethral Catheter 1 day                 Lab Results: no new labs  Imaging/EKG Studies: Xr Hip/pelv 2-3 Vws Right    Result Date: 2/18/2020  Impression: Displaced angulated fracture through the diametaphysis of the distal femur at the level of the proximal tip of the right knee prosthesis  The study was marked in [facility] for immediate notification  Workstation performed: [id number]     Xr Femur 2 Views Right    Result Date: 2/18/2020  Impression: Displaced angulated fracture through the diametaphysis of the distal femur at the level of the proximal tip of the right knee prosthesis  The study was marked in [facility] for immediate notification  Workstation performed: [id number]     Xr Knee 1 Or 2 Vw Right    Result Date: 2/18/2020  Impression: Displaced angulated fracture through the diametaphysis of the distal femur at the level of the proximal tip of the right knee prosthesis  The study was marked in [facility] for immediate notification   Workstation performed: [id number]     Ct Head Without Contrast    Result Date: 2/19/2020  Impression: Redemonstration of a subdural hemorrhage in the interhemispheric fissure extending to the right and measuring approximately 3 7 x 0 8 cm in AP and transverse dimensions (previously measuring approximately 2 1 x 0 7 cm in AP and transverse dimensions)  No significant mass effect or midline shift  Small right frontal scalp hematoma is intervally improved  No other significant interval change  Workstation performed: [id number]     Ct Head Without Contrast    Addendum Date: 2/18/2020    ADDENDUM: Please note the impression should read small right parafalcine subdural hemorrhage without mass effect  Result Date: 2/18/2020  Impression: No acute intracranial abnormality  I personally discussed this study with Dr Lisa Lopes on 2/18/2020 at 2:16 PM  Workstation performed: [id number]     Ct Cervical Spine Without Contrast    Result Date: 2/18/2020  Impression: No cervical spine fracture or traumatic malalignment  Workstation performed: [id number]     Xr Chest 1 View    Result Date: 2/18/2020  Impression: No acute cardiopulmonary disease   Workstation performed: [id number]     Other Studies: none  VTE Prophylaxis: Heparin and SCDs

## 2020-02-20 NOTE — ASSESSMENT & PLAN NOTE
Urinalysis demonstrated positive nitrite, 20-30 RBC, 2-4 WBC, moderate bacteria  Given chronic indwelling catheter, more likely colonization rather than true UTI  Patient without suprapubic pain, fever, or other symptoms of UTI  Patient given 1 dose of Rocephin IV at [facility] - CLOSED  Do not recommend treatment at this time given that this more likely represents asymptomatic bacteriuria/colonization of the bladder rather UTI

## 2020-02-20 NOTE — ASSESSMENT & PLAN NOTE
Patient on OxyContin 10 mg twice daily prescribed by her PCP  Prescription last filled on February 9th for 60 tablets  Review of PDMP does not demonstrate any aberrant behavior  Patient also on PRN tramadol 50 mg every 6 hours  Patient takes opioids for chronic back pain  Will continue OxyContin as patient reports that she takes this every day  Will add on PRN oxycodone IR and Dilaudid for breakthrough pain  [place] Counseling: I discussed with the patient the risks of tildrakizumab including but not limited to immunosuppression, malignancy, posterior leukoencephalopathy syndrome, and serious infections.  The patient understands that monitoring is required including a PPD at baseline and must alert us or the primary physician if symptoms of infection or other concerning signs are noted.

## 2020-02-20 NOTE — ASSESSMENT & PLAN NOTE
Patient sustained a fracture of her right distal femur  Fracture angulated  Fracture in proximity to proximal aspect of 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  Patient neurovascularly intact distally  No skin compromise  Orthopedic Surgery consultation recommending conservative management at this time  Patient will need to be cleared by Neurosurgery prior to operative repair

## 2020-02-20 NOTE — SOCIAL WORK
CM spoke to pt's  regarding d/c plan  Pt is recommended for IP rehab  CM reviewed SNF options   [facility]  CM placed referral  Facility can accept  Bed available tomorrow

## 2020-02-21 VITALS
RESPIRATION RATE: 17 BRPM | WEIGHT: 197.75 LBS | BODY MASS INDEX: 36.39 KG/M2 | SYSTOLIC BLOOD PRESSURE: 117 MMHG | OXYGEN SATURATION: 94 % | HEIGHT: 62 IN | DIASTOLIC BLOOD PRESSURE: 63 MMHG | HEART RATE: 72 BPM | TEMPERATURE: 98 F

## 2020-02-21 LAB — BLOOD GROUP ANTIBODIES SERPL: NORMAL

## 2020-02-21 PROCEDURE — 86870 RBC ANTIBODY IDENTIFICATION: CPT | Performed by: EMERGENCY MEDICINE

## 2020-02-21 PROCEDURE — 99238 HOSP IP/OBS DSCHRG MGMT 30/<: CPT | Performed by: SURGERY

## 2020-02-21 PROCEDURE — NC001 PR NO CHARGE: Performed by: SURGERY

## 2020-02-21 RX ORDER — AMOXICILLIN 250 MG
1 CAPSULE ORAL
Refills: 0
Start: 2020-02-21 | End: 2020-03-04 | Stop reason: HOSPADM

## 2020-02-21 RX ORDER — OXYCODONE HYDROCHLORIDE 5 MG/1
5-10 TABLET ORAL EVERY 4 HOURS PRN
Qty: 36 TABLET | Refills: 0 | Status: SHIPPED | OUTPATIENT
Start: 2020-02-21 | End: 2020-02-21

## 2020-02-21 RX ORDER — OXYCODONE HYDROCHLORIDE 5 MG/1
5-10 TABLET ORAL EVERY 4 HOURS PRN
Qty: 36 TABLET | Refills: 0 | Status: SHIPPED | OUTPATIENT
Start: 2020-02-21 | End: 2020-03-04 | Stop reason: HOSPADM

## 2020-02-21 RX ORDER — METHOCARBAMOL 500 MG/1
250 TABLET, FILM COATED ORAL EVERY 6 HOURS
Refills: 0
Start: 2020-02-21 | End: 2020-03-04 | Stop reason: HOSPADM

## 2020-02-21 RX ORDER — LEVETIRACETAM 500 MG/1
500 TABLET ORAL EVERY 12 HOURS SCHEDULED
Qty: 9 TABLET | Refills: 0
Start: 2020-02-21 | End: 2020-03-04 | Stop reason: HOSPADM

## 2020-02-21 RX ADMIN — TAMSULOSIN HYDROCHLORIDE 0.4 MG: 0.4 CAPSULE ORAL at 12:42

## 2020-02-21 RX ADMIN — METHOCARBAMOL TABLETS 250 MG: 500 TABLET, COATED ORAL at 00:02

## 2020-02-21 RX ADMIN — GABAPENTIN 100 MG: 100 CAPSULE ORAL at 08:48

## 2020-02-21 RX ADMIN — AMLODIPINE BESYLATE 5 MG: 5 TABLET ORAL at 08:49

## 2020-02-21 RX ADMIN — OXYCODONE HYDROCHLORIDE 10 MG: 10 TABLET ORAL at 08:49

## 2020-02-21 RX ADMIN — PANTOPRAZOLE SODIUM 40 MG: 40 TABLET, DELAYED RELEASE ORAL at 05:26

## 2020-02-21 RX ADMIN — PROPRANOLOL HYDROCHLORIDE 10 MG: 10 TABLET ORAL at 08:50

## 2020-02-21 RX ADMIN — METHOCARBAMOL TABLETS 250 MG: 500 TABLET, COATED ORAL at 12:42

## 2020-02-21 RX ADMIN — HEPARIN SODIUM 5000 UNITS: 5000 INJECTION INTRAVENOUS; SUBCUTANEOUS at 12:43

## 2020-02-21 RX ADMIN — LEVETIRACETAM 500 MG: 500 TABLET, FILM COATED ORAL at 08:48

## 2020-02-21 RX ADMIN — DOCUSATE SODIUM 100 MG: 100 CAPSULE, LIQUID FILLED ORAL at 08:48

## 2020-02-21 RX ADMIN — OXYBUTYNIN 5 MG: 5 TABLET, FILM COATED, EXTENDED RELEASE ORAL at 08:48

## 2020-02-21 RX ADMIN — SPIRONOLACTONE 25 MG: 25 TABLET ORAL at 08:48

## 2020-02-21 RX ADMIN — BUSPIRONE HYDROCHLORIDE 5 MG: 5 TABLET ORAL at 08:48

## 2020-02-21 RX ADMIN — HEPARIN SODIUM 5000 UNITS: 5000 INJECTION INTRAVENOUS; SUBCUTANEOUS at 05:26

## 2020-02-21 RX ADMIN — FUROSEMIDE 60 MG: 40 TABLET ORAL at 08:48

## 2020-02-21 RX ADMIN — METHOCARBAMOL TABLETS 250 MG: 500 TABLET, COATED ORAL at 06:00

## 2020-02-21 NOTE — ASSESSMENT & PLAN NOTE
- Patient on OxyContin 10 mg twice daily prescribed by her PCP  Prescription last filled on February 9th for 60 tablets  Review of PDMP does not demonstrate any aberrant behavior  Patient also on PRN tramadol 50 mg every 6 hours  Patient takes opioids for chronic back pain  - During hospitalization, patient was not receiving tramadol or OxyContin with adequate pain control using p r n  Oxycodone as only  On discharge, patient will be prescribed her current pain medication regimen including p r n  Oxycodone IR, scheduled Robaxin, and Neurontin  - Patient will need review of her medication regimen once weaned off of the oxycodone IR for her acute pain to determine if she requires resumption of her home medication regimen  Ultimately, tramadol strongly recommended against due to the associated negative affects, specifically delirium, and this patient's age range

## 2020-02-21 NOTE — ASSESSMENT & PLAN NOTE
- Appreciate Neurosurgery evaluation and recommendations  Non-operative management recommended  - Patient given DDAVP to reverse antiplatelet affect of aspirin which he takes chronically  - Avoid all antiplatelet and anticoagulant medications except for DVT prophylaxis  - Patient placed on Keppra 500 mg BID  Will continue for 7 days   - Monitor Neurologic exam   - Outpatient follow up with Neurosurgery for re-evaluation

## 2020-02-21 NOTE — ASSESSMENT & PLAN NOTE
- Repaired with staples at [facility]  Janet Neal will need to be removed on 2/28   - Continue local wound care  - Analgesia as needed

## 2020-02-21 NOTE — ASSESSMENT & PLAN NOTE
- Fall precautions  - Patient has a history of ambulatory dysfunction  She is essentially wheelchair-bound  - Continue PT and OT evaluation and treatment as indicated

## 2020-02-21 NOTE — ASSESSMENT & PLAN NOTE
- Patient has a chronic indwelling Davies catheter  She sees Urology as an outpatient  - Continue home oxybutynin

## 2020-02-21 NOTE — ASSESSMENT & PLAN NOTE
- Fracture of right distal femur  Fracture is angulated  Fracture in proximity to proximal aspect of 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  Patient neurovascularly intact distally  No skin compromise  - Appreciate Orthopedic Surgery evaluation and recommendations  Plan for continued conservative management at this time  - Maintain nonweightbearing status on right lower extremity in immobilization   - Continue DVT prophylaxis  - Continue analgesia as needed  - Monitor neurovascular exam   - Outpatient follow up with Orthopedic Surgery

## 2020-02-21 NOTE — ASSESSMENT & PLAN NOTE
- Repaired with staples at [facility]  Eloisa Fears will need to be removed on 2/28   - Continue local wound care  - Analgesia as needed

## 2020-02-21 NOTE — TRANSPORTATION MEDICAL NECESSITY
Section I - General Information    Name of Patient: Kassi Alejandra                 : 1940    Medicare #: [id number]  Transport Date: 20 (PCS is valid for round trips on this date and for all repetitive trips in the 60-day range as noted below )  Origin: [address] [facility]                                                         Destination: [facility]  Is the pt's stay covered under Medicare Part A (PPS/DRG)   [x]     Closest appropriate facility? If no, why is transport to more distant facility required? Yes  If hospice pt, is this transport related to pt's terminal illness? No       Section II - Medical Necessity Questionnaire  Ambulance transportation is medically necessary only if other means of transport are contraindicated or would be potentially harmful to the patient  To meet this requirement, the patient must either be "bed confined" or suffer from a condition such that transport by means other than ambulance is contraindicated by the patient's condition  The following questions must be answered by the medical professional signing below for this form to be valid:    1)  Describe the MEDICAL CONDITION (physical and/or mental) of this patient AT [address] [place] [address] that requires the patient to be transported in an ambulance and why transport by other means is contraindicated by the patient's condition: SDH, Ambulatory dysfunction, Closed fracture of right distal femur    2) Is the patient "bed confined" as defined below? Yes  To be "be confined" the patient must satisfy all three of the following conditions: (1) unable to get up from bed without Assistance; AND (2) unable to ambulate; AND (3) unable to sit in a chair or wheelchair  3) Can this patient safely be transported by car or wheelchair van (i e , seated during transport without a medical attendant or monitoring)?    No    4) In addition to completing questions 1-3 above, please check any of the following conditions that apply*:   *Note: supporting documentation for any boxes checked must be maintained in the patient's medical records  If hosp-hosp transfer, describe services needed at 2nd facility not available at 1st facility? Non-Healed Fractures  Patient is confused  Moderate/severe pain on movement   Unable to tolerate seated position for time needed to transport       Section III - Signature of Physician or Healthcare Professional  I certify that the above information is true and correct based on my evaluation of this patient, and represent that the patient requires transport by ambulance and that other forms of transport are contraindicated  I understand that this information will be used by the Centers for Medicare and Medicaid Services (CMS) to support the determination of medical necessity for ambulance services, and I represent that I have personal knowledge of the patient's condition at time of transport  []  If this box is checked, I also certify that the patient is physically or mentally incapable of signing the ambulance service's claim and that the institution with which I am affiliated has furnished care, services, or assistance to the patient  My signature below is made on behalf of the patient pursuant to 42 CFR §424 36(b)(4)  In accordance with 42 CFR §424 37, the specific reason(s) that the patient is physically or mentally incapable of signing the claim form is as follows:      Signature of Physician* or Healthcare Professional_______________Hever Anderson _______________________________________________  Signature Date 02/21/20 (For scheduled repetitive transports, this form is not valid for transports performed more than 60 days after this date)    Printed Name & Credentials of Physician or Healthcare Professional (MD, DO, RN, etc )________________________________  *Form must be signed by patient's attending physician for scheduled, repetitive transports   For non-repetitive, unscheduled ambulance transports, if unable to obtain the signature of the attending physician, any of the following may sign (choose appropriate option below)  [] Physician Assistant []  Clinical Nurse Specialist []  Registered Nurse  []  Nurse Practitioner  [x] Discharge Planner

## 2020-02-21 NOTE — DISCHARGE SUMMARY
Discharge- Annette Ingram 1940, 78 y o  female MRN: [id number]    Unit/Bed#: [facility] 631-01 Encounter: [id number]    Primary Care Provider: Ruiz Mccall MD   Date and time admitted to hospital: 2/18/2020  4:43 PM        Fall from standing  Assessment & Plan  - Status post fall on attempting to transfer into her wheelchair using her walker with the below noted injuries  - Patient is primarily wheelchair bound at baseline due to chronic back pain and deconditioning   - Fall precautions  - Continue PT and OT evaluation and treatment as indicated  - Case management assisted with disposition planning  * Subdural hematoma ([facility] )  Assessment & Plan  - Appreciate Neurosurgery evaluation and recommendations  Non-operative management recommended  - Patient given DDAVP to reverse antiplatelet affect of aspirin which he takes chronically  - Avoid all antiplatelet and anticoagulant medications except for DVT prophylaxis  - Patient placed on Keppra 500 mg BID  Will continue for 7 days   - Monitor Neurologic exam   - Outpatient follow up with Neurosurgery for re-evaluation  Closed fracture of right distal femur ([facility] )  Assessment & Plan  - Fracture of right distal femur  Fracture is angulated  Fracture in proximity to proximal aspect of 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  Patient neurovascularly intact distally  No skin compromise  - Appreciate Orthopedic Surgery evaluation and recommendations  Plan for continued conservative management at this time  - Maintain nonweightbearing status on right lower extremity in immobilization   - Continue DVT prophylaxis  - Continue analgesia as needed  - Monitor neurovascular exam   - Outpatient follow up with Orthopedic Surgery  Occipital scalp laceration, initial encounter  Assessment & Plan  - Repaired with staples at [facility]  Joden Argyle will need to be removed on 2/28   - Continue local wound care  - Analgesia as needed      Ambulatory dysfunction  Assessment & Plan  - Fall precautions  - Patient has a history of ambulatory dysfunction  She is essentially wheelchair-bound  - Continue PT and OT evaluation and treatment as indicated  Uncomplicated opioid dependence ([facility] )  Assessment & Plan  - Patient on OxyContin 10 mg twice daily prescribed by her PCP  Prescription last filled on February 9th for 60 tablets  Review of PDMP does not demonstrate any aberrant behavior  Patient also on PRN tramadol 50 mg every 6 hours  Patient takes opioids for chronic back pain  - During hospitalization, patient was not receiving tramadol or OxyContin with adequate pain control using p r n  Oxycodone as only  On discharge, patient will be prescribed her current pain medication regimen including p r n  Oxycodone IR, scheduled Robaxin, and Neurontin  - Patient will need review of her medication regimen once weaned off of the oxycodone IR for her acute pain to determine if she requires resumption of her home medication regimen  Ultimately, tramadol strongly recommended against due to the associated negative affects, specifically delirium, and this patient's age range  Chronic indwelling Davies catheter  Assessment & Plan  - Patient has a chronic indwelling Davies catheter  She sees Urology as an outpatient  - Continue home oxybutynin  Discharge Summary - Trauma Service   Kevin Jack 78 y o  female MRN: [id number]  Unit/Bed#: [facility] 631-01 Encounter: [id number]    Admission Date: 2/18/2020     Discharge Date: 2/21/2020    Admitting Diagnosis: Subdural hematoma ([facility] ) [S06 5X9A]  Occipital scalp laceration, initial encounter [S01 01XA]  Other closed fracture of distal end of right femur, initial encounter ([place] [address] ) [S72 491A]  Unspecified multiple injuries, initial encounter [T07  XXXA]    Discharge Diagnosis: See above  Attending and Service: Dr Ana Butterfield, Acute Care Surgical Services  Consulting Physician(s):     1  Orthopedic surgery    2  Neurosurgery  3  Geriatric Medicine  Imaging and Procedures Performed:     Xr Hip/pelv 2-3 Vws Right    Result Date: 2/18/2020  Impression: Displaced angulated fracture through the diametaphysis of the distal femur at the level of the proximal tip of the right knee prosthesis  The study was marked in [facility] for immediate notification  Workstation performed: [id number]     Xr Femur 2 Views Right    Result Date: 2/18/2020  Impression: Displaced angulated fracture through the diametaphysis of the distal femur at the level of the proximal tip of the right knee prosthesis  The study was marked in [facility] for immediate notification  Workstation performed: [id number]     Xr Knee 1 Or 2 Vw Right    Result Date: 2/18/2020  Impression: Displaced angulated fracture through the diametaphysis of the distal femur at the level of the proximal tip of the right knee prosthesis  The study was marked in [facility] for immediate notification  Workstation performed: [id number]     Xr Ankle 2 Vw Right    Result Date: 2/20/2020  Impression: Marked pes planus deformity redemonstrated with degenerative changes of the dorsum of midfoot on a small plantar heel spur  Workstation performed: [id number]     Ct Head Without Contrast    Result Date: 2/19/2020  Impression: Redemonstration of a subdural hemorrhage in the interhemispheric fissure extending to the right and measuring approximately 3 7 x 0 8 cm in AP and transverse dimensions (previously measuring approximately 2 1 x 0 7 cm in AP and transverse dimensions)  No significant mass effect or midline shift  Small right frontal scalp hematoma is intervally improved  No other significant interval change  Workstation performed: [id number]     Ct Head Without Contrast    Addendum Date: 2/18/2020    ADDENDUM: Please note the impression should read small right parafalcine subdural hemorrhage without mass effect  Result Date: 2/18/2020  Impression: No acute intracranial abnormality    I personally discussed this study with Dr Shreya Gan on 2/18/2020 at 2:16 PM  Workstation performed: [id number]     Ct Cervical Spine Without Contrast    Result Date: 2/18/2020  Impression: No cervical spine fracture or traumatic malalignment  Workstation performed: [id number]     Xr Chest 1 View    Result Date: 2/18/2020  Impression: No acute cardiopulmonary disease  Workstation performed: [id number]     Hospital Course: Stacie Garcia is a 58-year-old female who presented initially to [facility] after a fall while attempting to transfer to her wheelchair  She did fall onto her right side striking her head on the ground  The fall was witnessed by her  who brought her to the emergency department  She did have a laceration to posterior scalp which was repaired with staples by the [facility] emergency department team   Her workup included the above-noted imaging studies noting a right parafalcine subdural hematoma and a right femur fracture  She was transferred to [facility] for trauma evaluation  On her initial evaluation at [facility], she was complaining primarily right knee pain without any numbness or tingling  She had no other complaints  She was noted to have a chronic Davies catheter  Her primary survey was unremarkable  On secondary survey, she was hypertensive with normal vital signs otherwise; she did appear chronically ill, but was not in acute distress; she had an occipital scalp laceration with repair via 1 staple, and there was abrasions/ecchymosis to the right temple; she had no midline cervical spine tenderness; she did have a shortened externally rotated right lower extremity with tenderness over the distal femur, but an intact neurovascular exam in the distal right lower extremity; remainder of her exam was unremarkable  Her initial workup included labs in the above-noted imaging studies      She was [place] to [place] trauma service at [place] [address] [facility] [place] with a subdural hematoma, distal right femur fracture, a scalp laceration, platelet dysfunction secondary to aspirin use, a chronic indwelling urinary catheter, and history of uncomplicated opioid dependence  She was initially kept NPO, started on IV fluids and an analgesic regimen, due to her multiple traumatic injuries and comorbidities, she was admitted to the intensive care unit, a knee immobilizer was placed on her right lower extremity, and consultations were placed to neurosurgery and orthopedic surgery  Neurosurgery evaluated the patient and determined non operative management was recommended  She had frequent neurologic checks and repeat CT scan of the head demonstrating stability of her traumatic brain injury with no further workup deemed necessary during her hospitalization  Orthopedic surgery did discuss operative intervention and ultimately a decision was made by the patient and recommended surgery to pursue conservative, non operative management for this injury with nonweightbearing status on the right lower extremity immobilized in a knee immobilizer with eventual transition to an hinged knee brace  Her diet was advanced as tolerated once determined she did not require operative intervention  Once tolerating an oral diet, she was transitioned off of IV fluids and onto an oral analgesic regimen  PT and OT evaluated the patient and recommended continued placement at a post acute care facility  Case Management assisted with disposition planning, the patient was deemed appropriate for discharge on 02/21/2020  On discharge, the patient is instructed to follow-up with the patient's primary care provider to review the events of the patient's recent hospitalization   The patient is instructed to follow-up in the Trauma Clinic as scheduled on 2/28/2020 at 12:15 PM for scalp staple removal   The patient is instructed to follow up with Neurosurgery in approximately 2 weeks for evaluation of the traumatic brain injury with repeat CT scan of the head prior to the follow-up appointment  The patient is instructed to follow up with Orthopedic surgery in approximately 2 weeks for re-evaluation of her right femur fracture  The patient should follow the provided discharge instructions  Condition at Discharge: fair     Discharge instructions/Information to patient and family:   See after visit summary for information provided to patient and family  Provisions for Follow-Up Care:  See after visit summary for information related to follow-up care and any pertinent home health orders  Disposition: See After Visit Summary for discharge disposition information  Planned Readmission: No    Discharge Statement   I spent 29 minutes discharging the patient  This time was spent on the day of discharge  I had direct contact with the patient on the day of discharge  Additional documentation is required if more than 30 minutes were spent on discharge  Discharge Medications:  See after visit summary for reconciled discharge medications provided to patient and family        Lillian Harrison PA-C  2/21/2020  11:59 AM

## 2020-02-21 NOTE — PROGRESS NOTES
Progress Note - Geriatric Medicine   Aleah Summers 78 y o  female MRN: [id number]  Unit/Bed#: [facility] 631-01 Encounter: [id number]      Assessment/Plan:    Right distal femur periprosthetic fracture  -non operative management, continue hinged brace per Orthopedics  -continue acute pain control  -PT and OT recommend short-term rehab    Acute pain due to trauma  -continue per geriatric pain protocol    Subdural hematoma  -continue Keppra for total of 7 days per Neurosurgery    Ambulatory dysfunction with fall  -mostly wheelchair-bound at baseline  -would likely benefit from increase in-home supports versus long-term placement, discussed with  at bedside on initial consultation  -continue fall precautions  -if returning home encourage home safety evaluation and personal fall alert system    Chronic indwelling Davies catheter  -continue local Davies care  -recommend outpatient follow-up with urology for maintenance care    Subjective:   Patient seen and examined at the bedside where she is sitting eating lunch  She reports that she slept well overnight and has no acute complaints  She denies any pain in her right lower extremity  She has been placed in a hinged brace but orthopedics  Review of Systems   Constitutional: Negative for chills and fever  HENT: Positive for hearing loss  Negative for trouble swallowing  Eyes: Positive for visual disturbance  Respiratory: Negative for cough and shortness of breath  Cardiovascular: Negative for chest pain and palpitations  Gastrointestinal: Negative for abdominal distention, abdominal pain and diarrhea  Endocrine: Negative  Genitourinary: Negative  Chronic indwelling Davies catheter   Musculoskeletal: Positive for gait problem  Skin: Negative  Neurological: Positive for weakness (Generalized, per patient at baseline)  Hematological: Does not bruise/bleed easily  Psychiatric/Behavioral: Positive for decreased concentration     All other systems reviewed and are negative  Objective:     Vitals: Blood pressure 118/67, pulse 72, temperature 98 °F (36 7 °C), resp  rate 16, height 5' 2" (1 575 m), weight 89 7 kg (197 lb 12 oz), SpO2 94 %  ,Body mass index is 36 17 kg/m²  Intake/Output Summary (Last 24 hours) at 2/20/2020 2227  Last data filed at 2/20/2020 1453  Gross per 24 hour   Intake 480 ml   Output 550 ml   Net -70 ml     Current Medications: Reviewed    Physical Exam:   Physical Exam   Constitutional: She appears well-developed  No distress  HENT:   Head: Normocephalic  Mouth/Throat: Oropharynx is clear and moist    Eyes: Conjunctivae are normal  Right eye exhibits no discharge  Left eye exhibits no discharge  Neck: No tracheal deviation present  No thyromegaly present  Cardiovascular: Normal rate and intact distal pulses  Pulmonary/Chest: She has wheezes  Course breath sounds   Abdominal: Soft  Bowel sounds are normal    Musculoskeletal: She exhibits edema  Right lower extremity in locked hinged brace   Neurological: She is alert  Speech is much more clear and comprehensible    Skin: Skin is warm and dry  She is not diaphoretic  Psychiatric: She has a normal mood and affect  Nursing note and vitals reviewed  Invasive Devices     Peripheral Intravenous Line            Peripheral IV 02/18/20 Left Antecubital 2 days    Peripheral IV 02/18/20 Right Antecubital 2 days          Drain            Urethral Catheter Latex 16 Fr  990 days    Urethral Catheter 2 days              Lab, Imaging and other studies: I have personally reviewed pertinent reports

## 2020-02-21 NOTE — PLAN OF CARE
Problem: Prexisting or High Potential for Compromised Skin Integrity  Goal: Skin integrity is maintained or improved  Description  INTERVENTIONS:  - Identify patients at risk for skin breakdown  - Assess and monitor skin integrity  - Assess and monitor nutrition and hydration status  - Monitor labs   - Assess for incontinence   - Turn and reposition patient  - Assist with mobility/ambulation  - Relieve pressure over bony prominences  - Avoid friction and shearing  - Provide appropriate hygiene as needed including keeping skin clean and dry  - Evaluate need for skin moisturizer/barrier cream  - Collaborate with interdisciplinary team   - Patient/family teaching  - Consider wound care consult   Outcome: Progressing     Problem: PAIN - ADULT  Goal: Verbalizes/displays adequate comfort level or baseline comfort level  Description  Interventions:  - Encourage patient to monitor pain and request assistance  - Assess pain using appropriate pain scale  - Administer analgesics based on type and severity of pain and evaluate response  - Implement non-pharmacological measures as appropriate and evaluate response  - Consider cultural and social influences on pain and pain management  - Notify physician/advanced practitioner if interventions unsuccessful or patient reports new pain  Outcome: Progressing     Problem: INFECTION - ADULT  Goal: Absence or prevention of progression during hospitalization  Description  INTERVENTIONS:  - Assess and monitor for signs and symptoms of infection  - Monitor lab/diagnostic results  - Monitor all insertion sites, i e  indwelling lines, tubes, and drains  - Monitor endotracheal if appropriate and nasal secretions for changes in amount and color  - [place] appropriate cooling/warming therapies per order  - Administer medications as ordered  - Instruct and encourage patient and family to use good hand hygiene technique  - Identify and instruct in appropriate isolation precautions for identified infection/condition  Outcome: Progressing  Goal: Absence of fever/infection during neutropenic period  Description  INTERVENTIONS:  - Monitor WBC    Outcome: Progressing     Problem: SAFETY ADULT  Goal: Patient will remain free of falls  Description  INTERVENTIONS:  - Assess patient frequently for physical needs  -  Identify cognitive and physical deficits and behaviors that affect risk of falls    -  [place] fall precautions as indicated by assessment   - Educate patient/family on patient safety including physical limitations  - Instruct patient to call for assistance with activity based on assessment  - Modify environment to reduce risk of injury  - Consider OT/PT consult to assist with strengthening/mobility  Outcome: Progressing  Goal: Maintain or return to baseline ADL function  Description  INTERVENTIONS:  -  Assess patient's ability to carry out ADLs; assess patient's baseline for ADL function and identify physical deficits which impact ability to perform ADLs (bathing, care of mouth/teeth, toileting, grooming, dressing, etc )  - Assess/evaluate cause of self-care deficits   - Assess range of motion  - Assess patient's mobility; develop plan if impaired  - Assess patient's need for assistive devices and provide as appropriate  - Encourage maximum independence but intervene and supervise when necessary  - Involve family in performance of ADLs  - Assess for home care needs following discharge   - Consider OT consult to assist with ADL evaluation and planning for discharge  - Provide patient education as appropriate  Outcome: Progressing  Goal: Maintain or return mobility status to optimal level  Description  INTERVENTIONS:  - Assess patient's baseline mobility status (ambulation, transfers, stairs, etc )    - Identify cognitive and physical deficits and behaviors that affect mobility  - Identify mobility aids required to assist with transfers and/or ambulation (gait belt, sit-to-stand, lift, walker, cane, etc )  - [place] fall precautions as indicated by assessment  - Record patient progress and toleration of activity level on Mobility SBAR; progress patient to next Phase/Stage  - Instruct patient to call for assistance with activity based on assessment  - Consider rehabilitation consult to assist with strengthening/weightbearing, etc   Outcome: Progressing     Problem: DISCHARGE PLANNING  Goal: Discharge to home or other facility with appropriate resources  Description  INTERVENTIONS:  - Identify barriers to discharge w/patient and caregiver  - Arrange for needed discharge resources and transportation as appropriate  - Identify discharge learning needs (meds, wound care, etc )  - Arrange for interpretive services to assist at discharge as needed  - Refer to Case Management Department for coordinating discharge planning if the patient needs post-hospital services based on physician/advanced practitioner order or complex needs related to functional status, cognitive ability, or social support system  Outcome: Progressing     Problem: Knowledge Deficit  Goal: Patient/family/caregiver demonstrates understanding of disease process, treatment plan, medications, and discharge instructions  Description  Complete learning assessment and assess knowledge base    Interventions:  - Provide teaching at level of understanding  - Provide teaching via preferred learning methods  Outcome: Progressing     Problem: NEUROSENSORY - ADULT  Goal: Achieves stable or improved neurological status  Description  INTERVENTIONS  - Monitor and report changes in neurological status  - Monitor vital signs such as temperature, blood pressure, glucose, and any other labs ordered   - Initiate measures to prevent increased intracranial pressure  - Monitor for seizure activity and implement precautions if appropriate      Outcome: Progressing  Goal: Remains free of injury related to seizures activity  Description  INTERVENTIONS  - Maintain airway, patient safety  and administer oxygen as ordered  - Monitor patient for seizure activity, document and report duration and description of seizure to physician/advanced practitioner  - If seizure occurs,  ensure patient safety during seizure  - Reorient patient post seizure  - Seizure pads on all 4 side rails  - Instruct patient/family to notify RN of any seizure activity including if an aura is experienced  - Instruct patient/family to call for assistance with activity based on nursing assessment  - Administer anti-seizure medications if ordered    Outcome: Progressing  Goal: Achieves maximal functionality and self care  Description  INTERVENTIONS  - Monitor swallowing and airway patency with patient fatigue and changes in neurological status  - Encourage and assist patient to increase activity and self care     - Encourage visually impaired, hearing impaired and aphasic patients to use assistive/communication devices  Outcome: Progressing     Problem: RESPIRATORY - ADULT  Goal: Achieves optimal ventilation and oxygenation  Description  INTERVENTIONS:  - Assess for changes in respiratory status  - Assess for changes in mentation and behavior  - Position to facilitate oxygenation and minimize respiratory effort  - Oxygen administered by appropriate delivery if ordered  - Initiate smoking cessation education as indicated  - Encourage broncho-pulmonary hygiene including cough, deep breathe, Incentive Spirometry  - Assess the need for suctioning and aspirate as needed  - Assess and instruct to report SOB or any respiratory difficulty  - Respiratory Therapy support as indicated  Outcome: Progressing     Problem: GENITOURINARY - ADULT  Goal: Urinary catheter remains patent  Description  INTERVENTIONS:  - Assess patency of urinary catheter  - If patient has a chronic wells, consider changing catheter if non-functioning  - Follow guidelines for intermittent irrigation of non-functioning urinary catheter  Outcome: Progressing     Problem: HEMATOLOGIC - ADULT  Goal: Maintains hematologic stability  Description  INTERVENTIONS  - Assess for signs and symptoms of bleeding or hemorrhage  - Monitor labs  - Administer supportive blood products/factors as ordered and appropriate  Outcome: Progressing     Problem: MUSCULOSKELETAL - ADULT  Goal: Maintain or return mobility to safest level of function  Description  INTERVENTIONS:  - Assess patient's ability to carry out ADLs; assess patient's baseline for ADL function and identify physical deficits which impact ability to perform ADLs (bathing, care of mouth/teeth, toileting, grooming, dressing, etc )  - Assess/evaluate cause of self-care deficits   - Assess range of motion  - Assess patient's mobility  - Assess patient's need for assistive devices and provide as appropriate  - Encourage maximum independence but intervene and supervise when necessary  - Involve family in performance of ADLs  - Assess for home care needs following discharge   - Consider OT consult to assist with ADL evaluation and planning for discharge  - Provide patient education as appropriate  Outcome: Progressing  Goal: Maintain proper alignment of affected body part  Description  INTERVENTIONS:  - Support, maintain and protect limb and body alignment  - Provide patient/ family with appropriate education  Outcome: Progressing     Problem: CARDIOVASCULAR - ADULT  Goal: Maintains optimal cardiac output and hemodynamic stability  Description  INTERVENTIONS:  - Monitor I/O, vital signs and rhythm  - Monitor for S/S and trends of decreased cardiac output  - Administer and titrate ordered vasoactive medications to optimize hemodynamic stability  - Assess quality of pulses, skin color and temperature  - Assess for signs of decreased coronary artery perfusion  - Instruct patient to report change in severity of symptoms  Outcome: Progressing  Goal: Absence of cardiac dysrhythmias or at baseline rhythm  Description  INTERVENTIONS:  - Continuous cardiac monitoring, vital signs, obtain 12 lead EKG if ordered  - Administer antiarrhythmic and heart rate control medications as ordered  - Monitor electrolytes and administer replacement therapy as ordered  Outcome: Progressing     Problem: GASTROINTESTINAL - ADULT  Goal: Maintains or returns to baseline bowel function  Description  INTERVENTIONS:  - Assess bowel function  - Encourage oral fluids to ensure adequate hydration  - Administer IV fluids if ordered to ensure adequate hydration  - Administer ordered medications as needed  - Encourage mobilization and activity  - Consider nutritional services referral to assist patient with adequate nutrition and appropriate food choices  Outcome: Progressing  Goal: Maintains adequate nutritional intake  Description  INTERVENTIONS:  - Monitor percentage of each meal consumed  - Identify factors contributing to decreased intake, treat as appropriate  - Assist with meals as needed  - Monitor I&O, weight, and lab values if indicated  - Obtain nutrition services referral as needed  Outcome: Progressing     Problem: METABOLIC, FLUID AND ELECTROLYTES - ADULT  Goal: Electrolytes maintained within normal limits  Description  INTERVENTIONS:  - Monitor labs and assess patient for signs and symptoms of electrolyte imbalances  - Administer electrolyte replacement as ordered  - Monitor response to electrolyte replacements, including repeat lab results as appropriate  - Instruct patient on fluid and nutrition as appropriate  Outcome: Progressing  Goal: Fluid balance maintained  Description  INTERVENTIONS:  - Monitor labs   - Monitor I/O and WT  - Instruct patient on fluid and nutrition as appropriate  - Assess for signs & symptoms of volume excess or deficit  Outcome: Progressing     Problem: SKIN/TISSUE INTEGRITY - ADULT  Goal: Skin integrity remains intact  Description  INTERVENTIONS  - Identify patients at risk for skin breakdown  - Assess and monitor skin integrity  - Assess and monitor nutrition and hydration status  - Monitor labs (i e  albumin)  - Assess for incontinence   - Turn and reposition patient  - Assist with mobility/ambulation  - Relieve pressure over bony prominences  - Avoid friction and shearing  - Provide appropriate hygiene as needed including keeping skin clean and dry  - Evaluate need for skin moisturizer/barrier cream  - Collaborate with interdisciplinary team (i e  Nutrition, Rehabilitation, etc )   - Patient/family teaching  Outcome: Progressing     Problem: Potential for Falls  Goal: Patient will remain free of falls  Description  INTERVENTIONS:  - Assess patient frequently for physical needs  -  Identify cognitive and physical deficits and behaviors that affect risk of falls  -  [place] fall precautions as indicated by assessment   - Educate patient/family on patient safety including physical limitations  - Instruct patient to call for assistance with activity based on assessment  - Modify environment to reduce risk of injury  - Consider OT/PT consult to assist with strengthening/mobility  Outcome: Progressing     Problem: Nutrition/Hydration-ADULT  Goal: Nutrient/Hydration intake appropriate for improving, restoring or maintaining nutritional needs  Description  Monitor and assess patient's nutrition/hydration status for malnutrition  Collaborate with interdisciplinary team and initiate plan and interventions as ordered  Monitor patient's weight and dietary intake as ordered or per policy  Utilize nutrition screening tool and intervene as necessary  Determine patient's food preferences and provide high-protein, high-caloric foods as appropriate       INTERVENTIONS:  - Monitor oral intake, urinary output, labs, and treatment plans  - Assess nutrition and hydration status and recommend course of action  - Evaluate amount of meals eaten  - Assist patient with eating if necessary   - Allow adequate time for meals  - Recommend/ encourage appropriate diets, oral nutritional supplements, and vitamin/mineral supplements  - Order, calculate, and assess calorie counts as needed  - Recommend, monitor, and adjust tube feedings and TPN/PPN based on assessed needs  - Assess need for intravenous fluids  - Provide specific nutrition/hydration education as appropriate  - Include patient/family/caregiver in decisions related to nutrition  Outcome: Progressing

## 2020-02-21 NOTE — PROGRESS NOTES
Progress Note - Theron Clancy 1940, 78 y o  female MRN: [id number]    Unit/Bed#: [facility] 631-01 Encounter: [id number]    Primary Care Provider: Enzo Copeland MD   Date and time admitted to hospital: 2/18/2020  4:43 PM        Fall from standing  Assessment & Plan  - Status post fall on attempting to transfer into her wheelchair using her walker with the below noted injuries  - Patient is primarily wheelchair bound at baseline due to chronic back pain and deconditioning   - Fall precautions  - Continue PT and OT evaluation and treatment as indicated  - Case management assisted with disposition planning  * Subdural hematoma ([facility] [address] )  Assessment & Plan  - Appreciate Neurosurgery evaluation and recommendations  Non-operative management recommended  - Patient given DDAVP to reverse antiplatelet affect of aspirin which he takes chronically  - Avoid all antiplatelet and anticoagulant medications except for DVT prophylaxis  - Patient placed on Keppra 500 mg BID  Will continue for 7 days   - Monitor Neurologic exam   - Outpatient follow up with Neurosurgery for re-evaluation  Closed fracture of right distal femur ([facility] [address] )  Assessment & Plan  - Fracture of right distal femur  Fracture is angulated  Fracture in proximity to proximal aspect of 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  Patient neurovascularly intact distally  No skin compromise  - Appreciate Orthopedic Surgery evaluation and recommendations  Plan for continued conservative management at this time  - Maintain nonweightbearing status on right lower extremity in immobilization   - Continue DVT prophylaxis  - Continue analgesia as needed  - Monitor neurovascular exam   - Outpatient follow up with Orthopedic Surgery  Occipital scalp laceration, initial encounter  Assessment & Plan  - Repaired with staples at [facility]  Betjose Oswald will need to be removed on 2/28   - Continue local wound care  - Analgesia as needed      Ambulatory dysfunction  Assessment & Plan  - Fall precautions  - Patient has a history of ambulatory dysfunction  She is essentially wheelchair-bound  - Continue PT and OT evaluation and treatment as indicated  Disposition: Anticipate discharge to post-acute care facility today  Case management assisted with disposition planning  SUBJECTIVE:  Chief Complaint: "I have leg pain "    Subjective: Patient is feeling okay, but she did not sleep too well last night and is having some pain in her leg this morning  She denies significant pain elsewhere  She is tolerating diet without nausea, vomiting or constipation  She feels prepared for discharge today        OBJECTIVE:     Meds/Allergies     Current Facility-Administered Medications:     amLODIPine (NORVASC) tablet 5 mg, 5 mg, Oral, Daily, Ramsey Rgoers MD, 5 mg at 02/21/20 0849    busPIRone (BUSPAR) tablet 5 mg, 5 mg, Oral, BID, aRmsey Rogers MD, 5 mg at 02/21/20 0848    docusate sodium (COLACE) capsule 100 mg, 100 mg, Oral, Daily, Ramsey Rogers MD, 100 mg at 02/21/20 0848    furosemide (LASIX) tablet 60 mg, 60 mg, Oral, Daily, Ramsey Rogers MD, 60 mg at 02/21/20 0848    gabapentin (NEURONTIN) capsule 100 mg, 100 mg, Oral, TID, Ramsey Rogers MD, 100 mg at 02/21/20 0848    heparin (porcine) subcutaneous injection 5,000 Units, 5,000 Units, Subcutaneous, Q8H [facility], Ramsey Rogers MD, 5,000 Units at 02/21/20 0526    HYDROmorphone (DILAUDID) injection 0 5 mg, 0 5 mg, Intravenous, Q4H PRN, Ramsey Rogers MD, 0 5 mg at 02/19/20 2307    levETIRAcetam (KEPPRA) tablet 500 mg, 500 mg, Oral, Q12H [facility], Ramsey Rogers MD, 500 mg at 02/21/20 0848    methocarbamol (ROBAXIN) tablet 250 mg, 250 mg, Oral, Q6H, Ramsey Rogers MD, 250 mg at 02/21/20 0600    ondansetron (ZOFRAN) injection 4 mg, 4 mg, Intravenous, Q4H PRN, Ramsey Rogers MD    oxybutynin (DITROPAN-XL) 24 hr tablet 5 mg, 5 mg, Oral, Daily, Ramsey Rogers, MD, 5 mg at 02/21/20 0848    oxyCODONE (ROXICODONE) immediate release tablet 10 mg, 10 mg, Oral, Q4H PRN, Keegan Hudson MD, 10 mg at 02/21/20 0849    oxyCODONE (ROXICODONE) IR tablet 5 mg, 5 mg, Oral, Q4H PRN, Keegan Hudson MD, 5 mg at 02/20/20 0555    pantoprazole (PROTONIX) EC tablet 40 mg, 40 mg, Oral, Early Morning, Keegan Hudson MD, 40 mg at 02/21/20 0526    propranolol (INDERAL) tablet 10 mg, 10 mg, Oral, BID, Keegan Hudson MD, 10 mg at 02/21/20 0850    senna-docusate sodium (SENOKOT S) 8 6-50 mg per tablet 1 tablet, 1 tablet, Oral, HS, Keegan Hudson MD, 1 tablet at 02/20/20 2108    spironolactone (ALDACTONE) tablet 25 mg, 25 mg, Oral, Daily, Keegan Hudson MD, 25 mg at 02/21/20 0848    tamsulosin (FLOMAX) capsule 0 4 mg, 0 4 mg, Oral, Q24H, Keegan Hudson MD, 0 4 mg at 02/20/20 1304     Vitals:   Vitals:    02/21/20 0733   BP: 117/63   Pulse: 72   Resp: 17   Temp: 98 °F (36 7 °C)   SpO2: 94%       Intake/Output:  I/O       02/19 0701 - 02/20 0700 02/20 0701 - 02/21 0700 02/21 0701 - 02/22 0700    P  O  1200 300     I V  (mL/kg) 67 5 (0 8)      Total Intake(mL/kg) 1267 5 (14 1) 300 (3 3)     Urine (mL/kg/hr) 325 (0 2) 850 (0 4)     Total Output 325 850     Net +942 5 -550                   Nutrition/GI Proph/Bowel Reg: Regular diet; Protonix; Colace and Senna    Physical Exam:   GENERAL APPEARANCE: Patient in no acute distress  HEENT: NC, posterior scalp laceration helaing well with minimal tenderness; PERRL, EOMs intact; Mucous membranes moist  NECK / BACK: No neck or back tenderness  CV: Regular rate and rhythm; no murmur/gallops/rubs appreciated  CHEST / LUNGS: Clear to auscultation; no wheezes/rales/rhonci  ABD: NABS; soft; non-distended; non-tender  : Urinary catheter in place with clear yellow urine in drainage bag  EXT: +2 pulses bilaterally upper & lower extremities; no clubbing/cyanosis   RLE in [facility] with mild to moderate thigh tenderness and swelling with an intact neurovascular exam distally and soft thigh compartments  NEURO: GCS 15; no focal neurologic deficits; neurovascularly intact  SKIN: Warm, dry and well perfused; no rash; no jaundice  Invasive Devices     Peripheral Intravenous Line            Peripheral IV 02/18/20 Left Antecubital 2 days    Peripheral IV 02/18/20 Right Antecubital 2 days          Drain            Urethral Catheter Latex 16 Fr  990 days    Urethral Catheter 2 days                 Lab Results: Results: I have personally reviewed pertinent reports   , BMP/CMP: No results found for: SODIUM, K, CL, CO2, ANIONGAP, BUN, CREATININE, GLUCOSE, CALCIUM, AST, ALT, ALKPHOS, PROT, BILITOT, EGFR, CBC: No results found for: WBC, HGB, HCT, MCV, PLT, ADJUSTEDWBC, MCH, MCHC, RDW, MPV, NRBC and Coagulation: No results found for: PT, INR, APTT  Imaging/EKG Studies: Results: I have personally reviewed pertinent reports      Other Studies: N/A  VTE Prophylaxis: Sequential compression device (Venodyne)  and Heparin       Maira Sainz PA-C  2/21/2020 09:45 AM

## 2020-02-21 NOTE — SOCIAL WORK
Pt is approved SNF rehab at [facility]  Transport set for 1400 via [employer]   CM will follow up and inform family

## 2020-02-21 NOTE — ASSESSMENT & PLAN NOTE
- Status post fall on attempting to transfer into her wheelchair using her walker with the below noted injuries  - Patient is primarily wheelchair bound at baseline due to chronic back pain and deconditioning   - Fall precautions  - Continue PT and OT evaluation and treatment as indicated  - Case management assisted with disposition planning

## 2020-02-24 ENCOUNTER — TELEPHONE (OUTPATIENT)
Dept: OBGYN CLINIC | Facility: HOSPITAL | Age: 80
End: 2020-02-24

## 2020-02-24 ENCOUNTER — PATIENT OUTREACH (OUTPATIENT)
Dept: CASE MANAGEMENT | Facility: OTHER | Age: 80
End: 2020-02-24

## 2020-02-24 DIAGNOSIS — Z71.89 COMPLEX CARE COORDINATION: Primary | ICD-10-CM

## 2020-02-24 NOTE — TELEPHONE ENCOUNTER
LM on  for transportation  to call back for scheduling patient with Dr Lula Alvares for 2 week NP appointment for her R distal femur fx   Please schedule if she calls back, thank you

## 2020-02-27 ENCOUNTER — HOSPITAL ENCOUNTER (INPATIENT)
Facility: HOSPITAL | Age: 80
LOS: 6 days | Discharge: NURSING, REHAB OR LONG-TERM CARE | DRG: 640 | End: 2020-03-04
Attending: EMERGENCY MEDICINE | Admitting: INTERNAL MEDICINE
Payer: MEDICARE

## 2020-02-27 ENCOUNTER — APPOINTMENT (EMERGENCY)
Dept: CT IMAGING | Facility: HOSPITAL | Age: 80
DRG: 640 | End: 2020-02-27
Payer: MEDICARE

## 2020-02-27 ENCOUNTER — APPOINTMENT (EMERGENCY)
Dept: RADIOLOGY | Facility: HOSPITAL | Age: 80
DRG: 640 | End: 2020-02-27
Payer: MEDICARE

## 2020-02-27 DIAGNOSIS — E80.6 HYPERBILIRUBINEMIA: ICD-10-CM

## 2020-02-27 DIAGNOSIS — E87.1 HYPONATREMIA: Primary | ICD-10-CM

## 2020-02-27 DIAGNOSIS — S72.491A OTHER CLOSED FRACTURE OF DISTAL END OF RIGHT FEMUR, INITIAL ENCOUNTER (HCC): ICD-10-CM

## 2020-02-27 DIAGNOSIS — S06.5X9A SUBDURAL HEMATOMA (HCC): ICD-10-CM

## 2020-02-27 DIAGNOSIS — K59.00 CONSTIPATION: ICD-10-CM

## 2020-02-27 PROBLEM — I48.91 A-FIB (HCC): Status: ACTIVE | Noted: 2020-02-27

## 2020-02-27 PROBLEM — F11.90 UNCOMPLICATED OPIOID USE: Status: ACTIVE | Noted: 2020-02-27

## 2020-02-27 PROBLEM — Z86.79 HISTORY OF SUBDURAL HEMATOMA: Status: ACTIVE | Noted: 2020-02-27

## 2020-02-27 PROBLEM — Z87.19 HISTORY OF CIRRHOSIS: Status: ACTIVE | Noted: 2020-02-27

## 2020-02-27 PROBLEM — G93.40 ENCEPHALOPATHY: Status: ACTIVE | Noted: 2020-02-27

## 2020-02-27 LAB
ALBUMIN SERPL BCP-MCNC: 2.4 G/DL (ref 3.5–5)
ALBUMIN SERPL BCP-MCNC: 2.4 G/DL (ref 3.5–5)
ALP SERPL-CCNC: 337 U/L (ref 46–116)
ALP SERPL-CCNC: 342 U/L (ref 46–116)
ALT SERPL W P-5'-P-CCNC: 61 U/L (ref 12–78)
ALT SERPL W P-5'-P-CCNC: 64 U/L (ref 12–78)
AMMONIA PLAS-SCNC: 12 UMOL/L (ref 11–35)
ANION GAP SERPL CALCULATED.3IONS-SCNC: 5 MMOL/L (ref 4–13)
APTT PPP: 32 SECONDS (ref 23–37)
AST SERPL W P-5'-P-CCNC: 174 U/L (ref 5–45)
AST SERPL W P-5'-P-CCNC: 195 U/L (ref 5–45)
ATRIAL RATE: 375 BPM
BASOPHILS # BLD AUTO: 0.02 THOUSANDS/ΜL (ref 0–0.1)
BASOPHILS NFR BLD AUTO: 0 % (ref 0–1)
BILIRUB SERPL-MCNC: 4.6 MG/DL (ref 0.2–1)
BILIRUB SERPL-MCNC: 4.75 MG/DL (ref 0.2–1)
BUN SERPL-MCNC: 12 MG/DL (ref 5–25)
BUN SERPL-MCNC: 13 MG/DL (ref 5–25)
BUN SERPL-MCNC: 14 MG/DL (ref 5–25)
CALCIUM SERPL-MCNC: 8.5 MG/DL (ref 8.3–10.1)
CALCIUM SERPL-MCNC: 8.6 MG/DL (ref 8.3–10.1)
CALCIUM SERPL-MCNC: 8.8 MG/DL (ref 8.3–10.1)
CHLORIDE SERPL-SCNC: 83 MMOL/L (ref 100–108)
CHLORIDE SERPL-SCNC: 83 MMOL/L (ref 100–108)
CHLORIDE SERPL-SCNC: 84 MMOL/L (ref 100–108)
CO2 SERPL-SCNC: 28 MMOL/L (ref 21–32)
CO2 SERPL-SCNC: 30 MMOL/L (ref 21–32)
CO2 SERPL-SCNC: 32 MMOL/L (ref 21–32)
CREAT SERPL-MCNC: 0.77 MG/DL (ref 0.6–1.3)
CREAT SERPL-MCNC: 0.79 MG/DL (ref 0.6–1.3)
CREAT SERPL-MCNC: 0.92 MG/DL (ref 0.6–1.3)
EOSINOPHIL # BLD AUTO: 0.16 THOUSAND/ΜL (ref 0–0.61)
EOSINOPHIL NFR BLD AUTO: 2 % (ref 0–6)
ERYTHROCYTE [DISTWIDTH] IN BLOOD BY AUTOMATED COUNT: 15 % (ref 11.6–15.1)
GFR SERPL CREATININE-BSD FRML MDRD: 59 ML/MIN/1.73SQ M
GFR SERPL CREATININE-BSD FRML MDRD: 70 ML/MIN/1.73SQ M
GFR SERPL CREATININE-BSD FRML MDRD: 73 ML/MIN/1.73SQ M
GLUCOSE SERPL-MCNC: 166 MG/DL (ref 65–140)
GLUCOSE SERPL-MCNC: 176 MG/DL (ref 65–140)
GLUCOSE SERPL-MCNC: 177 MG/DL (ref 65–140)
GLUCOSE SERPL-MCNC: 217 MG/DL (ref 65–140)
HCT VFR BLD AUTO: 35.8 % (ref 34.8–46.1)
HGB BLD-MCNC: 12 G/DL (ref 11.5–15.4)
IMM GRANULOCYTES # BLD AUTO: 0.14 THOUSAND/UL (ref 0–0.2)
IMM GRANULOCYTES NFR BLD AUTO: 2 % (ref 0–2)
INR PPP: 1.1 (ref 0.84–1.19)
LYMPHOCYTES # BLD AUTO: 1.39 THOUSANDS/ΜL (ref 0.6–4.47)
LYMPHOCYTES NFR BLD AUTO: 15 % (ref 14–44)
MCH RBC QN AUTO: 29.8 PG (ref 26.8–34.3)
MCHC RBC AUTO-ENTMCNC: 33.5 G/DL (ref 31.4–37.4)
MCV RBC AUTO: 89 FL (ref 82–98)
MONOCYTES # BLD AUTO: 1.08 THOUSAND/ΜL (ref 0.17–1.22)
MONOCYTES NFR BLD AUTO: 12 % (ref 4–12)
NEUTROPHILS # BLD AUTO: 6.49 THOUSANDS/ΜL (ref 1.85–7.62)
NEUTS SEG NFR BLD AUTO: 69 % (ref 43–75)
NRBC BLD AUTO-RTO: 0 /100 WBCS
PLATELET # BLD AUTO: 131 THOUSANDS/UL (ref 149–390)
PMV BLD AUTO: 10.8 FL (ref 8.9–12.7)
POTASSIUM SERPL-SCNC: 3.8 MMOL/L (ref 3.5–5.3)
POTASSIUM SERPL-SCNC: 4.2 MMOL/L (ref 3.5–5.3)
POTASSIUM SERPL-SCNC: 5.7 MMOL/L (ref 3.5–5.3)
PROT SERPL-MCNC: 6.2 G/DL (ref 6.4–8.2)
PROT SERPL-MCNC: 6.5 G/DL (ref 6.4–8.2)
PROTHROMBIN TIME: 14.3 SECONDS (ref 11.6–14.5)
QRS AXIS: 200 DEGREES
QRSD INTERVAL: 76 MS
QT INTERVAL: 412 MS
QTC INTERVAL: 425 MS
RBC # BLD AUTO: 4.03 MILLION/UL (ref 3.81–5.12)
SODIUM SERPL-SCNC: 116 MMOL/L (ref 136–145)
SODIUM SERPL-SCNC: 118 MMOL/L (ref 136–145)
SODIUM SERPL-SCNC: 121 MMOL/L (ref 136–145)
T WAVE AXIS: 186 DEGREES
VENTRICULAR RATE: 64 BPM
WBC # BLD AUTO: 9.28 THOUSAND/UL (ref 4.31–10.16)

## 2020-02-27 PROCEDURE — 99285 EMERGENCY DEPT VISIT HI MDM: CPT | Performed by: EMERGENCY MEDICINE

## 2020-02-27 PROCEDURE — 99222 1ST HOSP IP/OBS MODERATE 55: CPT | Performed by: ORTHOPAEDIC SURGERY

## 2020-02-27 PROCEDURE — 85025 COMPLETE CBC W/AUTO DIFF WBC: CPT | Performed by: EMERGENCY MEDICINE

## 2020-02-27 PROCEDURE — 70450 CT HEAD/BRAIN W/O DYE: CPT

## 2020-02-27 PROCEDURE — 82948 REAGENT STRIP/BLOOD GLUCOSE: CPT

## 2020-02-27 PROCEDURE — 93005 ELECTROCARDIOGRAM TRACING: CPT

## 2020-02-27 PROCEDURE — 27501 TREATMENT OF THIGH FRACTURE: CPT | Performed by: ORTHOPAEDIC SURGERY

## 2020-02-27 PROCEDURE — 85610 PROTHROMBIN TIME: CPT | Performed by: EMERGENCY MEDICINE

## 2020-02-27 PROCEDURE — 80048 BASIC METABOLIC PNL TOTAL CA: CPT | Performed by: INTERNAL MEDICINE

## 2020-02-27 PROCEDURE — 85730 THROMBOPLASTIN TIME PARTIAL: CPT | Performed by: EMERGENCY MEDICINE

## 2020-02-27 PROCEDURE — 80053 COMPREHEN METABOLIC PANEL: CPT | Performed by: EMERGENCY MEDICINE

## 2020-02-27 PROCEDURE — 99223 1ST HOSP IP/OBS HIGH 75: CPT | Performed by: INTERNAL MEDICINE

## 2020-02-27 PROCEDURE — 36415 COLL VENOUS BLD VENIPUNCTURE: CPT | Performed by: EMERGENCY MEDICINE

## 2020-02-27 PROCEDURE — 99285 EMERGENCY DEPT VISIT HI MDM: CPT

## 2020-02-27 PROCEDURE — 73552 X-RAY EXAM OF FEMUR 2/>: CPT

## 2020-02-27 PROCEDURE — 93010 ELECTROCARDIOGRAM REPORT: CPT | Performed by: INTERNAL MEDICINE

## 2020-02-27 PROCEDURE — 82140 ASSAY OF AMMONIA: CPT | Performed by: INTERNAL MEDICINE

## 2020-02-27 RX ORDER — GABAPENTIN 100 MG/1
100 CAPSULE ORAL 3 TIMES DAILY PRN
Status: DISCONTINUED | OUTPATIENT
Start: 2020-02-27 | End: 2020-03-04 | Stop reason: HOSPADM

## 2020-02-27 RX ORDER — FAMOTIDINE 20 MG/1
20 TABLET, FILM COATED ORAL AS NEEDED
Status: DISCONTINUED | OUTPATIENT
Start: 2020-02-27 | End: 2020-03-04 | Stop reason: HOSPADM

## 2020-02-27 RX ORDER — SPIRONOLACTONE 25 MG/1
25 TABLET ORAL DAILY
Status: DISCONTINUED | OUTPATIENT
Start: 2020-02-28 | End: 2020-02-28

## 2020-02-27 RX ORDER — TAMSULOSIN HYDROCHLORIDE 0.4 MG/1
0.4 CAPSULE ORAL
Status: DISCONTINUED | OUTPATIENT
Start: 2020-02-27 | End: 2020-03-04 | Stop reason: HOSPADM

## 2020-02-27 RX ORDER — OXYCODONE HYDROCHLORIDE 5 MG/1
5 TABLET ORAL EVERY 4 HOURS PRN
Status: DISPENSED | OUTPATIENT
Start: 2020-02-27 | End: 2020-03-02

## 2020-02-27 RX ORDER — PROPRANOLOL HYDROCHLORIDE 20 MG/1
10 TABLET ORAL 2 TIMES DAILY
Status: DISCONTINUED | OUTPATIENT
Start: 2020-02-27 | End: 2020-03-02

## 2020-02-27 RX ORDER — BUSPIRONE HYDROCHLORIDE 10 MG/1
5 TABLET ORAL 3 TIMES DAILY
Status: DISCONTINUED | OUTPATIENT
Start: 2020-02-28 | End: 2020-03-04 | Stop reason: HOSPADM

## 2020-02-27 RX ORDER — SENNOSIDES 8.6 MG
1 TABLET ORAL
Status: DISCONTINUED | OUTPATIENT
Start: 2020-02-27 | End: 2020-03-04 | Stop reason: HOSPADM

## 2020-02-27 RX ORDER — AMLODIPINE BESYLATE 5 MG/1
5 TABLET ORAL DAILY
Status: DISCONTINUED | OUTPATIENT
Start: 2020-02-28 | End: 2020-03-02

## 2020-02-27 RX ORDER — FAMOTIDINE 20 MG/1
20 TABLET, FILM COATED ORAL AS NEEDED
COMMUNITY

## 2020-02-27 RX ORDER — DOCUSATE SODIUM 100 MG/1
100 CAPSULE, LIQUID FILLED ORAL DAILY
Status: DISCONTINUED | OUTPATIENT
Start: 2020-02-28 | End: 2020-03-04 | Stop reason: HOSPADM

## 2020-02-27 RX ADMIN — PROPRANOLOL HYDROCHLORIDE 10 MG: 20 TABLET ORAL at 17:20

## 2020-02-27 RX ADMIN — SODIUM CHLORIDE 250 ML: 0.9 INJECTION, SOLUTION INTRAVENOUS at 17:22

## 2020-02-27 RX ADMIN — STANDARDIZED SENNA CONCENTRATE 8.6 MG: 8.6 TABLET ORAL at 22:39

## 2020-02-27 RX ADMIN — OXYCODONE HYDROCHLORIDE 5 MG: 5 TABLET ORAL at 22:39

## 2020-02-27 RX ADMIN — SODIUM CHLORIDE 250 ML: 0.9 INJECTION, SOLUTION INTRAVENOUS at 14:43

## 2020-02-27 RX ADMIN — OXYCODONE HYDROCHLORIDE 5 MG: 5 TABLET ORAL at 17:19

## 2020-02-27 RX ADMIN — TAMSULOSIN HYDROCHLORIDE 0.4 MG: 0.4 CAPSULE ORAL at 17:20

## 2020-02-27 NOTE — PLAN OF CARE
Problem: PAIN - ADULT  Goal: Verbalizes/displays adequate comfort level or baseline comfort level  Description  Interventions:  - Encourage patient to monitor pain and request assistance  - Assess pain using appropriate pain scale  - Administer analgesics based on type and severity of pain and evaluate response  - Implement non-pharmacological measures as appropriate and evaluate response  - Consider cultural and social influences on pain and pain management  - Notify physician/advanced practitioner if interventions unsuccessful or patient reports new pain  Outcome: Progressing     Problem: INFECTION - ADULT  Goal: Absence or prevention of progression during hospitalization  Description  INTERVENTIONS:  - Assess and monitor for signs and symptoms of infection  - Monitor lab/diagnostic results  - Monitor all insertion sites, i e  indwelling lines, tubes, and drains  - Monitor endotracheal if appropriate and nasal secretions for changes in amount and color  - [place] appropriate cooling/warming therapies per order  - Administer medications as ordered  - Instruct and encourage patient and family to use good hand hygiene technique  - Identify and instruct in appropriate isolation precautions for identified infection/condition  Outcome: Progressing     Problem: SAFETY ADULT  Goal: Patient will remain free of falls  Description  INTERVENTIONS:  - Assess patient frequently for physical needs  -  Identify cognitive and physical deficits and behaviors that affect risk of falls    -  [place] fall precautions as indicated by assessment   - Educate patient/family on patient safety including physical limitations  - Instruct patient to call for assistance with activity based on assessment  - Modify environment to reduce risk of injury  - Consider OT/PT consult to assist with strengthening/mobility  Outcome: Progressing  Goal: Maintain or return to baseline ADL function  Description  INTERVENTIONS:  -  Assess patient's ability to carry out ADLs; assess patient's baseline for ADL function and identify physical deficits which impact ability to perform ADLs (bathing, care of mouth/teeth, toileting, grooming, dressing, etc )  - Assess/evaluate cause of self-care deficits   - Assess range of motion  - Assess patient's mobility; develop plan if impaired  - Assess patient's need for assistive devices and provide as appropriate  - Encourage maximum independence but intervene and supervise when necessary  - Involve family in performance of ADLs  - Assess for home care needs following discharge   - Consider OT consult to assist with ADL evaluation and planning for discharge  - Provide patient education as appropriate  Outcome: Progressing  Goal: Maintain or return mobility status to optimal level  Description  INTERVENTIONS:  - Assess patient's baseline mobility status (ambulation, transfers, stairs, etc )    - Identify cognitive and physical deficits and behaviors that affect mobility  - Identify mobility aids required to assist with transfers and/or ambulation (gait belt, sit-to-stand, lift, walker, cane, etc )  - [place] fall precautions as indicated by assessment  - Record patient progress and toleration of activity level on Mobility SBAR; progress patient to next Phase/Stage  - Instruct patient to call for assistance with activity based on assessment  - Consider rehabilitation consult to assist with strengthening/weightbearing, etc   Outcome: Progressing     Problem: DISCHARGE PLANNING  Goal: Discharge to home or other facility with appropriate resources  Description  INTERVENTIONS:  - Identify barriers to discharge w/patient and caregiver  - Arrange for needed discharge resources and transportation as appropriate  - Identify discharge learning needs (meds, wound care, etc )  - Arrange for interpretive services to assist at discharge as needed  - Refer to Case Management Department for coordinating discharge planning if the patient needs post-hospital services based on physician/advanced practitioner order or complex needs related to functional status, cognitive ability, or social support system  Outcome: Progressing     Problem: Knowledge Deficit  Goal: Patient/family/caregiver demonstrates understanding of disease process, treatment plan, medications, and discharge instructions  Description  Complete learning assessment and assess knowledge base    Interventions:  - Provide teaching at level of understanding  - Provide teaching via preferred learning methods  Outcome: Progressing

## 2020-02-27 NOTE — ASSESSMENT & PLAN NOTE
History of Afib, previously on aspirin  Patient previously refused for Coumadin as per Cardiology notes  Aspirin was on hold since subdural hematoma episode  Continue to monitor

## 2020-02-27 NOTE — ED PROVIDER NOTES
Pt Name: Ashley Mcclure  MRN: [id number]  [place] 1940  Age/Sex: 78 y o  female  Date of evaluation: 2020  PCP: Sharon Ahuja MD    [address] [place] [address]    Chief Complaint   Patient presents with    Abnormal Lab     Pt sent from [facility] due to blood work from today showing sodium of 120 with increased confusion and trouble finding words  HPI    Tasneem Yi presents to the Emergency Department complaining of confusion  She had outpatient labs done and was found to have a sodium of 120  She had a recent fall with femur fracture and subdural and was therefore transferred from here to [place] and then to [facility]      Past Medical and Surgical History    Past Medical History:   Diagnosis Date    Anxiety     Atrial fibrillation (HCC)     CHF [facility]congestive heart failure) ([facility] [address] [facility]     Depression     HTN (hypertension) 2016    Opioid dependence ([facility] )     Pneumonia        Past Surgical History:   Procedure Laterality Date    APPENDECTOMY      CHOLECYSTECTOMY      TONSILLECTOMY      TOTAL KNEE ARTHROPLASTY Bilateral     TUBAL LIGATION         Family History   Problem Relation Age of Onset    Cancer Father     Hypertension Sister        Social History     Tobacco Use    Smoking status: Former Smoker     Types: Cigarettes     Last attempt to quit: 1980     Years since quittin 7    Smokeless tobacco: Never Used   Substance Use Topics    Alcohol use: Never     Frequency: Never     Binge frequency: Never    Drug use: Never           Allergies    Allergies   Allergen Reactions    Aspirin GI Intolerance    Warfarin        Home Medications    Prior to Admission medications    Medication Sig Start Date End Date Taking?  Authorizing Provider   amLODIPine (NORVASC) 5 mg tablet TAKE 1 TABLET BY MOUTH EVERY DAY 10/12/19   Sharon Ahuja MD   busPIRone (BUSPAR) 5 mg tablet TAKE 1 TABLET BY MOUTH THREE TIMES A DAY 11/3/19   Sharon Ahuja MD   docusate sodium (COLACE) 100 mg capsule every 24 hours    Historical Provider, MD   furosemide (LASIX) 40 mg tablet Take 1 5 tablets (60 mg total) by mouth daily for 90 days 7/17/19 10/15/19  Mena Lizama MD   gabapentin (NEURONTIN) 100 mg capsule Take 1 capsule (100 mg total) by mouth 3 (three) times a day as needed (pain) 1/14/20   Mena Lizama MD   levETIRAcetam (KEPPRA) 500 mg tablet Take 1 tablet (500 mg total) by mouth every 12 (twelve) hours for 9 doses 2/21/20 2/26/20  Jorge Simpson PA-C   methocarbamol (ROBAXIN) 500 mg tablet Take 0 5 tablets (250 mg total) by mouth every 6 (six) hours 2/21/20   Jorge Simpson PA-C   nystatin (MYCOSTATIN) powder APPLY 1 APPLICATION TOPICALLY 2 (TWO) TIMES A DAY 2-3 TIMES DAILY TO AFFECTED AREA(S) 11/7/19   Mena Lizama MD   omeprazole (PriLOSEC) 20 mg delayed release capsule Take 1 capsule (20 mg total) by mouth 2 (two) times a day 10/30/19   Mena Lizama MD   oxybutynin (DITROPAN-XL) 5 mg 24 hr tablet TAKE 1 TABLET BY MOUTH EVERY DAY 10/11/19   Mena Lizama MD   oxyCODONE (ROXICODONE) 5 mg immediate release tablet Take 1-2 tablets (5-10 mg total) by mouth every 4 (four) hours as needed for moderate pain or severe pain for up to 10 daysMax Daily Amount: 60 mg 2/21/20 3/2/20  Apollo Harkins PA-C   potassium chloride (MICRO-K) 10 MEQ CR capsule Take 1 capsule (10 mEq total) by mouth daily 7/8/19   Mena Lizama MD   propranolol (INDERAL) 10 mg tablet TAKE 1 TABLET BY MOUTH TWICE A DAY 1/9/20   Mena Lizama MD   senna (SENOKOT) 8 6 MG tablet Take 2 tablets by mouth daily at bedtime    Historical Provider, MD   senna-docusate sodium (SENOKOT S) 8 6-50 mg per tablet Take 1 tablet by mouth daily at bedtime 2/21/20   Apollo Harkins PA-C   spironolactone (ALDACTONE) 25 mg tablet TAKE 1 TABLET BY MOUTH EVERY DAY 10/11/19   Mena Lizama MD   tamsulosin (FLOMAX) 0 4 mg every 24 hours    Historical Provider, MD           Review of Systems    Review of Systems   Constitutional: Negative for activity change, appetite change, chills, diaphoresis, fatigue and fever  HENT: Negative for congestion, postnasal drip, rhinorrhea, sinus pressure, sneezing and sore throat  Eyes: Negative for pain and visual disturbance  Respiratory: Negative for cough, chest tightness and shortness of breath  Cardiovascular: Negative for chest pain, palpitations and leg swelling  Gastrointestinal: Negative for abdominal distention, abdominal pain, constipation, diarrhea, nausea and vomiting  Endocrine: Negative for polydipsia, polyphagia and polyuria  Genitourinary: Negative for decreased urine volume, difficulty urinating, dysuria, flank pain, frequency and hematuria  Musculoskeletal: Positive for gait problem  Negative for arthralgias, joint swelling and neck pain  Skin: Negative for pallor and rash  Allergic/Immunologic: Negative for immunocompromised state  Neurological: Negative for syncope, speech difficulty, weakness, light-headedness, numbness and headaches  Psychiatric/Behavioral: Positive for confusion and decreased concentration  All other systems reviewed and are negative  Physical Exam      ED Triage Vitals   Temperature Pulse Respirations Blood Pressure SpO2   02/27/20 1142 02/27/20 1142 02/27/20 1142 02/27/20 1142 02/27/20 1142   97 7 °F (36 5 °C) 66 16 148/67 95 %      Temp Source Heart Rate Source Patient Position - Orthostatic VS BP Location FiO2 (%)   02/27/20 1142 02/27/20 1142 02/27/20 1142 02/27/20 1142 --   Oral Monitor Lying Right arm       Pain Score       02/27/20 1245       Worst Possible Pain               Physical Exam   Constitutional: She is oriented to person, place, and time  She appears well-developed and well-nourished  No distress  HENT:   Head: Normocephalic and atraumatic  Nose: Nose normal    Mouth/Throat: Oropharynx is clear and moist    Eyes: Pupils are equal, round, and reactive to light  Conjunctivae, EOM and lids are normal    Neck: Normal range of motion  Neck supple  Cardiovascular: Normal rate, regular rhythm and normal heart sounds  Exam reveals no gallop and no friction rub  No murmur heard  Pulmonary/Chest: Effort normal and breath sounds normal  No accessory muscle usage  No respiratory distress  She has no wheezes  She has no rales  Abdominal: Soft  She exhibits no distension  There is no tenderness  There is no rebound and no guarding  Musculoskeletal:        Legs:  Neurological: She is alert and oriented to person, place, and time  No cranial nerve deficit or sensory deficit  Skin: Skin is warm and dry  No rash noted  She is not diaphoretic  No erythema  Psychiatric: She has a normal mood and affect  Her speech is normal and behavior is normal  Judgment and thought content normal    Nursing note and vitals reviewed  Assessment and Plan    Altagracia Lira is a 78 y o  female who presents with confusion  Physical examination remarkable for deformity of RLE  Plan will be to perform diagnostic testing and treat symptomatically  MDM    Diagnostic Results      EKG INTERPRETATION  EKG Interpretation    Afib/ no significant change from prior  EKG interpreted by me  Interpretation by Frank Arreola DO  EKG reviewed and interpreted independently      Labs:    Results for orders placed or performed during the hospital encounter of 02/27/20   CBC and differential   Result Value Ref Range    WBC 9 28 4 31 - 10 16 Thousand/uL    RBC 4 03 3 81 - 5 12 Million/uL    Hemoglobin 12 0 11 5 - 15 4 g/dL    Hematocrit 35 8 34 8 - 46 1 %    MCV 89 82 - 98 fL    MCH 29 8 26 8 - 34 3 pg    MCHC 33 5 31 4 - 37 4 g/dL    RDW 15 0 11 6 - 15 1 %    MPV 10 8 8 9 - 12 7 fL    Platelets 438 (L) 019 - 390 Thousands/uL    nRBC 0 /100 WBCs    Neutrophils Relative 69 43 - 75 %    Immat GRANS % 2 0 - 2 %    Lymphocytes Relative 15 14 - 44 %    Monocytes Relative 12 4 - 12 %    Eosinophils Relative 2 0 - 6 %    Basophils Relative 0 0 - 1 %    Neutrophils Absolute 6 49 1 85 - 7 62 Thousands/µL    Immature Grans Absolute 0 14 0 00 - 0 20 Thousand/uL    Lymphocytes Absolute 1 39 0 60 - 4 47 Thousands/µL    Monocytes Absolute 1 08 0 17 - 1 22 Thousand/µL    Eosinophils Absolute 0 16 0 00 - 0 61 Thousand/µL    Basophils Absolute 0 02 0 00 - 0 10 Thousands/µL   Comprehensive metabolic panel   Result Value Ref Range    Sodium 116 (L) 136 - 145 mmol/L    Potassium 5 7 (H) 3 5 - 5 3 mmol/L    Chloride 83 (L) 100 - 108 mmol/L    CO2 28 21 - 32 mmol/L    ANION GAP 5 4 - 13 mmol/L    BUN 14 5 - 25 mg/dL    Creatinine 0 77 0 60 - 1 30 mg/dL    Glucose 177 (H) 65 - 140 mg/dL    Calcium 8 6 8 3 - 10 1 mg/dL     (H) 5 - 45 U/L    ALT 64 12 - 78 U/L    Alkaline Phosphatase 337 (H) 46 - 116 U/L    Total Protein 6 5 6 4 - 8 2 g/dL    Albumin 2 4 (L) 3 5 - 5 0 g/dL    Total Bilirubin 4 75 (H) 0 20 - 1 00 mg/dL    eGFR 73 ml/min/1 73sq m   Protime-INR   Result Value Ref Range    Protime 14 3 11 6 - 14 5 seconds    INR 1 10 0 84 - 1 19   APTT   Result Value Ref Range    PTT 32 23 - 37 seconds   Comprehensive metabolic panel   Result Value Ref Range    Sodium 118 (L) 136 - 145 mmol/L    Potassium 4 2 3 5 - 5 3 mmol/L    Chloride 83 (L) 100 - 108 mmol/L    CO2 30 21 - 32 mmol/L    ANION GAP 5 4 - 13 mmol/L    BUN 13 5 - 25 mg/dL    Creatinine 0 79 0 60 - 1 30 mg/dL    Glucose 166 (H) 65 - 140 mg/dL    Calcium 8 8 8 3 - 10 1 mg/dL     (H) 5 - 45 U/L    ALT 61 12 - 78 U/L    Alkaline Phosphatase 342 (H) 46 - 116 U/L    Total Protein 6 2 (L) 6 4 - 8 2 g/dL    Albumin 2 4 (L) 3 5 - 5 0 g/dL    Total Bilirubin 4 60 (H) 0 20 - 1 00 mg/dL    eGFR 70 ml/min/1 73sq m   Basic metabolic panel   Result Value Ref Range    Sodium 121 (L) 136 - 145 mmol/L    Potassium 3 8 3 5 - 5 3 mmol/L    Chloride 84 (L) 100 - 108 mmol/L    CO2 32 21 - 32 mmol/L    ANION GAP 5 4 - 13 mmol/L    BUN 12 5 - 25 mg/dL    Creatinine 0 92 0 60 - 1 30 mg/dL    Glucose 217 (H) 65 - 140 mg/dL    Calcium 8 5 8 3 - 10 1 mg/dL eGFR 59 ml/min/1 73sq m   Ammonia   Result Value Ref Range    Ammonia 12 11 - 35 umol/L   Comprehensive metabolic panel   Result Value Ref Range    Sodium 121 (L) 136 - 145 mmol/L    Potassium 4 4 3 5 - 5 3 mmol/L    Chloride 84 (L) 100 - 108 mmol/L    CO2 31 21 - 32 mmol/L    ANION GAP 6 4 - 13 mmol/L    BUN 11 5 - 25 mg/dL    Creatinine 0 81 0 60 - 1 30 mg/dL    Glucose 139 65 - 140 mg/dL    Calcium 9 0 8 3 - 10 1 mg/dL     (H) 5 - 45 U/L    ALT 71 12 - 78 U/L    Alkaline Phosphatase 392 (H) 46 - 116 U/L    Total Protein 7 1 6 4 - 8 2 g/dL    Albumin 2 7 (L) 3 5 - 5 0 g/dL    Total Bilirubin 5 50 (H) 0 20 - 1 00 mg/dL    eGFR 68 ml/min/1 73sq m   Magnesium   Result Value Ref Range    Magnesium 1 8 1 6 - 2 6 mg/dL   CBC and differential   Result Value Ref Range    WBC 7 62 4 31 - 10 16 Thousand/uL    RBC 4 36 3 81 - 5 12 Million/uL    Hemoglobin 12 8 11 5 - 15 4 g/dL    Hematocrit 38 9 34 8 - 46 1 %    MCV 89 82 - 98 fL    MCH 29 4 26 8 - 34 3 pg    MCHC 32 9 31 4 - 37 4 g/dL    RDW 15 2 (H) 11 6 - 15 1 %    MPV 10 8 8 9 - 12 7 fL    Platelets 985 (L) 203 - 390 Thousands/uL    nRBC 0 /100 WBCs    Neutrophils Relative 71 43 - 75 %    Immat GRANS % 2 0 - 2 %    Lymphocytes Relative 15 14 - 44 %    Monocytes Relative 10 4 - 12 %    Eosinophils Relative 2 0 - 6 %    Basophils Relative 0 0 - 1 %    Neutrophils Absolute 5 35 1 85 - 7 62 Thousands/µL    Immature Grans Absolute 0 12 0 00 - 0 20 Thousand/uL    Lymphocytes Absolute 1 16 0 60 - 4 47 Thousands/µL    Monocytes Absolute 0 79 0 17 - 1 22 Thousand/µL    Eosinophils Absolute 0 17 0 00 - 0 61 Thousand/µL    Basophils Absolute 0 03 0 00 - 0 10 Thousands/µL   Osmolality-"If this is regarding a toxic alcohol, STOP  Test is not routinely indicated   Please consult medical  on call for further guidance "   Result Value Ref Range    Osmolality Serum 267 (L) 282 - 298 mmol/KG   TSH, 3rd generation   Result Value Ref Range    TSH 3RD GENERATON 1 945 0 358 - 3 740 uIU/mL   Uric acid   Result Value Ref Range    Uric Acid 5 1 2 0 - 6 8 mg/dL   Basic metabolic panel   Result Value Ref Range    Sodium 119 (L) 136 - 145 mmol/L    Potassium 4 1 3 5 - 5 3 mmol/L    Chloride 82 (L) 100 - 108 mmol/L    CO2 27 21 - 32 mmol/L    ANION GAP 10 4 - 13 mmol/L    BUN 11 5 - 25 mg/dL    Creatinine 0 77 0 60 - 1 30 mg/dL    Glucose 169 (H) 65 - 140 mg/dL    Calcium 8 9 8 3 - 10 1 mg/dL    eGFR 73 ml/min/1 73sq m   ECG 12 lead   Result Value Ref Range    Ventricular Rate 64 BPM    Atrial Rate 375 BPM    AK Interval  ms    QRSD Interval 76 ms    QT Interval 412 ms    QTC Interval 425 ms    P Axis  degrees    QRS Axis 200 degrees    T Wave Axis 186 degrees   Fingerstick Glucose (POCT)   Result Value Ref Range    POC Glucose 176 (H) 65 - 140 mg/dl       All labs reviewed and utilized in the medical decision making process    Radiology:    XR femur 2 views RIGHT   Final Result      Again seen is a right knee replacement, with periprosthetic fracture of the distal femoral diaphysis with increased displacement compared to 2/18/2020 plain films  Workstation performed: [id number]         CT head without contrast   Final Result      Persistent moderately hyperdense right parafalcine subdural hematoma  Persistent small hyperdensity at the base of the right thalamus also likely representing parenchymal hematoma  No associated edema or mass effect  No new findings  Moderate diffuse confluent chronic white matter changes as above  The study was marked in [facility] for immediate notification  Workstation performed: [id number]             All radiology studies independently viewed by me and interpreted by the radiologist     Procedure    Procedures      ED Course of Care and Re-Assessments    I spoke with [facility] for admission and ortho to make them aware of consult for increased displacement of her femur fracture       Medications   amLODIPine (NORVASC) tablet 5 mg (5 mg Oral Given 2/28/20 0815)   busPIRone (BUSPAR) tablet 5 mg (5 mg Oral Given 2/28/20 0815)   docusate sodium (COLACE) capsule 100 mg (100 mg Oral Given 2/28/20 0815)   famotidine (PEPCID) tablet 20 mg (has no administration in time range)   gabapentin (NEURONTIN) capsule 100 mg (has no administration in time range)   oxyCODONE (ROXICODONE) IR tablet 5 mg (5 mg Oral Given 2/28/20 1153)   propranolol (INDERAL) tablet 10 mg (10 mg Oral Given 2/28/20 0815)   senna (SENOKOT) tablet 8 6 mg (8 6 mg Oral Given 2/27/20 2239)   tamsulosin (FLOMAX) capsule 0 4 mg (0 4 mg Oral Given 2/27/20 1720)   sodium chloride 0 9 % infusion (40 mL/hr Intravenous New Bag 2/28/20 1145)   sodium chloride 0 9 % bolus 250 mL (0 mL Intravenous Stopped 2/27/20 1530)   sodium chloride 0 9 % bolus 250 mL (0 mL Intravenous Stopped 2/28/20 0700)           FINAL IMPRESSION    Final diagnoses:   Hyponatremia   Hyperbilirubinemia         DISPOSITION/PLAN    Admitted       Hector Keller, DO Hector Keller DO  02/28/20 8350

## 2020-02-27 NOTE — ASSESSMENT & PLAN NOTE
History of subdural hematoma, fall  Presents now with confusion  No new changes on head imaging today

## 2020-02-27 NOTE — CONSULTS
Orthopaedic Surgery - Consultation  Andrew Desai (82 y o  female)   : 1940   MRN: [id number]  Date: 2020   Encounter: [id number]   Unit/Bed#: E2 -01    Consulting Physician:  Leighann Landis MD  Requesting Physician: Chloe Gonzalez MD  Reason for Consult / Principal Problem: right distal femur fracture    Assessment / Plan  Right displaced periprosthetic femur fracture    · After discussion with the patient and her , I am recommending continuing nonsurgical treatment  This is based on her low functional demands  She is non-ambulatory and requires assistance for standing transfers  Additionally, she has multiple medical co-morbiditites  This seems to make the risks of surgery outweigh the potential benefits  · Continue NWB RLE  · Knee immobilizer at all times  · Analgesics  · F/U as outpatient as scheduled    History of Present Illness  Andrew Desai is a 78 y o  female who presents to [facility] with confusion  We are consulted on her right distal femur periprosthetic fracture, which she sustained in a fall on 20  She was treated at [facility], where the orthopaedic consultant recommended nonsurgical treatment in a knee immobilizer  XR in the ED today show increased fracture displacement with bayonnete apposition  Currently she denies significant right knee or thigh pain  At baseline, she lives at [facility] and is non-ambulatory  She requires assistance for standing transfers  Additionally, she is hyponatremic and has metabolic encephalopathy, as well as cirrhosis, CHF, and A-fib      Review of Systems  Negative for chest pain and shortness of breath  Review of all other systems is negative    Medical, Surgical, Family, and Social History    Past Medical History:   Diagnosis Date    Anxiety     [facility] [address] [facility]     [facility]F [facility]congestive [facility] failure) [facility] [address] [facility]     Depression     HTN (hypertension) 2016    Opioid dependence ([facility] [address] [facility]     Pneumonia        Past Surgical History: Procedure Laterality Date    APPENDECTOMY      CHOLECYSTECTOMY      TONSILLECTOMY      TOTAL KNEE ARTHROPLASTY Bilateral     TUBAL LIGATION         Family History   Problem Relation Age of Onset    Cancer Father     Hypertension Sister        Social History     Occupational History    Occupation: flower      Comment: retired   Tobacco Use    Smoking status: Former Smoker     Types: Cigarettes     Last attempt to quit: 1980     Years since quittin 7    Smokeless tobacco: Never Used   Substance and Sexual Activity    Alcohol use: Never     Frequency: Never     Binge frequency: Never    Drug use: Never    Sexual activity: Not Currently       Allergies   Allergen Reactions    Aspirin GI Intolerance    Warfarin        Current Facility-Administered Medications   Medication Dose Route Frequency    [START ON 2020] amLODIPine (NORVASC) tablet 5 mg  5 mg Oral Daily    [START ON 2020] busPIRone (BUSPAR) tablet 5 mg  5 mg Oral TID    [START ON 2020] docusate sodium (COLACE) capsule 100 mg  100 mg Oral Daily    famotidine (PEPCID) tablet 20 mg  20 mg Oral PRN    gabapentin (NEURONTIN) capsule 100 mg  100 mg Oral TID PRN    oxyCODONE (ROXICODONE) IR tablet 5 mg  5 mg Oral Q4H PRN    propranolol (INDERAL) tablet 10 mg  10 mg Oral BID    senna (SENOKOT) tablet 8 6 mg  1 tablet Oral HS    sodium chloride 0 9 % bolus 250 mL  250 mL Intravenous Once    [START ON 2020] spironolactone (ALDACTONE) tablet 25 mg  25 mg Oral Daily    tamsulosin (FLOMAX) capsule 0 4 mg  0 4 mg Oral Daily With Dinner     Medications Prior to Admission   Medication    amLODIPine (NORVASC) 5 mg tablet    busPIRone (BUSPAR) 5 mg tablet    docusate sodium (COLACE) 100 mg capsule    famotidine (PEPCID) 20 mg tablet    furosemide (LASIX) 40 mg tablet    gabapentin (NEURONTIN) 100 mg capsule    methocarbamol (ROBAXIN) 500 mg tablet    nystatin (MYCOSTATIN) powder    oxyCODONE (ROXICODONE) 5 mg immediate release tablet    potassium chloride (MICRO-K) 10 MEQ CR capsule    propranolol (INDERAL) 10 mg tablet    senna (SENOKOT) 8 6 MG tablet    spironolactone (ALDACTONE) 25 mg tablet    tamsulosin (FLOMAX) 0 4 mg    levETIRAcetam (KEPPRA) 500 mg tablet    omeprazole (PriLOSEC) 20 mg delayed release capsule    oxybutynin (DITROPAN-XL) 5 mg 24 hr tablet    senna-docusate sodium (SENOKOT S) 8 6-50 mg per tablet       Vitals  Temp:  [97 °F (36 1 °C)-97 7 °F (36 5 °C)] 97 °F (36 1 °C)  HR:  [66-77] 74  Resp:  [16-20] 18  BP: (130-164)/() 130/65  Body mass index is 37 9 kg/m²  I/O last 24 hours: In: 250 [IV Piggyback:250]  Out: 650 [Urine:650]    Physical Exam  General:  Alert & oriented x3, no distress, appears stated age  [de-identified]:  EOMI, eyes clear, hearing intact, mucous membranes moist  Neck:  Supple, non-tender, trachea midline, no lymphadenopathy  Cardiovascular:  Regular rate, no discernable arrhythmia  Pulmonary:  Regular rate, respirations non-labored   Abdominal:  Soft, non-tender    Ortho Exam - Right Lower Extremity  · Mild tenderness of right knee / thigh  · Compartments soft / compressible  · RLE is shortened compared with the left  · +DF/PF ankle and toes  · Foot warm / perfused  · Sensation intact throughout RLE    Imaging  I have personally reviewed pertinent films in PACS  XR of right femur - displaced distal femur periprosthetic fracture with increased displacement compared with prior films  Stemmed TKA components appear well-fixed      Lab Results  (I have personally reviewed pertinent lab results )  Results from last 7 days   Lab Units 02/27/20  1145   WBC Thousand/uL 9 28   HEMOGLOBIN g/dL 12 0   HEMATOCRIT % 35 8   PLATELETS Thousands/uL 131*   RBC Million/uL 4 03   MCV fL 89   MCH pg 29 8   MCHC g/dL 33 5   RDW % 15 0   MPV fL 10 8   NRBC AUTO /100 WBCs 0     Results from last 7 days   Lab Units 02/27/20  1254   PTT seconds 32   INR  1 10     Results from last 7 days   Lab Units 02/27/20  1254 02/27/20  1145   POTASSIUM mmol/L 4 2 5 7*   CHLORIDE mmol/L 83* 83*   CO2 mmol/L 30 28   BUN mg/dL 13 14   CREATININE mg/dL 0 79 0 77   EGFR ml/min/1 73sq m 70 73   CALCIUM mg/dL 8 8 8 6   ALT U/L 61 64   AST U/L 174* 195*               EKG, Pathology, and Other Studies  (I have personally reviewed pertinent reports )  na    Code Status  Level 1 - Full Code    Counseling / Coordination of Care  Total floor / unit time spent today 20 minutes  Greater than 50% of total time was spent with the patient and / or family counseling and / or coordination of care      Barbie Anderson MD

## 2020-02-27 NOTE — ASSESSMENT & PLAN NOTE
Wt Readings from Last 3 Encounters:   02/19/20 89 7 kg (197 lb 12 oz)   02/18/20 95 7 kg (210 lb 15 7 oz)   06/06/17 97 7 kg (215 lb 6 2 oz)     Euvolemic  Lasix on hold due to hyponatremia  Continue to monitor weight daily

## 2020-02-27 NOTE — H&P
H&P- Aris Camps 1940, 78 y o  female MRN: [id number]    Unit/Bed#: E2 -01 Encounter: [id number]    Primary Care Provider: Sandra Fritz MD   Date and time admitted to hospital: 2/27/2020 11:35 AM    * Encephalopathy  Assessment & Plan  Presents with altered mental status most likely secondary to metabolic encephalopathy due to hyponatremia  History of recent subdural and intracranial hematoma with no new changes on current imaging  Hx of liver cirrhosis with no sign of decompensation  Check serum ammonium level  Hyponatremia  Assessment & Plan  Presents with significant hyponatremia of 116 associated with confusion  Patient had about normal serum sodium level around a week ago  History of DDAVP use recently for reversal of aspirin  250 mL normal saline given in ED  Repeat serum sodium is 118  Give another 250 mL normal saline and recheck  Plan is to slowly increase serum sodium by 6-8 mEq over the next 24 hours  Hold Lasix  Nephrology consult  Closed fracture of right distal femur [facility])  Assessment & Plan  History of right distal femur fracture which was managed conservatively  Repeat x-ray shows worsening displacement of the fracture ends  Orthopedic consult  Uncomplicated opioid use  Assessment & Plan  PMED checked  Continue oxycodone as needed  History of cirrhosis  Assessment & Plan  History of cirrhosis and chronic alcohol use documented on previous note  Patient on propranolol and Aldactone  Continue to monitor  GI follow-up as an outpatient  No evidence of decompensation  A-fib [facility])  Assessment & Plan  History of Afib, previously on aspirin  Patient previously refused for anticoagulation as per Cardiology notes  Aspirin was on hold since subdural hematoma episode  Continue to monitor  History of subdural hematoma  Assessment & Plan  History of subdural hematoma, fall  Presents now with confusion  No new changes on head imaging today      Chronic diastolic (congestive) heart failure [facility])  Assessment & Plan  Wt Readings from Last 3 Encounters:   02/19/20 89 7 kg (197 lb 12 oz)   02/18/20 95 7 kg (210 lb 15 7 oz)   06/06/17 97 7 kg (215 lb 6 2 oz)     Euvolemic  Lasix on hold due to hyponatremia  Continue to monitor weight daily  Chronic indwelling Davies catheter  Assessment & Plan  Check routinely    VTE Prophylaxis: Anticoagulation deferred  / sequential compression device   Code Status:  DNR DNI  POLST: There is no POLST form on file for this patient (pre-hospital)    Anticipated Length of Stay:  Patient will be admitted on an Inpatient basis with an anticipated length of stay of  greater than 2 midnights  Justification for Hospital Stay:  Encephalopathy, right femur fracture, hyponatremia    Total Time for Visit, including Counseling / Coordination of Care: 45 minutes  Greater than 50% of this total time spent on direct patient counseling and coordination of care  History of Present Illness:    Ashley Mcclure is a 78 y o  female who presents with confusion  Patient presented to ED due to confusion  Patient was recently admitted for subdural hematoma and right distal femur fracture after fall  Patient was discharged from the hospital to follow up with orthopedic as an outpatient and was on conservative management within right knee brace  While inpatient patient was given DDAVP to reverse aspirin during the episode of subdural hematoma  Now presented with hyponatremia associated with confusion  Past medical history also significant for chronic CHF, AFib, cirrhosis, frequent fall, opioid dependence      Review of Systems:    Review of Systems   Unable to perform ROS: Mental status change       Past Medical and Surgical History:     Past Medical History:   Diagnosis Date    Anxiety     [facility] [address] [facility]     CHF [facility]congestive heart failure) ([facility] [address] [facility]     Depression     HTN (hypertension) 11/9/2016    Opioid dependence ([facility] [address] [facility]     Pneumonia Past Surgical History:   Procedure Laterality Date    APPENDECTOMY      CHOLECYSTECTOMY      TONSILLECTOMY      TOTAL KNEE ARTHROPLASTY Bilateral     TUBAL LIGATION         Meds/Allergies:    Prior to Admission medications    Medication Sig Start Date End Date Taking?  Authorizing Provider   amLODIPine (NORVASC) 5 mg tablet TAKE 1 TABLET BY MOUTH EVERY DAY 10/12/19  Yes Jamaal Jiménez MD   busPIRone (BUSPAR) 5 mg tablet TAKE 1 TABLET BY MOUTH THREE TIMES A DAY 11/3/19  Yes Jamaal Jiménez MD   docusate sodium (COLACE) 100 mg capsule every 24 hours   Yes Historical Provider, MD   famotidine (PEPCID) 20 mg tablet Take 20 mg by mouth as needed for heartburn   Yes Historical Provider, MD   furosemide (LASIX) 40 mg tablet Take 1 5 tablets (60 mg total) by mouth daily for 90 days 7/17/19 2/27/20 Yes Jamaal Jiménez MD   gabapentin (NEURONTIN) 100 mg capsule Take 1 capsule (100 mg total) by mouth 3 (three) times a day as needed (pain) 1/14/20  Yes Jamaal Jiménez MD   methocarbamol (ROBAXIN) 500 mg tablet Take 0 5 tablets (250 mg total) by mouth every 6 (six) hours 2/21/20  Yes Beba Stewart PA-C   nystatin (MYCOSTATIN) powder APPLY 1 APPLICATION TOPICALLY 2 (TWO) TIMES A DAY 2-3 TIMES DAILY TO AFFECTED AREA(S) 11/7/19  Yes Jamaal Jiménez MD   oxyCODONE (ROXICODONE) 5 mg immediate release tablet Take 1-2 tablets (5-10 mg total) by mouth every 4 (four) hours as needed for moderate pain or severe pain for up to 10 daysMax Daily Amount: 60 mg 2/21/20 3/2/20 Yes Halina Grimm PA-C   potassium chloride (MICRO-K) 10 MEQ CR capsule Take 1 capsule (10 mEq total) by mouth daily 7/8/19  Yes Jamaal Jiménez MD   propranolol (INDERAL) 10 mg tablet TAKE 1 TABLET BY MOUTH TWICE A DAY 1/9/20  Yes Jamaal Jiménez MD   senna (SENOKOT) 8 6 MG tablet Take 1 tablet by mouth daily at bedtime    Yes Historical Provider, MD   spironolactone (ALDACTONE) 25 mg tablet TAKE 1 TABLET BY MOUTH EVERY DAY 10/11/19  Yes Jamaal Jiménez MD   [facility]) 0 4 mg every 24 hours   Yes Historical Provider, MD   levETIRAcetam (KEPPRA) 500 mg tablet Take 1 tablet (500 mg total) by mouth every 12 (twelve) hours for 9 doses 20  Logan Priest PA-C   omeprazole (PriLOSEC) 20 mg delayed release capsule Take 1 capsule (20 mg total) by mouth 2 (two) times a day  Patient not taking: Reported on 2020 10/30/19   Ruiz Mccall MD   oxybutynin (DITROPAN-XL) 5 mg 24 hr tablet TAKE 1 TABLET BY MOUTH EVERY DAY  Patient not taking: Reported on 2020 10/11/19   Ruiz Mccall MD   senna-docusate sodium (SENOKOT S) 8 6-50 mg per tablet Take 1 tablet by mouth daily at bedtime  Patient not taking: Reported on 2020   Michela Trotter PA-C     I have reviewed home medications with patient personally  Allergies: Allergies   Allergen Reactions    Aspirin GI Intolerance    Warfarin        Social History:     Substance Use History:   Social History     Substance and Sexual Activity   Alcohol Use Never    Frequency: Never    Binge frequency: Never     Social History     Tobacco Use   Smoking Status Former Smoker    Types: Cigarettes    Last attempt to quit: 1980    Years since quittin 7   Smokeless Tobacco Never Used     Social History     Substance and Sexual Activity   Drug Use Never       Family History:    non-contributory    Physical Exam:     Vitals:   Blood Pressure: (!) 142/101 (20 1600)  Pulse: 74 (20 1600)  Temperature: 97 7 °F (36 5 °C) (20 1142)  Temp Source: Oral (20 1142)  Respirations: 18 (20 1600)  SpO2: 97 % (20 1600)    Physical Exam    General appearance: alert  Skin: Skin color, texture, turgor normal  No rashes or lesions  Head: Normocephalic, without obvious abnormality, atraumatic  Eyes: conjunctivae/corneas clear  PERRL, EOM's intact    Lungs: clear to auscultation bilaterally  Heart: regular rate and rhythm, S1, S2 normal, no murmur, click, rub or gallop  Abdomen: soft, non-tender; bowel sounds normal; no masses,  no organomegaly  Back: symmetric, no curvature  ROM normal  No CVA tenderness  Extremities: Right shortened extremity with swelling and tenderness around the knee area, intact distal neuro vascular structure  Neurologic: Mental status: alertness: alert  Psychiatric:  Normal mood and flat affect    Additional Data:     Lab Results: I have personally reviewed pertinent reports  Results from last 7 days   Lab Units 02/27/20  1145   WBC Thousand/uL 9 28   HEMOGLOBIN g/dL 12 0   HEMATOCRIT % 35 8   PLATELETS Thousands/uL 131*   NEUTROS PCT % 69   LYMPHS PCT % 15   MONOS PCT % 12   EOS PCT % 2     Results from last 7 days   Lab Units 02/27/20  1254   SODIUM mmol/L 118*   POTASSIUM mmol/L 4 2   CHLORIDE mmol/L 83*   CO2 mmol/L 30   BUN mg/dL 13   CREATININE mg/dL 0 79   ANION GAP mmol/L 5   CALCIUM mg/dL 8 8   ALBUMIN g/dL 2 4*   TOTAL BILIRUBIN mg/dL 4 60*   ALK PHOS U/L 342*   ALT U/L 61   AST U/L 174*   GLUCOSE RANDOM mg/dL 166*     Results from last 7 days   Lab Units 02/27/20  1254   INR  1 10     Results from last 7 days   Lab Units 02/27/20  1141   POC GLUCOSE mg/dl 176*               Imaging: I have personally reviewed pertinent reports  XR femur 2 views RIGHT   Final Result by Marquis Roberto MD (02/27 7617)      Again seen is a right knee replacement, with periprosthetic fracture of the distal femoral diaphysis with increased displacement compared to 2/18/2020 plain films  Workstation performed: [id number]         CT head without contrast   Final Result by Billy Farris MD (02/27 2787)      Persistent moderately hyperdense right parafalcine subdural hematoma  Persistent small hyperdensity at the base of the right thalamus also likely representing parenchymal hematoma  No associated edema or mass effect  No new findings  Moderate diffuse confluent chronic white matter changes as above  The study was marked in [facility] for immediate notification  Workstation performed: [id number]             EKG, Pathology, and Other Studies Reviewed on Admission: yes    Allscripts / Epic Records Reviewed: Yes     ** Please Note: This note has been constructed using a voice recognition system   **

## 2020-02-27 NOTE — ASSESSMENT & PLAN NOTE
History of cirrhosis and chronic alcohol use documented on previous note  Patient on propranolol and Aldactone  Continue to monitor  GI follow-up as an outpatient  No evidence of decompensation

## 2020-02-27 NOTE — ASSESSMENT & PLAN NOTE
Presents with altered mental status most likely secondary to metabolic encephalopathy due to hyponatremia  History of recent subdural and intracranial hematoma with no new changes on current imaging

## 2020-02-27 NOTE — ASSESSMENT & PLAN NOTE
History of right distal femur fracture which was managed conservatively  Repeat x-ray shows worsening displacement of the fracture ends  Orthopedic consult

## 2020-02-27 NOTE — ASSESSMENT & PLAN NOTE
Presents with significant hyponatremia of 116 associated with confusion  Patient had close to normal serum sodium level about a week ago  History of DDAVP use recently for reversal of aspirin  250 mL normal saline given in ED  Repeat serum sodium is 118  Give another 250 mL normal saline and recheck  Plan is to slowly increase serum sodium by 6-8 mEq over the next 24 hours  Hold Lasix  Nephrology consult

## 2020-02-28 LAB
ALBUMIN SERPL BCP-MCNC: 2.7 G/DL (ref 3.5–5)
ALP SERPL-CCNC: 392 U/L (ref 46–116)
ALT SERPL W P-5'-P-CCNC: 71 U/L (ref 12–78)
ANION GAP SERPL CALCULATED.3IONS-SCNC: 10 MMOL/L (ref 4–13)
ANION GAP SERPL CALCULATED.3IONS-SCNC: 4 MMOL/L (ref 4–13)
ANION GAP SERPL CALCULATED.3IONS-SCNC: 6 MMOL/L (ref 4–13)
ANION GAP SERPL CALCULATED.3IONS-SCNC: 7 MMOL/L (ref 4–13)
AST SERPL W P-5'-P-CCNC: 224 U/L (ref 5–45)
BASOPHILS # BLD AUTO: 0.03 THOUSANDS/ΜL (ref 0–0.1)
BASOPHILS NFR BLD AUTO: 0 % (ref 0–1)
BILIRUB SERPL-MCNC: 5.5 MG/DL (ref 0.2–1)
BUN SERPL-MCNC: 11 MG/DL (ref 5–25)
BUN SERPL-MCNC: 11 MG/DL (ref 5–25)
BUN SERPL-MCNC: 13 MG/DL (ref 5–25)
BUN SERPL-MCNC: 14 MG/DL (ref 5–25)
CALCIUM SERPL-MCNC: 8.7 MG/DL (ref 8.3–10.1)
CALCIUM SERPL-MCNC: 8.8 MG/DL (ref 8.3–10.1)
CALCIUM SERPL-MCNC: 8.9 MG/DL (ref 8.3–10.1)
CALCIUM SERPL-MCNC: 9 MG/DL (ref 8.3–10.1)
CHLORIDE SERPL-SCNC: 82 MMOL/L (ref 100–108)
CHLORIDE SERPL-SCNC: 84 MMOL/L (ref 100–108)
CHLORIDE SERPL-SCNC: 84 MMOL/L (ref 100–108)
CHLORIDE SERPL-SCNC: 86 MMOL/L (ref 100–108)
CHLORIDE UR-SCNC: 36 MMOL/L
CO2 SERPL-SCNC: 27 MMOL/L (ref 21–32)
CO2 SERPL-SCNC: 29 MMOL/L (ref 21–32)
CO2 SERPL-SCNC: 29 MMOL/L (ref 21–32)
CO2 SERPL-SCNC: 31 MMOL/L (ref 21–32)
CREAT SERPL-MCNC: 0.77 MG/DL (ref 0.6–1.3)
CREAT SERPL-MCNC: 0.81 MG/DL (ref 0.6–1.3)
CREAT SERPL-MCNC: 0.84 MG/DL (ref 0.6–1.3)
CREAT SERPL-MCNC: 0.88 MG/DL (ref 0.6–1.3)
EOSINOPHIL # BLD AUTO: 0.17 THOUSAND/ΜL (ref 0–0.61)
EOSINOPHIL NFR BLD AUTO: 2 % (ref 0–6)
ERYTHROCYTE [DISTWIDTH] IN BLOOD BY AUTOMATED COUNT: 15.2 % (ref 11.6–15.1)
GFR SERPL CREATININE-BSD FRML MDRD: 62 ML/MIN/1.73SQ M
GFR SERPL CREATININE-BSD FRML MDRD: 65 ML/MIN/1.73SQ M
GFR SERPL CREATININE-BSD FRML MDRD: 68 ML/MIN/1.73SQ M
GFR SERPL CREATININE-BSD FRML MDRD: 73 ML/MIN/1.73SQ M
GLUCOSE SERPL-MCNC: 139 MG/DL (ref 65–140)
GLUCOSE SERPL-MCNC: 169 MG/DL (ref 65–140)
GLUCOSE SERPL-MCNC: 200 MG/DL (ref 65–140)
GLUCOSE SERPL-MCNC: 203 MG/DL (ref 65–140)
HCT VFR BLD AUTO: 38.9 % (ref 34.8–46.1)
HGB BLD-MCNC: 12.8 G/DL (ref 11.5–15.4)
IMM GRANULOCYTES # BLD AUTO: 0.12 THOUSAND/UL (ref 0–0.2)
IMM GRANULOCYTES NFR BLD AUTO: 2 % (ref 0–2)
LYMPHOCYTES # BLD AUTO: 1.16 THOUSANDS/ΜL (ref 0.6–4.47)
LYMPHOCYTES NFR BLD AUTO: 15 % (ref 14–44)
MAGNESIUM SERPL-MCNC: 1.8 MG/DL (ref 1.6–2.6)
MCH RBC QN AUTO: 29.4 PG (ref 26.8–34.3)
MCHC RBC AUTO-ENTMCNC: 32.9 G/DL (ref 31.4–37.4)
MCV RBC AUTO: 89 FL (ref 82–98)
MONOCYTES # BLD AUTO: 0.79 THOUSAND/ΜL (ref 0.17–1.22)
MONOCYTES NFR BLD AUTO: 10 % (ref 4–12)
NEUTROPHILS # BLD AUTO: 5.35 THOUSANDS/ΜL (ref 1.85–7.62)
NEUTS SEG NFR BLD AUTO: 71 % (ref 43–75)
NRBC BLD AUTO-RTO: 0 /100 WBCS
OSMOLALITY UR/SERPL-RTO: 267 MMOL/KG (ref 282–298)
OSMOLALITY UR/SERPL-RTO: 270 MMOL/KG (ref 282–298)
OSMOLALITY UR: 516 MMOL/KG
PLATELET # BLD AUTO: 144 THOUSANDS/UL (ref 149–390)
PMV BLD AUTO: 10.8 FL (ref 8.9–12.7)
POTASSIUM SERPL-SCNC: 4 MMOL/L (ref 3.5–5.3)
POTASSIUM SERPL-SCNC: 4.1 MMOL/L (ref 3.5–5.3)
POTASSIUM SERPL-SCNC: 4.1 MMOL/L (ref 3.5–5.3)
POTASSIUM SERPL-SCNC: 4.4 MMOL/L (ref 3.5–5.3)
PROT SERPL-MCNC: 7.1 G/DL (ref 6.4–8.2)
RBC # BLD AUTO: 4.36 MILLION/UL (ref 3.81–5.12)
SODIUM 24H UR-SCNC: <5 MOL/L
SODIUM SERPL-SCNC: 119 MMOL/L (ref 136–145)
SODIUM SERPL-SCNC: 119 MMOL/L (ref 136–145)
SODIUM SERPL-SCNC: 120 MMOL/L (ref 136–145)
SODIUM SERPL-SCNC: 121 MMOL/L (ref 136–145)
TSH SERPL DL<=0.05 MIU/L-ACNC: 1.95 UIU/ML (ref 0.36–3.74)
URATE SERPL-MCNC: 5.1 MG/DL (ref 2–6.8)
URATE SERPL-MCNC: 5.3 MG/DL (ref 2–6.8)
WBC # BLD AUTO: 7.62 THOUSAND/UL (ref 4.31–10.16)

## 2020-02-28 PROCEDURE — 83735 ASSAY OF MAGNESIUM: CPT | Performed by: INTERNAL MEDICINE

## 2020-02-28 PROCEDURE — 84443 ASSAY THYROID STIM HORMONE: CPT | Performed by: PHYSICIAN ASSISTANT

## 2020-02-28 PROCEDURE — 83930 ASSAY OF BLOOD OSMOLALITY: CPT | Performed by: PHYSICIAN ASSISTANT

## 2020-02-28 PROCEDURE — 84300 ASSAY OF URINE SODIUM: CPT | Performed by: INTERNAL MEDICINE

## 2020-02-28 PROCEDURE — 85025 COMPLETE CBC W/AUTO DIFF WBC: CPT | Performed by: INTERNAL MEDICINE

## 2020-02-28 PROCEDURE — 99223 1ST HOSP IP/OBS HIGH 75: CPT | Performed by: INTERNAL MEDICINE

## 2020-02-28 PROCEDURE — 84550 ASSAY OF BLOOD/URIC ACID: CPT | Performed by: INTERNAL MEDICINE

## 2020-02-28 PROCEDURE — 80048 BASIC METABOLIC PNL TOTAL CA: CPT | Performed by: PHYSICIAN ASSISTANT

## 2020-02-28 PROCEDURE — 82436 ASSAY OF URINE CHLORIDE: CPT | Performed by: INTERNAL MEDICINE

## 2020-02-28 PROCEDURE — 99232 SBSQ HOSP IP/OBS MODERATE 35: CPT | Performed by: PHYSICIAN ASSISTANT

## 2020-02-28 PROCEDURE — 80053 COMPREHEN METABOLIC PANEL: CPT | Performed by: INTERNAL MEDICINE

## 2020-02-28 PROCEDURE — 84550 ASSAY OF BLOOD/URIC ACID: CPT | Performed by: PHYSICIAN ASSISTANT

## 2020-02-28 PROCEDURE — 83935 ASSAY OF URINE OSMOLALITY: CPT | Performed by: INTERNAL MEDICINE

## 2020-02-28 PROCEDURE — 80048 BASIC METABOLIC PNL TOTAL CA: CPT | Performed by: INTERNAL MEDICINE

## 2020-02-28 PROCEDURE — 83930 ASSAY OF BLOOD OSMOLALITY: CPT | Performed by: INTERNAL MEDICINE

## 2020-02-28 RX ORDER — SODIUM CHLORIDE 9 MG/ML
60 INJECTION, SOLUTION INTRAVENOUS CONTINUOUS
Status: DISCONTINUED | OUTPATIENT
Start: 2020-02-28 | End: 2020-02-29

## 2020-02-28 RX ADMIN — BUSPIRONE HYDROCHLORIDE 5 MG: 10 TABLET ORAL at 16:03

## 2020-02-28 RX ADMIN — DOCUSATE SODIUM 100 MG: 100 CAPSULE, LIQUID FILLED ORAL at 08:15

## 2020-02-28 RX ADMIN — BUSPIRONE HYDROCHLORIDE 5 MG: 10 TABLET ORAL at 22:43

## 2020-02-28 RX ADMIN — SPIRONOLACTONE 25 MG: 25 TABLET ORAL at 08:15

## 2020-02-28 RX ADMIN — OXYCODONE HYDROCHLORIDE 5 MG: 5 TABLET ORAL at 16:03

## 2020-02-28 RX ADMIN — AMLODIPINE BESYLATE 5 MG: 5 TABLET ORAL at 08:15

## 2020-02-28 RX ADMIN — PROPRANOLOL HYDROCHLORIDE 10 MG: 20 TABLET ORAL at 08:15

## 2020-02-28 RX ADMIN — STANDARDIZED SENNA CONCENTRATE 8.6 MG: 8.6 TABLET ORAL at 22:43

## 2020-02-28 RX ADMIN — OXYCODONE HYDROCHLORIDE 5 MG: 5 TABLET ORAL at 11:53

## 2020-02-28 RX ADMIN — SODIUM CHLORIDE 40 ML/HR: 0.9 INJECTION, SOLUTION INTRAVENOUS at 11:45

## 2020-02-28 RX ADMIN — TAMSULOSIN HYDROCHLORIDE 0.4 MG: 0.4 CAPSULE ORAL at 16:01

## 2020-02-28 RX ADMIN — BUSPIRONE HYDROCHLORIDE 5 MG: 10 TABLET ORAL at 08:15

## 2020-02-28 RX ADMIN — PROPRANOLOL HYDROCHLORIDE 10 MG: 20 TABLET ORAL at 17:23

## 2020-02-28 RX ADMIN — OXYCODONE HYDROCHLORIDE 5 MG: 5 TABLET ORAL at 08:16

## 2020-02-28 NOTE — ASSESSMENT & PLAN NOTE
Presents with significant hyponatremia of 116 associated with confusion     Spoke with Nephrology this AM   Continue q4 BMP checks   Hold lasix and aldactone   Appreciate Nephrology consult and recommendations   Check urine and serum studies   Started on gentle IVF 40 cc/hour

## 2020-02-28 NOTE — ASSESSMENT & PLAN NOTE
History of cirrhosis and chronic alcohol use documented on previous note  Patient on propranolol and Aldactone    Holding aldactone after discussion with Nephrology this AM  was already given dose today but will hold from now on until improvement in sodium levels

## 2020-02-28 NOTE — SOCIAL WORK
CM contacted [facility] for pt was admitted from their facility  Pt had met her 3 midnights last admission so is able to return to [facility] whenever she is medically stable

## 2020-02-28 NOTE — PLAN OF CARE
Problem: PAIN - ADULT  Goal: Verbalizes/displays adequate comfort level or baseline comfort level  Description  Interventions:  - Encourage patient to monitor pain and request assistance  - Assess pain using appropriate pain scale  - Administer analgesics based on type and severity of pain and evaluate response  - Implement non-pharmacological measures as appropriate and evaluate response  - Consider cultural and social influences on pain and pain management  - Notify physician/advanced practitioner if interventions unsuccessful or patient reports new pain  Outcome: Progressing     Problem: INFECTION - ADULT  Goal: Absence or prevention of progression during hospitalization  Description  INTERVENTIONS:  - Assess and monitor for signs and symptoms of infection  - Monitor lab/diagnostic results  - Monitor all insertion sites, i e  indwelling lines, tubes, and drains  - Monitor endotracheal if appropriate and nasal secretions for changes in amount and color  - [place] appropriate cooling/warming therapies per order  - Administer medications as ordered  - Instruct and encourage patient and family to use good hand hygiene technique  - Identify and instruct in appropriate isolation precautions for identified infection/condition  Outcome: Progressing     Problem: SAFETY ADULT  Goal: Patient will remain free of falls  Description  INTERVENTIONS:  - Assess patient frequently for physical needs  -  Identify cognitive and physical deficits and behaviors that affect risk of falls    -  [place] fall precautions as indicated by assessment   - Educate patient/family on patient safety including physical limitations  - Instruct patient to call for assistance with activity based on assessment  - Modify environment to reduce risk of injury  - Consider OT/PT consult to assist with strengthening/mobility  Outcome: Progressing  Goal: Maintain or return to baseline ADL function  Description  INTERVENTIONS:  -  Assess patient's ability to carry out ADLs; assess patient's baseline for ADL function and identify physical deficits which impact ability to perform ADLs (bathing, care of mouth/teeth, toileting, grooming, dressing, etc )  - Assess/evaluate cause of self-care deficits   - Assess range of motion  - Assess patient's mobility; develop plan if impaired  - Assess patient's need for assistive devices and provide as appropriate  - Encourage maximum independence but intervene and supervise when necessary  - Involve family in performance of ADLs  - Assess for home care needs following discharge   - Consider OT consult to assist with ADL evaluation and planning for discharge  - Provide patient education as appropriate  Outcome: Progressing  Goal: Maintain or return mobility status to optimal level  Description  INTERVENTIONS:  - Assess patient's baseline mobility status (ambulation, transfers, stairs, etc )    - Identify cognitive and physical deficits and behaviors that affect mobility  - Identify mobility aids required to assist with transfers and/or ambulation (gait belt, sit-to-stand, lift, walker, cane, etc )  - [place] fall precautions as indicated by assessment  - Record patient progress and toleration of activity level on Mobility SBAR; progress patient to next Phase/Stage  - Instruct patient to call for assistance with activity based on assessment  - Consider rehabilitation consult to assist with strengthening/weightbearing, etc   Outcome: Progressing     Problem: DISCHARGE PLANNING  Goal: Discharge to home or other facility with appropriate resources  Description  INTERVENTIONS:  - Identify barriers to discharge w/patient and caregiver  - Arrange for needed discharge resources and transportation as appropriate  - Identify discharge learning needs (meds, wound care, etc )  - Arrange for interpretive services to assist at discharge as needed  - Refer to Case Management Department for coordinating discharge planning if the patient needs post-hospital services based on physician/advanced practitioner order or complex needs related to functional status, cognitive ability, or social support system  Outcome: Progressing     Problem: Knowledge Deficit  Goal: Patient/family/caregiver demonstrates understanding of disease process, treatment plan, medications, and discharge instructions  Description  Complete learning assessment and assess knowledge base    Interventions:  - Provide teaching at level of understanding  - Provide teaching via preferred learning methods  Outcome: Progressing     Problem: Prexisting or High Potential for Compromised Skin Integrity  Goal: Skin integrity is maintained or improved  Description  INTERVENTIONS:  - Identify patients at risk for skin breakdown  - Assess and monitor skin integrity  - Assess and monitor nutrition and hydration status  - Monitor labs   - Assess for incontinence   - Turn and reposition patient  - Assist with mobility/ambulation  - Relieve pressure over bony prominences  - Avoid friction and shearing  - Provide appropriate hygiene as needed including keeping skin clean and dry  - Evaluate need for skin moisturizer/barrier cream  - Collaborate with interdisciplinary team   - Patient/family teaching  - Consider wound care consult   Outcome: Progressing

## 2020-02-28 NOTE — ASSESSMENT & PLAN NOTE
History of subdural hematoma, fall  No new changes on head imaging today  Monitor closely   notify [facility]IM immediately for any neurologic changes

## 2020-02-28 NOTE — ASSESSMENT & PLAN NOTE
Wt Readings from Last 3 Encounters:   02/28/20 97 5 kg (214 lb 15 2 oz)   02/19/20 89 7 kg (197 lb 12 oz)   02/18/20 95 7 kg (210 lb 15 7 oz)     Euvolemic  Lasix on hold due to hyponatremia  Continue to monitor weight daily

## 2020-02-28 NOTE — CONSULTS
Consultation - Nephrology   Kacey Falcon 78 y o  female MRN: [id number]  Unit/Bed#: E2 -01 Encounter: [id number]    ASSESSMENT AND PLAN:  Patient is a 68-year-old female multiple medical issues of hypertension for many years, documented chronic opiate dependence, recent episode of fall with right femur fracture, subdural hematoma, history of cirrhosis, history of CVA, chronic Davies catheter, presented with confusion  We are consulted for hyponatremia management  Severe Hyponatremia, history of chronic intermittent hyponatremia  -serum sodium 116 on admission and has improved to 121 today a m  After 500 mL IV fluid bolus  She is not on any maintenance IV fluid currently   -continue BMP Q four hourly for today  -most recent BMP results to follow   -if sodium dropping or does not improve further, may consider gentle IV normal saline at 40 mL/hour as she responded well to IV fluid bolus yesterday   -hyponatremia likely suspected secondary to hypovolemic component, recent use of DDAVP during recent hospitalization with concern for underlying SIADH given chronic intermittent hyponatremia issues in the past, chronic pain issues with opioid dependence and recently diagnosed subdural/parenchymal hematoma, this all in the setting of underlying cirrhosis  -patient is active, alert, oriented x1, overall confused  -denies any nausea, vomiting or headache   -goal serum sodium correction no more than 8 to 10 mEq in 24 hours  -denies any obvious history of malignancy  -CT head yesterday shows no mass effect or edema  Shows persistent subdural hematoma, parenchymal hematoma   -check urine sodium, urine chloride, urine osmolality, serum osmolality, serum uric acid   -fluid restriction 1 2 L per day    Hypertension, blood pressure well controlled, change holding parameter for amlodipine  History of cirrhosis, currently seems to be compensated    Remains on room air   -she remains on Lasix, Aldactone as outpatient although hold for time being   -status post last dose Aldactone today    CKD stage 2, baseline creatinine seems to be 0 7 to 0 8  -could be secondary to underlying hypertension, age-related nephron loss  -creatinine overall stable at baseline  -renal ultrasound in September 2019 shows right kidney 8 cm, left kidney 9 7 cm, normal echogenicity, mild atrophic right kidney  -urinalysis which is Davies specimen recently this month showed 2+ proteinuria, 20 to 30 RBCs, moderate bacteria, very concentrated sample  Proteinuria, this remains Davies specimen, check UPC ratio    Discussed above plan in detail with primary team    HISTORY OF PRESENT ILLNESS:  Requesting Physician: Jose Alberto MD  Reason for Consult:  Hyponatremia    Stacie Garcia is a 78y o  year old female who was admitted to [facility] after presenting with altered mental status  A renal consultation is requested today for assistance in the management of hyponatremia  Old medical records were reviewed  Patient does have mild chronic intermittent hyponatremia issues going back to 2016  She was recently hospitalized after episode of fall when she was found to have subdural/parenchymal hematoma and also femur fracture  She was treated conservatively  Was also given dose of DDAVP due to hematoma issues  She is overall confused at the time of my encounter and overall poor historian  Part of the history is also obtained from reviewing medical records and talking to nursing staff  Patient denies any nausea, vomiting or headache  She does have chronic indwelling Davies catheter due to prior history of stroke  She was given 500 mL IV fluid bolus with serum sodium improving from 116 on admission to 121 today morning  She denies any history of malignancy or any recent history of malignancy      PAST MEDICAL HISTORY:  Past Medical History:   Diagnosis Date    Anxiety     Atrial fibrillation ([address] )     CHF (congestive heart failure) (HCC)     Depression  HTN (hypertension) 2016    Opioid dependence [facility] [address] )     Pneumonia        PAST SURGICAL HISTORY:  Past Surgical History:   Procedure Laterality Date    APPENDECTOMY      CHOLECYSTECTOMY      TONSILLECTOMY      TOTAL KNEE ARTHROPLASTY Bilateral     TUBAL LIGATION         ALLERGIES:  Allergies   Allergen Reactions    Aspirin GI Intolerance    Warfarin        SOCIAL HISTORY:  Social History     Substance and Sexual Activity   Alcohol Use Never    Frequency: Never    Binge frequency: Never     Social History     Substance and Sexual Activity   Drug Use Never     Social History     Tobacco Use   Smoking Status Former Smoker    Types: Cigarettes    Last attempt to quit: 1980    Years since quittin 7   Smokeless Tobacco Never Used       FAMILY HISTORY:  Family History   Problem Relation Age of Onset   Waylon King Cancer Father     Hypertension Sister        MEDICATIONS:    Current Facility-Administered Medications:     amLODIPine (NORVASC) tablet 5 mg, 5 mg, Oral, Daily, Lawanda Shaffer MD, 5 mg at 20 0815    busPIRone (BUSPAR) tablet 5 mg, 5 mg, Oral, TID, Lawanda Shaffer MD, 5 mg at 20 0815    docusate sodium (COLACE) capsule 100 mg, 100 mg, Oral, Daily, Lawanda Shaffer MD, 100 mg at 20 0815    famotidine (PEPCID) tablet 20 mg, 20 mg, Oral, PRN, Lawanda Shaffer MD    gabapentin (NEURONTIN) capsule 100 mg, 100 mg, Oral, TID PRN, Lawanda Shaffer MD    oxyCODONE (ROXICODONE) IR tablet 5 mg, 5 mg, Oral, Q4H PRN, Lawanda Shafefr MD, 5 mg at 20 0816    propranolol (INDERAL) tablet 10 mg, 10 mg, Oral, BID, Lawanda Shaffer MD, 10 mg at 20 0815    senna (SENOKOT) tablet 8 6 mg, 1 tablet, Oral, HS, Lawanda Shaffer MD, 8 6 mg at 20 2239    tamsulosin (FLOMAX) capsule 0 4 mg, 0 4 mg, Oral, Daily With Dinner, Lawanda Shaffer MD, 0 4 mg at 20 1720    REVIEW OF SYSTEMS:  More than 10 review of systems were attempted, overall review of system remains very limited as patient is confused and poor historian  No other pertinent positive findings other than mentioned in the note    PHYSICAL EXAM:  Current Weight: Weight - Scale: 97 5 kg (214 lb 15 2 oz)  First Weight: Weight - Scale: 93 kg (205 lb 0 4 oz)  Vitals:    02/28/20 0819   BP: 118/59   Pulse: 73   Resp: 18   Temp: (!) 97 °F (36 1 °C)   SpO2: 97%       Intake/Output Summary (Last 24 hours) at 2/28/2020 1122  Last data filed at 2/28/2020 7281  Gross per 24 hour   Intake 250 ml   Output 1700 ml   Net -1450 ml     Wt Readings from Last 3 Encounters:   02/28/20 97 5 kg (214 lb 15 2 oz)   02/19/20 89 7 kg (197 lb 12 oz)   02/18/20 95 7 kg (210 lb 15 7 oz)     Temp Readings from Last 3 Encounters:   02/28/20 (!) 97 °F (36 1 °C) (Tympanic)   02/21/20 98 °F (36 7 °C) (Oral)   02/18/20 98 3 °F (36 8 °C) (Rectal)     BP Readings from Last 3 Encounters:   02/28/20 118/59   02/21/20 117/63   02/18/20 133/64     Pulse Readings from Last 3 Encounters:   02/28/20 73   02/21/20 72   02/18/20 79        Physical Examination:  General:  Lying in bed, no acute distress  Eyes:  Mild conjunctival pallor present  ENT:  External examination of ears and nose unremarkable  Neck:  No obvious lymphadenopathy appreciated  Respiratory:  Bilateral air entry present  CVS:  S1, S2 present  GI:  Soft, nontender, nondistended  CNS:  Active, alert, oriented x1  Extremities:  Trace edema in legs  Psych:  Confused, conscious  Skin:  No new rash in legs    Invasive Devices:   Urethral Catheter Latex 16 Fr   (Active)       Urethral Catheter (Active)   Output (mL) 350 mL 2/28/2020  6:43 AM     Lab Results:   Results from last 7 days   Lab Units 02/28/20  0632 02/27/20 2000 02/27/20  1254 02/27/20  1145   WBC Thousand/uL 7 62  --   --  9 28   HEMOGLOBIN g/dL 12 8  --   --  12 0   HEMATOCRIT % 38 9  --   --  35 8   PLATELETS Thousands/uL 144*  --   --  131*   POTASSIUM mmol/L 4 4 3 8 4 2 5 7*   CHLORIDE mmol/L 84* 84* 83* 83*   CO2 mmol/L 31 32 30 28   BUN mg/dL 11 12 13 14   CREATININE mg/dL 0  81 0 92 0 79 0 77   CALCIUM mg/dL 9 0 8 5 8 8 8 6   MAGNESIUM mg/dL 1 8  --   --   --        Other Studies:   XR femur 2 views RIGHT   Final Result by Devaughn Wood MD (02/27 6615)      Again seen is a right knee replacement, with periprosthetic fracture of the distal femoral diaphysis with increased displacement compared to 2/18/2020 plain films  Workstation performed: [id number]         CT head without contrast   Final Result by Teresa Mora MD (02/27 3046)      Persistent moderately hyperdense right parafalcine subdural hematoma  Persistent small hyperdensity at the base of the right thalamus also likely representing parenchymal hematoma  No associated edema or mass effect  No new findings  Moderate diffuse confluent chronic white matter changes as above  The study was marked in [facility] for immediate notification  Workstation performed: [id number]             Portions of the record may have been created with voice recognition software  Occasional wrong word or "sound a like" substitutions may have occurred due to the inherent limitations of voice recognition software  Read the chart carefully and recognize, using context, where substitutions have occurred

## 2020-02-28 NOTE — PROGRESS NOTES
Progress Note - Aris Parker 1940, 78 y o  female MRN: [id number]  Unit/Bed#: E2 -01 Encounter: [id number]  Primary Care Provider: Sandra Fritz MD   Date and time admitted to hospital: 2/27/2020 11:35 AM    * Encephalopathy  Assessment & Plan  Presents with altered mental status most likely secondary to metabolic encephalopathy due to hyponatremia  History of recent subdural and intracranial hematoma with no new changes on current imaging  Neuro checks   Correcting electrolytes in conjunction with Nephrology         Hyponatremia  Assessment & Plan  Presents with significant hyponatremia of 116 associated with confusion  Spoke with Nephrology this AM   Continue q4 BMP checks   Hold lasix and aldactone   Appreciate Nephrology consult and recommendations   Check urine and serum studies   Started on gentle IVF 40 cc/hour       Closed fracture of right distal femur ([address] )  Assessment & Plan  History of right distal femur fracture which was managed conservatively  Repeat x-ray shows worsening displacement of the fracture ends  Orthopedic consult  Continued recommendations are non-surgical intervention   NWB RLE with knee immobilizer     A-fib ([address] )  Assessment & Plan  History of Afib, previously on aspirin  Patient previously refused for Coumadin as per Cardiology notes  Aspirin was on hold since subdural hematoma episode  Continue to monitor  Chronic diastolic (congestive) heart failure [facility])  Assessment & Plan  Wt Readings from Last 3 Encounters:   02/28/20 97 5 kg (214 lb 15 2 oz)   02/19/20 89 7 kg (197 lb 12 oz)   02/18/20 95 7 kg (210 lb 15 7 oz)     Euvolemic  Lasix on hold due to hyponatremia  Continue to monitor weight daily  Chronic indwelling Davies catheter  Assessment & Plan  Monitor I and O    History of cirrhosis  Assessment & Plan  History of cirrhosis and chronic alcohol use documented on previous note  Patient on propranolol and Aldactone    Holding aldactone after discussion with Nephrology this AM  was already given dose today but will hold from now on until improvement in sodium levels       History of subdural hematoma  Assessment & Plan  History of subdural hematoma, fall  No new changes on head imaging today  Monitor closely  notify SLIM immediately for any neurologic changes     Uncomplicated opioid use  Assessment & Plan  PMED checked  Continue oxycodone as needed  VTE Pharmacologic Prophylaxis:   Pharmacologic: Pharmacologic VTE Prophylaxis contraindicated due to subdural hematoma   Mechanical VTE Prophylaxis in Place: Yes    Patient Centered Rounds: I have performed bedside rounds with nursing staff today  Discussions with Specialists or Other Care Team Provider: Nephrology, Dr Dalia Tom     Education and Discussions with Family / Patient: patient, spoke with  on phone     Time Spent for Care: 20 minutes  More than 50% of total time spent on counseling and coordination of care as described above  Current Length of Stay: 1 day(s)    Current Patient Status: Inpatient   Certification Statement: The patient will continue to require additional inpatient hospital stay due to hyponatremia, encephalopathy     Discharge Plan: pending clinical course     Code Status: Level 1 - Full Code      Subjective:   Patient is alert and oriented to person, place, month  She is very slow to respond to questions  She denies pain  Limited ROS due to encephalopathy  Objective:     Vitals:   Temp (24hrs), Av 9 °F (36 1 °C), Min:96 6 °F (35 9 °C), Max:97 °F (36 1 °C)    Temp:  [96 6 °F (35 9 °C)-97 °F (36 1 °C)] 97 °F (36 1 °C)  HR:  [70-77] 73  Resp:  [18-20] 18  BP: (112-164)/() 118/59  SpO2:  [97 %-98 %] 97 %  Body mass index is 39 31 kg/m²  Input and Output Summary (last 24 hours):        Intake/Output Summary (Last 24 hours) at 2020 1406  Last data filed at 2020 0643  Gross per 24 hour   Intake 250 ml   Output 1700 ml   Net -1450 ml Physical Exam:     Physical Exam   Constitutional: No distress  HENT:   Head: Normocephalic  Eyes: Conjunctivae are normal    Neck: Neck supple  Cardiovascular: Normal rate  An irregularly irregular rhythm present  Pulmonary/Chest: Effort normal and breath sounds normal    Abdominal: Soft  Bowel sounds are normal  She exhibits distension  There is no tenderness  There is no rebound and no guarding  Genitourinary:   Genitourinary Comments: Davies cath in place   Musculoskeletal: She exhibits deformity (RLE knee immobolizer present )  Neurological: She is alert  No focal neuro deficits notes, bilaterally hand  symmetric, globally weak throughout, performs finger to nose, is very slow to respond to questions, is oriented to person,place, month  Skin: Skin is warm  Psychiatric: She is slowed  Cognition and memory are impaired  Nursing note and vitals reviewed  Additional Data:     Labs:    Results from last 7 days   Lab Units 02/28/20  0632   WBC Thousand/uL 7 62   HEMOGLOBIN g/dL 12 8   HEMATOCRIT % 38 9   PLATELETS Thousands/uL 144*   NEUTROS PCT % 71   LYMPHS PCT % 15   MONOS PCT % 10   EOS PCT % 2     Results from last 7 days   Lab Units 02/28/20  1044 02/28/20  0632   SODIUM mmol/L 119* 121*   POTASSIUM mmol/L 4 1 4 4   CHLORIDE mmol/L 82* 84*   CO2 mmol/L 27 31   BUN mg/dL 11 11   CREATININE mg/dL 0 77 0 81   ANION GAP mmol/L 10 6   CALCIUM mg/dL 8 9 9 0   ALBUMIN g/dL  --  2 7*   TOTAL BILIRUBIN mg/dL  --  5 50*   ALK PHOS U/L  --  392*   ALT U/L  --  71   AST U/L  --  224*   GLUCOSE RANDOM mg/dL 169* 139     Results from last 7 days   Lab Units 02/27/20  1254   INR  1 10     Results from last 7 days   Lab Units 02/27/20  1141   POC GLUCOSE mg/dl 176*                   * I Have Reviewed All Lab Data Listed Above  * Additional Pertinent Lab Tests Reviewed:  All Labs Within Last 24 Hours Reviewed    Imaging:    Imaging Reports Reviewed Today Include:   Imaging Personally Reviewed by Myself Includes:  reviewed    Recent Cultures (last 7 days):           Last 24 Hours Medication List:     Current Facility-Administered Medications:  amLODIPine 5 mg Oral Daily Jose Alberto MD    busPIRone 5 mg Oral TID Jose Alberto MD    docusate sodium 100 mg Oral Daily Jose Alberto MD    famotidine 20 mg Oral PRN Jose Alberto MD    gabapentin 100 mg Oral TID PRN Jose Alberto MD    oxyCODONE 5 mg Oral Q4H PRN Jose Alberto MD    propranolol 10 mg Oral BID Jose Alberto MD    senna 1 tablet Oral HS Jose Alberto MD    sodium chloride 40 mL/hr Intravenous Continuous Tanya Alonso MD Last Rate: 40 mL/hr (02/28/20 1145)   tamsulosin 0 4 mg Oral Daily With Sandy Fan MD         Today, Patient Was Seen By: Mari Sullivan PA-C    ** Please Note: Dictation voice to text software may have been used in the creation of this document   **

## 2020-02-28 NOTE — ASSESSMENT & PLAN NOTE
Presents with altered mental status most likely secondary to metabolic encephalopathy due to hyponatremia  History of recent subdural and intracranial hematoma with no new changes on current imaging    Neuro checks   Correcting electrolytes in conjunction with Nephrology

## 2020-02-29 LAB
ANION GAP SERPL CALCULATED.3IONS-SCNC: 4 MMOL/L (ref 4–13)
ANION GAP SERPL CALCULATED.3IONS-SCNC: 5 MMOL/L (ref 4–13)
ANION GAP SERPL CALCULATED.3IONS-SCNC: 6 MMOL/L (ref 4–13)
BUN SERPL-MCNC: 11 MG/DL (ref 5–25)
BUN SERPL-MCNC: 12 MG/DL (ref 5–25)
BUN SERPL-MCNC: 13 MG/DL (ref 5–25)
BUN SERPL-MCNC: 13 MG/DL (ref 5–25)
BUN SERPL-MCNC: 14 MG/DL (ref 5–25)
CALCIUM SERPL-MCNC: 8.4 MG/DL (ref 8.3–10.1)
CALCIUM SERPL-MCNC: 8.5 MG/DL (ref 8.3–10.1)
CALCIUM SERPL-MCNC: 8.6 MG/DL (ref 8.3–10.1)
CALCIUM SERPL-MCNC: 8.6 MG/DL (ref 8.3–10.1)
CALCIUM SERPL-MCNC: 8.8 MG/DL (ref 8.3–10.1)
CHLORIDE SERPL-SCNC: 86 MMOL/L (ref 100–108)
CHLORIDE SERPL-SCNC: 87 MMOL/L (ref 100–108)
CHLORIDE SERPL-SCNC: 87 MMOL/L (ref 100–108)
CO2 SERPL-SCNC: 29 MMOL/L (ref 21–32)
CO2 SERPL-SCNC: 30 MMOL/L (ref 21–32)
CREAT SERPL-MCNC: 0.76 MG/DL (ref 0.6–1.3)
CREAT SERPL-MCNC: 0.79 MG/DL (ref 0.6–1.3)
CREAT SERPL-MCNC: 0.8 MG/DL (ref 0.6–1.3)
CREAT SERPL-MCNC: 0.83 MG/DL (ref 0.6–1.3)
CREAT SERPL-MCNC: 0.89 MG/DL (ref 0.6–1.3)
GFR SERPL CREATININE-BSD FRML MDRD: 61 ML/MIN/1.73SQ M
GFR SERPL CREATININE-BSD FRML MDRD: 66 ML/MIN/1.73SQ M
GFR SERPL CREATININE-BSD FRML MDRD: 69 ML/MIN/1.73SQ M
GFR SERPL CREATININE-BSD FRML MDRD: 70 ML/MIN/1.73SQ M
GFR SERPL CREATININE-BSD FRML MDRD: 74 ML/MIN/1.73SQ M
GLUCOSE SERPL-MCNC: 162 MG/DL (ref 65–140)
GLUCOSE SERPL-MCNC: 184 MG/DL (ref 65–140)
GLUCOSE SERPL-MCNC: 199 MG/DL (ref 65–140)
GLUCOSE SERPL-MCNC: 200 MG/DL (ref 65–140)
GLUCOSE SERPL-MCNC: 212 MG/DL (ref 65–140)
POTASSIUM SERPL-SCNC: 3.9 MMOL/L (ref 3.5–5.3)
POTASSIUM SERPL-SCNC: 4 MMOL/L (ref 3.5–5.3)
POTASSIUM SERPL-SCNC: 4.1 MMOL/L (ref 3.5–5.3)
POTASSIUM SERPL-SCNC: 4.1 MMOL/L (ref 3.5–5.3)
POTASSIUM SERPL-SCNC: 4.3 MMOL/L (ref 3.5–5.3)
SODIUM SERPL-SCNC: 120 MMOL/L (ref 136–145)
SODIUM SERPL-SCNC: 121 MMOL/L (ref 136–145)
SODIUM SERPL-SCNC: 122 MMOL/L (ref 136–145)

## 2020-02-29 PROCEDURE — 99232 SBSQ HOSP IP/OBS MODERATE 35: CPT | Performed by: PHYSICIAN ASSISTANT

## 2020-02-29 PROCEDURE — 99233 SBSQ HOSP IP/OBS HIGH 50: CPT | Performed by: INTERNAL MEDICINE

## 2020-02-29 PROCEDURE — 80048 BASIC METABOLIC PNL TOTAL CA: CPT | Performed by: INTERNAL MEDICINE

## 2020-02-29 PROCEDURE — NC001 PR NO CHARGE: Performed by: INTERNAL MEDICINE

## 2020-02-29 RX ORDER — SODIUM CHLORIDE 1000 MG
1 TABLET, SOLUBLE MISCELLANEOUS
Status: DISCONTINUED | OUTPATIENT
Start: 2020-02-29 | End: 2020-03-01

## 2020-02-29 RX ADMIN — PROPRANOLOL HYDROCHLORIDE 10 MG: 20 TABLET ORAL at 17:05

## 2020-02-29 RX ADMIN — TAMSULOSIN HYDROCHLORIDE 0.4 MG: 0.4 CAPSULE ORAL at 16:43

## 2020-02-29 RX ADMIN — BUSPIRONE HYDROCHLORIDE 5 MG: 10 TABLET ORAL at 21:03

## 2020-02-29 RX ADMIN — SODIUM CHLORIDE 60 ML/HR: 0.9 INJECTION, SOLUTION INTRAVENOUS at 12:20

## 2020-02-29 RX ADMIN — OXYCODONE HYDROCHLORIDE 5 MG: 5 TABLET ORAL at 23:33

## 2020-02-29 RX ADMIN — OXYCODONE HYDROCHLORIDE 5 MG: 5 TABLET ORAL at 05:32

## 2020-02-29 RX ADMIN — BUSPIRONE HYDROCHLORIDE 5 MG: 10 TABLET ORAL at 16:43

## 2020-02-29 RX ADMIN — BUSPIRONE HYDROCHLORIDE 5 MG: 10 TABLET ORAL at 09:50

## 2020-02-29 RX ADMIN — PROPRANOLOL HYDROCHLORIDE 10 MG: 20 TABLET ORAL at 09:50

## 2020-02-29 RX ADMIN — AMLODIPINE BESYLATE 5 MG: 5 TABLET ORAL at 09:50

## 2020-02-29 RX ADMIN — OXYCODONE HYDROCHLORIDE 5 MG: 5 TABLET ORAL at 19:38

## 2020-02-29 RX ADMIN — OXYCODONE HYDROCHLORIDE 5 MG: 5 TABLET ORAL at 02:04

## 2020-02-29 RX ADMIN — GABAPENTIN 100 MG: 100 CAPSULE ORAL at 19:40

## 2020-02-29 RX ADMIN — SODIUM CHLORIDE TAB 1 GM 1 G: 1 TAB at 21:02

## 2020-02-29 RX ADMIN — STANDARDIZED SENNA CONCENTRATE 8.6 MG: 8.6 TABLET ORAL at 21:03

## 2020-02-29 RX ADMIN — DOCUSATE SODIUM 100 MG: 100 CAPSULE, LIQUID FILLED ORAL at 09:50

## 2020-02-29 NOTE — ASSESSMENT & PLAN NOTE
History of cirrhosis and chronic alcohol use documented on previous note  Patient on propranolol and Aldactone    Holding aldactone due to hyponatremia

## 2020-02-29 NOTE — PROGRESS NOTES
Progress Note - Danette Lazaro 1940, 78 y o  female MRN: [id number]  Unit/Bed#: E2 -01 Encounter: [id number]  Primary Care Provider: Mena Lizama MD   Date and time admitted to hospital: 2/27/2020 11:35 AM    * Encephalopathy  Assessment & Plan  Presents with altered mental status most likely secondary to metabolic encephalopathy due to hyponatremia  History of recent subdural and intracranial hematoma with no new changes on current imaging  Neuro checks   Correcting electrolytes in conjunction with Nephrology as below         Hyponatremia  Assessment & Plan  Presents with significant hyponatremia of 116 associated with confusion  Continue q4 BMP checks   Hold lasix and aldactone   Appreciate Nephrology consult and recommendations   Started on gentle IVF 40 cc/hour   Follow up with Nephrology recommendations   Na still 120       Closed fracture of right distal femur [facility])  Assessment & Plan  History of right distal femur fracture which was managed conservatively  Repeat x-ray shows worsening displacement of the fracture ends  Orthopedic consult  Continued recommendations are non-surgical intervention   NWB RLE with knee immobilizer     A-fib ([address] )  Assessment & Plan  History of Afib, previously on aspirin  Patient previously refused Coumadin as per Cardiology notes  Aspirin on hold since subdural hematoma episode  Continue to monitor  Chronic diastolic (congestive) heart failure (HCC)  Assessment & Plan  Wt Readings from Last 3 Encounters:   02/29/20 97 8 kg (215 lb 9 8 oz)   02/19/20 89 7 kg (197 lb 12 oz)   02/18/20 95 7 kg (210 lb 15 7 oz)     Monitor volume status closely on IVF hydration  Lasix on hold due to hyponatremia  Continue to monitor weight daily  Chronic indwelling Davies catheter  Assessment & Plan  Monitor I and O    History of cirrhosis  Assessment & Plan  History of cirrhosis and chronic alcohol use documented on previous note  Patient on propranolol and Aldactone  Holding aldactone due to hyponatremia       History of subdural hematoma  Assessment & Plan  History of subdural hematoma, fall  No new changes on head imaging today  Monitor closely  notify SLIM immediately for any neurologic changes     Uncomplicated opioid use  Assessment & Plan  PMED checked  Continue oxycodone as needed  Limit narcotics in setting of encephalopathy       VTE Pharmacologic Prophylaxis:   Pharmacologic: Pharmacologic VTE Prophylaxis contraindicated due to subdural hematoma   Mechanical VTE Prophylaxis in Place: Yes    Patient Centered Rounds: I have performed bedside rounds with nursing staff today  Discussions with Specialists or Other Care Team Provider: will touch base with Nephrology    Education and Discussions with Family / Patient: left message for      Time Spent for Care: 20 minutes  More than 50% of total time spent on counseling and coordination of care as described above  Current Length of Stay: 2 day(s)    Current Patient Status: Inpatient   Certification Statement: The patient will continue to require additional inpatient hospital stay due to encephalopathy, hyponatremia     Discharge Plan: pending clinical improvement/course     Code Status: Level 1 - Full Code      Subjective:   Patient limited ROS due to encephalopathy  Slow to respond to questions  No pain  Objective:     Vitals:   Temp (24hrs), Av °F (36 7 °C), Min:97 °F (36 1 °C), Max:98 9 °F (37 2 °C)    Temp:  [97 °F (36 1 °C)-98 9 °F (37 2 °C)] 97 6 °F (36 4 °C)  HR:  [66-75] 75  Resp:  [18] 18  BP: (118-140)/(59-76) 128/76  SpO2:  [97 %-100 %] 100 %  Body mass index is 39 44 kg/m²  Input and Output Summary (last 24 hours): Intake/Output Summary (Last 24 hours) at 2020 0817  Last data filed at 2020 0354  Gross per 24 hour   Intake    Output 480 ml   Net -480 ml       Physical Exam:     Physical Exam   Constitutional: No distress  HENT:   Head: Normocephalic     Eyes: Pupils are equal, round, and reactive to light  Conjunctivae are normal    Neck: Neck supple  Cardiovascular: Normal rate and regular rhythm  Pulmonary/Chest: Effort normal and breath sounds normal    Abdominal: Soft  Bowel sounds are normal  She exhibits distension  There is no tenderness  There is no guarding  Musculoskeletal: She exhibits deformity  Tenderness: right knee immobilizer    Neurological: She is alert  Slow to respond to questions with delay in answering, oriented to person, place, month   Psychiatric: She is slowed  Cognition and memory are impaired  Nursing note and vitals reviewed  Additional Data:     Labs:    Results from last 7 days   Lab Units 02/28/20  0632   WBC Thousand/uL 7 62   HEMOGLOBIN g/dL 12 8   HEMATOCRIT % 38 9   PLATELETS Thousands/uL 144*   NEUTROS PCT % 71   LYMPHS PCT % 15   MONOS PCT % 10   EOS PCT % 2     Results from last 7 days   Lab Units 02/29/20  0350  02/28/20  0632   SODIUM mmol/L 120*   < > 121*   POTASSIUM mmol/L 3 9   < > 4 4   CHLORIDE mmol/L 87*   < > 84*   CO2 mmol/L 29   < > 31   BUN mg/dL 13   < > 11   CREATININE mg/dL 0 76   < > 0 81   ANION GAP mmol/L 4   < > 6   CALCIUM mg/dL 8 4   < > 9 0   ALBUMIN g/dL  --   --  2 7*   TOTAL BILIRUBIN mg/dL  --   --  5 50*   ALK PHOS U/L  --   --  392*   ALT U/L  --   --  71   AST U/L  --   --  224*   GLUCOSE RANDOM mg/dL 162*   < > 139    < > = values in this interval not displayed  Results from last 7 days   Lab Units 02/27/20  1254   INR  1 10     Results from last 7 days   Lab Units 02/27/20  1141   POC GLUCOSE mg/dl 176*                   * I Have Reviewed All Lab Data Listed Above  * Additional Pertinent Lab Tests Reviewed:  All Labs Within Last 24 Hours Reviewed    Imaging:    Imaging Reports Reviewed Today Include:   Imaging Personally Reviewed by Myself Includes:  reviewed    Recent Cultures (last 7 days):           Last 24 Hours Medication List:     Current Facility-Administered Medications:  amLODIPine 5 mg Oral Daily Nisreen Benitez MD    busPIRone 5 mg Oral TID Nisreen Benitez MD    docusate sodium 100 mg Oral Daily Nisreen Benitez MD    famotidine 20 mg Oral PRN Nisreen Benitez MD    gabapentin 100 mg Oral TID PRN Nisreen Benitez MD    oxyCODONE 5 mg Oral Q4H PRN Nisreen Benitez MD    propranolol 10 mg Oral BID Nisreen Benitez MD    senna 1 tablet Oral HS Nisreen Benitez MD    sodium chloride 40 mL/hr Intravenous Continuous Jayde Lee MD Last Rate: 40 mL/hr (02/28/20 1145)   tamsulosin 0 4 mg Oral Daily With Baron Lida MD         Today, Patient Was Seen By: Leelee Hung PA-C    ** Please Note: Dictation voice to text software may have been used in the creation of this document   **

## 2020-02-29 NOTE — ASSESSMENT & PLAN NOTE
Wt Readings from Last 3 Encounters:   02/29/20 97 8 kg (215 lb 9 8 oz)   02/19/20 89 7 kg (197 lb 12 oz)   02/18/20 95 7 kg (210 lb 15 7 oz)     Monitor volume status closely on IVF hydration  Lasix on hold due to hyponatremia  Continue to monitor weight daily

## 2020-02-29 NOTE — ASSESSMENT & PLAN NOTE
History of Afib, previously on aspirin  Patient previously refused Coumadin as per Cardiology notes  Aspirin on hold since subdural hematoma episode  Continue to monitor

## 2020-02-29 NOTE — ASSESSMENT & PLAN NOTE
Presents with significant hyponatremia of 116 associated with confusion     Continue q4 BMP checks   Hold lasix and aldactone   Appreciate Nephrology consult and recommendations   Started on gentle IVF 40 cc/hour   Follow up with Nephrology recommendations   Na still 120

## 2020-02-29 NOTE — ASSESSMENT & PLAN NOTE
History of right distal femur fracture which was managed conservatively  Repeat x-ray shows worsening displacement of the fracture ends  Orthopedic consult   Continued recommendations are non-surgical intervention   NWB RLE with knee immobilizer

## 2020-02-29 NOTE — PLAN OF CARE
Problem: PAIN - ADULT  Goal: Verbalizes/displays adequate comfort level or baseline comfort level  Description  Interventions:  - Encourage patient to monitor pain and request assistance  - Assess pain using appropriate pain scale  - Administer analgesics based on type and severity of pain and evaluate response  - Implement non-pharmacological measures as appropriate and evaluate response  - Consider cultural and social influences on pain and pain management  - Notify physician/advanced practitioner if interventions unsuccessful or patient reports new pain  Outcome: Progressing     Problem: INFECTION - ADULT  Goal: Absence or prevention of progression during hospitalization  Description  INTERVENTIONS:  - Assess and monitor for signs and symptoms of infection  - Monitor lab/diagnostic results  - Monitor all insertion sites, i e  indwelling lines, tubes, and drains  - Monitor endotracheal if appropriate and nasal secretions for changes in amount and color  - [place] appropriate cooling/warming therapies per order  - Administer medications as ordered  - Instruct and encourage patient and family to use good hand hygiene technique  - Identify and instruct in appropriate isolation precautions for identified infection/condition  Outcome: Progressing     Problem: SAFETY ADULT  Goal: Patient will remain free of falls  Description  INTERVENTIONS:  - Assess patient frequently for physical needs  -  Identify cognitive and physical deficits and behaviors that affect risk of falls    -  [place] fall precautions as indicated by assessment   - Educate patient/family on patient safety including physical limitations  - Instruct patient to call for assistance with activity based on assessment  - Modify environment to reduce risk of injury  - Consider OT/PT consult to assist with strengthening/mobility  Outcome: Progressing  Goal: Maintain or return to baseline ADL function  Description  INTERVENTIONS:  -  Assess patient's ability to carry out ADLs; assess patient's baseline for ADL function and identify physical deficits which impact ability to perform ADLs (bathing, care of mouth/teeth, toileting, grooming, dressing, etc )  - Assess/evaluate cause of self-care deficits   - Assess range of motion  - Assess patient's mobility; develop plan if impaired  - Assess patient's need for assistive devices and provide as appropriate  - Encourage maximum independence but intervene and supervise when necessary  - Involve family in performance of ADLs  - Assess for home care needs following discharge   - Consider OT consult to assist with ADL evaluation and planning for discharge  - Provide patient education as appropriate  Outcome: Progressing  Goal: Maintain or return mobility status to optimal level  Description  INTERVENTIONS:  - Assess patient's baseline mobility status (ambulation, transfers, stairs, etc )    - Identify cognitive and physical deficits and behaviors that affect mobility  - Identify mobility aids required to assist with transfers and/or ambulation (gait belt, sit-to-stand, lift, walker, cane, etc )  - [place] fall precautions as indicated by assessment  - Record patient progress and toleration of activity level on Mobility SBAR; progress patient to next Phase/Stage  - Instruct patient to call for assistance with activity based on assessment  - Consider rehabilitation consult to assist with strengthening/weightbearing, etc   Outcome: Progressing     Problem: DISCHARGE PLANNING  Goal: Discharge to home or other facility with appropriate resources  Description  INTERVENTIONS:  - Identify barriers to discharge w/patient and caregiver  - Arrange for needed discharge resources and transportation as appropriate  - Identify discharge learning needs (meds, wound care, etc )  - Arrange for interpretive services to assist at discharge as needed  - Refer to Case Management Department for coordinating discharge planning if the patient needs post-hospital services based on physician/advanced practitioner order or complex needs related to functional status, cognitive ability, or social support system  Outcome: Progressing     Problem: Knowledge Deficit  Goal: Patient/family/caregiver demonstrates understanding of disease process, treatment plan, medications, and discharge instructions  Description  Complete learning assessment and assess knowledge base  Interventions:  - Provide teaching at level of understanding  - Provide teaching via preferred learning methods  Outcome: Progressing     Problem: Prexisting or High Potential for Compromised Skin Integrity  Goal: Skin integrity is maintained or improved  Description  INTERVENTIONS:  - Identify patients at risk for skin breakdown  - Assess and monitor skin integrity  - Assess and monitor nutrition and hydration status  - Monitor labs   - Assess for incontinence   - Turn and reposition patient  - Assist with mobility/ambulation  - Relieve pressure over bony prominences  - Avoid friction and shearing  - Provide appropriate hygiene as needed including keeping skin clean and dry  - Evaluate need for skin moisturizer/barrier cream  - Collaborate with interdisciplinary team   - Patient/family teaching  - Consider wound care consult   Outcome: Progressing     Problem: Potential for Falls  Goal: Patient will remain free of falls  Description  INTERVENTIONS:  - Assess patient frequently for physical needs  -  Identify cognitive and physical deficits and behaviors that affect risk of falls    -  [place] fall precautions as indicated by assessment   - Educate patient/family on patient safety including physical limitations  - Instruct patient to call for assistance with activity based on assessment  - Modify environment to reduce risk of injury  - Consider OT/PT consult to assist with strengthening/mobility  Outcome: Progressing     Problem: METABOLIC, FLUID AND ELECTROLYTES - ADULT  Goal: Electrolytes maintained within normal limits  Description  INTERVENTIONS:  - Monitor labs and assess patient for signs and symptoms of electrolyte imbalances  - Administer electrolyte replacement as ordered  - Monitor response to electrolyte replacements, including repeat lab results as appropriate  - Instruct patient on fluid and nutrition as appropriate  Outcome: Progressing  Goal: Fluid balance maintained  Description  INTERVENTIONS:  - Monitor labs   - Monitor I/O and WT  - Instruct patient on fluid and nutrition as appropriate  - Assess for signs & symptoms of volume excess or deficit  Outcome: Progressing     Problem: SKIN/TISSUE INTEGRITY - ADULT  Goal: Skin integrity remains intact  Description  INTERVENTIONS  - Identify patients at risk for skin breakdown  - Assess and monitor skin integrity  - Assess and monitor nutrition and hydration status  - Monitor labs (i e  albumin)  - Assess for incontinence   - Turn and reposition patient  - Assist with mobility/ambulation  - Relieve pressure over bony prominences  - Avoid friction and shearing  - Provide appropriate hygiene as needed including keeping skin clean and dry  - Evaluate need for skin moisturizer/barrier cream  - Collaborate with interdisciplinary team (i e  Nutrition, Rehabilitation, etc )   - Patient/family teaching  Outcome: Progressing  Goal: Incision(s), wounds(s) or drain site(s) healing without S/S of infection  Description  INTERVENTIONS  - Assess and document risk factors for skin impairment   - Assess and document dressing, incision, wound bed, drain sites and surrounding tissue  - Consider nutrition services referral as needed  - Oral mucous membranes remain intact  - Provide patient/ family education  Outcome: Progressing  Goal: Oral mucous membranes remain intact  Description  INTERVENTIONS  - Assess oral mucosa and hygiene practices  - Implement preventative oral hygiene regimen  - Implement oral medicated treatments as ordered  - Initiate Nutrition services referral as needed  Outcome: Progressing     Problem: MUSCULOSKELETAL - ADULT  Goal: Maintain or return mobility to safest level of function  Description  INTERVENTIONS:  - Assess patient's ability to carry out ADLs; assess patient's baseline for ADL function and identify physical deficits which impact ability to perform ADLs (bathing, care of mouth/teeth, toileting, grooming, dressing, etc )  - Assess/evaluate cause of self-care deficits   - Assess range of motion  - Assess patient's mobility  - Assess patient's need for assistive devices and provide as appropriate  - Encourage maximum independence but intervene and supervise when necessary  - Involve family in performance of ADLs  - Assess for home care needs following discharge   - Consider OT consult to assist with ADL evaluation and planning for discharge  - Provide patient education as appropriate  Outcome: Progressing  Goal: Maintain proper alignment of affected body part  Description  INTERVENTIONS:  - Support, maintain and protect limb and body alignment  - Provide patient/ family with appropriate education  Outcome: Progressing     Problem: DECISION MAKING  Goal: Pt/Family able to effectively weigh alternatives and participate in decision making related to treatment and care  Description  INTERVENTIONS:  - Identify decision maker  - Determine when there are differences among patient's view, family's view, and healthcare provider's view of patient condition and care goals  - Facilitate patient/family articulation of goals for care  - Help patient/family identify pros/cons of alternative solutions  - Provide information as requested by patient/family  - Respect patient/family rights related to privacy and sharing information   - Serve as a liaison between patient, family and health care team  - Initiate consults as appropriate (Ethics Team, Palliative Care, Family Care Conference, etc )  Outcome: Progressing     Problem: CONFUSION/THOUGHT DISTURBANCE  Goal: Thought disturbances (confusion, delirium, depression, dementia or psychosis) are managed to maintain or return to baseline mental status and functional level  Description  INTERVENTIONS:  - Assess for possible contributors to  thought disturbance, including but not limited to medications, infection, impaired vision or hearing, underlying metabolic abnormalities, dehydration, respiratory compromise,  psychiatric diagnoses and notify attending PHYSICAN/AP  - Monitor and intervene to maintain adequate nutrition, hydration, elimination, sleep and activity  - Decrease environmental stimuli, including noise as appropriate  - Provide frequent contacts to provide refocusing, direction and reassurance as needed  Approach patient calmly with eye contact and at their level    - [place] high risk fall precautions, aspiration precautions and other safety measures, as indicated  - If delirium suspected, notify physician/AP of change in condition and request immediate in-person evaluation  - Pursue consults as appropriate including Geriatric (campus dependent), OT for cognitive evaluation/activity planning, psychiatric, pastoral care, etc   Outcome: Progressing

## 2020-02-29 NOTE — ASSESSMENT & PLAN NOTE
Presents with altered mental status most likely secondary to metabolic encephalopathy due to hyponatremia  History of recent subdural and intracranial hematoma with no new changes on current imaging    Neuro checks   Correcting electrolytes in conjunction with Nephrology as below

## 2020-02-29 NOTE — PROGRESS NOTES
NEPHROLOGY PROGRESS NOTE   Martin Liter 78 y o  female MRN: [id number]  Unit/Bed#: E2 -01 Encounter: [id number]      ASSESSMENT/PLAN:  1  Hyponatremia:  Multifactorial with hypovolemia, recent use of DDAVP last hospitalization, cirrhosis and possibly underlying SIADH related to chronic pain and recent subdural/parenchymal hematoma   · Sodium 116 on admission and improved to 121 yesterday with 500cc normal saline bolus   · Currently on normal saline at 40cc/h but relatively unchanged (corrects to around 123 for hyperglycemia)   · Workup: serum osm 270, urine sodium <5, urine osm 516, uric acid 5 3, TSH normal   · Continue to hold spironolactone and lasix   · Increase saline to 60cc/h as still seems dry on exam (although low urine sodium could be related to cirrhosis)  · Continue 1200cc oral fluid restriction  · Continue q4h BMP and if no improvement would consider changing to 1 8% saline   2  R femur fracture: conservative management   3  Hypertension:  Blood pressure well controlled and would continue amlodipine  4  History of cirrhosis  5  Recent subdural hematoma        SUBJECTIVE:  Patient alert and oriented to person but not place or time (this is stable per nurse and per chart)  Her appetite is poor and she is not eating well  No chest pain, shortness of breath, nausea, vomiting or diarrhea      OBJECTIVE:  Current Weight: Weight - Scale: 97 8 kg (215 lb 9 8 oz)  Vitals:    02/29/20 0700   BP: 128/76   Pulse: 75   Resp: 18   Temp: 97 6 °F (36 4 °C)   SpO2: 100%       Intake/Output Summary (Last 24 hours) at 2/29/2020 3058  Last data filed at 2/29/2020 0354  Gross per 24 hour   Intake    Output 480 ml   Net -480 ml       General:  No acute distress  Skin:  No rash  Eyes:  Sclerae anicteric  ENT:  Moist mucous membranes  Neck:  Trachea midline with no JVD  Chest:  Clear to auscultation bilaterally  CVS:  Regular rate and rhythm  Abdomen:  Soft, nontender, nondistended  Extremities:  + RLE edema, no LLE edema   Neuro:  Awake and alert but only oriented x 1  Psych:  Appropriate affect      Medications:  Scheduled Meds:  Current Facility-Administered Medications:  amLODIPine 5 mg Oral Daily Olga Kendall MD    busPIRone 5 mg Oral TID Olga Kendall MD    docusate sodium 100 mg Oral Daily Olga Kendall MD    famotidine 20 mg Oral PRN Olga Kendall MD    gabapentin 100 mg Oral TID PRN Olga Kendall MD    oxyCODONE 5 mg Oral Q4H PRN Olga Kendall MD    propranolol 10 mg Oral BID Olga Kendall MD    senna 1 tablet Oral HS Olga Kendall MD    sodium chloride 40 mL/hr Intravenous Continuous Analisa Hassan MD Last Rate: 40 mL/hr (02/28/20 1145)   tamsulosin 0 4 mg Oral Daily With Nyasia Cole MD        PRN Meds: famotidine    gabapentin    oxyCODONE    Continuous Infusions:  sodium chloride 40 mL/hr Last Rate: 40 mL/hr (02/28/20 1145)       Laboratory Results:  Results from last 7 days   Lab Units 02/29/20  0853 02/29/20  0350 02/28/20  2354 02/28/20  2000 02/28/20  1724 02/28/20  1044 02/28/20  0632  02/27/20  1145   WBC Thousand/uL  --   --   --   --   --   --  7 62  --  9 28   HEMOGLOBIN g/dL  --   --   --   --   --   --  12 8  --  12 0   HEMATOCRIT %  --   --   --   --   --   --  38 9  --  35 8   PLATELETS Thousands/uL  --   --   --   --   --   --  144*  --  131*   SODIUM mmol/L 121* 120* 120* 119* 120* 119* 121*   < > 116*   POTASSIUM mmol/L 4 1 3 9 4 1 4 0 4 1 4 1 4 4   < > 5 7*   CHLORIDE mmol/L 86* 87* 86* 86* 84* 82* 84*   < > 83*   CO2 mmol/L 30 29 29 29 29 27 31   < > 28   BUN mg/dL 13 13 14 14 13 11 11   < > 14   CREATININE mg/dL 0 89 0 76 0 83 0 88 0 84 0 77 0 81   < > 0 77   CALCIUM mg/dL 8 6 8 4 8 8 8 8 8 7 8 9 9 0   < > 8 6   MAGNESIUM mg/dL  --   --   --   --   --   --  1 8  --   --     < > = values in this interval not displayed

## 2020-03-01 LAB
ANION GAP SERPL CALCULATED.3IONS-SCNC: 3 MMOL/L (ref 4–13)
ANION GAP SERPL CALCULATED.3IONS-SCNC: 5 MMOL/L (ref 4–13)
ANION GAP SERPL CALCULATED.3IONS-SCNC: 7 MMOL/L (ref 4–13)
BUN SERPL-MCNC: 11 MG/DL (ref 5–25)
BUN SERPL-MCNC: 12 MG/DL (ref 5–25)
BUN SERPL-MCNC: 12 MG/DL (ref 5–25)
CALCIUM SERPL-MCNC: 8.6 MG/DL (ref 8.3–10.1)
CALCIUM SERPL-MCNC: 8.7 MG/DL (ref 8.3–10.1)
CALCIUM SERPL-MCNC: 8.8 MG/DL (ref 8.3–10.1)
CHLORIDE SERPL-SCNC: 87 MMOL/L (ref 100–108)
CHLORIDE SERPL-SCNC: 87 MMOL/L (ref 100–108)
CHLORIDE SERPL-SCNC: 89 MMOL/L (ref 100–108)
CO2 SERPL-SCNC: 28 MMOL/L (ref 21–32)
CO2 SERPL-SCNC: 30 MMOL/L (ref 21–32)
CO2 SERPL-SCNC: 30 MMOL/L (ref 21–32)
CREAT SERPL-MCNC: 0.69 MG/DL (ref 0.6–1.3)
CREAT SERPL-MCNC: 0.74 MG/DL (ref 0.6–1.3)
CREAT SERPL-MCNC: 0.8 MG/DL (ref 0.6–1.3)
GFR SERPL CREATININE-BSD FRML MDRD: 69 ML/MIN/1.73SQ M
GFR SERPL CREATININE-BSD FRML MDRD: 76 ML/MIN/1.73SQ M
GFR SERPL CREATININE-BSD FRML MDRD: 82 ML/MIN/1.73SQ M
GLUCOSE SERPL-MCNC: 155 MG/DL (ref 65–140)
GLUCOSE SERPL-MCNC: 158 MG/DL (ref 65–140)
GLUCOSE SERPL-MCNC: 210 MG/DL (ref 65–140)
MAGNESIUM SERPL-MCNC: 1.8 MG/DL (ref 1.6–2.6)
PHOSPHATE SERPL-MCNC: 3.1 MG/DL (ref 2.3–4.1)
POTASSIUM SERPL-SCNC: 4.1 MMOL/L (ref 3.5–5.3)
POTASSIUM SERPL-SCNC: 4.3 MMOL/L (ref 3.5–5.3)
POTASSIUM SERPL-SCNC: 4.7 MMOL/L (ref 3.5–5.3)
SODIUM SERPL-SCNC: 122 MMOL/L (ref 136–145)

## 2020-03-01 PROCEDURE — 84100 ASSAY OF PHOSPHORUS: CPT | Performed by: INTERNAL MEDICINE

## 2020-03-01 PROCEDURE — 80048 BASIC METABOLIC PNL TOTAL CA: CPT | Performed by: INTERNAL MEDICINE

## 2020-03-01 PROCEDURE — 99233 SBSQ HOSP IP/OBS HIGH 50: CPT | Performed by: INTERNAL MEDICINE

## 2020-03-01 PROCEDURE — 80048 BASIC METABOLIC PNL TOTAL CA: CPT | Performed by: PHYSICIAN ASSISTANT

## 2020-03-01 PROCEDURE — 83735 ASSAY OF MAGNESIUM: CPT | Performed by: INTERNAL MEDICINE

## 2020-03-01 RX ORDER — SODIUM CHLORIDE 1000 MG
2 TABLET, SOLUBLE MISCELLANEOUS
Status: DISCONTINUED | OUTPATIENT
Start: 2020-03-01 | End: 2020-03-02

## 2020-03-01 RX ORDER — FUROSEMIDE 10 MG/ML
20 INJECTION INTRAMUSCULAR; INTRAVENOUS ONCE
Status: COMPLETED | OUTPATIENT
Start: 2020-03-01 | End: 2020-03-01

## 2020-03-01 RX ADMIN — PROPRANOLOL HYDROCHLORIDE 10 MG: 20 TABLET ORAL at 08:26

## 2020-03-01 RX ADMIN — STANDARDIZED SENNA CONCENTRATE 8.6 MG: 8.6 TABLET ORAL at 21:27

## 2020-03-01 RX ADMIN — OXYCODONE HYDROCHLORIDE 5 MG: 5 TABLET ORAL at 07:33

## 2020-03-01 RX ADMIN — BUSPIRONE HYDROCHLORIDE 5 MG: 10 TABLET ORAL at 08:27

## 2020-03-01 RX ADMIN — AMLODIPINE BESYLATE 5 MG: 5 TABLET ORAL at 08:26

## 2020-03-01 RX ADMIN — DOCUSATE SODIUM 100 MG: 100 CAPSULE, LIQUID FILLED ORAL at 08:27

## 2020-03-01 RX ADMIN — SODIUM CHLORIDE TAB 1 GM 1 G: 1 TAB at 11:40

## 2020-03-01 RX ADMIN — SODIUM CHLORIDE TAB 1 GM 2 G: 1 TAB at 15:33

## 2020-03-01 RX ADMIN — TAMSULOSIN HYDROCHLORIDE 0.4 MG: 0.4 CAPSULE ORAL at 15:33

## 2020-03-01 RX ADMIN — OXYCODONE HYDROCHLORIDE 5 MG: 5 TABLET ORAL at 21:27

## 2020-03-01 RX ADMIN — BUSPIRONE HYDROCHLORIDE 5 MG: 10 TABLET ORAL at 21:27

## 2020-03-01 RX ADMIN — GABAPENTIN 100 MG: 100 CAPSULE ORAL at 21:27

## 2020-03-01 RX ADMIN — SODIUM CHLORIDE TAB 1 GM 1 G: 1 TAB at 07:33

## 2020-03-01 RX ADMIN — BUSPIRONE HYDROCHLORIDE 5 MG: 10 TABLET ORAL at 15:33

## 2020-03-01 RX ADMIN — FUROSEMIDE 20 MG: 10 INJECTION, SOLUTION INTRAMUSCULAR; INTRAVENOUS at 10:41

## 2020-03-01 RX ADMIN — PROPRANOLOL HYDROCHLORIDE 10 MG: 20 TABLET ORAL at 17:01

## 2020-03-01 NOTE — ASSESSMENT & PLAN NOTE
Wt Readings from Last 3 Encounters:   03/01/20 97 4 kg (214 lb 11 7 oz)   02/19/20 89 7 kg (197 lb 12 oz)   02/18/20 95 7 kg (210 lb 15 7 oz)     Monitor volume status closely on IVF hydration  Lasix on hold due to hyponatremia  Continue to monitor weight daily

## 2020-03-01 NOTE — PROGRESS NOTES
Sodium level is 120 on repeat  Start sodium chloride tabs 1 gram po tid  Continue with fluid restriction   If still low give loop diuretic

## 2020-03-01 NOTE — ASSESSMENT & PLAN NOTE
Patient most recently was admitted to [facility] and discharged on 02/21/20 to [facility]  -patient had a fall when trying to transfer into wheelchair using her walker, patient was evaluated by neuro surgery, given DDAVP to reverse anti-platelet effect of aspirin  -repeat CT head on 02/27 reveals persistent moderately hyperdense right parafalcine subdural hematoma, small hyperdensity at base of right thumb is also likely representing parenchymal hematoma which is persistent, no new findings  -will continue to closely monitor, if any changes will repeat CT head stat

## 2020-03-01 NOTE — ASSESSMENT & PLAN NOTE
Presents with significant hyponatremia of 116 associated with confusion    Likely secondary to recent use of DDAVP during the recent hospitalization, cirrhosis, possible underlying SIADH due to chronic pain and recent subdural hematoma  -nephrology input appreciated, initially started on IV fluids however sodium remained very stable, fluid stopped given no significant improvement and patient started now on sodium chloride 1 g t i d   -continue with 1200 cc fluid restriction  -given Lasix 20 mg IV as per Nephrology for lower extremity edema  Monitor BMP

## 2020-03-01 NOTE — PROGRESS NOTES
NEPHROLOGY PROGRESS NOTE   Ekaterina Nuno 78 y o  female MRN: [id number]  Unit/Bed#: E2 -01 Encounter: [id number]      ASSESSMENT/PLAN:  1  Hyponatremia:  Multifactorial with hypovolemia, recent use of DDAVP last hospitalization, cirrhosis and possibly underlying SIADH related to chronic pain and recent subdural/parenchymal hematoma   · Sodium 116 on admission initially improved to 121 with saline bolus  · She continued on IV fluids but sodium remained very stable around 120-122  · Last night fluids were stopped since she had no significant improvement in her sodium and she was started on sodium chloride 1 g t i d  · Workup: serum osm 270, urine sodium <5, urine osm 516, uric acid 5 3, TSH normal   · Continue 1200cc oral fluid restriction  · Continue sodium chloride 1 g t i d   · Give lasix 20 IV x 1 as she does have some edema in thighs   · Check BMP at 1pm   2  R femur fracture: conservative management   3  Hypertension:  Blood pressure well controlled and would continue amlodipine  4  History of cirrhosis  5  Recent subdural hematoma        SUBJECTIVE:  Remains only oriented to person which has been stable  Did eat a little bit of breakfast according to nurse  Patient denies chest pain, shortness of breath, nausea, vomiting or diarrhea  She is having significant pain      OBJECTIVE:  Current Weight: Weight - Scale: 97 4 kg (214 lb 11 7 oz)  Vitals:    03/01/20 0737   BP: 148/70   Pulse: 77   Resp: 20   Temp: (!) 96 5 °F (35 8 °C)   SpO2: 98%       Intake/Output Summary (Last 24 hours) at 3/1/2020 0884  Last data filed at 2/29/2020 2300  Gross per 24 hour   Intake 250 ml   Output 350 ml   Net -100 ml       General:  No acute distress  Skin:  No rash  Eyes:  Sclerae anicteric  ENT:  Moist mucous membranes  Neck:  Trachea midline   Chest:  Clear to auscultation bilaterally  CVS:  Regular rate and rhythm  Abdomen:  Soft, nontender, nondistended  Extremities:  Trace thigh edema  Neuro:  Awake and alert but oriented x1   Psych:  Appropriate affect        Medications:  Scheduled Meds:    Current Facility-Administered Medications:  amLODIPine 5 mg Oral Daily Lisseth Walton MD   busPIRone 5 mg Oral TID Lisseth Walton, MD   docusate sodium 100 mg Oral Daily Lisseth Walton MD   famotidine 20 mg Oral PRN Lisseth Walton, MD   gabapentin 100 mg Oral TID PRN Lisseth Walton MD   oxyCODONE 5 mg Oral Q4H PRN Lisseth Walton, MD   propranolol 10 mg Oral BID Lisseth Walton, MD   senna 1 tablet Oral HS Lisseth Walton, MD   sodium chloride 1 g Oral TID With Meals Chin Sages,    tamsulosin 0 4 mg Oral Daily With Damian Carrion MD       PRN Meds: famotidine    gabapentin    oxyCODONE    Laboratory Results:  Results from last 7 days   Lab Units 03/01/20  0427 03/01/20  0350 02/29/20 2354 02/29/20  1955 02/29/20  1631 02/29/20  0853 02/29/20  0350 02/28/20  2354  02/28/20  0632  02/27/20  1145   WBC Thousand/uL  --   --   --   --   --   --   --   --   --  7 62  --  9 28   HEMOGLOBIN g/dL  --   --   --   --   --   --   --   --   --  12 8  --  12 0   HEMATOCRIT %  --   --   --   --   --   --   --   --   --  38 9  --  35 8   PLATELETS Thousands/uL  --   --   --   --   --   --   --   --   --  144*  --  131*   SODIUM mmol/L  --  122* 122* 122* 120* 121* 120* 120*   < > 121*   < > 116*   POTASSIUM mmol/L  --  4 3 4 1 4 0 4 3 4 1 3 9 4 1   < > 4 4   < > 5 7*   CHLORIDE mmol/L  --  87* 89* 87* 86* 86* 87* 86*   < > 84*   < > 83*   CO2 mmol/L  --  28 30 29 29 30 29 29   < > 31   < > 28   BUN mg/dL  --  11 12 11 12 13 13 14   < > 11   < > 14   CREATININE mg/dL  --  0 74 0 69 0 79 0 80 0 89 0 76 0 83   < > 0 81   < > 0 77   CALCIUM mg/dL  --  8 8 8 7 8 5 8 6 8 6 8 4 8 8   < > 9 0   < > 8 6   MAGNESIUM mg/dL 1 8  --   --   --   --   --   --   --   --  1 8  --   --    PHOSPHORUS mg/dL 3 1  --   --   --   --   --   --   --   --   --   --   --     < > = values in this interval not displayed

## 2020-03-01 NOTE — PROGRESS NOTES
Progress Note - Che Hewitt 1940, 78 y o  female MRN: [id number]    Unit/Bed#: E2 -01 Encounter: [id number]    Primary Care Provider: Dimitris Barnard MD   Date and time admitted to hospital: 2/27/2020 11:35 AM        * Encephalopathy  Assessment & Plan  Presents with altered mental status most likely secondary to metabolic encephalopathy due to hyponatremia  History of recent subdural and intracranial hematoma with no new changes on current imaging  -hyponatremia being corrected by Nephrology, started on salt tabs  -as per patient's spouse, she is normally very alert and oriented, reads the newspaper  -Neuro checks         Hyponatremia  Assessment & Plan  Presents with significant hyponatremia of 116 associated with confusion  Likely secondary to recent use of DDAVP during the recent hospitalization, cirrhosis, possible underlying SIADH due to chronic pain and recent subdural hematoma  -nephrology input appreciated, initially started on IV fluids however sodium remained very stable, fluid stopped given no significant improvement and patient started now on sodium chloride 1 g t i d   -continue with 1200 cc fluid restriction  -given Lasix 20 mg IV as per Nephrology for lower extremity edema  Monitor BMP       History of subdural hematoma  Assessment & Plan  Patient most recently was admitted to [facility] and discharged on 02/21/20 to [facility]  -patient had a fall when trying to transfer into wheelchair using her walker, patient was evaluated by neuro surgery, given DDAVP to reverse anti-platelet effect of aspirin  -repeat CT head on 02/27 reveals persistent moderately hyperdense right parafalcine subdural hematoma, small hyperdensity at base of right thumb is also likely representing parenchymal hematoma which is persistent, no new findings  -will continue to closely monitor, if any changes will repeat CT head stat    Uncomplicated opioid use  Assessment & Plan  PMED checked    Continue oxycodone as needed  Limit narcotics in setting of encephalopathy     History of cirrhosis  Assessment & Plan  History of cirrhosis and chronic alcohol use documented on previous note  Patient on propranolol and Aldactone  Holding aldactone due to hyponatremia       A-fib [facility])  Assessment & Plan  History of Afib, previously on aspirin  Patient previously refused Coumadin as per Cardiology notes  Aspirin on hold since subdural hematoma episode  Continue to monitor  Closed fracture of right distal femur [facility])  Assessment & Plan  History of right distal femur fracture which was managed conservatively  Repeat x-ray shows worsening displacement of the fracture ends  Orthopedic consult  Continued recommendations are non-surgical intervention   NWB RLE with knee immobilizer     Chronic diastolic (congestive) heart failure (HCC)  Assessment & Plan  Wt Readings from Last 3 Encounters:   03/01/20 97 4 kg (214 lb 11 7 oz)   02/19/20 89 7 kg (197 lb 12 oz)   02/18/20 95 7 kg (210 lb 15 7 oz)     Monitor volume status closely on IVF hydration  Lasix on hold due to hyponatremia  Continue to monitor weight daily  Chronic indwelling Davies catheter  Assessment & Plan  Monitor I and O      VTE Pharmacologic Prophylaxis:   Pharmacologic:  Contraindicated to subdural hematoma    Patient Centered Rounds: I have performed bedside rounds with nursing staff today  Education and Discussions with Family / Patient: spouse, Sheryle Harbour    Time Spent for Care: 35 minutes  More than 50% of total time spent on counseling and coordination of care as described above      Current Length of Stay: 3 day(s)    Current Patient Status: Inpatient   Certification Statement: The patient will continue to require additional inpatient hospital stay due to Hyponatremia, altered mental status    Discharge Plan / Estimated Discharge Date:  2-3 days    Code Status: Level 1 - Full Code      Subjective:   Patient seen and examined at bedside, complaining of pain in her right lower extremity, denies any chest pain, palpitations, dyspnea  Patient still confused, oriented to self and place, when asked what year it is she replies October, when asked what month it is she replies October  Objective:     Vitals:   Temp (24hrs), Av 3 °F (36 3 °C), Min:96 5 °F (35 8 °C), Max:98 3 °F (36 8 °C)    Temp:  [96 5 °F (35 8 °C)-98 3 °F (36 8 °C)] 96 5 °F (35 8 °C)  HR:  [57-77] 77  Resp:  [17-20] 20  BP: (124-148)/(56-70) 148/70  SpO2:  [97 %-98 %] 98 %  Body mass index is 39 27 kg/m²  Input and Output Summary (last 24 hours): Intake/Output Summary (Last 24 hours) at 3/1/2020 1148  Last data filed at 3/1/2020 1039  Gross per 24 hour   Intake 250 ml   Output 500 ml   Net -250 ml       Physical Exam:    Constitutional: Patient is oriented to self and place, forgetful with time  HEENT:  Normocephalic, atraumatic  Cardiovascular: Normal S1S2, RRR, No murmurs/rubs/gallops appreciated  Pulmonary:  Bilateral air entry, No rhonchi/rales/wheezing appreciated  Abdominal: Soft, Bowel sounds present, Non-tender, Non-distended  Extremities:  No cyanosis, clubbing or edema, right knee immobilizer  Neurological:  Alert, slow and delayed was responding to questions    Additional Data:     Labs:    Results from last 7 days   Lab Units 20  0632   WBC Thousand/uL 7 62   HEMOGLOBIN g/dL 12 8   HEMATOCRIT % 38 9   PLATELETS Thousands/uL 144*   NEUTROS PCT % 71   LYMPHS PCT % 15   MONOS PCT % 10   EOS PCT % 2     Results from last 7 days   Lab Units 20  0350  20  0632   POTASSIUM mmol/L 4 3   < > 4 4   CHLORIDE mmol/L 87*   < > 84*   CO2 mmol/L 28   < > 31   BUN mg/dL 11   < > 11   CREATININE mg/dL 0 74   < > 0 81   CALCIUM mg/dL 8 8   < > 9 0   ALK PHOS U/L  --   --  392*   ALT U/L  --   --  71   AST U/L  --   --  224*    < > = values in this interval not displayed       Results from last 7 days   Lab Units 20  1254   INR  1 10        I Have Reviewed All Lab Data Listed Above          Recent Cultures (last 7 days):           Last 24 Hours Medication List:     Current Facility-Administered Medications:  amLODIPine 5 mg Oral Daily [facility], MD   busPIRone 5 mg Oral TID [facility], MD   docusate sodium 100 mg Oral Daily [facility], MD   famotidine 20 mg Oral PRN [facility], MD   gabapentin 100 mg Oral TID PRN [facility], MD   oxyCODONE 5 mg Oral Q4H PRN [facility], MD   propranolol 10 mg Oral BID [facility], MD   senna 1 tablet Oral HS [facility], MD   sodium chloride 1 g Oral TID With Meals Lloyd Gonzalez DO   tamsulosin 0 4 mg Oral Daily With Yoli Mcmullen MD        Today, Patient Was Seen By: Michael Sandra MD

## 2020-03-01 NOTE — ASSESSMENT & PLAN NOTE
Presents with altered mental status most likely secondary to metabolic encephalopathy due to hyponatremia  History of recent subdural and intracranial hematoma with no new changes on current imaging      -hyponatremia being corrected by Nephrology, started on salt tabs  -as per patient's spouse, she is normally very alert and oriented, reads the newspaper  -Neuro checks

## 2020-03-02 PROBLEM — D64.9 ANEMIA: Status: ACTIVE | Noted: 2020-03-02

## 2020-03-02 LAB
ANION GAP SERPL CALCULATED.3IONS-SCNC: 7 MMOL/L (ref 4–13)
BASOPHILS # BLD AUTO: 0.05 THOUSANDS/ΜL (ref 0–0.1)
BASOPHILS NFR BLD AUTO: 1 % (ref 0–1)
BUN SERPL-MCNC: 14 MG/DL (ref 5–25)
CALCIUM SERPL-MCNC: 9 MG/DL (ref 8.3–10.1)
CHLORIDE SERPL-SCNC: 89 MMOL/L (ref 100–108)
CO2 SERPL-SCNC: 28 MMOL/L (ref 21–32)
CREAT SERPL-MCNC: 0.67 MG/DL (ref 0.6–1.3)
EOSINOPHIL # BLD AUTO: 0.18 THOUSAND/ΜL (ref 0–0.61)
EOSINOPHIL NFR BLD AUTO: 2 % (ref 0–6)
ERYTHROCYTE [DISTWIDTH] IN BLOOD BY AUTOMATED COUNT: 17.1 % (ref 11.6–15.1)
GFR SERPL CREATININE-BSD FRML MDRD: 83 ML/MIN/1.73SQ M
GLUCOSE SERPL-MCNC: 136 MG/DL (ref 65–140)
HCT VFR BLD AUTO: 33.6 % (ref 34.8–46.1)
HGB BLD-MCNC: 11.3 G/DL (ref 11.5–15.4)
IMM GRANULOCYTES # BLD AUTO: 0.14 THOUSAND/UL (ref 0–0.2)
IMM GRANULOCYTES NFR BLD AUTO: 2 % (ref 0–2)
LYMPHOCYTES # BLD AUTO: 1.58 THOUSANDS/ΜL (ref 0.6–4.47)
LYMPHOCYTES NFR BLD AUTO: 18 % (ref 14–44)
MCH RBC QN AUTO: 30.3 PG (ref 26.8–34.3)
MCHC RBC AUTO-ENTMCNC: 33.6 G/DL (ref 31.4–37.4)
MCV RBC AUTO: 90 FL (ref 82–98)
MONOCYTES # BLD AUTO: 1.19 THOUSAND/ΜL (ref 0.17–1.22)
MONOCYTES NFR BLD AUTO: 13 % (ref 4–12)
NEUTROPHILS # BLD AUTO: 5.89 THOUSANDS/ΜL (ref 1.85–7.62)
NEUTS SEG NFR BLD AUTO: 64 % (ref 43–75)
NRBC BLD AUTO-RTO: 0 /100 WBCS
PLATELET # BLD AUTO: 256 THOUSANDS/UL (ref 149–390)
PMV BLD AUTO: 13 FL (ref 8.9–12.7)
POTASSIUM SERPL-SCNC: 4.8 MMOL/L (ref 3.5–5.3)
RBC # BLD AUTO: 3.73 MILLION/UL (ref 3.81–5.12)
SODIUM SERPL-SCNC: 124 MMOL/L (ref 136–145)
WBC # BLD AUTO: 9.03 THOUSAND/UL (ref 4.31–10.16)

## 2020-03-02 PROCEDURE — 80048 BASIC METABOLIC PNL TOTAL CA: CPT | Performed by: PHYSICIAN ASSISTANT

## 2020-03-02 PROCEDURE — 97167 OT EVAL HIGH COMPLEX 60 MIN: CPT

## 2020-03-02 PROCEDURE — 99232 SBSQ HOSP IP/OBS MODERATE 35: CPT | Performed by: INTERNAL MEDICINE

## 2020-03-02 PROCEDURE — 85025 COMPLETE CBC W/AUTO DIFF WBC: CPT | Performed by: INTERNAL MEDICINE

## 2020-03-02 PROCEDURE — 97163 PT EVAL HIGH COMPLEX 45 MIN: CPT

## 2020-03-02 RX ORDER — POLYETHYLENE GLYCOL 3350 17 G/17G
17 POWDER, FOR SOLUTION ORAL DAILY
Status: DISCONTINUED | OUTPATIENT
Start: 2020-03-03 | End: 2020-03-04 | Stop reason: HOSPADM

## 2020-03-02 RX ORDER — SODIUM CHLORIDE 1000 MG
2 TABLET, SOLUBLE MISCELLANEOUS
Status: DISCONTINUED | OUTPATIENT
Start: 2020-03-02 | End: 2020-03-04 | Stop reason: HOSPADM

## 2020-03-02 RX ORDER — OXYCODONE HYDROCHLORIDE 5 MG/1
5 TABLET ORAL EVERY 4 HOURS PRN
Status: DISCONTINUED | OUTPATIENT
Start: 2020-03-02 | End: 2020-03-04 | Stop reason: HOSPADM

## 2020-03-02 RX ORDER — PROPRANOLOL HYDROCHLORIDE 20 MG/1
10 TABLET ORAL 2 TIMES DAILY
Status: DISCONTINUED | OUTPATIENT
Start: 2020-03-02 | End: 2020-03-04 | Stop reason: HOSPADM

## 2020-03-02 RX ORDER — FUROSEMIDE 20 MG/1
10 TABLET ORAL DAILY
Status: DISCONTINUED | OUTPATIENT
Start: 2020-03-02 | End: 2020-03-04 | Stop reason: HOSPADM

## 2020-03-02 RX ORDER — AMLODIPINE BESYLATE 5 MG/1
5 TABLET ORAL DAILY
Status: DISCONTINUED | OUTPATIENT
Start: 2020-03-03 | End: 2020-03-04 | Stop reason: HOSPADM

## 2020-03-02 RX ADMIN — AMLODIPINE BESYLATE 5 MG: 5 TABLET ORAL at 08:34

## 2020-03-02 RX ADMIN — FUROSEMIDE 10 MG: 20 TABLET ORAL at 10:23

## 2020-03-02 RX ADMIN — SODIUM CHLORIDE TAB 1 GM 2 G: 1 TAB at 21:30

## 2020-03-02 RX ADMIN — PROPRANOLOL HYDROCHLORIDE 10 MG: 20 TABLET ORAL at 17:33

## 2020-03-02 RX ADMIN — BUSPIRONE HYDROCHLORIDE 5 MG: 10 TABLET ORAL at 08:34

## 2020-03-02 RX ADMIN — STANDARDIZED SENNA CONCENTRATE 8.6 MG: 8.6 TABLET ORAL at 21:30

## 2020-03-02 RX ADMIN — BUSPIRONE HYDROCHLORIDE 5 MG: 10 TABLET ORAL at 15:57

## 2020-03-02 RX ADMIN — TAMSULOSIN HYDROCHLORIDE 0.4 MG: 0.4 CAPSULE ORAL at 15:57

## 2020-03-02 RX ADMIN — SODIUM CHLORIDE TAB 1 GM 2 G: 1 TAB at 15:57

## 2020-03-02 RX ADMIN — OXYCODONE HYDROCHLORIDE 5 MG: 5 TABLET ORAL at 07:38

## 2020-03-02 RX ADMIN — SODIUM CHLORIDE TAB 1 GM 2 G: 1 TAB at 11:22

## 2020-03-02 RX ADMIN — DOCUSATE SODIUM 100 MG: 100 CAPSULE, LIQUID FILLED ORAL at 08:33

## 2020-03-02 RX ADMIN — PROPRANOLOL HYDROCHLORIDE 10 MG: 20 TABLET ORAL at 08:34

## 2020-03-02 RX ADMIN — BUSPIRONE HYDROCHLORIDE 5 MG: 10 TABLET ORAL at 21:30

## 2020-03-02 RX ADMIN — SODIUM CHLORIDE TAB 1 GM 2 G: 1 TAB at 07:38

## 2020-03-02 NOTE — PLAN OF CARE
Problem: PHYSICAL THERAPY ADULT  Goal: Performs mobility at highest level of function for planned discharge setting  See evaluation for individualized goals  Description  Treatment/Interventions: Functional transfer training, LE strengthening/ROM, Therapeutic exercise, Endurance training, Cognitive reorientation, Patient/family training, Equipment eval/education, Bed mobility, Compensatory technique education, Continued evaluation, Spoke to nursing, OT  Equipment Recommended: Wheelchair, Kermitt Prudent, Other (Comment)(walker, wc if pt does not own; Robby Lopez for OOB mobility)       See flowsheet documentation for full assessment, interventions and recommendations  Note:   Prognosis: Poor  Problem List: Decreased strength, Decreased range of motion, Decreased endurance, Impaired balance, Decreased mobility, Decreased coordination, Decreased cognition, Impaired judgement, Decreased safety awareness, Pain, Orthopedic restrictions, Obesity  Assessment: Apolonia Meehan is a 78 y o  female [facility] on 2/27/2020 for Encephalopathy  Pt  has a past medical history of Anxiety, Atrial fibrillation ([facility] [address] ), CHF (congestive heart failure) ([facility] [address] ), Depression, HTN (hypertension) (11/9/2016), Opioid dependence ([facility] ), and Pneumonia  Pt prev admitted to [facility] from 2/18-20/20 for subdural hematoma, R femur fx s/p fall; placed with HKB and d/c to Son for STR  PT was consulted and pt was seen on 3/2/2020 for mobility assessment and d/c planning  Pt presents NWB RLE w HKB in place and locked in extension  Other precautions include cognition, high fall risk, PIV, catheter  Pt comes to [facility]; poor historian  Pt is currently functioning at a maximum assistance x2-3 level for bed mobility  Pt demonstrated poor static sitting balance at EOB; unsafe to trial sit<>stand transf at this time    Pt will benefit from continued skilled IP PT to address the above mentioned impairments  in order to maximize recovery and increase functional independence when completing mobility and ADLs  Currently PT recommendations for DME include RW, wc if pt does not have, Jennifer Kaplan for OOB mobility if appropriate  At this time PT recommendations for d/c are STR vs LTC pending progress  Barriers to Discharge: Other (Comment)  Barriers to Discharge Comments: pt requires max A for ADLs and mobility  Recommendation: Short-term skilled PT, Long-term skilled nursing home placement(STR vs LTC pending progress)     PT - OK to Discharge: Yes    See flowsheet documentation for full assessment

## 2020-03-02 NOTE — PHYSICAL THERAPY NOTE
PHYSICAL THERAPY EVALUATION      Patient Name: Kevin Jack  ABWYI'V Date: 3/2/2020   PT EVALUATION    78 y o     [id number]    Hyperbilirubinemia [E80 6]  Hyponatremia [E87 1]  Abnormal laboratory test [R89 9]    Past Medical History:   Diagnosis Date    Anxiety     Atrial fibrillation ([facility] [address] )     CHF (congestive heart failure) (HCC)     Depression     HTN (hypertension) 11/9/2016    Opioid dependence ([facility] [address] )     Pneumonia      Past Surgical History:   Procedure Laterality Date    APPENDECTOMY      CHOLECYSTECTOMY      TONSILLECTOMY      TOTAL KNEE ARTHROPLASTY Bilateral     TUBAL LIGATION          03/02/20 1116   Note Type   Note type Eval only   Pain Assessment   Pain Assessment FLACC   Pain Type Acute pain   Pain Location Leg   Pain Orientation Right   Pain Rating: FLACC (Rest) - Face 0   Pain Rating: FLACC (Rest) - Legs 0   Pain Rating: FLACC (Rest) - Activity 0   Pain Rating: FLACC (Rest) - Cry 0   Pain Rating: FLACC (Rest) - Consolability 0   Score: FLACC (Rest) 0   Pain Rating: FLACC (Activity) - Face 1   Pain Rating: FLACC (Activity) - Legs 0   Pain Rating: FLACC (Activity) - Activity 0   Pain Rating: FLACC (Activity) - Cry 1   Pain Rating: FLACC (Activity) - Consolability 1   Score: FLACC (Activity) 3   Home Living   Type of Home SNF  ([facility])   Home Equipment Wheelchair-manual;Walker   Additional Comments pt poor historian, brief social hx obtained via chart review   Prior Function   Level of [place] Needs assistance with ADLs and functional mobility; Needs assistance with IADLs   Lives With Facility staff   Receives Help From Personal care attendant; Other (Comment)  (facility staff)   ADL Assistance Needs assistance   IADLs Needs assistance   Falls in the last 6 months 1 to 4  (pt denies any recent falls since d/c to Son)   Vocational Retired   Comments pt comes to us from [facility], reports dependent for wc mobility   pt poor historian, limited information provided Restrictions/Precautions   Weight Bearing Precautions Per Order Yes   RLE Weight Bearing Per Order NWB   Braces or Orthoses LE Immobilizer  (HKB locked in extension)   Other Precautions Cognitive; Bed Alarm;Multiple lines; Fall Risk;Pain;WBS   General   Additional Pertinent History pt admitted 2/27/20 for acute encephalopathy  prev admission to [facility] 2/18-20/20 for subdural hematoma, R femur fx s/o fall  given HKB at [facility] at d/c to [place] for [address] [place]  chronic wells catheter   Family/Caregiver Present No   Cognition   Overall Cognitive Status Impaired   Arousal/Participation Cooperative   Attention Attends with cues to redirect   Orientation Level Oriented to person   Memory Decreased recall of precautions;Decreased recall of recent events;Decreased short term memory   Following Commands Follows one step commands with increased time or repetition   Comments pt appears confused  delayed response time and occ difficulty word finding  verbalize NWB RLE however poor safety awareness   RLE Assessment   RLE Assessment X  (RLE signficantly ER)   Strength RLE   R Knee Flexion   (locked in ext w HKB)   R Knee Extension   (locked in ext w HKB)   LLE Assessment   LLE Assessment X  (difficult to assess 2* to cognition)   Coordination   Movements are Fluid and Coordinated 0   Coordination and Movement Description given WBS, cognition   Bed Mobility   Rolling R 2  Maximal assistance   Additional items Assist x 2;Bedrails; Increased time required;Verbal cues;LE management   Rolling L 2  Maximal assistance   Additional items Assist x 2;Bedrails; Increased time required;Verbal cues;LE management   Supine to Sit 2  Maximal assistance   Additional items Assist x 2;HOB elevated; Bedrails; Increased time required;Verbal cues;LE management; Other  (trunk support)   Sit to Supine 2  Maximal assistance   Additional items Assist x 3;Verbal cues;LE management; Other  (trunk support)   Additional Comments pt require max A to reposition at EOB   use of bl bed rails and min Ax1 to maintain upright position at EOB   Transfers   Sit to Stand Unable to assess   Ambulation/Elevation   Gait pattern Not appropriate   Balance   Static Sitting Poor +  (min A and bl bedrails at EOB 5')   Dynamic Sitting Poor   Static Standing Zero   Endurance Deficit   Endurance Deficit Yes   Endurance Deficit Description weakness   Activity Tolerance   Activity Tolerance Patient limited by fatigue;Patient limited by pain; Other (Comment)  (cognition, WBS)   Medical Staff Made Aware Orrie Siemens OT/ Corinne Kaur OTS   Nurse Made Aware Cecile Morgan RN   Assessment   Prognosis Poor   Problem List Decreased strength;Decreased range of motion;Decreased endurance; Impaired balance;Decreased mobility; Decreased coordination;Decreased cognition; Impaired judgement;Decreased safety awareness;Pain;Orthopedic restrictions; Obesity   Assessment Aris Parker is a 78 y o  [facility] for Encephalopathy  Pt  has a past medical history of Anxiety, Atrial fibrillation ([facility] [address] ), CHF (congestive heart failure) ([facility] [address] ), Depression, HTN (hypertension) (11/9/2016), Opioid dependence ([facility] ), and Pneumonia  Pt prev admitted to [facility] from 2/18-20/20 for subdural hematoma, R femur fx s/p fall; placed with HKB and d/c to Son for STR  PT was consulted and pt was seen on 3/2/2020 for mobility assessment and d/c planning  Pt presents NWB RLE w HKB in place and locked in extension  Other precautions include cognition, high fall risk, PIV, catheter  Pt comes to [facility] from [facility]; poor historian  Pt is currently functioning at a maximum assistance x2-3 level for bed mobility  Pt demonstrated poor static sitting balance at EOB; unsafe to trial sit<>stand transf at this time  Pt will benefit from continued skilled IP PT to address the above mentioned impairments  in order to maximize recovery and increase functional independence when completing mobility and ADLs   Currently PT recommendations for DME include RW, wc if pt does not haveAntony for OOB mobility if appropriate  At this time PT recommendations for d/c are STR vs LTC pending progress  Barriers to Discharge Other (Comment)   Barriers to Discharge Comments pt requires max A for ADLs and mobility   Goals   Patient Goals none stated 2* to cognition   STG Expiration Date 03/23/20   Short Term Goal #1 1)  Pt will perform bed mobility with min Ax2 demonstrating appropriate technique 100% of the time in order to improve function and decrease caregiver burden  2)  PT to see to assess transf and updated goals as appropriate  3) Improve overall strength and balance 1/2 grade in order to optimize ability to perform functional tasks and reduce fall risk  4) Increase activity tolerance to 35 minutes in order to improve endurance to functional tasks  5) PT for ongoing patient and family/caregiver education, DME needs and d/c planning in order to promote highest level of function in least restrictive environment  PT Treatment Day 0   Plan   Treatment/Interventions Functional transfer training;LE strengthening/ROM; Therapeutic exercise; Endurance training;Cognitive reorientation;Patient/family training;Equipment eval/education; Bed mobility; Compensatory technique education;Continued evaluation;Spoke to nursing;OT   PT Frequency Other (Comment)  (4-5/wk)   Recommendation   Recommendation Short-term skilled PT;Long-term skilled nursing home placement  (STR vs LTC pending progress)   Equipment Recommended Wheelchair;Walker; Other (Comment)  (walker, wc if pt does not own; Antony Lucio for OOB mobility)   PT - OK to Discharge Yes   Additional Comments when medically stable   Modified Agustín Scale   Modified Agustín Scale 4   Barthel Index   Feeding 5   Bathing 0   Grooming Score 0   Dressing Score 5   Bladder Score 0   Bowels Score 10   Toilet Use Score 0   Transfers (Bed/Chair) Score 0   Mobility (Level Surface) Score 0   Stairs Score 0   Barthel Index Score 20   History: co - morbidities, age, social background, past experience (hospital admission s/p fall, conservative management of femur fx, d/c to STR), fall risk, use of assistive device, assist for adl's and mobility, cognition, multiple lines, WBS and HKB  Exam: impairments in systems including musculoskeletal (ROM- limited R knee mobility via ortho, RLE ER and decreased leg length compared to L), neuromuscular (balance, coordination), joint integrity (R femur fx), integumentary (decreased skin integrity), cognition, Barthel Index 20  Clinical: unstable/unpredictable  Complexity:high      Maury Mendez, PT

## 2020-03-02 NOTE — PLAN OF CARE
Problem: OCCUPATIONAL THERAPY ADULT  Goal: Performs self-care activities at highest level of function for planned discharge setting  See evaluation for individualized goals  Description  Treatment Interventions: ADL retraining, Functional transfer training, UE strengthening/ROM, Endurance training, Patient/family training, Compensatory technique education, Equipment evaluation/education, Activityengagement          See flowsheet documentation for full assessment, interventions and recommendations  Note:   Limitation: Decreased ADL status, Decreased UE ROM, Decreased UE strength, Decreased Safe judgement during ADL, Decreased cognition, Decreased endurance, Decreased self-care trans, Decreased high-level ADLs  Prognosis: Fair  Assessment: Pt is 78 y o female adm to [address] [place] [address] on 2/27/20 presenting w/ Abnormal labs and increased confusion  Pt w/ recent adm to [facility] on 2/18/20 2* fall resulting in fx of R distal femer and d/c to [facility] for STR  Per ortho recommendations, continue w/ non-surgical treatment  WBS is NWB RLE w/ knee immobilizer  Pt dx'd w/ Encephalopathy and Hyponatremia  Comorbidities affecting pt's functional performance include Anxiety, A-fib, CHF, Depression, HTN, Opioid dependence, and Pneumonia  Active OT orders  Pt presents from [facility] for [address] [place]  At baseline, pt reports A w/ ADLs/IADLs and SPTs to w/c, A with w/c mobility, (-) , and reports 0 falls PTA  ? accuracy and reliability 2* pt poor historian w/ increased confusion and increased difficulty w/ word finding  Upon evaluation, pt currently requires Min A for UB ADLS, Max A for LB ADLS, Max A for toileting, Max A x2-3 for bed mobility 2* increased pain, decreased strength, impaired cognition, decreased endurance, and decreased activity tolerance  These impairments, as well as pt's decreased ADL status limit pt's ability to engage in all areas of occupation  Pt scored 25/100 on the Barthel Index   Based on this OT evaluation and the pt's functional performance deficits, pt has been identified as a High complexity evaluation  Pt would benefit from continued OT services during hospital stay to address deficits and improve level of functional independence in these Occupational Performance areas: grooming, bathing/shower, dressing, toileting, bed mobility, and functional mobility/transfers  OT recommends STR   Pt will be on OT caseload 3-5x/wk to address the following goals to  in 10-14 days:     OT Discharge Recommendation: Short Term Rehab  OT - OK to Discharge: Yes(when medically cleared to rehab)

## 2020-03-02 NOTE — PROGRESS NOTES
[facility] Progress Note  Patient: Alexandre Cowart 78 y o  female   MRN: [id number]  PCP: Bigg Pappas MD  Unit/Bed#: E2 -01 Encounter: [id number]  Date Of Visit: 03/02/20      Assessment/plan  1  Acute encephalopathy likely result of hyponatremia and recent subdural hematoma  Hyponatremia treated by nephrology  2  Hyponatremia- multifactorial due to hypovolemia, recent use of ddavp, cirrhosis and possible underlying siadh due to chronic pain and recent subdural hematoma  Appreciate nephrology recommendations  Pt originally treated with IV fluids but sodium remained very stable  Fluid was stopped given minimal improvement and he was started on sodium chloride 1 g tid which have been increased to 2g qid  Pt will continue with fluid restriction of 1200ml  Pt will continue lasix  Sodium has increased to 124  Will check bmp in am      3  History of subdural hematoma- pt was recently admitted to [facility] and discharged on2/21/2020 to rehab at [facility]  Pt had a fall when trying to transfer to wheelchair using her walker  Pt was evaluated by neurosx and given ddavp to reverse anti-platelet effect of asa  Repeat ct head on 2/27 reveals persistent moderately hyperdense small persistent subdural hematoma  Will continue to monitor and obtain ct head stat if any changes    4  History of cirrhosis- due to chronic alcohol use  Continue propanolol  Holding aldactone due to hyponatremia  5  afib- pt had been previously on asa  Holding asa due to subdural hematoma  Will monitor  6  Closed fracture of right distal femur- history of right distal femur fracture which was managed conservatively  Repeat xray shows worsening displacement of the fracture  Ortho evaluated pt and recommended non surgical intervention  Continue conservative management with knee immobilizer and NWB RLE  7  Chronic diastolic chf- continue low dose lasix   Monitor daily weight    8  chronic indwelling wells    dispo- pt to return to [facility] once sodium is stable  Subjective:   Pt seen and examined  Pt states she has an itchy part of skin on the right side of her abd  No other problems  No f/c no cp no sob no n/v/d no abd pain  Her responses are slow and it takes time for her to get correct responses  She also required prompting for some of the orientation questions  Objective:     Vitals: Blood pressure 137/67, pulse 67, temperature 98 1 °F (36 7 °C), temperature source Tympanic, resp  rate 18, height 5' 2" (1 575 m), weight 96 8 kg (213 lb 6 5 oz), SpO2 97 %  ,Body mass index is 39 03 kg/m²  Lab, Imaging and other studies:  Results from last 7 days   Lab Units 03/02/20 0427 02/27/20  1254   WBC Thousand/uL 9 03   < >  --    HEMOGLOBIN g/dL 11 3*   < >  --    HEMATOCRIT % 33 6*   < >  --    PLATELETS Thousands/uL 256   < >  --    INR   --   --  1 10    < > = values in this interval not displayed  Results from last 7 days   Lab Units 03/02/20  0427 02/28/20  6912   POTASSIUM mmol/L 4 8   < > 4 4   CHLORIDE mmol/L 89*   < > 84*   CO2 mmol/L 28   < > 31   BUN mg/dL 14   < > 11   CREATININE mg/dL 0 67   < > 0 81   CALCIUM mg/dL 9 0   < > 9 0   ALK PHOS U/L  --   --  392*   ALT U/L  --   --  71   AST U/L  --   --  224*    < > = values in this interval not displayed             Scheduled Meds:   Current Facility-Administered Medications:  [START ON 3/3/2020] amLODIPine 5 mg Oral Daily Mariana Vasquez DO   busPIRone 5 mg Oral TID Ana Patel MD   docusate sodium 100 mg Oral Daily Ana Patel MD   famotidine 20 mg Oral PRN Ana Patel MD   furosemide 10 mg Oral Daily Mariana Vasquez, DO   gabapentin 100 mg Oral TID PRN Ana Patel MD   oxyCODONE 5 mg Oral Q4H PRN Almita Poet, DO   propranolol 10 mg Oral BID Mariana CLEMENTINE Vasquez, DO   senna 1 tablet Oral HS Ana Patel MD   sodium chloride 2 g Oral 4x Daily (with meals and at bedtime) Mariana Julian DO   tamsulosin 0 4 mg Oral Daily With Dinner Ana Patel MD     Continuous Infusions:    PRN Meds: famotidine    gabapentin    oxyCODONE      Physical exam:  Physical Exam  General appearance: alert  Head: Normocephalic, without obvious abnormality, atraumatic  Eyes: conjunctivae/corneas clear  PERRL, EOM's intact  Fundi benign    Neck: no adenopathy, no carotid bruit, no JVD, supple, symmetrical, trachea midline and thyroid not enlarged, symmetric, no tenderness/mass/nodules  Lungs: clear to auscultation bilaterally  Heart: regular rate and rhythm, S1, S2 normal, no murmur, click, rub or gallop  Abdomen: soft, non-tender; bowel sounds normal; no masses,  no organomegaly  Extremities: extremities normal, warm and well-perfused; no cyanosis, clubbing, or edema  Pulses: 2+ and symmetric  Skin: Skin color, texture, turgor normal  No rashes or lesions  Neurologic: Mental status: Alert, oriented, thought content appropriate, slow responses and prompting to obtain oriented x3      VTE Pharmacologic Prophylaxis: Reason for no pharmacologic prophylaxis subdural hematoma  VTE Mechanical Prophylaxis: sequential compression device    Counseling / Coordination of Care  Total floor / unit time spent today 20 minutes   Current Length of Stay: 4 day(s)    Current Patient Status: Inpatient     Code Status: Level 1 - Full Code

## 2020-03-02 NOTE — OCCUPATIONAL THERAPY NOTE
Occupational Therapy Evaluation     Patient Name: Curt Ivy  NMEWM'H Date: 3/2/2020  Problem List  Principal Problem:    Encephalopathy  Active Problems:    Essential hypertension    Chronic indwelling Davies catheter    Chronic diastolic (congestive) heart failure (HCC)    Closed fracture of right distal femur (HCC)    Hyponatremia    History of subdural hematoma    A-fib (HCC)    History of cirrhosis    Uncomplicated opioid use    Anemia    Past Medical History  Past Medical History:   Diagnosis Date    Anxiety     Atrial fibrillation ([facility] [address] )     CHF (congestive heart failure) (HCC)     Depression     HTN (hypertension) 11/9/2016    Opioid dependence ([facility] )     Pneumonia      Past Surgical History  Past Surgical History:   Procedure Laterality Date    APPENDECTOMY      CHOLECYSTECTOMY      TONSILLECTOMY      TOTAL KNEE ARTHROPLASTY Bilateral     TUBAL LIGATION               03/02/20 1117   Note Type   Note type Eval only   Restrictions/Precautions   Weight Bearing Precautions Per Order Yes   RLE Weight Bearing Per Order NWB   Braces or Orthoses LE Immobilizer   Other Precautions Cognitive; Bed Alarm; Fall Risk;Pain;WBS   Pain Assessment   Pain Assessment FLACC   Pain Type Acute pain   Pain Location Leg   Pain Orientation Right   Pain Rating: FLACC (Rest) - Face 0   Pain Rating: FLACC (Rest) - Legs 0   Pain Rating: FLACC (Rest) - Activity 0   Pain Rating: FLACC (Rest) - Cry 0   Pain Rating: FLACC (Rest) - Consolability 0   Score: FLACC (Rest) 0   Pain Rating: FLACC (Activity) - Face 1   Pain Rating: FLACC (Activity) - Legs 0   Pain Rating: FLACC (Activity) - Activity 1   Pain Rating: FLACC (Activity) - Cry 1   Pain Rating: FLACC (Activity) - Consolability 1   Score: FLACC (Activity) 4   Home Living   Type of Home SNF  [facility]   Home Equipment [facility]   Additional Comments unable to obtain bathroom layout 2* pt poor historian   Prior Function   Level of [place] Needs assistance with IADLs; Needs assistance with ADLs and functional mobility   Lives With Facility staff   Receives Help From Personal care attendant   ADL Assistance Needs assistance   IADLs Needs assistance   Falls in the last 6 months 1 to 4  (pt denies falls; per chart, 1 fall resulting in R femer fx)   Vocational Retired   Comments At baseline, pt reports A w/ ADLs/IADLs and SPT to w/c, A with w/c mobility, (-) , and reports 0 falls PTA  Lifestyle   Autonomy At baseline, pt reports A w/ ADLs/IADLs and SPT to w/c, A with w/c mobility, (-) , and reports 0 falls PTA  Reciprocal Relationships facility staff    Service to Others retired   ADL   Where Assessed Supine, bed   Eating Assistance 5  Supervision/Setup   Grooming Assistance 4  [facility] 4  Minimal Assistance   LB [address] 2  Maximal [address] 2  Maximal [address]  2  Maximal [address] 2  Maximal Assistance   Bed Mobility   Rolling R 2  Maximal assistance   Additional items Assist x 2;Bedrails; Increased time required;Verbal cues   Rolling L 2  Maximal assistance   Additional items Assist x 2;Bedrails; Increased time required;Verbal cues   Supine to Sit 2  Maximal assistance   Additional items Assist x 2;HOB elevated; Bedrails; Increased time required;Verbal cues;LE management   Sit to Supine 2  Maximal assistance   Additional items Assist x 3;Bedrails;Verbal cues; Increased time required;LE management   Additional Comments Pt lying supine at end of session w/ bed alarm activated  Call bell and phone within reach  All needs met and no further questions for OT at this time     Transfers   Sit to Stand Unable to assess   Stand to Sit Unable to assess   Additional Comments unable to asses 2* decreased seated balance/tolerance   Functional Mobility   Additional Comments unable to asses 2* decreased seated balance/tolerance   Balance   Static Sitting Poor +   Dynamic Sitting Poor   Static Standing Zero   Ambulatory Zero   Activity Tolerance   Activity Tolerance Patient limited by pain; Patient limited by fatigue   Medical Staff Made Aware Mora PT   Nurse Made Aware Dinorah Cedeno, RN   RUE Assessment   RUE Assessment [facility]   RUE Strength   RUE Overall Strength Within Functional Limits - able to perform ADL tasks with strength  (4-/5)   LUE Assessment   LUE Assessment WFL   LUE Strength   LUE Overall Strength Within Functional Limits - able to perform ADL tasks with strength  (4-/5)   Hand Function   Gross Motor Coordination Functional   Fine Motor Coordination Functional   Sensation   Light Touch No apparent deficits   Proprioception   Proprioception No apparent deficits   Vision-Basic Assessment   Current Vision Wears glasses only for reading  (not present at time of eval)   Vision - Complex Assessment   Ocular Range of Motion [facility]   Acuity Able to read employee name badge without difficulty   Perception   Inattention/Neglect Appears intact   Cognition   Overall Cognitive Status Impaired   Arousal/Participation Alert   Attention Attends with cues to redirect   Orientation Level Oriented to person;Oriented to place  (general to time "March")   Memory Decreased recall of precautions;Decreased recall of recent events;Decreased short term memory   Following Commands Follows one step commands with increased time or repetition   Comments pt w/ poor insight into deficits and increased confusion  Pt having word finding difficulties  Assessment   Limitation Decreased ADL status; Decreased UE ROM; Decreased UE strength;Decreased Safe judgement during ADL;Decreased cognition;Decreased endurance;Decreased self-care trans;Decreased high-level ADLs   Prognosis Fair   Assessment Pt is 78 y o female adm to [place] [id number] on 2/27/20 presenting w/ Abnormal labs and increased confusion   Pt w/ recent adm to [facility] on 2/18/20 2* fall resulting in fx of R distal femer and d/c to [place] for STR  Per ortho recommendations, continue w/ non-surgical treatment  WBS is NWB RLE w/ knee immobilizer  Pt dx'd w/ Encephalopathy and Hyponatremia  Comorbidities affecting pt's functional performance include Anxiety, A-fib, CHF, Depression, HTN, Opioid dependence, and Pneumonia  Active OT orders  Pt presents from [place] for [address] [place]  At baseline, pt reports A w/ ADLs/IADLs and SPTs to w/c, A with w/c mobility, (-) , and reports 0 falls PTA  ? accuracy and reliability 2* pt poor historian w/ increased confusion and increased difficulty w/ word finding  Upon evaluation, pt currently requires Min A for UB ADLS, Max A for LB ADLS, Max A for toileting, Max A x2-3 for bed mobility 2* increased pain, decreased strength, impaired cognition, decreased endurance, and decreased activity tolerance  These impairments, as well as pt's decreased ADL status limit pt's ability to engage in all areas of occupation  Pt scored 25/100 on the Barthel Index  Based on this OT evaluation and the pt's functional performance deficits, pt has been identified as a High complexity evaluation  Pt would benefit from continued OT services during hospital stay to address deficits and improve level of functional independence in these Occupational Performance areas: grooming, bathing/shower, dressing, toileting, bed mobility, and functional mobility/transfers  OT recommends STR  Pt will be on OT caseload 3-5x/wk to address the following goals to  in 10-14 days:   Goals   Patient Goals none stated 2* cognition    LTG Time Frame 10-14   Long Term Goal Please refer to LTGs listed below:   Plan   Treatment Interventions ADL retraining;Functional transfer training;UE strengthening/ROM; Endurance training;Patient/family training; Compensatory technique education;Equipment evaluation/education; Activityengagement   Goal Expiration Date 20   OT Treatment Day 0   OT Frequency 3-5x/wk Recommendation   OT Discharge Recommendation Short Term Rehab   OT - OK to Discharge Yes  (when medically cleared to rehab)   Barthel Index   Feeding 5   Bathing 0   Grooming Score 0   Dressing Score 5   Bladder Score 0   Bowels Score 10   Toilet Use Score 0   Transfers (Bed/Chair) Score 0   Mobility (Level Surface) Score 0   Stairs Score 0   Barthel Index Score 20   Modified Agustín Scale   Modified Agustín Scale 5       Goals:   1  Pt will improve activity tolerance to G for 30 min treatment session to increase activity engagement  2  Pt will increase bed mobility to Mod A and transfer EOB to participate in functional activity and decrease caregiver assistance required  3  Pt will increase dynamic seated tolerance to 8-10 min w/ Fair+ seated balance to increase safety during participation in ADLs  4  Pt will complete light grooming task w/ Supervision w/ G sequencing  5  Pt will increase independence in UB ADLs w/ use of DME to Supervision w/ G balance/safety demonstrated to increase functional activity engagement  6  Pt will increase independence in LB ADLs w/ use of DME to Min A w/ G balance/safety demonstrated to increase functional activity engagement  7  Pt will complete toileting w/ Min A w/ G hygiene/thoroughness w/ use of DME as needed  8  Pt will verbalize 3 potential fall risks in environment and carryover G safety through functional activity to decrease fall risk in home environment  9  Pt will increase BUE strength by 1 MM grade via AROM/AAROM exercises to increase independence in ADLS and functional mobility/transfers  10  Pt will demonstrate 100% attention to task during additional cognitive assessments w/ G participation to assist in d/c planning and recommendations    11  Pt will have 100% compliance of WBS of NWB RLE during functional activities w/o cues from therapist    12  Pt will tolerate continued OOB assessment and appropriate functional mobility/transfer goals will be established by OTR as applicable         Jefe Pill, OTS

## 2020-03-03 LAB
ANION GAP SERPL CALCULATED.3IONS-SCNC: 7 MMOL/L (ref 4–13)
BUN SERPL-MCNC: 15 MG/DL (ref 5–25)
CALCIUM SERPL-MCNC: 9 MG/DL (ref 8.3–10.1)
CHLORIDE SERPL-SCNC: 93 MMOL/L (ref 100–108)
CO2 SERPL-SCNC: 28 MMOL/L (ref 21–32)
CREAT SERPL-MCNC: 0.76 MG/DL (ref 0.6–1.3)
GFR SERPL CREATININE-BSD FRML MDRD: 74 ML/MIN/1.73SQ M
GLUCOSE SERPL-MCNC: 162 MG/DL (ref 65–140)
POTASSIUM SERPL-SCNC: 4.2 MMOL/L (ref 3.5–5.3)
SODIUM SERPL-SCNC: 128 MMOL/L (ref 136–145)

## 2020-03-03 PROCEDURE — 97760 ORTHOTIC MGMT&TRAING 1ST ENC: CPT | Performed by: PHYSICAL THERAPIST

## 2020-03-03 PROCEDURE — 99232 SBSQ HOSP IP/OBS MODERATE 35: CPT | Performed by: INTERNAL MEDICINE

## 2020-03-03 PROCEDURE — 97112 NEUROMUSCULAR REEDUCATION: CPT | Performed by: PHYSICAL THERAPIST

## 2020-03-03 PROCEDURE — 99024 POSTOP FOLLOW-UP VISIT: CPT | Performed by: PHYSICIAN ASSISTANT

## 2020-03-03 PROCEDURE — 80048 BASIC METABOLIC PNL TOTAL CA: CPT | Performed by: INTERNAL MEDICINE

## 2020-03-03 PROCEDURE — 97110 THERAPEUTIC EXERCISES: CPT | Performed by: PHYSICAL THERAPIST

## 2020-03-03 RX ADMIN — PROPRANOLOL HYDROCHLORIDE 10 MG: 20 TABLET ORAL at 08:13

## 2020-03-03 RX ADMIN — POLYETHYLENE GLYCOL 3350 17 G: 17 POWDER, FOR SOLUTION ORAL at 08:12

## 2020-03-03 RX ADMIN — SODIUM CHLORIDE TAB 1 GM 2 G: 1 TAB at 21:09

## 2020-03-03 RX ADMIN — OXYCODONE HYDROCHLORIDE 5 MG: 5 TABLET ORAL at 07:50

## 2020-03-03 RX ADMIN — STANDARDIZED SENNA CONCENTRATE 8.6 MG: 8.6 TABLET ORAL at 21:09

## 2020-03-03 RX ADMIN — OXYCODONE HYDROCHLORIDE 5 MG: 5 TABLET ORAL at 17:04

## 2020-03-03 RX ADMIN — SODIUM CHLORIDE TAB 1 GM 2 G: 1 TAB at 11:56

## 2020-03-03 RX ADMIN — SODIUM CHLORIDE TAB 1 GM 2 G: 1 TAB at 07:50

## 2020-03-03 RX ADMIN — PROPRANOLOL HYDROCHLORIDE 10 MG: 20 TABLET ORAL at 17:05

## 2020-03-03 RX ADMIN — DOCUSATE SODIUM 100 MG: 100 CAPSULE, LIQUID FILLED ORAL at 08:13

## 2020-03-03 RX ADMIN — SODIUM CHLORIDE TAB 1 GM 2 G: 1 TAB at 16:01

## 2020-03-03 RX ADMIN — BUSPIRONE HYDROCHLORIDE 5 MG: 10 TABLET ORAL at 21:11

## 2020-03-03 RX ADMIN — TAMSULOSIN HYDROCHLORIDE 0.4 MG: 0.4 CAPSULE ORAL at 16:01

## 2020-03-03 RX ADMIN — FUROSEMIDE 10 MG: 20 TABLET ORAL at 08:13

## 2020-03-03 RX ADMIN — BUSPIRONE HYDROCHLORIDE 5 MG: 10 TABLET ORAL at 16:01

## 2020-03-03 RX ADMIN — BUSPIRONE HYDROCHLORIDE 5 MG: 10 TABLET ORAL at 08:12

## 2020-03-03 NOTE — PROGRESS NOTES
[facility] Progress Note  Patient: Hunter Maynard 78 y o  female   MRN: [id number]  PCP: Lane Moran MD  Unit/Bed#: E2 -01 Encounter: [id number]  Date Of Visit: 03/03/20      Assessment/plan  1  Acute encephalopathy likely result of hyponatremia and recent subdural hematoma  Hyponatremia treated by nephrology  Pt mentation is slowly improving       2  Hyponatremia- multifactorial due to hypovolemia, recent use of ddavp, cirrhosis and possible underlying siadh due to chronic pain and recent subdural hematoma  Appreciate nephrology recommendations  Pt originally treated with IV fluids but sodium remained very stable  Fluid was stopped given minimal improvement and he was started on sodium chloride 1 g tid which have been increased to 2g qid  Pt will continue with fluid restriction of 1200ml  Pt will continue lasix  Sodium has increased to 128  Will check bmp in am       3  History of subdural hematoma- pt was recently admitted to [facility] and discharged on 2/21/2020 to rehab at [facility]  Pt had a fall when trying to transfer to wheelchair using her walker  Pt was evaluated by neurosx and given ddavp to reverse anti-platelet effect of asa  Repeat ct head on 2/27 reveals persistent moderately hyperdense small persistent subdural hematoma  Will continue to monitor and obtain ct head stat if any changes     4  History of cirrhosis- due to chronic alcohol use  Continue propanolol  Holding aldactone due to hyponatremia       5  afib- pt had been previously on asa  Holding asa due to subdural hematoma  Will monitor       6  Closed fracture of right distal femur- history of right distal femur fracture which was managed conservatively  Repeat xray shows worsening displacement of the fracture  Ortho evaluated pt and recommended non surgical intervention  Continue conservative management with knee immobilizer and NWB RLE       7  Chronic diastolic chf- continue low dose lasix   Monitor daily weight     8  chronic indwelling wells     dispo- pt to return to [facility] once sodium is stable  Subjective:   Pt seen and examined  Pt needed prompting only for year today  No f/c no cp no sob no n/v/d no abd pain  Objective:     Vitals: Blood pressure 121/76, pulse 69, temperature 99 °F (37 2 °C), temperature source Tympanic, resp  rate 18, height 5' 2" (1 575 m), weight 97 kg (213 lb 13 5 oz), SpO2 96 %  ,Body mass index is 39 11 kg/m²  Lab, Imaging and other studies:  Results from last 7 days   Lab Units 03/02/20  0427  02/27/20  1254   WBC Thousand/uL 9 03   < >  --    HEMOGLOBIN g/dL 11 3*   < >  --    HEMATOCRIT % 33 6*   < >  --    PLATELETS Thousands/uL 256   < >  --    INR   --   --  1 10    < > = values in this interval not displayed  Results from last 7 days   Lab Units 03/03/20  0435  02/28/20  1383   POTASSIUM mmol/L 4 2   < > 4 4   CHLORIDE mmol/L 93*   < > 84*   CO2 mmol/L 28   < > 31   BUN mg/dL 15   < > 11   CREATININE mg/dL 0 76   < > 0 81   CALCIUM mg/dL 9 0   < > 9 0   ALK PHOS U/L  --   --  392*   ALT U/L  --   --  71   AST U/L  --   --  224*    < > = values in this interval not displayed  Lab Results   Component Value Date    BLOODCX No Growth After 5 Days  06/05/2017    BLOODCX No Growth After 5 Days   06/05/2017    URINECX >100,000 cfu/ml Escherichia coli 06/05/2017    URINECX >100,000 cfu/ml Klebsiella pneumoniae 11/01/2016     Scheduled Meds:   Current Facility-Administered Medications:  amLODIPine 5 mg Oral Daily Mariana Vasquez DO   busPIRone 5 mg Oral TID Del Barone MD   docusate sodium 100 mg Oral Daily Del Barone MD   famotidine 20 mg Oral PRN Del Barone MD   furosemide 10 mg Oral Daily Mariana Vasquez, DO   gabapentin 100 mg Oral TID PRN Del Barone MD   influenza vaccine 0 5 mL Intramuscular Once Marlen Brady, DO   oxyCODONE 5 mg Oral Q4H PRN Marlen Ambron, DO   polyethylene glycol 17 g Oral Daily Marlen Ambron, DO   propranolol 10 mg Oral BID Mariana K Negin Jacobson DO   senna 1 tablet Oral HS Alicia Álvarez MD   sodium chloride 2 g Oral 4x Daily (with meals and at bedtime) Mariana Dunne DO   tamsulosin 0 4 mg Oral Daily With Dinner Alicia Álvarez MD     Continuous Infusions:    PRN Meds: famotidine    gabapentin    oxyCODONE      Physical exam:  Physical Exam  General appearance: alert  Head: Normocephalic, without obvious abnormality, atraumatic  Eyes: conjunctivae/corneas clear  PERRL, EOM's intact  Fundi benign    Neck: no adenopathy, no carotid bruit, no JVD, supple, symmetrical, trachea midline and thyroid not enlarged, symmetric, no tenderness/mass/nodules  Lungs: clear to auscultation bilaterally  Heart: regular rate and rhythm, S1, S2 normal, no murmur, click, rub or gallop  Abdomen: soft, non-tender; bowel sounds normal; no masses,  no organomegaly  Extremities: extremities normal, warm and well-perfused; no cyanosis, clubbing, or edema  Pulses: 2+ and symmetric  Skin: Skin color, texture, turgor normal  No rashes or lesions  Neurologic: Mental status: awake alert oriented x3 with prompting on year      VTE Pharmacologic Prophylaxis: Reason for no pharmacologic prophylaxis due to recent subdural hematoma  VTE Mechanical Prophylaxis: sequential compression device    Counseling / Coordination of Care  Total floor / unit time spent today 20 minutes     Current Length of Stay: 5 day(s)    Current Patient Status: Inpatient     Code Status: Level 1 - Full Code

## 2020-03-03 NOTE — PHYSICAL THERAPY NOTE
Physical Therapy Progress Note     03/03/20 1434   Pain Assessment   Pain Assessment [facility] Pain Intervention(s) Repositioned; Emotional support; Ambulation/increased activity   Pain Rating: FLACC (Rest) - Face 0   Pain Rating: FLACC (Rest) - Legs 0   Pain Rating: FLACC (Rest) - Activity 0   Pain Rating: FLACC (Rest) - Cry 0   Pain Rating: FLACC (Rest) - Consolability 0   Score: FLACC (Rest) 0   Pain Rating: FLACC (Activity) - Face 1   Pain Rating: FLACC (Activity) - Legs 0   Pain Rating: FLACC (Activity) - Activity 1   Pain Rating: FLACC (Activity) - Cry 1   Pain Rating: FLACC (Activity) - Consolability 1   Score: FLACC (Activity) 4   Restrictions/Precautions   Weight Bearing Precautions Per Order Yes   RLE Weight Bearing Per Order NWB   Braces or Orthoses LE Braces  (hinged knee brace  locked in extension)   Other Precautions Cognitive; Bed Alarm;WBS;Multiple lines; Fall Risk;Pain;Hard of hearing   General   Chart Reviewed Yes   Response to Previous Treatment Patient with no complaints from previous session  Family/Caregiver Present No   Cognition   Overall Cognitive Status Impaired   Orientation Level Oriented to person   Subjective   Subjective uncomfortable in the bed  Bed Mobility   Rolling R 2  Maximal assistance   Additional items Assist x 2; Increased time required;Verbal cues;LE management   Rolling L 2  Maximal assistance   Additional items Assist x 2; Increased time required;Verbal cues;LE management   Supine to Sit   (not performed due to stool incontinence)   Transfers   Sit to Stand Unable to assess   Endurance Deficit   Endurance Deficit Yes   Endurance Deficit Description cognition, weakness   Activity Tolerance   Activity Tolerance Patient tolerated treatment well;Treatment limited secondary to medical complications (Comment)   Nurse Made Aware yes- michael chavez   Exercises   UE Exercise Supine;10 reps;AAROM; Bilateral  (sh flexion, abduction, push/pull)   TKR Supine;10 reps;PROM;AAROM; Left  (rle not performed except ankle arom)   Neuro re-ed rolling: reaching, bending lle, assiting with pullling self over  , pillow between knees due to pt body habitus   Equipment Use   Comments adjusted length of rle knee orthosis due to telescoping distally, hinge migration to prox tibia and top of brace at approximate fx site  educated cna and nurse michael   Assessment   Prognosis Poor   Problem List Decreased strength;Decreased range of motion;Decreased endurance; Impaired balance;Decreased mobility; Decreased cognition; Impaired judgement;Decreased safety awareness; Obesity; Decreased skin integrity;Orthopedic restrictions;Pain   Assessment pt in bed, slumped to left against railing and soiled with stool  pt also noted to have distal migration of hinged knee brace  pt was reclined in bed, brace adjusted and lengthened at proximal portion  pt's nurse and aide notified to monitor fit  pt performed ther ex lle and r ankle arom, bue arom all ranges  pt also rolled side to side with step by step instructions and max assist of 2 persons  pillow placed between knees due to body habitus and to maintain alignment of rle  pt then cleaned with assist of CNA and returned to supine and repositioned  pt reproted feeling like she was falling to the right when she was in midline  pt scd's, bed alarm on  heels off bed  pt tolerated well  pt continues to be dependent, nwb rle, fall risk from sitting  needs snf   Goals   Patient Goals none offered   STG Expiration Date 03/23/20   PT Treatment Day 1   Plan   Treatment/Interventions LE strengthening/ROM; Therapeutic exercise;Patient/family training;Equipment eval/education; Bed mobility; Compensatory technique education;Spoke to nursing   Progress Slow progress, medical status limitations   PT Frequency   (4-5x/week)   Recommendation   Recommendation Post acute IP rehab  (likely ltc)   Equipment Recommended Walker  (mechanical lift with precautions for leverage at fx site)   PT - OK to Discharge Yes   Additional Comments snf     Beatriz Captain, PT

## 2020-03-03 NOTE — PLAN OF CARE
Problem: PHYSICAL THERAPY ADULT  Goal: Performs mobility at highest level of function for planned discharge setting  See evaluation for individualized goals  Description  Treatment/Interventions: Functional transfer training, LE strengthening/ROM, Therapeutic exercise, Endurance training, Cognitive reorientation, Patient/family training, Equipment eval/education, Bed mobility, Compensatory technique education, Continued evaluation, Spoke to nursing, OT  Equipment Recommended: Wheelchair, Lurlean Cheek, Other (Comment)(walker, wc if pt does not own; Dossie Castles for OOB mobility)       See flowsheet documentation for full assessment, interventions and recommendations  Outcome: Not Progressing  Note:   Prognosis: Poor  Problem List: Decreased strength, Decreased range of motion, Decreased endurance, Impaired balance, Decreased mobility, Decreased cognition, Impaired judgement, Decreased safety awareness, Obesity, Decreased skin integrity, Orthopedic restrictions, Pain  Assessment: pt in bed, slumped to left against railing and soiled with stool  pt also noted to have distal migration of hinged knee brace  pt was reclined in bed, brace adjusted and lengthened at proximal portion  pt's nurse and aide notified to monitor fit  pt performed ther ex lle and r ankle arom, bue arom all ranges  pt also rolled side to side with step by step instructions and max assist of 2 persons  pillow placed between knees due to body habitus and to maintain alignment of rle  pt then cleaned with assist of CNA and returned to supine and repositioned  pt reproted feeling like she was falling to the right when she was in midline  pt scd's, bed alarm on  heels off bed  pt tolerated well  pt continues to be dependent, nwb rle, fall risk from sitting  needs snf  Barriers to Discharge:  Other (Comment)  Barriers to Discharge Comments: pt requires max A for ADLs and mobility  Recommendation: Post acute IP rehab(likely ltc)     PT - OK to Discharge: Yes    See flowsheet documentation for full assessment

## 2020-03-03 NOTE — PROGRESS NOTES
Progress Note - Orthopedics   Darryl Lisa 78 y o  female MRN: [id number]  Unit/Bed#: E2 -01 Encounter: [id number]    Assessment:  68-year-old female with right distal femur periprosthetic femur fracture    Plan:  · Will continue with conservative treatment of fracture at this time  · Patient to remain in knee immobilizer at all times  · NWB to RLE  · Pain control p r n  · PT/OT  · Patient is clear for discharge from an orthopedic standpoint  She should follow-up in the office with orthopedics  Subjective:  Patient seen and examined  Patient reports that she does not have any pain currently  States she is comfortable in a knee immobilizer  Vitals: Blood pressure 121/76, pulse 69, temperature 99 °F (37 2 °C), temperature source Tympanic, resp  rate 18, height 5' 2" (1 575 m), weight 97 kg (213 lb 13 5 oz), SpO2 96 %  ,Body mass index is 39 11 kg/m²  Intake/Output Summary (Last 24 hours) at 3/3/2020 1716  Last data filed at 3/3/2020 0111  Gross per 24 hour   Intake    Output 300 ml   Net -300 ml       Invasive Devices     Peripheral Intravenous Line            Peripheral IV 03/02/20 Right Forearm 1 day          Drain            Urethral Catheter 13 days                Ortho Exam:  Right lower extremity:  Inspection-  right lower extremity is shortened and externally rotated  Patient is currently wearing knee immobilizer  No areas of skin breakdown or irritation from the brace  Palpation- thigh and calf soft and compressible  ROM- knee ROM not tested  Able to dorsiflex and plantar flex the ankle and flex and extend all toes  Neurovascular- sensation intact L4 through S1  Palpable dorsalis pedis pulse  AT/GS intact      Lab, Imaging and other studies:   CMP:   Lab Results   Component Value Date    SODIUM 128 (L) 03/03/2020    CL 93 (L) 03/03/2020    CO2 28 03/03/2020    BUN 15 03/03/2020    CREATININE 0 76 03/03/2020    CALCIUM 9 0 03/03/2020    EGFR 74 03/03/2020

## 2020-03-03 NOTE — PROGRESS NOTES
Progress Note - Nephrology   Cadence Pritchard 78 y o  female MRN: [id number]  Unit/Bed#: E2 -01 Encounter: [id number]      Assessment / Plan:  1  Hyponatremia, multifactorial in the setting of hypovolemia bulimia, recent use of DDAVP, cirrhosis and possibly underlying SIADH due to chronic pain and recent subdural/parenchymal hematoma  -serum sodium on admission 116, improved to 121 with saline bolus, now improved to 128 on salt tablets  -will continue salt tabs 2g q i d  and continue Lasix 10 milligrams p o  Daily  -continue 1 2L/day fluid restriction  -f/u am BMP, renal function stable 0 6-0 8 range     2  Hypertension-BP well controlled, continue amlodipine 5 milligrams p o  Daily, propranolol 10 milligrams p o  B i d  With hold parameters     3  Mild anemia - Hgb 11 3, monitor CBC     4  Right femur fracture-conservative management     5  History of cirrhosis     6  Recent subdural hematoma        Subjective:   She has trouble localizing verbalizing her thoughts  She does ask that the phone  She wants to call her   No other complaints  Objective:     Vitals: Blood pressure 125/71, pulse 65, temperature 98 °F (36 7 °C), temperature source Tympanic, resp  rate 18, height 5' 2" (1 575 m), weight 97 kg (213 lb 13 5 oz), SpO2 96 %  ,Body mass index is 39 11 kg/m²  Temp (24hrs), Av 9 °F (36 6 °C), Min:97 6 °F (36 4 °C), Max:98 1 °F (36 7 °C)      Weight (last 2 days)     Date/Time   Weight    20 0546   97 (213 85)    20 0551   96 8 (213 41)    20 0534   97 4 (214 73)                Intake/Output Summary (Last 24 hours) at 3/3/2020 1307  Last data filed at 3/3/2020 0111  Gross per 24 hour   Intake    Output 300 ml   Net -300 ml     I/O last 24 hours: In: -   Out: 300 [Urine:300]        Physical Exam:   Physical Exam   Constitutional: She appears well-developed and well-nourished  No distress  Chronically ill appearing   HENT:   Head: Normocephalic and atraumatic  Mouth/Throat: No oropharyngeal exudate  Eyes: Right eye exhibits no discharge  Left eye exhibits no discharge  No scleral icterus  Neck: Normal range of motion  Neck supple  No thyromegaly present  Cardiovascular: Normal rate and regular rhythm  No murmur heard  Pulmonary/Chest: Effort normal and breath sounds normal  No respiratory distress  She has no wheezes  Abdominal: Soft  Bowel sounds are normal  She exhibits no distension  Genitourinary:   Genitourinary Comments: +wells   Musculoskeletal: She exhibits no edema  RLE brace   Neurological: She is alert  She exhibits normal muscle tone  Awake, confused   Skin: Skin is warm and dry  No rash noted  She is not diaphoretic  Psychiatric: She has a normal mood and affect  Her behavior is normal    Nursing note and vitals reviewed        Invasive Devices     Peripheral Intravenous Line            Peripheral IV 03/02/20 Right Forearm 1 day          Drain            Urethral Catheter 13 days                Medications:    Scheduled Meds:  Current Facility-Administered Medications:  amLODIPine 5 mg Oral Daily Mariana K Christina, DO   busPIRone 5 mg Oral TID Yovana Sanderson MD   docusate sodium 100 mg Oral Daily Yovana Sanderson MD   famotidine 20 mg Oral PRN Yovana Sanderson MD   furosemide 10 mg Oral Daily Mariana Vasquez, DO   gabapentin 100 mg Oral TID PRN Yovana Sanderson MD   influenza vaccine 0 5 mL Intramuscular Once Marlen Ambron, DO   oxyCODONE 5 mg Oral Q4H PRN Marlen Ambron, DO   polyethylene glycol 17 g Oral Daily Marlen Ambron, DO   propranolol 10 mg Oral BID Mariana CLEMENTINE Venegasi, DO   senna 1 tablet Oral HS Yovana Sanderson MD   sodium chloride 2 g Oral 4x Daily (with meals and at bedtime) Kemi Esparza, DO   tamsulosin 0 4 mg Oral Daily With Jamie Baugh MD       PRN Meds: famotidine    gabapentin    oxyCODONE    Continuous Infusions:         LAB RESULTS:      Results from last 7 days   Lab Units 03/03/20  0435 03/02/20  0427 03/01/20  1242 03/01/20  0427 03/01/20  0350 02/29/20  2354 02/29/20  1955 02/29/20  1631  02/28/20  0632  02/27/20  1254 02/27/20  1145   WBC Thousand/uL  --  9 03  --   --   --   --   --   --   --  7 62  --   --  9 28   HEMOGLOBIN g/dL  --  11 3*  --   --   --   --   --   --   --  12 8  --   --  12 0   HEMATOCRIT %  --  33 6*  --   --   --   --   --   --   --  38 9  --   --  35 8   PLATELETS Thousands/uL  --  256  --   --   --   --   --   --   --  144*  --   --  131*   NEUTROS PCT %  --  64  --   --   --   --   --   --   --  71  --   --  69   LYMPHS PCT %  --  18  --   --   --   --   --   --   --  15  --   --  15   MONOS PCT %  --  13*  --   --   --   --   --   --   --  10  --   --  12   EOS PCT %  --  2  --   --   --   --   --   --   --  2  --   --  2   POTASSIUM mmol/L 4 2 4 8 4 7  --  4 3 4 1 4 0 4 3   < > 4 4   < > 4 2 5 7*   CHLORIDE mmol/L 93* 89* 87*  --  87* 89* 87* 86*   < > 84*   < > 83* 83*   CO2 mmol/L 28 28 30  --  28 30 29 29   < > 31   < > 30 28   BUN mg/dL 15 14 12  --  11 12 11 12   < > 11   < > 13 14   CREATININE mg/dL 0 76 0 67 0 80  --  0 74 0 69 0 79 0 80   < > 0 81   < > 0 79 0 77   CALCIUM mg/dL 9 0 9 0 8 6  --  8 8 8 7 8 5 8 6   < > 9 0   < > 8 8 8 6   ALK PHOS U/L  --   --   --   --   --   --   --   --   --  392*  --  342* 337*   ALT U/L  --   --   --   --   --   --   --   --   --  71  --  61 64   AST U/L  --   --   --   --   --   --   --   --   --  224*  --  174* 195*   MAGNESIUM mg/dL  --   --   --  1 8  --   --   --   --   --  1 8  --   --   --    PHOSPHORUS mg/dL  --   --   --  3 1  --   --   --   --   --   --   --   --   --     < > = values in this interval not displayed  CUTURES:  Lab Results   Component Value Date    BLOODCX No Growth After 5 Days  06/05/2017    BLOODCX No Growth After 5 Days   06/05/2017    URINECX >100,000 cfu/ml Escherichia coli 06/05/2017    URINECX >100,000 cfu/ml Klebsiella pneumoniae 11/01/2016                 Portions of the record may have been created with voice recognition software  Occasional wrong word or "sound a like" substitutions may have occurred due to the inherent limitations of voice recognition software  Read the chart carefully and recognize, using context, where substitutions have occurred  If you have any questions, please contact the dictating provider

## 2020-03-03 NOTE — CASE MANAGEMENT
Late Entry: This case management assistant (CMA) went to review the patient's medicare rights with her and she did not appear to be alert and oriented at that time; 3/2/2020  This CMA called the nurse this morning, 3/3/2020,  and asked if any family members have come in to see the patient in order to review the medicare rights with either the  or the son  The nurse stated that her son came in to visit the patient on Sunday  The patient's son is not listed on her emergency contact sheet  This CMA is going to mail the patient's medicare rights to the patient's home address listed on her facesheet, so that the  and the son will have a copy of them  The IMM will be mailed today at 9:30 AM on 1/9/2082 via certified mail

## 2020-03-04 VITALS
BODY MASS INDEX: 40.04 KG/M2 | HEIGHT: 62 IN | HEART RATE: 85 BPM | DIASTOLIC BLOOD PRESSURE: 74 MMHG | TEMPERATURE: 98 F | OXYGEN SATURATION: 96 % | WEIGHT: 217.59 LBS | SYSTOLIC BLOOD PRESSURE: 145 MMHG | RESPIRATION RATE: 16 BRPM

## 2020-03-04 LAB
ANION GAP SERPL CALCULATED.3IONS-SCNC: 3 MMOL/L (ref 4–13)
BASOPHILS # BLD AUTO: 0.03 THOUSANDS/ΜL (ref 0–0.1)
BASOPHILS NFR BLD AUTO: 0 % (ref 0–1)
BUN SERPL-MCNC: 15 MG/DL (ref 5–25)
CALCIUM SERPL-MCNC: 8.6 MG/DL (ref 8.3–10.1)
CHLORIDE SERPL-SCNC: 97 MMOL/L (ref 100–108)
CO2 SERPL-SCNC: 29 MMOL/L (ref 21–32)
CREAT SERPL-MCNC: 0.81 MG/DL (ref 0.6–1.3)
EOSINOPHIL # BLD AUTO: 0.17 THOUSAND/ΜL (ref 0–0.61)
EOSINOPHIL NFR BLD AUTO: 2 % (ref 0–6)
ERYTHROCYTE [DISTWIDTH] IN BLOOD BY AUTOMATED COUNT: 16.3 % (ref 11.6–15.1)
GFR SERPL CREATININE-BSD FRML MDRD: 68 ML/MIN/1.73SQ M
GLUCOSE SERPL-MCNC: 155 MG/DL (ref 65–140)
HCT VFR BLD AUTO: 32.9 % (ref 34.8–46.1)
HGB BLD-MCNC: 10.6 G/DL (ref 11.5–15.4)
IMM GRANULOCYTES # BLD AUTO: 0.08 THOUSAND/UL (ref 0–0.2)
IMM GRANULOCYTES NFR BLD AUTO: 1 % (ref 0–2)
LYMPHOCYTES # BLD AUTO: 1.3 THOUSANDS/ΜL (ref 0.6–4.47)
LYMPHOCYTES NFR BLD AUTO: 19 % (ref 14–44)
MCH RBC QN AUTO: 29.8 PG (ref 26.8–34.3)
MCHC RBC AUTO-ENTMCNC: 32.2 G/DL (ref 31.4–37.4)
MCV RBC AUTO: 92 FL (ref 82–98)
MONOCYTES # BLD AUTO: 0.87 THOUSAND/ΜL (ref 0.17–1.22)
MONOCYTES NFR BLD AUTO: 13 % (ref 4–12)
NEUTROPHILS # BLD AUTO: 4.49 THOUSANDS/ΜL (ref 1.85–7.62)
NEUTS SEG NFR BLD AUTO: 65 % (ref 43–75)
NRBC BLD AUTO-RTO: 0 /100 WBCS
PLATELET # BLD AUTO: 115 THOUSANDS/UL (ref 149–390)
PMV BLD AUTO: 10.5 FL (ref 8.9–12.7)
POTASSIUM SERPL-SCNC: 4.8 MMOL/L (ref 3.5–5.3)
RBC # BLD AUTO: 3.56 MILLION/UL (ref 3.81–5.12)
SODIUM SERPL-SCNC: 129 MMOL/L (ref 136–145)
WBC # BLD AUTO: 6.94 THOUSAND/UL (ref 4.31–10.16)

## 2020-03-04 PROCEDURE — 85025 COMPLETE CBC W/AUTO DIFF WBC: CPT | Performed by: INTERNAL MEDICINE

## 2020-03-04 PROCEDURE — 97530 THERAPEUTIC ACTIVITIES: CPT

## 2020-03-04 PROCEDURE — 80048 BASIC METABOLIC PNL TOTAL CA: CPT | Performed by: INTERNAL MEDICINE

## 2020-03-04 PROCEDURE — 99024 POSTOP FOLLOW-UP VISIT: CPT | Performed by: PHYSICIAN ASSISTANT

## 2020-03-04 PROCEDURE — 99239 HOSP IP/OBS DSCHRG MGMT >30: CPT | Performed by: INTERNAL MEDICINE

## 2020-03-04 PROCEDURE — 97535 SELF CARE MNGMENT TRAINING: CPT

## 2020-03-04 PROCEDURE — 99232 SBSQ HOSP IP/OBS MODERATE 35: CPT | Performed by: INTERNAL MEDICINE

## 2020-03-04 PROCEDURE — 97110 THERAPEUTIC EXERCISES: CPT

## 2020-03-04 RX ORDER — POLYETHYLENE GLYCOL 3350 17 G/17G
17 POWDER, FOR SOLUTION ORAL DAILY
Qty: 14 EACH | Refills: 0
Start: 2020-03-05 | End: 2020-03-20 | Stop reason: HOSPADM

## 2020-03-04 RX ORDER — OXYCODONE HYDROCHLORIDE 5 MG/1
5-10 TABLET ORAL EVERY 4 HOURS PRN
Qty: 36 TABLET | Refills: 0 | Status: CANCELLED | OUTPATIENT
Start: 2020-03-04 | End: 2020-03-14

## 2020-03-04 RX ORDER — FUROSEMIDE 20 MG/1
10 TABLET ORAL DAILY
Refills: 0
Start: 2020-03-05 | End: 2020-03-20 | Stop reason: HOSPADM

## 2020-03-04 RX ORDER — SODIUM CHLORIDE 1000 MG
2 TABLET, SOLUBLE MISCELLANEOUS
Refills: 0
Start: 2020-03-04 | End: 2020-03-20 | Stop reason: HOSPADM

## 2020-03-04 RX ADMIN — POLYETHYLENE GLYCOL 3350 17 G: 17 POWDER, FOR SOLUTION ORAL at 08:41

## 2020-03-04 RX ADMIN — SODIUM CHLORIDE TAB 1 GM 2 G: 1 TAB at 13:00

## 2020-03-04 RX ADMIN — DOCUSATE SODIUM 100 MG: 100 CAPSULE, LIQUID FILLED ORAL at 08:43

## 2020-03-04 RX ADMIN — FUROSEMIDE 10 MG: 20 TABLET ORAL at 08:41

## 2020-03-04 RX ADMIN — PROPRANOLOL HYDROCHLORIDE 10 MG: 20 TABLET ORAL at 08:41

## 2020-03-04 RX ADMIN — SODIUM CHLORIDE TAB 1 GM 2 G: 1 TAB at 08:41

## 2020-03-04 RX ADMIN — BUSPIRONE HYDROCHLORIDE 5 MG: 10 TABLET ORAL at 08:42

## 2020-03-04 RX ADMIN — AMLODIPINE BESYLATE 5 MG: 5 TABLET ORAL at 08:41

## 2020-03-04 RX ADMIN — OXYCODONE HYDROCHLORIDE 5 MG: 5 TABLET ORAL at 03:10

## 2020-03-04 RX ADMIN — OXYCODONE HYDROCHLORIDE 5 MG: 5 TABLET ORAL at 12:36

## 2020-03-04 NOTE — OCCUPATIONAL THERAPY NOTE
[address] Progress Note     Patient Name: Nicholas Mauro  IMYDX'D Date: 3/4/2020  Problem List  Principal Problem:    Encephalopathy  Active Problems:    Essential hypertension    Chronic indwelling Davies catheter    Chronic diastolic (congestive) heart failure (HCC)    Closed fracture of right distal femur (HCC)    Hyponatremia    History of subdural hematoma    A-fib (HCC)    History of cirrhosis    Uncomplicated opioid use    Anemia            03/04/20 1006   Restrictions/Precautions   Weight Bearing Precautions Per Order Yes   RLE Weight Bearing Per Order NWB   Braces or Orthoses LE Immobilizer  (Hinged knee brace locked in extension)   General   Response to Previous Treatment Patient with no complaints from previous session   Lifestyle   Autonomy At baseline, pt reports A w/ ADLs/IADLs and SPT to w/c, A with w/c mobility, (-) , and reports 0 falls PTA  Reciprocal Relationships facility staff    Service to Others retired   Pain Assessment   Pain Assessment FLACC   Pain Type Acute pain   Pain Location Leg   Pain Orientation Right   Pain Onset Ongoing   Clinical Progression Not changed   Patient's Stated Pain Goal No pain   Hospital Pain Intervention(s) Repositioned; Ambulation/increased activity; Emotional support; Rest   Pain Rating: FLACC (Rest) - Face 0   Pain Rating: FLACC (Rest) - Legs 0   Pain Rating: FLACC (Rest) - Activity 0   Pain Rating: FLACC (Rest) - Cry 0   Pain Rating: FLACC (Rest) - Consolability 0   Score: FLACC (Rest) 0   Pain Rating: FLACC (Activity) - Face 1   Pain Rating: FLACC (Activity) - Legs 1   Pain Rating: FLACC (Activity) - Activity 0   Pain Rating: FLACC (Activity) - Cry 1   Pain Rating: FLACC (Activity) - Consolability 1   Score: FLACC (Activity) 4   ADL   Where Assessed Supine, bed   Grooming Assistance 4  Minimal Assistance   Grooming Deficit Setup;Verbal cueing;Supervision/safety; Increased time to complete;Wash/dry hands; Wash/dry face;Brushing hair   Grooming Comments Light grooming tasks completed while lying supine w/ Min A 2* assist to brush back of hair 2* impaired functional reach with B/L UEs   UB Dressing Assistance 4  Minimal Assistance   UB Dressing Deficit Setup;Verbal cueing;Supervision/safety; Increased time to complete;Pull around back;Pull down in back   UB Dressing Comments UB dressing completed w/ Min A with assist to bring gown around/down in back 2* increased body habitus and increased fatigue in long sitting position   LB Dressing Assistance 2  Maximal Assistance   LB Dressing Deficit Setup;Verbal cueing;Supervision/safety; Increased time to complete; Don/doff R sock; Don/doff L sock   LB Dressing Comments LB dressing completed w/ Total A 2* impaired functional reach demonstrated to don/doff B/L socks  Bed Mobility   Rolling R 2  Maximal assistance   Additional items Assist x 2;Bedrails; Increased time required;Verbal cues;LE management   Rolling L 2  Maximal assistance   Additional items Assist x 2;Bedrails; Increased time required;Verbal cues;LE management   Supine to Sit 2  Maximal assistance   Additional items Assist x 2; Increased time required;Verbal cues;LE management; Bedrails  (Long sit in bed)   Sit to Supine 2  Maximal assistance   Additional items Assist x 2; Increased time required;Verbal cues;LE management  (from long sit in bed)   Additional Comments Pt lying supine at end of session with call bell and phone within reach  Bed alarm activated  All needs met and pt reports no further questions for OT at this time  Transfers   Sit to Stand Unable to assess   Stand to Sit Unable to assess   Cognition   Overall Cognitive Status Impaired   Arousal/Participation Alert   Attention Attends with cues to redirect   Orientation Level Oriented to person;Disoriented to place; Disoriented to time;Disoriented to situation   Memory Decreased recall of precautions;Decreased recall of recent events;Decreased short term memory   Following Commands Follows one step commands with increased time or repetition   Activity Tolerance   Activity Tolerance Patient limited by fatigue;Patient limited by pain   Medical Staff Jc Martinez, GOKUL; Angela, RN   Assessment   Assessment Pt seen for OT treatment session focusing on functional activity tolerance, bed mobility, and ADLs  Pt alert and cooperative throughout session  Pt lying supine at start of session  Light grooming tasks completed while lying supine w/ Min A 2* assist to brush back of hair 2* impaired functional reach with B/L UEs  LB dressing completed w/ Total A 2* impaired functional reach demonstrated to don/doff B/L socks  Pt declined EOB trial, however agreeable to long sitting and rolling L/R  Max A of 2 required for long sitting (x2) with assist for trunk control and cues for technique  Pt able to tolerate approx  30 seconds in long sitting position  UB dressing completed w/ Min A with assist to bring gown around/down in back 2* increased body habitus and increased fatigue in long sitting position  Rolling L/R completed with Max A of 2 with cues for safe technique and use of bed rails  Educated pt on importance of weight shifting and repositioning to help prevent skin breakdown and pressure ulcers with pt verbalizing understanding of education  Pt lying supine at end of session with call bell and phone within reach  Bed alarm activated  All needs met and pt reports no further questions for OT at this time  Continue to recommend STR when medically cleared  OT to follow pt on caseload  Plan   Treatment Interventions ADL retraining;Functional transfer training;UE strengthening/ROM; Endurance training;Patient/family training;Equipment evaluation/education; Compensatory technique education; Activityengagement   Goal Expiration Date 03/16/20   OT Treatment Day 1   OT Frequency 3-5x/wk   Recommendation   OT Discharge Recommendation Short Term Rehab   OT - OK to Discharge Yes  (when medically cleared to rehab)   Barthel Index   Feeding 5   Bathing 0   Grooming Score 0   Dressing Score 5   Bladder Score 0   Bowels Score 5   Toilet Use Score 0   Transfers (Bed/Chair) Score 0   Mobility (Level Surface) Score 0   Stairs Score 0   Barthel Index Score 15   Modified Freedom Scale   Modified Agustín Scale 5       Catherine Power, OTR/L

## 2020-03-04 NOTE — PHYSICAL THERAPY NOTE
Physical Therapy Progress Note     03/04/20 1005   Pain Assessment   Pain Assessment FLACC   Pain Type Acute pain   Pain Location Leg   Pain Orientation Right   Hospital Pain Intervention(s) Ambulation/increased activity;Repositioned   Pain Rating: FLACC (Rest) - Face 0   Pain Rating: FLACC (Rest) - Legs 0   Pain Rating: FLACC (Rest) - Activity 0   Pain Rating: FLACC (Rest) - Cry 0   Pain Rating: FLACC (Rest) - Consolability 0   Score: FLACC (Rest) 0   Pain Rating: FLACC (Activity) - Face 1   Pain Rating: FLACC (Activity) - Legs 1   Pain Rating: FLACC (Activity) - Activity 0   Pain Rating: FLACC (Activity) - Cry 1   Pain Rating: FLACC (Activity) - Consolability 1   Score: FLACC (Activity) 4   Restrictions/Precautions   Weight Bearing Precautions Per Order Yes   RLE Weight Bearing Per Order NWB   Braces or Orthoses LE Immobilizer  (Hinged knee brace locked in extension)   Other Precautions Cognitive; Chair Alarm; Bed Alarm; Fall Risk;Pain;WBS   General   Chart Reviewed Yes   Response to Previous Treatment Patient reporting fatigue but able to participate  Family/Caregiver Present No   Subjective   Subjective Willing to participate in therapy this AM    Bed Mobility   Rolling R 2  Maximal assistance   Additional items Assist x 2;Bedrails;Leg ; Increased time required;Verbal cues;LE management   Rolling L 2  Maximal assistance   Additional items Assist x 2;Bedrails;Leg ; Increased time required;Verbal cues;LE management   Supine to Sit 2  Maximal assistance   Additional items Assist x 2;Bedrails; Increased time required;Verbal cues;LE management   Sit to Supine 2  Maximal assistance   Additional items Assist x 2;Bedrails;Leg ; Increased time required;Verbal cues;LE management   Balance   Static Sitting Poor +   Dynamic Sitting Poor -   Endurance Deficit   Endurance Deficit Yes   Endurance Deficit Description cognition/fatigue/weakness   Activity Tolerance   Activity Tolerance Patient limited by fatigue;Patient limited by pain   Medical Staff [address]    Nurse Made Aware Yes   Exercises   TKR Supine;10 reps;AAROM; Left  (AAROM RLE AP's )   Assessment   Prognosis Poor   Problem List Decreased range of motion;Decreased strength; Impaired balance;Decreased endurance;Decreased mobility; Decreased skin integrity;Orthopedic restrictions;Pain;Obesity; Decreased safety awareness;Decreased cognition   Assessment Pt  supine in bed upon my arrival  Pt  reporting fatigue/pain, however agreeable to therapeutic intervention  Performance of HEP supine with emphasis on LLE with limited AP's performed on RLE  Progressed with transfers requiring A of 2 with cues for hand placement/technique  Pt  repositioned supine in bed at end of treatment session with ice applied and bed alarm active  PT will continue to recommend d/c to rehab when medically stable for continued improvement of noted impairments above  Barriers to Discharge Other (Comment)   Barriers to Discharge Comments Level of assistance  Goals   Patient Goals To rest    STG Expiration Date 03/23/20   PT Treatment Day 2   Plan   Treatment/Interventions Functional transfer training;LE strengthening/ROM; Therapeutic exercise; Endurance training;Bed mobility;Spoke to nursing;Spoke to case management;OT   Progress Slow progress, decreased activity tolerance   PT Frequency Other (Comment)  (4-5x/wk)   Recommendation   Recommendation Short-term skilled PT;Long-term skilled nursing home placement  (STR vs LTC pending progress)   Equipment Recommended Walker; Wheelchair  ( walker, wc if pt does not own; Ochoa Meng for OOB mobility)   PT - OK to Discharge Yes  (if d/c to rehab when medically stable )     Clayton Reagan, GOKUL

## 2020-03-04 NOTE — PLAN OF CARE
Problem: OCCUPATIONAL THERAPY ADULT  Goal: Performs self-care activities at highest level of function for planned discharge setting  See evaluation for individualized goals  Description  Treatment Interventions: ADL retraining, Functional transfer training, UE strengthening/ROM, Endurance training, Patient/family training, Compensatory technique education, Equipment evaluation/education, Activityengagement          See flowsheet documentation for full assessment, interventions and recommendations  Outcome: Progressing  Note:   Limitation: Decreased ADL status, Decreased UE ROM, Decreased UE strength, Decreased Safe judgement during ADL, Decreased cognition, Decreased endurance, Decreased self-care trans, Decreased high-level ADLs  Prognosis: Fair  Assessment: Pt seen for OT treatment session focusing on functional activity tolerance, bed mobility, and ADLs  Pt alert and cooperative throughout session  Pt lying supine at start of session  Light grooming tasks completed while lying supine w/ Min A 2* assist to brush back of hair 2* impaired functional reach with B/L UEs  LB dressing completed w/ Total A 2* impaired functional reach demonstrated to don/doff B/L socks  Pt declined EOB trial, however agreeable to long sitting and rolling L/R  Max A of 2 required for long sitting (x2) with assist for trunk control and cues for technique  Pt able to tolerate approx  30 seconds in long sitting position  UB dressing completed w/ Min A with assist to bring gown around/down in back 2* increased body habitus and increased fatigue in long sitting position  Rolling L/R completed with Max A of 2 with cues for safe technique and use of bed rails  Educated pt on importance of weight shifting and repositioning to help prevent skin breakdown and pressure ulcers with pt verbalizing understanding of education  Pt lying supine at end of session with call bell and phone within reach  Bed alarm activated   All needs met and pt reports no further questions for OT at this time  Continue to recommend STR when medically cleared  OT to follow pt on caseload        OT Discharge Recommendation: Short Term Rehab  OT - OK to Discharge: Yes(when medically cleared to rehab)

## 2020-03-04 NOTE — DISCHARGE SUMMARY
Discharge Summary - [facility] Internal Medicine    Patient Information: Curt Ivy 78 y o  female MRN: [id number]  Unit/Bed#: E2 -01 Encounter: [id number]    Discharging Physician / Practitioner: Sindy Seay DO  PCP: Yoni Tran MD  Admission Date: 2/27/2020  Discharge Date: 03/04/20    Disposition:     Discharged to [facility]    Reason for Admission: acute encephalopathy     Discharge Diagnoses:     Principal Problem:    Encephalopathy  Active Problems:    Essential hypertension    Chronic indwelling Davies catheter    Chronic diastolic (congestive) heart failure (HCC)    Closed fracture of right distal femur (HCC)    Hyponatremia    History of subdural hematoma    A-fib ([address] )    History of cirrhosis    Uncomplicated opioid use    Anemia  Resolved Problems:    * No resolved hospital problems  *      Consultations During Hospital Stay:  · Nephrology  · Ortho    Procedures Performed:     · none    Significant Findings / Test Results:   Xr Femur 2 Views Right  Result Date: 2/27/2020  Impression: Again seen is a right knee replacement, with periprosthetic fracture of the distal femoral diaphysis with increased displacement compared to 2/18/2020 plain films  Ct Head Without Contrast  Result Date: 2/27/2020  Impression: Persistent moderately hyperdense right parafalcine subdural hematoma  Persistent small hyperdensity at the base of the right thalamus also likely representing parenchymal hematoma  No associated edema or mass effect  No new findings  Moderate diffuse confluent chronic white matter changes as above  Incidental Findings:   · none    Test Results Pending at Discharge (will require follow up):   · none     Outpatient Tests Requested:  [facility] on [facility] Course:     Curt Ivy is a 78 y o  female patient who originally presented to the hospital on 2/27/2020 due to acute encephalopathy likely result of hyponatremia and recent subdural hematoma   Hyponatremia treated by nephrology  Pt mentation is slowly improving  Pt still having word difficulty at times  Possibly underlying dementia also      She had Hyponatremia that is multifactorial due to hypovolemia, recent use of ddavp, cirrhosis and possible underlying siadh due to chronic pain and recent subdural hematoma  Nephrology evaluated her  Pt originally treated with IV fluids but sodium remained very stable  Fluid was stopped given minimal improvement and she was started on sodium chloride 1 g tid which have been increased to 2g qid  Pt will continue with fluid restriction of 1200ml  Pt will continue lasix  Sodium has increased to 129  Will check bmp on Monday      She has a recent history of subdural hematoma  Pt was recently admitted to [facility] and discharged on 2/21/2020 to rehab at [facility]  Pt had a fall when trying to transfer to wheelchair using her walker  Pt was evaluated by neurosx and given ddavp to reverse anti-platelet effect of asa  Repeat ct head on 2/27 reveals persistent moderately hyperdense small subdural hematoma  Will continue to monitor and obtain ct head stat if any changes  Follow up outpt with neurosx      Pt has a 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  She will continue propanolol and lasix  Holding aldactone due to hyponatremia      She has pafib  Pt had been previously on asa  Holding asa due to subdural hematoma      She had a Closed fracture of right distal femur which was managed conservatively  Repeat xray shows worsening displacement of the fracture  Ortho evaluated pt and recommended non surgical intervention  Continue conservative management with knee immobilizer and NWB RLE  She will follow up in 2 weeks  Pt has chronic diastolic chf  She will continue low dose lasix      Pt will maintain chronic indwelling wells   She was discharged to [facility] today       Discharge Day Visit / Exam:     * Please refer to separate progress note for these details *    Discharge instructions/Information to patient and family:   See after visit summary for information provided to patient and family  Provisions for Follow-Up Care:  See after visit summary for information related to follow-up care and any pertinent home health orders  Discharge Statement:  I spent 36 minutes discharging the patient  This time was spent on the day of discharge  I had direct contact with the patient on the day of discharge  Greater than 50% of the total time was spent examining patient, answering all patient questions, arranging and discussing plan of care with patient as well as directly providing post-discharge instructions  Additional time then spent on discharge activities  Discharge Medications:  See after visit summary for reconciled discharge medications provided to patient and family

## 2020-03-04 NOTE — PLAN OF CARE
Problem: PHYSICAL THERAPY ADULT  Goal: Performs mobility at highest level of function for planned discharge setting  See evaluation for individualized goals  Description  Treatment/Interventions: Functional transfer training, LE strengthening/ROM, Therapeutic exercise, Endurance training, Cognitive reorientation, Patient/family training, Equipment eval/education, Bed mobility, Compensatory technique education, Continued evaluation, Spoke to nursing, OT  Equipment Recommended: Wheelchair, Kermitt Prudent, Other (Comment)(walker, wc if pt does not own; Robby Lopez for OOB mobility)       See flowsheet documentation for full assessment, interventions and recommendations  Outcome: Progressing  Note:   Prognosis: Poor  Problem List: Decreased range of motion, Decreased strength, Impaired balance, Decreased endurance, Decreased mobility, Decreased skin integrity, Orthopedic restrictions, Pain, Obesity, Decreased safety awareness, Decreased cognition  Assessment: Pt  supine in bed upon my arrival  Pt  reporting fatigue/pain, however agreeable to therapeutic intervention  Performance of HEP supine with emphasis on LLE with limited AP's performed on RLE  Progressed with transfers requiring A of 2 with cues for hand placement/technique  Pt  repositioned supine in bed at end of treatment session with ice applied and bed alarm active  PT will continue to recommend d/c to rehab when medically stable for continued improvement of noted impairments above  Barriers to Discharge: Other (Comment)  Barriers to Discharge Comments: Level of assistance  Recommendation: Short-term skilled PT, Long-term skilled nursing home placement(STR vs LTC pending progress)     PT - OK to Discharge: Yes(if d/c to rehab when medically stable )    See flowsheet documentation for full assessment

## 2020-03-04 NOTE — SOCIAL WORK
Attending made CM aware that patient would be cleared for discharge  Pt was at [facility] getting rehab when her mental status changed and she was sent to the hospital      A BLS transport, d/t a change in pt's mental status and a closed fracture of her right femur, was scheduled with [email or web address] for 4:00 today  CMN form in chart; copy made for MR bin  Numbers for report in EPIC     IMM was already provided to pt/family yesterday by Care Manager Assistant; copy was put in MR bin  Updated: attending, RN,  and facility via [facility]; CM called  to update       Kaela Oleary, MSW  3/4/2020  7513

## 2020-03-04 NOTE — PROGRESS NOTES
Orthopaedic Surgery - Progress Note  Che Hewitt (17 y o  female)   : 1940   MRN: [id number]  Date: 3/4/2020   Encounter: [id number]   Unit/Bed#: E2 -01    Assessment / Plan  68-year-old female with right distal femur periprosthetic fracture    · Brace is fitting comfortably at this time with good padding  Patient feels she is tolerating it well  Continue with a mobilizer in place at all times  · Continue nonweightbearing to the right lower extremity  · Continue with ice and analgesics as needed  · PT/OT for transfers and to promote use, strengthening of the left knee/leg at this time  · Orthopedics will sign off at this time  Patient was previously scheduled follow-up with Dr Bonny Escudero outpatient  She will need an updated appointment with him 2 weeks from discharge for re-evaluation  Subjective  78year-old female with right distal femur periprosthetic fracture which has been being treated conservatively  Patient at this time has minimal discomfort  She feels that the brace is okay, not causing any discomfort or pain  She is denying any distal numbness or tingling at this time  Vitals  Temp:  [96 7 °F (35 9 °C)-99 °F (37 2 °C)] 96 7 °F (35 9 °C)  HR:  [69-88] 85  Resp:  [18] 18  BP: (121-169)/(65-76) 169/65  Body mass index is 39 8 kg/m²  I/O last 24 hours: In: 100 [P O :100]  Out: 750 [Urine:750]    Right Knee Exam  Alignment:  Leg is in immobilizer at this time in full extension without any obvious deformity  Inspection:  No swelling or ecchymosis noted at this time  Brace is in place with good padding  Palpation:  No tenderness  ROM:  Not tested  Strength:  Able to actively dorsiflex & plantarflex ankle and toes  Stability:  Not tested  Tests:  No pertinent positive or negative tests  Patella:  Not tested  Neurovascular:  Sensation intact in DP/SP/Coates/Sa/T nerve distributions  Sensation intact in all digital nerve distributions  Toes warm and perfused    Gait:  Not tested      Lab Results  (I have personally reviewed pertinent lab results )  Results from last 7 days   Lab Units 03/04/20  0455 03/02/20  0427 02/28/20  0632 02/27/20  1145   WBC Thousand/uL 6 94 9 03 7 62 9 28   HEMOGLOBIN g/dL 10 6* 11 3* 12 8 12 0   HEMATOCRIT % 32 9* 33 6* 38 9 35 8   PLATELETS Thousands/uL 115* 256 144* 131*     Results from last 7 days   Lab Units 02/27/20  1254   PTT seconds 32   INR  1 10     Results from last 7 days   Lab Units 03/04/20  0455 03/03/20  0435 03/02/20  0427 03/01/20  1242 03/01/20  0427 03/01/20  0350 02/29/20  2354 02/29/20  1955 02/29/20  1631 02/29/20  0853 02/29/20  0350 02/28/20  2354 02/28/20 2000 02/28/20  1724 02/28/20  1044 02/28/20  0632 02/27/20 2000 02/27/20  1254 02/27/20  1145   POTASSIUM mmol/L 4 8 4 2 4 8 4 7  --  4 3 4 1 4 0 4 3 4 1 3 9 4 1 4 0 4 1 4 1 4 4 3 8 4 2 5 7*   CHLORIDE mmol/L 97* 93* 89* 87*  --  87* 89* 87* 86* 86* 87* 86* 86* 84* 82* 84* 84* 83* 83*   CO2 mmol/L 29 28 28 30  --  28 30 29 29 30 29 29 29 29 27 31 32 30 28   BUN mg/dL 15 15 14 12  --  11 12 11 12 13 13 14 14 13 11 11 12 13 14   CREATININE mg/dL 0 81 0 76 0 67 0 80  --  0 74 0 69 0 79 0 80 0 89 0 76 0 83 0 88 0 84 0 77 0 81 0 92 0 79 0 77   EGFR ml/min/1 73sq m 68 74 83 69  --  76 82 70 69 61 74 66 62 65 73 68 59 70 73   CALCIUM mg/dL 8 6 9 0 9 0 8 6  --  8 8 8 7 8 5 8 6 8 6 8 4 8 8 8 8 8 7 8 9 9 0 8 5 8 8 8 6   PHOSPHORUS mg/dL  --   --   --   --  3 1  --   --   --   --   --   --   --   --   --   --   --   --   --   --    ALK PHOS U/L  --   --   --   --   --   --   --   --   --   --   --   --   --   --   --  392*  --  342* 337*   ALT U/L  --   --   --   --   --   --   --   --   --   --   --   --   --   --   --  71  --  61 64   AST U/L  --   --   --   --   --   --   --   --   --   --   --   --   --   --   --  224*  --  174* 195*               Dmitriy Larios PA-C

## 2020-03-04 NOTE — PROGRESS NOTES
[facility] Internal Medicine Progress Note  Patient: Martin Brambila 78 y o  female   MRN: [id number]  PCP: Michael Manrique MD  Unit/Bed#: E2 -01 Encounter: [id number]  Date Of Visit: 03/04/20      Assessment/plan  1  Acute encephalopathy likely result of hyponatremia and recent subdural hematoma  Hyponatremia treated by nephrology  Pt mentation is slowly improving  Pt still having word difficulty at times  Possibly underlying dementia also       2  Hyponatremia- multifactorial due to hypovolemia, recent use of ddavp, cirrhosis and possible underlying siadh due to chronic pain and recent subdural hematoma  Appreciate nephrology recommendations  Pt originally treated with IV fluids but sodium remained very stable  Fluid was stopped given minimal improvement and he was started on sodium chloride 1 g tid which have been increased to 2g qid  Pt will continue with fluid restriction of 1200ml  Pt will continue lasix  Sodium has increased to 129  Will check bmp on Monday       3  History of subdural hematoma- pt was recently admitted to [facility] and discharged on 2/21/2020 to rehab at [facility]  Pt had a fall when trying to transfer to wheelchair using her walker  Pt was evaluated by neurosx and given ddavp to reverse anti-platelet effect of asa  Repeat ct head on 2/27 reveals persistent moderately hyperdense small subdural hematoma  Will continue to monitor and obtain ct head stat if any changes  Follow up outpt with neurosx       4  History of cirrhosis- due to chronic alcohol use  Continue propanolol  Holding aldactone due to hyponatremia       5  afib- pt had been previously on asa  Holding asa due to subdural hematoma  Will monitor       6  Closed fracture of right distal femur- history of right distal femur fracture which was managed conservatively  Repeat xray shows worsening displacement of the fracture  Ortho evaluated pt and recommended non surgical intervention   Continue conservative management with knee immobilizer and NWB RLE       7  Chronic diastolic chf- continue low dose lasix  Monitor daily weight     8  chronic indwelling wells     dispo-pt to return to [facility] today  Repeat bmp on monday    Subjective:   Pt seen and examined  Pt awake alert oriented x3 with prompting  Today she knew the year but had difficulty with the place  She knew she was in the hospital but wasn't sure which one  No other problems  No f/c no cp no sob no n/v/d no abd pain  Objective:     Vitals: Blood pressure 169/65, pulse 85, temperature (!) 96 7 °F (35 9 °C), temperature source Tympanic, resp  rate 18, height 5' 2" (1 575 m), weight 98 7 kg (217 lb 9 5 oz), SpO2 98 %  ,Body mass index is 39 8 kg/m²  Lab, Imaging and other studies:  Results from last 7 days   Lab Units 03/04/20  0455  02/27/20  1254   WBC Thousand/uL 6 94   < >  --    HEMOGLOBIN g/dL 10 6*   < >  --    HEMATOCRIT % 32 9*   < >  --    PLATELETS Thousands/uL 115*   < >  --    INR   --   --  1 10    < > = values in this interval not displayed  Results from last 7 days   Lab Units 03/04/20  0455  02/28/20  2081   POTASSIUM mmol/L 4 8   < > 4 4   CHLORIDE mmol/L 97*   < > 84*   CO2 mmol/L 29   < > 31   BUN mg/dL 15   < > 11   CREATININE mg/dL 0 81   < > 0 81   CALCIUM mg/dL 8 6   < > 9 0   ALK PHOS U/L  --   --  392*   ALT U/L  --   --  71   AST U/L  --   --  224*    < > = values in this interval not displayed  Lab Results   Component Value Date    BLOODCX No Growth After 5 Days  06/05/2017    BLOODCX No Growth After 5 Days   06/05/2017    URINECX >100,000 cfu/ml Escherichia coli 06/05/2017    URINECX >100,000 cfu/ml Klebsiella pneumoniae 11/01/2016     Scheduled Meds:   Current Facility-Administered Medications:  amLODIPine 5 mg Oral Daily Mariana Vasquez DO   busPIRone 5 mg Oral TID Lavinia Blocker, MD   docusate sodium 100 mg Oral Daily Lavinia Blocker, MD   famotidine 20 mg Oral PRN Lavinia Blocker, MD   furosemide 10 mg Oral Daily Mariana SPRING Christina, DO   gabapentin 100 mg Oral TID PRN Beatriz Chi MD   influenza vaccine 0 5 mL Intramuscular Once Marlen Ambpatience, DO   oxyCODONE 5 mg Oral Q4H PRN Marlen Ambron, DO   polyethylene glycol 17 g Oral Daily Marlen Ambron, DO   propranolol 10 mg Oral BID Mariana K Christina, DO   senna 1 tablet Oral HS Beatriz Chi MD   sodium chloride 2 g Oral 4x Daily (with meals and at bedtime) Mariana Kendall Lasgermania,    tamsulosin 0 4 mg Oral Daily With Pauline Michele MD     Continuous Infusions:    PRN Meds: famotidine    gabapentin    oxyCODONE      Physical exam:  Physical Exam  General appearance: alert and oriented, in no acute distress  Head: Normocephalic, without obvious abnormality, atraumatic  Eyes: conjunctivae/corneas clear  PERRL, EOM's intact  Fundi benign  Neck: no adenopathy, no carotid bruit, no JVD, supple, symmetrical, trachea midline and thyroid not enlarged, symmetric, no tenderness/mass/nodules  Lungs: clear to auscultation bilaterally  Heart: regular rate and rhythm, S1, S2 normal, no murmur, click, rub or gallop  Abdomen: soft, non-tender; bowel sounds normal; no masses,  no organomegaly  Extremities: extremities normal, warm and well-perfused; no cyanosis, clubbing, or edema  Pulses: 2+ and symmetric  Skin: Skin color, texture, turgor normal  No rashes or lesions  Neurologic: Mental status: awake alert oriented x3 with prompting  difficulty with place today but knew date

## 2020-03-04 NOTE — PROGRESS NOTES
Progress Note - Nephrology   Biankarobert Cowart 78 y o  female MRN: [id number]  Unit/Bed#: E2 -01 Encounter: [id number]      Assessment / Plan:  1  Hyponatremia, multifactorial in the setting of hypovolemia, recent use of DDAVP, cirrhosis and possibly underlying SIADH due to chronic pain and recent subdural/parenchymal hematoma  -serum sodium on admission 116, improved to 121 with saline bolus, now improved to 129 on salt tablets  -will continue salt tabs 2g q i d  and continue Lasix 10 milligrams p o  Daily  -continue 1 2L/day fluid restriction  -f/u am BMP, renal function stable 0 6-0 8 range     2  Hypertension-BP well controlled on average, continue amlodipine 5 mg p o  Daily, propranolol 10 milligrams p o  B i d  With hold parameters     3  Mild anemia - Hgb 11 3-->10 6, monitor CBC     4  Right femur fracture-conservative management     5  History of cirrhosis     6  Recent subdural hematoma    Okay for discharge from renal perspective with outpatient PCP follow-up  Would continue salt tablets 2g QID, 1 2L/day fluid restriction as well as 10 mg Lasix daily upon discharge  Recommend repeat BMP on Monday,   Have discussed the case with SLIM attending  Subjective: When I asked the patient if she has chest pain or nausea she states that her knees hurt  No other complaints  She does seem pleasantly confused  Objective:     Vitals: Blood pressure 169/65, pulse 85, temperature (!) 96 7 °F (35 9 °C), temperature source Tympanic, resp  rate 18, height 5' 2" (1 575 m), weight 98 7 kg (217 lb 9 5 oz), SpO2 98 %  ,Body mass index is 39 8 kg/m²  Temp (24hrs), Av 7 °F (36 5 °C), Min:96 7 °F (35 9 °C), Max:99 °F (37 2 °C)      Weight (last 2 days)     Date/Time   Weight    20 0535   98 7 (217 59)    20 0546   97 (213 85)    20 0551   96 8 (213 41)                Intake/Output Summary (Last 24 hours) at 3/4/2020 1216  Last data filed at 3/4/2020 0430  Gross per 24 hour Intake 100 ml   Output 750 ml   Net -650 ml     I/O last 24 hours: In: 100 [P O :100]  Out: 750 [Urine:750]        Physical Exam:   Physical Exam   Constitutional: She appears well-developed and well-nourished  No distress  HENT:   Head: Normocephalic and atraumatic  Mouth/Throat: No oropharyngeal exudate  Eyes: Right eye exhibits no discharge  Left eye exhibits no discharge  No scleral icterus  Neck: Normal range of motion  Neck supple  No thyromegaly present  Cardiovascular: Normal rate and regular rhythm  No murmur heard  Pulmonary/Chest: Effort normal and breath sounds normal  No respiratory distress  She has no wheezes  Abdominal: Soft  Bowel sounds are normal  She exhibits no distension  Genitourinary:   Genitourinary Comments: +wells   Musculoskeletal: She exhibits edema (left pedal)  Neurological: She is alert  She exhibits normal muscle tone  Awake, confused   Skin: Skin is warm and dry  No rash noted  She is not diaphoretic    +ecchymoses over hands   Psychiatric: Her behavior is normal    Flat affect   Nursing note and vitals reviewed        Invasive Devices     Peripheral Intravenous Line            Peripheral IV 03/02/20 Right Forearm 2 days          Drain            Urethral Catheter 14 days                Medications:    Scheduled Meds:  Current Facility-Administered Medications:  amLODIPine 5 mg Oral Daily Mariana K Christina, DO   busPIRone 5 mg Oral TID Antonio Zuñiga MD   docusate sodium 100 mg Oral Daily Antonio Zuñiga MD   famotidine 20 mg Oral PRN Antonio Zuñiga MD   furosemide 10 mg Oral Daily Mariana K Christina, DO   gabapentin 100 mg Oral TID PRN Antonio Zuñiga MD   influenza vaccine 0 5 mL Intramuscular Once Marlen Ambron, DO   oxyCODONE 5 mg Oral Q4H PRN Marlen Ambron, DO   polyethylene glycol 17 g Oral Daily Marlen Ambron, DO   propranolol 10 mg Oral BID Mariana K Christina, DO   senna 1 tablet Oral HS Antonio Zuñiga MD   sodium chloride 2 g Oral 4x Daily (with meals and at bedtime) Mariana SPRING [place] [facility], DO   tamsulosin 0 4 mg Oral Daily With Graeme Mcgregor MD       PRN Meds: famotidine    gabapentin    oxyCODONE    Continuous Infusions:         LAB RESULTS:      Results from last 7 days   Lab Units 03/04/20  0455 03/03/20  0435 03/02/20  0427 03/01/20  1242 03/01/20  0427 03/01/20  0350 02/29/20  2354 02/29/20  1955  02/28/20  0632  02/27/20  1254 02/27/20  1145   WBC Thousand/uL 6 94  --  9 03  --   --   --   --   --   --  7 62  --   --  9 28   HEMOGLOBIN g/dL 10 6*  --  11 3*  --   --   --   --   --   --  12 8  --   --  12 0   HEMATOCRIT % 32 9*  --  33 6*  --   --   --   --   --   --  38 9  --   --  35 8   PLATELETS Thousands/uL 115*  --  256  --   --   --   --   --   --  144*  --   --  131*   NEUTROS PCT % 65  --  64  --   --   --   --   --   --  71  --   --  69   LYMPHS PCT % 19  --  18  --   --   --   --   --   --  15  --   --  15   MONOS PCT % 13*  --  13*  --   --   --   --   --   --  10  --   --  12   EOS PCT % 2  --  2  --   --   --   --   --   --  2  --   --  2   POTASSIUM mmol/L 4 8 4 2 4 8 4 7  --  4 3 4 1 4 0   < > 4 4   < > 4 2 5 7*   CHLORIDE mmol/L 97* 93* 89* 87*  --  87* 89* 87*   < > 84*   < > 83* 83*   CO2 mmol/L 29 28 28 30  --  28 30 29   < > 31   < > 30 28   BUN mg/dL 15 15 14 12  --  11 12 11   < > 11   < > 13 14   CREATININE mg/dL 0 81 0 76 0 67 0 80  --  0 74 0 69 0 79   < > 0 81   < > 0 79 0 77   CALCIUM mg/dL 8 6 9 0 9 0 8 6  --  8 8 8 7 8 5   < > 9 0   < > 8 8 8 6   ALK PHOS U/L  --   --   --   --   --   --   --   --   --  392*  --  342* 337*   ALT U/L  --   --   --   --   --   --   --   --   --  71  --  61 64   AST U/L  --   --   --   --   --   --   --   --   --  224*  --  174* 195*   MAGNESIUM mg/dL  --   --   --   --  1 8  --   --   --   --  1 8  --   --   --    PHOSPHORUS mg/dL  --   --   --   --  3 1  --   --   --   --   --   --   --   --     < > = values in this interval not displayed         CUTURES:  Lab Results   Component Value Date    BLOODCX No Growth After 5 Days  06/05/2017    BLOODCX No Growth After 5 Days  06/05/2017    URINECX >100,000 cfu/ml Escherichia coli 06/05/2017    URINECX >100,000 cfu/ml Klebsiella pneumoniae 11/01/2016                 Portions of the record may have been created with voice recognition software  Occasional wrong word or "sound a like" substitutions may have occurred due to the inherent limitations of voice recognition software  Read the chart carefully and recognize, using context, where substitutions have occurred  If you have any questions, please contact the dictating provider

## 2020-03-06 ENCOUNTER — OFFICE VISIT (OUTPATIENT)
Dept: NEUROSURGERY | Facility: CLINIC | Age: 80
End: 2020-03-06
Payer: MEDICARE

## 2020-03-06 VITALS
HEIGHT: 62 IN | BODY MASS INDEX: 39.8 KG/M2 | TEMPERATURE: 98.1 F | DIASTOLIC BLOOD PRESSURE: 64 MMHG | HEART RATE: 68 BPM | SYSTOLIC BLOOD PRESSURE: 118 MMHG | RESPIRATION RATE: 16 BRPM

## 2020-03-06 DIAGNOSIS — S06.5X9A SDH (SUBDURAL HEMATOMA) (HCC): Primary | ICD-10-CM

## 2020-03-06 PROCEDURE — 3074F SYST BP LT 130 MM HG: CPT | Performed by: PHYSICIAN ASSISTANT

## 2020-03-06 PROCEDURE — 1160F RVW MEDS BY RX/DR IN RCRD: CPT | Performed by: PHYSICIAN ASSISTANT

## 2020-03-06 PROCEDURE — 4040F PNEUMOC VAC/ADMIN/RCVD: CPT | Performed by: PHYSICIAN ASSISTANT

## 2020-03-06 PROCEDURE — 99213 OFFICE O/P EST LOW 20 MIN: CPT | Performed by: PHYSICIAN ASSISTANT

## 2020-03-06 PROCEDURE — 3078F DIAST BP <80 MM HG: CPT | Performed by: PHYSICIAN ASSISTANT

## 2020-03-06 PROCEDURE — 1036F TOBACCO NON-USER: CPT | Performed by: PHYSICIAN ASSISTANT

## 2020-03-06 PROCEDURE — 1111F DSCHRG MED/CURRENT MED MERGE: CPT | Performed by: PHYSICIAN ASSISTANT

## 2020-03-06 RX ORDER — ACETAMINOPHEN 325 MG/1
650 TABLET ORAL EVERY 6 HOURS PRN
COMMUNITY

## 2020-03-06 NOTE — PATIENT INSTRUCTIONS
Falx subdural hematoma is slowly improving  Remain off Aspirin until resolution of hemorrhage  Neurosurgery discharge instructions following traumatic head bleed:      Do not take any blood thinning medications (ie  No Advil  No motrin  No ibuprofen  No Aleve  No Aspirin  No fishoil  No heparin  No antiplatelet / no anticoagulation medication)   Refrain from activity that increases chance of trauma to head or falls  Recommend you take fall precaution   No strenuous activity or sports   Return to hospital Emergency Room if you experience worsening / new headache, nausea/vomiting, speech/vision change, seizure, confusion / mental status change, weakness, or other neurological changes  Follow-up as scheduled with a repeat CT head without contrast to be completed 2-3 days prior to visit  Prescription has been entered electronically  Please call  to schedule

## 2020-03-06 NOTE — PROGRESS NOTES
Neurosurgery Office Note  Annette Ingram 78 y o  female MRN: [id number]      Assessment/Plan     This is an 70-year-old female with congestive heart failure, atrial fibrillation not on anticoagulation, chronic back problem, chronic indwelling Davies catheter, hypertension, and depression who presented the hospital mid February after a ground level fall at home on aspirin  Patient was trying to use her walker to transfer to revealed wheelchair when she fell  No loss of consciousness  Workup demonstrated a right parafalcine subdural hematoma with some mild increase on repeat imaging  Patient received DDAVP for reversal of daily aspirin 81mg  She was managed conservatively and ultimately discharged to rehab  Patient presents today for two week hospital follow-up with repeat CT head which was completed during an emergency room visit on February 27th for encephalopathy  Imaging demonstrates persistent parafalcine subdural hematoma with expected evolution without new hemorrhage  Patient complains of generalized weakness  She denies any headaches, vision or speech changes  Given persistent hemorrhage, would recommend continue holding of anti-platelet medication and repeat CT head in two weeks to ensure resolution prior to starting Aspirin  No neurosurgical intervention is indicated  Patient would be an overall poor surgical candidate  Outpatient follow-up in 2 weeks after completion of CT head wo  Diagnoses and all orders for this visit:    SDH (subdural hematoma) ([address] )  -     CT head wo contrast; Future    Other orders  -     acetaminophen (TYLENOL) 325 mg tablet;  Take 650 mg by mouth every 6 (six) hours as needed for mild pain  -     Petrolatum-Zinc Oxide (Phytoplex Z-Guard) 57-17 % PSTE; Apply topically            CHIEF COMPLAINT    Chief Complaint   Patient presents with    Follow-up       HISTORY    History of Present Illness     See Discussion / assessment and plan    REVIEW OF SYSTEMS    Review of Systems   Constitutional: Negative  HENT: Negative  Eyes: Negative  Respiratory: Positive for wheezing  Cardiovascular: Negative  Gastrointestinal: Negative  Endocrine: Negative  Genitourinary: Davies Catheter   Musculoskeletal: Negative  Skin: Negative  Allergic/Immunologic: Negative  Neurological: Positive for weakness  Psychiatric/Behavioral: Negative  All other systems reviewed and are negative  Meds/Allergies     Current Outpatient Medications   Medication Sig Dispense Refill    acetaminophen (TYLENOL) 325 mg tablet Take 650 mg by mouth every 6 (six) hours as needed for mild pain      amLODIPine (NORVASC) 5 mg tablet TAKE 1 TABLET BY MOUTH EVERY DAY 90 tablet 1    busPIRone (BUSPAR) 5 mg tablet TAKE 1 TABLET BY MOUTH THREE TIMES A  tablet 1    docusate sodium (COLACE) 100 mg capsule every 24 hours      famotidine (PEPCID) 20 mg tablet Take 20 mg by mouth as needed for heartburn      furosemide (LASIX) 20 mg tablet Take 0 5 tablets (10 mg total) by mouth daily  0    gabapentin (NEURONTIN) 100 mg capsule Take 1 capsule (100 mg total) by mouth 3 (three) times a day as needed (pain) 270 capsule 1    nystatin (MYCOSTATIN) powder APPLY 1 APPLICATION TOPICALLY 2 (TWO) TIMES A DAY 2-3 TIMES DAILY TO AFFECTED AREA(S) 15 g 5    Petrolatum-Zinc Oxide (Phytoplex Z-Guard) 57-17 % PSTE Apply topically      polyethylene glycol (MIRALAX) 17 g packet Take 17 g by mouth daily 14 each 0    propranolol (INDERAL) 10 mg tablet TAKE 1 TABLET BY MOUTH TWICE A  tablet 1    senna (SENOKOT) 8 6 MG tablet Take 1 tablet by mouth daily at bedtime       sodium chloride 1 g tablet Take 2 tablets (2 g total) by mouth 4 (four) times a day (with meals and at bedtime)  0    tamsulosin (FLOMAX) 0 4 mg every 24 hours       No current facility-administered medications for this visit          Allergies   Allergen Reactions    Aspirin GI Intolerance    Warfarin        PAST HISTORY    Past Medical History:   Diagnosis Date    Anxiety     Atrial fibrillation (HCC)     CHF (congestive heart failure) (HCC)     Depression     HTN (hypertension) 2016    Opioid dependence ([facility] [address] )     Pneumonia        Past Surgical History:   Procedure Laterality Date    APPENDECTOMY      CHOLECYSTECTOMY      TONSILLECTOMY      TOTAL KNEE ARTHROPLASTY Bilateral     TUBAL LIGATION         Social History     Tobacco Use    Smoking status: Former Smoker     Types: Cigarettes     Last attempt to quit: 1980     Years since quittin 7    Smokeless tobacco: Never Used   Substance Use Topics    Alcohol use: Never     Frequency: Never     Binge frequency: Never    Drug use: Never       Family History   Problem Relation Age of Onset    Cancer Father     Hypertension Sister        EXAM    Vitals:Blood pressure 118/64, pulse 68, temperature 98 1 °F (36 7 °C), temperature source Oral, resp  rate 16, height 5' 2" (1 575 m)  ,Body mass index is 39 8 kg/m²  Physical Exam   Constitutional: She appears well-developed and well-nourished  No distress  HENT:   Head: Normocephalic and atraumatic  Oropharynx dry  Poor dentition   Eyes: Pupils are equal, round, and reactive to light  Conjunctivae and EOM are normal  Right eye exhibits no discharge  Left eye exhibits no discharge  No scleral icterus  Cardiovascular: Normal rate  Pulmonary/Chest: Effort normal    Abdominal: Soft  She exhibits no distension  Neurological: She has normal strength  She has an abnormal Finger-Nose-Finger Test (improves with repeat testing)  Skin: Skin is warm and dry  No rash noted  Psychiatric: She has a normal mood and affect  Her speech is normal  She is slowed  Neurologic Exam     Mental Status   Oriented to person  Disoriented to month  Oriented to year  Follows 2 step commands  Attention: decreased  Concentration: decreased     Speech: speech is normal   Level of consciousness: alert  Normal comprehension  Cranial Nerves     CN III, IV, VI   Pupils are equal, round, and reactive to light  Extraocular motions are normal    Right pupil: Size: 2 mm  Shape: regular  Left pupil: Size: 2 mm  Shape: regular  Nystagmus: none   Upgaze: normal  Conjugate gaze: present    CN V   Facial sensation intact  CN VII   Facial expression full, symmetric  CN VIII   Hearing: impaired    CN XI   Right trapezius strength: normal  Left trapezius strength: normal    CN XII   Tongue: not atrophic  Fasciculations: absent  Tongue deviation: none    Motor Exam   Muscle bulk: normal  Overall muscle tone: normal  Right arm pronator drift: absent  Left arm pronator drift: absent    Strength   Strength 5/5 throughout  Diffuse 4/5 with limited testing to RLE 2/2 bracing      Sensory Exam   Light touch normal      Gait, Coordination, and Reflexes     Coordination   Finger to nose coordination: abnormal (improves with repeat testing)    Tremor   Resting tremor: absent  Intention tremor: absent  Action tremor: absent    Reflexes   Right Rogers: absent  Left Rogers: absent  Right ankle clonus: absent  Left ankle clonus: absentDST intact         MEDICAL DECISION MAKING    Imaging Studies:       Ct Head Without Contrast    Result Date: 2/27/2020  Narrative: CT BRAIN - WITHOUT CONTRAST INDICATION:   altered mental status  COMPARISON:  February 19, 2020 2 fast there is any CT TECHNIQUE:  CT examination of the brain was performed  In addition to axial images, coronal 2D reformatted images were created and submitted for interpretation  Radiation dose length product (DLP) for this visit:  864 mGy-cm   This examination, like all CT scans performed in the [facility], was performed utilizing techniques to minimize radiation dose exposure, including the use of iterative reconstruction and automated exposure control  IMAGE QUALITY:  Diagnostic   FINDINGS: PARENCHYMA: Diffuse confluent decreased attenuation is noted in periventricular and subcortical white matter demonstrating an appearance that is statistically most likely to represent moderate to severe microangiopathic change  Stable compared to prior  No CT signs of acute infarction  No intracranial mass, mass effect or midline shift  At the base of the right thalamus there is a focal oval 7 x 5 mm hyperdensity without associated edema or mass effect appearing stable compared to priors  Continued  relative hyperdense component within the known previous right interhemispheric subdural hematoma  Overall size and extent is mildly decreased in the anterior to posterior direction thickness remaining stable  Relative hyperdense component suggests a new slow hemorrhage since the prior study on the 19th  No significant mass effect  Trace shift to the left of the adjacent falx  No new intracranial hemorrhage evident  VENTRICLES AND EXTRA-AXIAL SPACES:  Normal for the patient's age  VISUALIZED ORBITS AND PARANASAL SINUSES:  Unremarkable  CALVARIUM AND EXTRACRANIAL SOFT TISSUES:  Normal      Impression: Persistent moderately hyperdense right parafalcine subdural hematoma  Persistent small hyperdensity at the base of the right thalamus also likely representing parenchymal hematoma  No associated edema or mass effect  No new findings  Moderate diffuse confluent chronic white matter changes as above  The study was marked in [facility] for immediate notification  Workstation performed: [id number]     Ct Head Without Contrast    Result Date: 2/19/2020  Narrative: CT BRAIN - WITHOUT CONTRAST INDICATION:   Recheck subdural hematoma  COMPARISON:  CT head dated 2/18/2020 TECHNIQUE:  CT examination of the brain was performed  In addition to axial images, coronal 2D reformatted images were created and submitted for interpretation  Radiation dose length product (DLP) for this visit:  857 11 mGy-cm     This examination, like all CT scans performed in the [facility], was performed utilizing techniques to minimize radiation dose exposure, including the use of iterative  reconstruction and automated exposure control  IMAGE QUALITY:  Diagnostic  FINDINGS: PARENCHYMA, VENTRICLES AND EXTRA-AXIAL SPACES:  There is redemonstration of a subdural hemorrhage in the interhemispheric fissure extending to the right and measuring approximately 3 7 x 0 8 cm in AP and transverse dimensions (previously measuring approximately 2 1 x 0 7 cm in AP and transverse dimensions  0 7 cm hyperdense focus in the region of the right thalamus, similar to the prior  No significant mass effect or midline shift  Multiple focal and confluent areas of low-density are redemonstrated in the periventricular and subcortical white matter, nonspecific but probably sequela of chronic microvascular angiopathy  No territorial infarct is seen  Gray-white differentiation appears maintained  Generalized parenchymal atrophy redemonstrated  There is some prominence of the ventricles and sulci which is commensurate with the volume loss but no hydrocephalus is seen  The basal cisterns are patent  The intracranial structures are otherwise without significant interval change  VISUALIZED ORBITS AND PARANASAL SINUSES:  Small amount of fluid in the left sphenoid sinus  Paranasal sinuses otherwise grossly clear  The orbits appear intact  CALVARIUM AND EXTRACRANIAL SOFT TISSUES:  Small right frontal scalp hematoma, intervally improved  No calvarial fracture is seen  Impression: Redemonstration of a subdural hemorrhage in the interhemispheric fissure extending to the right and measuring approximately 3 7 x 0 8 cm in AP and transverse dimensions (previously measuring approximately 2 1 x 0 7 cm in AP and transverse dimensions)  No significant mass effect or midline shift  Small right frontal scalp hematoma is intervally improved  No other significant interval change   Workstation performed: [id number]     Ct Head Without Contrast    Addendum Date: 2/18/2020 Addendum:   ADDENDUM: Please note the impression should read small right parafalcine subdural hemorrhage without mass effect  Result Date: 2/18/2020  Narrative: CT BRAIN - WITHOUT CONTRAST INDICATION:   fall pain, lac  COMPARISON:  3/10/2014  TECHNIQUE:  CT examination of the brain was performed  In addition to axial images, coronal 2D reformatted images were created and submitted for interpretation  Radiation dose length product (DLP) for this visit:  822 mGy-cm   This examination, like all CT scans performed in the [facility], was performed utilizing techniques to minimize radiation dose exposure, including the use of iterative reconstruction and automated exposure control  IMAGE QUALITY:  Diagnostic  FINDINGS: PARENCHYMA: Decreased attenuation is noted in periventricular and subcortical white matter demonstrating an appearance that is statistically most likely to represent mild microangiopathic change  No CT signs of acute infarction  No intracranial mass, mass effect or midline shift  A 2 1 x 0 7 cm focus of right parafalcine subdural hemorrhage is noted  No significant mass effect is present  VENTRICLES AND EXTRA-AXIAL SPACES:  Normal for the patient's age  VISUALIZED ORBITS AND PARANASAL SINUSES:  Unremarkable  CALVARIUM AND EXTRACRANIAL SOFT TISSUES:  Normal      Impression: No acute intracranial abnormality  I personally discussed this study with Dr Consuelo Esparza on 2/18/2020 at 2:16 PM  Workstation performed: [id number]       I have personally reviewed pertinent reports     and I have personally reviewed pertinent films in PACS

## 2020-03-10 ENCOUNTER — OFFICE VISIT (OUTPATIENT)
Dept: OBGYN CLINIC | Facility: MEDICAL CENTER | Age: 80
End: 2020-03-10

## 2020-03-10 VITALS
SYSTOLIC BLOOD PRESSURE: 118 MMHG | BODY MASS INDEX: 39.93 KG/M2 | HEIGHT: 62 IN | WEIGHT: 217 LBS | DIASTOLIC BLOOD PRESSURE: 62 MMHG

## 2020-03-10 DIAGNOSIS — S72.401D CLOSED FRACTURE OF DISTAL END OF RIGHT FEMUR WITH ROUTINE HEALING, UNSPECIFIED FRACTURE MORPHOLOGY, SUBSEQUENT ENCOUNTER: Primary | ICD-10-CM

## 2020-03-10 PROCEDURE — 1111F DSCHRG MED/CURRENT MED MERGE: CPT | Performed by: ORTHOPAEDIC SURGERY

## 2020-03-10 PROCEDURE — 1036F TOBACCO NON-USER: CPT | Performed by: ORTHOPAEDIC SURGERY

## 2020-03-10 PROCEDURE — 3078F DIAST BP <80 MM HG: CPT | Performed by: ORTHOPAEDIC SURGERY

## 2020-03-10 PROCEDURE — 3074F SYST BP LT 130 MM HG: CPT | Performed by: ORTHOPAEDIC SURGERY

## 2020-03-10 PROCEDURE — 3008F BODY MASS INDEX DOCD: CPT | Performed by: ORTHOPAEDIC SURGERY

## 2020-03-10 PROCEDURE — 99024 POSTOP FOLLOW-UP VISIT: CPT | Performed by: ORTHOPAEDIC SURGERY

## 2020-03-10 PROCEDURE — 1160F RVW MEDS BY RX/DR IN RCRD: CPT | Performed by: ORTHOPAEDIC SURGERY

## 2020-03-10 PROCEDURE — 4040F PNEUMOC VAC/ADMIN/RCVD: CPT | Performed by: ORTHOPAEDIC SURGERY

## 2020-03-10 NOTE — PROGRESS NOTES
Orthopaedic Surgery Note    CC: Right Knee Pain      HPI:  Tramaine Graham is a 78 y  o female with a history of right distal femur periprosthetic fracture  She was seen in the hospital by Dr Jean Paul Lindo and scheduled for followup with myself  The patient is in the office today by herself and states taht her  did not know she was coming to the office today  I have obtained some history from speaking with the patient and some history for the EMR  Briefly, she reports that she is non-ambulatory and she uses a wheelchair for mobility  She fell while attempting to transfer from wheelchair back in early February and had right knee pain  She was transferred to the [facility] ED and orthopaedics was consulted for a right distal femur periprosthetic fracture  This was initially treated in a hinged knee brace and NWB  She was subsequently in the [facility] hospital and repeat radiographs demonsrtrated further displacement of the fracture  In the office today she reports that she has minimal pain in the brace  She reports that she adamantly does not want any surgery        ALLERGIES:  Allergies   Allergen Reactions    Aspirin GI Intolerance    Warfarin        CURRENT MEDICATIONS:  Current Outpatient Medications   Medication Sig Dispense Refill    acetaminophen (TYLENOL) 325 mg tablet Take 650 mg by mouth every 6 (six) hours as needed for mild pain      amLODIPine (NORVASC) 5 mg tablet TAKE 1 TABLET BY MOUTH EVERY DAY 90 tablet 1    busPIRone (BUSPAR) 5 mg tablet TAKE 1 TABLET BY MOUTH THREE TIMES A  tablet 1    docusate sodium (COLACE) 100 mg capsule every 24 hours      famotidine (PEPCID) 20 mg tablet Take 20 mg by mouth as needed for heartburn      furosemide (LASIX) 20 mg tablet Take 0 5 tablets (10 mg total) by mouth daily  0    gabapentin (NEURONTIN) 100 mg capsule Take 1 capsule (100 mg total) by mouth 3 (three) times a day as needed (pain) 270 capsule 1    nystatin (MYCOSTATIN) powder APPLY 1 APPLICATION TOPICALLY 2 (TWO) TIMES A DAY 2-3 TIMES DAILY TO AFFECTED AREA(S) 15 g 5    Petrolatum-Zinc Oxide (Phytoplex Z-Guard) 57-17 % PSTE Apply topically      polyethylene glycol (MIRALAX) 17 g packet Take 17 g by mouth daily 14 each 0    propranolol (INDERAL) 10 mg tablet TAKE 1 TABLET BY MOUTH TWICE A  tablet 1    senna (SENOKOT) 8 6 MG tablet Take 1 tablet by mouth daily at bedtime       sodium chloride 1 g tablet Take 2 tablets (2 g total) by mouth 4 (four) times a day (with meals and at bedtime)  0    tamsulosin (FLOMAX) 0 4 mg every 24 hours       No current facility-administered medications for this visit          PAST MEDICAL HISTORY  Past Medical History:   Diagnosis Date    Anxiety     Atrial fibrillation (HCC)     CHF (congestive heart failure) (HCC)     Depression     HTN (hypertension) 2016    [facility]io[facility] dependence ([facility] [address] )     Pneumonia        SURGICAL HISTORY  Past Surgical History:   Procedure Laterality Date    APPENDECTOMY      CHOLECYSTECTOMY      TONSILLECTOMY      TOTAL KNEE ARTHROPLASTY Bilateral     TUBAL LIGATION         FAMILY HISTORY  Family History   Problem Relation Age of Onset    Cancer Father     Hypertension Sister        SOCIAL HISTORY  Social History     Socioeconomic History    Marital status: /Civil Union     Spouse name: Not on file    Number of children: 3    Years of education: Not on file    Highest education level: Not on file   Occupational History    Occupation: flower      Comment: retired   Social Needs    Financial resource strain: Not on file    Food insecurity:     Worry: Not on file     Inability: Not on file   [employer] needs:     Medical: Not on file     Non-medical: Not on file   Tobacco Use    Smoking status: Former Smoker     Types: Cigarettes     Last attempt to quit: 1980     Years since quittin 7    Smokeless tobacco: Never Used   Substance and Sexual Activity  Alcohol use: Never     Frequency: Never     Binge frequency: Never    Drug use: Never    Sexual activity: Not Currently   Lifestyle    Physical activity:     Days per week: Not on file     Minutes per session: Not on file    Stress: Not on file   Relationships    Social connections:     Talks on phone: Not on file     Gets together: Not on file     Attends [employer]ist service: Not on file     Active member of club or organization: Not on file     Attends meetings of clubs or organizations: Not on file     Relationship status: Not on file    Intimate partner violence:     Fear of current or ex partner: Not on file     Emotionally abused: Not on file     Physically abused: Not on file     Forced sexual activity: Not on file   Other Topics Concern    Not on file   Social History Narrative    ACTIVE ADVANCED DIRECTIVES    ALWAYS USES [employer]    CAFFEINE USE     DENIED HX OF DOMESTIC VIOLENCE     LACK OF [address] in row home, uses only 1st floor due to ambulatory dysfunction    PT HAS LIVING WILL AND POA     SUPPORTIVE AND SAFE     NO Jainism STATUS     3 children, 2 in [place]         Review of Systems   Patient indicated positive for weight gain, hearing loss, chest pain, abdmonial pain, constipation, joint pain, blood in urine, depression, headaches, easy bruising  Otherwise negative except per above and HPI  Physical Exam    Vitals  Vitals:    03/10/20 1152   BP: 118/62       BMI  Body mass index is 39 69 kg/m²  GENERAL: No acute distress  Well nourished and well hydrated  Appears stated age  HEENT : Normocephalic, atraumatic  Extraocular movements intact  Mucous membranes moist  NECK: Supple, trachea midline  LUNGS: Adequate and symmetric respiratory effort  No intercostal retractions or accessory muscle use  HEART: Extremities warm and perfused  ABDOMEN: Nondistended  SKIN: Warm and dry, no rash        Right Knee   Inspection/Appearance:       Swelling: No      Patella is midline  Brace in place  Brace is unbuckled and no skin ulceration noted  Alignment:  Knee is in neutral  ROM:     Not tested (pain with knee motion)  Motor:       +TA, GS  Vascular: Knee is warm and sensate with normal refill  Imaging  A) Imaging modality available  Radiographs: yes  MRI scan: no  CT scan: no    B) Imaging findings  Radiographs from 2/27/2020 demonstrate right distal femur periprosthetic fracture  The fracture is at the tip of the stem of a stemmed femoral TKA component, with cement mantle in place  The fracture is now approximately 100% displaced  There is no evidence of fracture callus at this time  Assessment and Plan  Right distal femur fracture  - patient reports that she is non-ambulatory and does not have significant pain in the right leg and she is adamant that she wants to avoid surgery    - I discussed with patient that surgical option would be a distal femoral replacement, which would be a large surgery but would be the definitive recommendation  She reports that she is not interested in pursuing this option     - continue hinged knee brace (attempt to maintain brace position more proximally)  - NWB RLE  - daily skin checks under brace    - followup 4 weeks with radiographs        Sorin Yoo MD  Adult Reconstruction Surgery  Department [place] [id number]  12:16 PM

## 2020-03-12 ENCOUNTER — APPOINTMENT (EMERGENCY)
Dept: RADIOLOGY | Facility: HOSPITAL | Age: 80
DRG: 377 | End: 2020-03-12
Payer: MEDICARE

## 2020-03-12 ENCOUNTER — APPOINTMENT (INPATIENT)
Dept: ULTRASOUND IMAGING | Facility: HOSPITAL | Age: 80
DRG: 377 | End: 2020-03-12
Payer: MEDICARE

## 2020-03-12 ENCOUNTER — APPOINTMENT (EMERGENCY)
Dept: CT IMAGING | Facility: HOSPITAL | Age: 80
DRG: 377 | End: 2020-03-12
Payer: MEDICARE

## 2020-03-12 ENCOUNTER — HOSPITAL ENCOUNTER (INPATIENT)
Facility: HOSPITAL | Age: 80
LOS: 8 days | Discharge: NURSING, REHAB OR LONG-TERM CARE | DRG: 377 | End: 2020-03-20
Attending: EMERGENCY MEDICINE | Admitting: INTERNAL MEDICINE
Payer: MEDICARE

## 2020-03-12 DIAGNOSIS — K72.90 HEPATIC ENCEPHALOPATHY (HCC): ICD-10-CM

## 2020-03-12 DIAGNOSIS — R74.01 TRANSAMINITIS: ICD-10-CM

## 2020-03-12 DIAGNOSIS — I95.9 HYPOTENSION: ICD-10-CM

## 2020-03-12 DIAGNOSIS — I81 PORTAL VEIN THROMBOSIS: ICD-10-CM

## 2020-03-12 DIAGNOSIS — R55 SYNCOPE: ICD-10-CM

## 2020-03-12 DIAGNOSIS — S72.401A CLOSED FRACTURE OF RIGHT DISTAL FEMUR (HCC): ICD-10-CM

## 2020-03-12 DIAGNOSIS — N17.9 AKI (ACUTE KIDNEY INJURY) (HCC): Primary | ICD-10-CM

## 2020-03-12 DIAGNOSIS — K74.60 CIRRHOSIS OF LIVER (HCC): ICD-10-CM

## 2020-03-12 DIAGNOSIS — R19.5 HEME POSITIVE STOOL: ICD-10-CM

## 2020-03-12 PROBLEM — R13.10 DYSPHAGIA: Status: ACTIVE | Noted: 2020-03-12

## 2020-03-12 PROBLEM — R41.82 ALTERED MENTAL STATUS: Status: ACTIVE | Noted: 2020-03-12

## 2020-03-12 LAB
ALBUMIN SERPL BCP-MCNC: 2.1 G/DL (ref 3.5–5)
ALP SERPL-CCNC: 757 U/L (ref 46–116)
ALT SERPL W P-5'-P-CCNC: 100 U/L (ref 12–78)
AMMONIA PLAS-SCNC: 20 UMOL/L (ref 11–35)
ANION GAP SERPL CALCULATED.3IONS-SCNC: 8 MMOL/L (ref 4–13)
APTT PPP: 28 SECONDS (ref 23–37)
AST SERPL W P-5'-P-CCNC: 189 U/L (ref 5–45)
BASOPHILS # BLD AUTO: 0.03 THOUSANDS/ΜL (ref 0–0.1)
BASOPHILS NFR BLD AUTO: 0 % (ref 0–1)
BILIRUB DIRECT SERPL-MCNC: 2.31 MG/DL (ref 0–0.2)
BILIRUB SERPL-MCNC: 3.56 MG/DL (ref 0.2–1)
BUN SERPL-MCNC: 57 MG/DL (ref 5–25)
CALCIUM SERPL-MCNC: 8.9 MG/DL (ref 8.3–10.1)
CHLORIDE SERPL-SCNC: 112 MMOL/L (ref 100–108)
CO2 SERPL-SCNC: 27 MMOL/L (ref 21–32)
CREAT SERPL-MCNC: 1.99 MG/DL (ref 0.6–1.3)
EOSINOPHIL # BLD AUTO: 0.01 THOUSAND/ΜL (ref 0–0.61)
EOSINOPHIL NFR BLD AUTO: 0 % (ref 0–6)
ERYTHROCYTE [DISTWIDTH] IN BLOOD BY AUTOMATED COUNT: 18.9 % (ref 11.6–15.1)
GFR SERPL CREATININE-BSD FRML MDRD: 23 ML/MIN/1.73SQ M
GLUCOSE SERPL-MCNC: 198 MG/DL (ref 65–140)
HCT VFR BLD AUTO: 42 % (ref 34.8–46.1)
HGB BLD-MCNC: 12.5 G/DL (ref 11.5–15.4)
IMM GRANULOCYTES # BLD AUTO: 0.21 THOUSAND/UL (ref 0–0.2)
IMM GRANULOCYTES NFR BLD AUTO: 2 % (ref 0–2)
INR PPP: 1.2 (ref 0.84–1.19)
LYMPHOCYTES # BLD AUTO: 1.65 THOUSANDS/ΜL (ref 0.6–4.47)
LYMPHOCYTES NFR BLD AUTO: 15 % (ref 14–44)
MCH RBC QN AUTO: 29.7 PG (ref 26.8–34.3)
MCHC RBC AUTO-ENTMCNC: 29.8 G/DL (ref 31.4–37.4)
MCV RBC AUTO: 100 FL (ref 82–98)
MONOCYTES # BLD AUTO: 0.94 THOUSAND/ΜL (ref 0.17–1.22)
MONOCYTES NFR BLD AUTO: 8 % (ref 4–12)
NEUTROPHILS # BLD AUTO: 8.41 THOUSANDS/ΜL (ref 1.85–7.62)
NEUTS SEG NFR BLD AUTO: 75 % (ref 43–75)
NRBC BLD AUTO-RTO: 0 /100 WBCS
PLATELET # BLD AUTO: 142 THOUSANDS/UL (ref 149–390)
PMV BLD AUTO: 11.3 FL (ref 8.9–12.7)
POTASSIUM SERPL-SCNC: 4.7 MMOL/L (ref 3.5–5.3)
PROT SERPL-MCNC: 6 G/DL (ref 6.4–8.2)
PROTHROMBIN TIME: 15.4 SECONDS (ref 11.6–14.5)
RBC # BLD AUTO: 4.21 MILLION/UL (ref 3.81–5.12)
SODIUM SERPL-SCNC: 147 MMOL/L (ref 136–145)
TROPONIN I SERPL-MCNC: <0.02 NG/ML
WBC # BLD AUTO: 11.25 THOUSAND/UL (ref 4.31–10.16)

## 2020-03-12 PROCEDURE — 99284 EMERGENCY DEPT VISIT MOD MDM: CPT | Performed by: EMERGENCY MEDICINE

## 2020-03-12 PROCEDURE — 84484 ASSAY OF TROPONIN QUANT: CPT | Performed by: EMERGENCY MEDICINE

## 2020-03-12 PROCEDURE — 85610 PROTHROMBIN TIME: CPT | Performed by: EMERGENCY MEDICINE

## 2020-03-12 PROCEDURE — 76705 ECHO EXAM OF ABDOMEN: CPT

## 2020-03-12 PROCEDURE — 85014 HEMATOCRIT: CPT | Performed by: PHYSICIAN ASSISTANT

## 2020-03-12 PROCEDURE — 85610 PROTHROMBIN TIME: CPT | Performed by: PHYSICIAN ASSISTANT

## 2020-03-12 PROCEDURE — 85730 THROMBOPLASTIN TIME PARTIAL: CPT | Performed by: PHYSICIAN ASSISTANT

## 2020-03-12 PROCEDURE — 85730 THROMBOPLASTIN TIME PARTIAL: CPT | Performed by: EMERGENCY MEDICINE

## 2020-03-12 PROCEDURE — 80048 BASIC METABOLIC PNL TOTAL CA: CPT | Performed by: EMERGENCY MEDICINE

## 2020-03-12 PROCEDURE — C9113 INJ PANTOPRAZOLE SODIUM, VIA: HCPCS | Performed by: PHYSICIAN ASSISTANT

## 2020-03-12 PROCEDURE — 85018 HEMOGLOBIN: CPT | Performed by: PHYSICIAN ASSISTANT

## 2020-03-12 PROCEDURE — 99285 EMERGENCY DEPT VISIT HI MDM: CPT

## 2020-03-12 PROCEDURE — 36415 COLL VENOUS BLD VENIPUNCTURE: CPT | Performed by: EMERGENCY MEDICINE

## 2020-03-12 PROCEDURE — 71046 X-RAY EXAM CHEST 2 VIEWS: CPT

## 2020-03-12 PROCEDURE — 70450 CT HEAD/BRAIN W/O DYE: CPT

## 2020-03-12 PROCEDURE — 85025 COMPLETE CBC W/AUTO DIFF WBC: CPT | Performed by: EMERGENCY MEDICINE

## 2020-03-12 PROCEDURE — 80076 HEPATIC FUNCTION PANEL: CPT | Performed by: EMERGENCY MEDICINE

## 2020-03-12 PROCEDURE — 93005 ELECTROCARDIOGRAM TRACING: CPT

## 2020-03-12 PROCEDURE — 82140 ASSAY OF AMMONIA: CPT | Performed by: EMERGENCY MEDICINE

## 2020-03-12 RX ORDER — OXYCODONE HYDROCHLORIDE 5 MG/1
5 TABLET ORAL EVERY 4 HOURS PRN
Status: ON HOLD | COMMUNITY
End: 2020-03-20 | Stop reason: SDUPTHER

## 2020-03-12 RX ORDER — OXYCODONE HYDROCHLORIDE 5 MG/1
5 TABLET ORAL EVERY 4 HOURS PRN
Status: DISCONTINUED | OUTPATIENT
Start: 2020-03-12 | End: 2020-03-20 | Stop reason: HOSPADM

## 2020-03-12 RX ORDER — PROPRANOLOL HYDROCHLORIDE 10 MG/1
10 TABLET ORAL 2 TIMES DAILY
Status: DISCONTINUED | OUTPATIENT
Start: 2020-03-12 | End: 2020-03-20 | Stop reason: HOSPADM

## 2020-03-12 RX ORDER — FAMOTIDINE 20 MG/1
10 TABLET, FILM COATED ORAL DAILY PRN
Status: DISCONTINUED | OUTPATIENT
Start: 2020-03-12 | End: 2020-03-20 | Stop reason: HOSPADM

## 2020-03-12 RX ORDER — AMLODIPINE BESYLATE 5 MG/1
5 TABLET ORAL DAILY
Status: DISCONTINUED | OUTPATIENT
Start: 2020-03-13 | End: 2020-03-13

## 2020-03-12 RX ORDER — GABAPENTIN 100 MG/1
100 CAPSULE ORAL 3 TIMES DAILY PRN
Status: DISCONTINUED | OUTPATIENT
Start: 2020-03-12 | End: 2020-03-20 | Stop reason: HOSPADM

## 2020-03-12 RX ORDER — ACETAMINOPHEN 325 MG/1
650 TABLET ORAL EVERY 6 HOURS PRN
Status: DISCONTINUED | OUTPATIENT
Start: 2020-03-12 | End: 2020-03-20 | Stop reason: HOSPADM

## 2020-03-12 RX ORDER — DEXTROSE AND SODIUM CHLORIDE 5; .45 G/100ML; G/100ML
50 INJECTION, SOLUTION INTRAVENOUS CONTINUOUS
Status: DISCONTINUED | OUTPATIENT
Start: 2020-03-12 | End: 2020-03-13

## 2020-03-12 RX ORDER — SODIUM CHLORIDE 9 MG/ML
75 INJECTION, SOLUTION INTRAVENOUS CONTINUOUS
Status: DISCONTINUED | OUTPATIENT
Start: 2020-03-12 | End: 2020-03-12

## 2020-03-12 RX ORDER — TAMSULOSIN HYDROCHLORIDE 0.4 MG/1
0.4 CAPSULE ORAL
Status: DISCONTINUED | OUTPATIENT
Start: 2020-03-12 | End: 2020-03-20 | Stop reason: HOSPADM

## 2020-03-12 RX ORDER — ONDANSETRON 2 MG/ML
4 INJECTION INTRAMUSCULAR; INTRAVENOUS EVERY 6 HOURS PRN
Status: DISCONTINUED | OUTPATIENT
Start: 2020-03-12 | End: 2020-03-20 | Stop reason: HOSPADM

## 2020-03-12 RX ORDER — PANTOPRAZOLE SODIUM 40 MG/1
40 INJECTION, POWDER, FOR SOLUTION INTRAVENOUS EVERY 12 HOURS SCHEDULED
Status: DISCONTINUED | OUTPATIENT
Start: 2020-03-12 | End: 2020-03-15

## 2020-03-12 RX ADMIN — SODIUM CHLORIDE 75 ML/HR: 0.9 INJECTION, SOLUTION INTRAVENOUS at 18:47

## 2020-03-12 RX ADMIN — DEXTROSE AND SODIUM CHLORIDE 50 ML/HR: 5; .45 INJECTION, SOLUTION INTRAVENOUS at 22:28

## 2020-03-12 RX ADMIN — PANTOPRAZOLE SODIUM 40 MG: 40 INJECTION, POWDER, FOR SOLUTION INTRAVENOUS at 22:31

## 2020-03-12 NOTE — ED PROVIDER NOTES
History  Chief Complaint   Patient presents with    Altered Mental Status     pt from [facility] after a period of "unresponsive "  pt alert to self  unaware why she was sent here  pt does not have any pain  per EMS pt hypotensive and hypothermic  pt with end stage liver disease  An 68-year-old female with past medical history of AFib, CHF, depression, hypertension and cirrhosis; BIBA after being found unresponsive  Per patient's nurse at [facility] patient was found unresponsive in her bed  Patient's nurse reports that she was hypoxic in the 80s at that time, and appeared cyanotic  Patient was placed on 10L oxygen via face mask and after approximately 5 minutes began regaining consciousness  Patient was awake and alert upon EMS arrival   Upon arrival to the ED, patient remained awake and alert and offered no complaints  Patient is unsure why she is in the ED or what happened earlier today  Patient denies recent fever, chills, cough, chest pain, shortness of breath, abdominal pain, nausea, vomiting, diarrhea and rashes  Per patient's nurse at [facility], patient has been having increasing lethargy and jaundice over the past several days  Assessment & plan:  Syncope, patient now awake and alert  Per NH staff patient was hypoxic  Patient currently offering no complaints  Will check lab work for anemia, electrolyte abnormality & renal impairment  Will check EKG for arrhythmia  Given patient's recent admission for a subdural hematoma, will obtain CT head to evaluate for worsening hemorrhage  History provided by:  Patient, EMS personnel and nursing home      Prior to Admission Medications   Prescriptions Last Dose Informant Patient Reported? Taking?    Petrolatum-Zinc Oxide (Phytoplex Z-Guard) 57-17 % PSTE   Yes No   Sig: Apply topically   acetaminophen (TYLENOL) 325 mg tablet   Yes No   Sig: Take 650 mg by mouth every 6 (six) hours as needed for mild pain   amLODIPine (NORVASC) 5 mg tablet   No Yes Sig: TAKE 1 TABLET BY MOUTH EVERY DAY   busPIRone (BUSPAR) 5 mg tablet   No Yes   Sig: TAKE 1 TABLET BY MOUTH THREE TIMES A DAY   docusate sodium (COLACE) 100 mg capsule   Yes Yes   Sig: every 24 hours   famotidine (PEPCID) 20 mg tablet   Yes Yes   Sig: Take 20 mg by mouth as needed for heartburn   furosemide (LASIX) 20 mg tablet   No Yes   Sig: Take 0 5 tablets (10 mg total) by mouth daily   gabapentin (NEURONTIN) 100 mg capsule   No Yes   Sig: Take 1 capsule (100 mg total) by mouth 3 (three) times a day as needed (pain)   nystatin (MYCOSTATIN) powder   No Yes   Sig: APPLY 1 APPLICATION TOPICALLY 2 (TWO) TIMES A DAY 2-3 TIMES DAILY TO AFFECTED AREA(S)   oxyCODONE (ROXICODONE) 5 mg immediate release tablet   Yes Yes   Sig: Take 5 mg by mouth every 4 (four) hours as needed for moderate pain   polyethylene glycol (MIRALAX) 17 g packet   No No   Sig: Take 17 g by mouth daily   propranolol (INDERAL) 10 mg tablet   No Yes   Sig: TAKE 1 TABLET BY MOUTH TWICE A DAY   senna (SENOKOT) 8 6 MG tablet  Spouse/Significant Other Yes Yes   Sig: Take 1 tablet by mouth daily at bedtime    sodium chloride 1 g tablet   No Yes   Sig: Take 2 tablets (2 g total) by mouth 4 (four) times a day (with meals and at bedtime)   tamsulosin (FLOMAX) 0 4 mg   Yes Yes   Sig: every 24 hours      Facility-Administered Medications: None       Past Medical History:   Diagnosis Date    Anxiety     Atrial fibrillation (HCC)     CHF (congestive heart failure) (HCC)     Depression     HTN (hypertension) 11/9/2016    [facility]     Pneumonia        Past Surgical History:   Procedure Laterality Date    APPENDECTOMY      CHOLECYSTECTOMY      TONSILLECTOMY      TOTAL KNEE ARTHROPLASTY Bilateral     TUBAL LIGATION         Family History   Problem Relation Age of Onset    Cancer Father     Hypertension Sister      I have reviewed and agree with the history as documented      E-Cigarette/Vaping    E-Cigarette Use Never User E-Cigarette/Vaping Substances    Nicotine No     THC No     CBD No     Flavoring No     Other No     Unknown No      Social History     Tobacco Use    Smoking status: Former Smoker     Types: Cigarettes     Last attempt to quit: 1980     Years since quittin 7    Smokeless tobacco: Never Used   Substance Use Topics    Alcohol use: Never     Frequency: Never     Binge frequency: Never    Drug use: Never       Review of Systems   All other systems reviewed and are negative        Physical Exam  Physical Exam  General Appearance: alert and oriented, nad, non toxic appearing  Skin:  Warm, dry, intact  HEENT: atraumatic, normocephalic  Neck: Supple, trachea midline  Cardiac: RRR; no murmurs, rub, gallops  Pulmonary: lungs CTAB; no wheezes, rales, rhonchi  Gastrointestinal: abdomen soft, nontender, distended; no guarding or rebound tenderness; good bowel sounds, no mass or bruits  Extremities:  3+ pitting edema to bilateral lower extremities, 2+ pulses; no calf tenderness, no clubbing, no cyanosis  Neuro:  no focal motor or sensory deficits, CN 2-12 grossly intact  Psych:  Normal mood and affect, normal judgement and insight      Vital Signs  ED Triage Vitals   Temperature Pulse Respirations Blood Pressure SpO2   20 1704 20 1704 20 1704 20 1704 20 170   97 8 °F (36 6 °C) 76 18 143/69 97 %      Temp Source Heart Rate Source Patient Position - Orthostatic VS BP Location FiO2 (%)   20 1704 20 1846 20 18420 --   Oral Monitor Sitting Right arm       Pain Score       --                  Vitals:    20 1704 20   BP: 143/69 114/75   Pulse: 76 74   Patient Position - Orthostatic VS:  Sitting         Visual Acuity  Visual Acuity      Most Recent Value   L Pupil Size (mm)  2   R Pupil Size (mm)  2          ED Medications  Medications   sodium chloride 0 9 % infusion (75 mL/hr Intravenous New Bag 3/12/20 1847)       Diagnostic Studies  Results Reviewed     Procedure Component Value Units Date/Time    Hepatic function panel [[id number]]  (Abnormal) Collected:  03/12/20 1717    Lab Status:  Final result Specimen:  Blood from Arm, Left Updated:  03/12/20 1802     Total Bilirubin 3 56 mg/dL      Bilirubin, Direct 2 31 mg/dL      Alkaline Phosphatase 757 U/L       U/L       U/L      Total Protein 6 0 g/dL      Albumin 2 1 g/dL     Troponin I [[id number]]  (Normal) Collected:  03/12/20 1717    Lab Status:  Final result Specimen:  Blood from Arm, Left Updated:  03/12/20 1750     Troponin I <0 02 ng/mL     Protime-INR [[id number]]  (Abnormal) Collected:  03/12/20 1717    Lab Status:  Final result Specimen:  Blood from Arm, Left Updated:  03/12/20 1748     Protime 15 4 seconds      INR 1 20    APTT [[id number]]  (Normal) Collected:  03/12/20 1717    Lab Status:  Final result Specimen:  Blood from Arm, Left Updated:  03/12/20 1748     PTT 28 seconds     Basic metabolic panel [[id number]]  (Abnormal) Collected:  03/12/20 1717    Lab Status:  Final result Specimen:  Blood from Arm, Left Updated:  03/12/20 1742     Sodium 147 mmol/L      Potassium 4 7 mmol/L      Chloride 112 mmol/L      CO2 27 mmol/L      ANION GAP 8 mmol/L      BUN 57 mg/dL      Creatinine 1 99 mg/dL      Glucose 198 mg/dL      Calcium 8 9 mg/dL      eGFR 23 ml/min/1 73sq m     Narrative:       Michael guidelines for Chronic Kidney Disease (CKD):     Stage 1 with normal or high GFR (GFR > 90 mL/min/1 73 square meters)    Stage 2 Mild CKD (GFR = 60-89 mL/min/1 73 square meters)    Stage 3A Moderate CKD (GFR = 45-59 mL/min/1 73 square meters)    Stage 3B Moderate CKD (GFR = 30-44 mL/min/1 73 square meters)    Stage 4 Severe CKD (GFR = 15-29 mL/min/1 73 square meters)    Stage 5 End Stage CKD (GFR <15 mL/min/1 73 square meters)  Note: GFR calculation is accurate only with a steady state creatinine    Ammonia [[id number]]  (Normal) Collected: 03/12/20 1720    Lab Status:  Final result Specimen:  Blood from Arm, Left Updated:  03/12/20 1741     Ammonia 20 umol/L     CBC and differential [[id number]]  (Abnormal) Collected:  03/12/20 1717    Lab Status:  Final result Specimen:  Blood from Arm, Left Updated:  03/12/20 1730     WBC 11 25 Thousand/uL      RBC 4 21 Million/uL      Hemoglobin 12 5 g/dL      Hematocrit 42 0 %       fL      MCH 29 7 pg      MCHC 29 8 g/dL      RDW 18 9 %      MPV 11 3 fL      Platelets 354 Thousands/uL      nRBC 0 /100 WBCs      Neutrophils Relative 75 %      Immat GRANS % 2 %      Lymphocytes Relative 15 %      Monocytes Relative 8 %      Eosinophils Relative 0 %      Basophils Relative 0 %      Neutrophils Absolute 8 41 Thousands/µL      Immature Grans Absolute 0 21 Thousand/uL      Lymphocytes Absolute 1 65 Thousands/µL      Monocytes Absolute 0 94 Thousand/µL      Eosinophils Absolute 0 01 Thousand/µL      Basophils Absolute 0 03 Thousands/µL                  XR chest 2 views   ED Interpretation by Phineas Romberg, DO (03/12 1756)   No acute disease      Final Result by Leonardo Stephens MD (03/12 1755)      No acute cardiopulmonary disease  Stable cardiomegaly  Workstation performed: [id number]         CT head without contrast   Final Result by Ruthy Burris MD (03/12 1746)      1  Interval resolution of interhemispheric subdural hematoma  Diminished attenuation of subacute right thalamic hemorrhage, stable in size measuring 7 x 5 mm  No acute intracranial abnormality  2   Atrophy with stable severe chronic small vessel ischemic changes                    Workstation performed: [id number]                    Procedures  Procedures   ECG 12 Lead Documentation  Date/Time: today/date: 3/12/2020  Performed by: Analisa Goncalves    ECG reviewed by me, the ED Provider: yes    Patient location:  ED   Previous ECG:  Compared to current, no change   Rate:  96  ECG rate assessment: normal    Rhythm: atrial flutter Ectopy:  none    QRS axis:  Normal  Intervals: normal   Q waves: None   ST segments:  Nonspecific changes  T waves: normal      Impression: Atrial flutter with nonspecific ST changes          ED Course  ED Course as of Mar 12 1852   Thu Mar 12, 2020   1754 Baseline 0 8   Creatinine(!): 1 99   1754 Recent admission for hyponatremia   Sodium(!): 147   1754 Improving form prior  No acute findings   CT head without contrast   1803 Improved from recent labs   TOTAL BILIRUBIN(!): 3 56   1804 Similar to prior   AST(!): 189   1805 Similar to prior   ALT(!): 100   1817 Given CECIL and hypernatremia, suspect patient may have been over diuresed  Will start gentle IVF hydration                                    MDM      Disposition  Final diagnoses:   CECIL (acute kidney injury) ([facility] [address] )   Syncope     Time reflects when diagnosis was documented in both MDM as applicable and the Disposition within this note     Time User Action Codes Description Comment    3/12/2020  6:26 PM Claudette, [address] CECIL (acute kidney injury) ([facility] [address] )     3/12/2020  6:26 PM Junior Li Add [[id number]] Syncope       ED Disposition     ED Disposition Condition Date/Time Comment    Admit Stable Thu Mar 12, 2020  6:26 PM Case was discussed with CARLOS and the patient's admission status was agreed to be Admission Status: inpatient status to the service of Dr Ben Robles   Follow-up Information    None         Patient's Medications   Discharge Prescriptions    No medications on file     No discharge procedures on file      PDMP Review       Value Time User    PDMP Reviewed  Yes 2/21/2020 12:10 PM Abhinav Jeff PA-C          ED Provider  Electronically Signed by           Myra Hawk DO  03/12/20 3684

## 2020-03-12 NOTE — ASSESSMENT & PLAN NOTE
Acute onset kidney injury    Possibly multifactorial due to decreased oral intake as well as suspected GIB  -hold oral diuretics salt tablets as pt appears hypovolemic  -consult nephrology for volume management/hypernatremia as pt normally hyponatremia and is on fluid restriction as well as salt tabs and diuretics  -started on 75cc/hr in ED of NS will change to d5 1/4 at 50cc/hr x 10 hours  -hold diuretics  Repeat bmp in am, consult nephrology

## 2020-03-13 ENCOUNTER — APPOINTMENT (INPATIENT)
Dept: RADIOLOGY | Facility: HOSPITAL | Age: 80
DRG: 377 | End: 2020-03-13
Attending: STUDENT IN AN ORGANIZED HEALTH CARE EDUCATION/TRAINING PROGRAM
Payer: MEDICARE

## 2020-03-13 PROBLEM — G93.41 METABOLIC ENCEPHALOPATHY: Status: ACTIVE | Noted: 2020-03-13

## 2020-03-13 PROBLEM — N18.9 ACUTE KIDNEY INJURY SUPERIMPOSED ON CHRONIC KIDNEY DISEASE (HCC): Status: ACTIVE | Noted: 2020-03-12

## 2020-03-13 PROBLEM — I81 PORTAL VEIN THROMBOSIS: Status: ACTIVE | Noted: 2020-03-13

## 2020-03-13 LAB
ALBUMIN FLD-MCNC: 2.5 G/DL
ALBUMIN SERPL BCP-MCNC: 2 G/DL (ref 3.5–5)
ALP SERPL-CCNC: 681 U/L (ref 46–116)
ALT SERPL W P-5'-P-CCNC: 90 U/L (ref 12–78)
ANION GAP SERPL CALCULATED.3IONS-SCNC: 6 MMOL/L (ref 4–13)
ANION GAP SERPL CALCULATED.3IONS-SCNC: 8 MMOL/L (ref 4–13)
ANION GAP SERPL CALCULATED.3IONS-SCNC: 9 MMOL/L (ref 4–13)
APPEARANCE FLD: CLEAR
APTT PPP: 29 SECONDS (ref 23–37)
AST SERPL W P-5'-P-CCNC: 153 U/L (ref 5–45)
ATRIAL RATE: 220 BPM
BACTERIA UR QL AUTO: ABNORMAL /HPF
BASOPHILS # BLD AUTO: 0.01 THOUSANDS/ΜL (ref 0–0.1)
BASOPHILS NFR BLD AUTO: 0 % (ref 0–1)
BILIRUB SERPL-MCNC: 3.39 MG/DL (ref 0.2–1)
BILIRUB UR QL STRIP: ABNORMAL
BUN SERPL-MCNC: 53 MG/DL (ref 5–25)
BUN SERPL-MCNC: 55 MG/DL (ref 5–25)
BUN SERPL-MCNC: 57 MG/DL (ref 5–25)
CALCIUM SERPL-MCNC: 8.5 MG/DL (ref 8.3–10.1)
CALCIUM SERPL-MCNC: 8.8 MG/DL (ref 8.3–10.1)
CALCIUM SERPL-MCNC: 8.9 MG/DL (ref 8.3–10.1)
CHLORIDE SERPL-SCNC: 113 MMOL/L (ref 100–108)
CLARITY UR: CLEAR
CO2 SERPL-SCNC: 26 MMOL/L (ref 21–32)
CO2 SERPL-SCNC: 27 MMOL/L (ref 21–32)
CO2 SERPL-SCNC: 28 MMOL/L (ref 21–32)
COLOR FLD: YELLOW
COLOR UR: YELLOW
CREAT SERPL-MCNC: 1.78 MG/DL (ref 0.6–1.3)
CREAT SERPL-MCNC: 1.87 MG/DL (ref 0.6–1.3)
CREAT SERPL-MCNC: 1.92 MG/DL (ref 0.6–1.3)
CREAT UR-MCNC: 112.9 MG/DL
EOSINOPHIL # BLD AUTO: 0.04 THOUSAND/ΜL (ref 0–0.61)
EOSINOPHIL NFR BLD AUTO: 0 % (ref 0–6)
ERYTHROCYTE [DISTWIDTH] IN BLOOD BY AUTOMATED COUNT: 19.5 % (ref 11.6–15.1)
GFR SERPL CREATININE-BSD FRML MDRD: 24 ML/MIN/1.73SQ M
GFR SERPL CREATININE-BSD FRML MDRD: 25 ML/MIN/1.73SQ M
GFR SERPL CREATININE-BSD FRML MDRD: 26 ML/MIN/1.73SQ M
GLUCOSE SERPL-MCNC: 167 MG/DL (ref 65–140)
GLUCOSE SERPL-MCNC: 174 MG/DL (ref 65–140)
GLUCOSE SERPL-MCNC: 202 MG/DL (ref 65–140)
GLUCOSE UR STRIP-MCNC: NEGATIVE MG/DL
HCT VFR BLD AUTO: 39.9 % (ref 34.8–46.1)
HCT VFR BLD AUTO: 40.8 % (ref 34.8–46.1)
HGB BLD-MCNC: 11.9 G/DL (ref 11.5–15.4)
HGB BLD-MCNC: 12.1 G/DL (ref 11.5–15.4)
HGB UR QL STRIP.AUTO: ABNORMAL
HISTIOCYTES NFR FLD: 1 %
IMM GRANULOCYTES # BLD AUTO: 0.16 THOUSAND/UL (ref 0–0.2)
IMM GRANULOCYTES NFR BLD AUTO: 2 % (ref 0–2)
INR PPP: 1.34 (ref 0.84–1.19)
KETONES UR STRIP-MCNC: NEGATIVE MG/DL
LEUKOCYTE ESTERASE UR QL STRIP: ABNORMAL
LYMPHOCYTES # BLD AUTO: 1.46 THOUSANDS/ΜL (ref 0.6–4.47)
LYMPHOCYTES NFR BLD AUTO: 15 % (ref 14–44)
LYMPHOCYTES NFR BLD AUTO: 16 %
MCH RBC QN AUTO: 29.6 PG (ref 26.8–34.3)
MCHC RBC AUTO-ENTMCNC: 29.8 G/DL (ref 31.4–37.4)
MCV RBC AUTO: 99 FL (ref 82–98)
MONO+MESO NFR FLD MANUAL: 1 %
MONOCYTES # BLD AUTO: 0.69 THOUSAND/ΜL (ref 0.17–1.22)
MONOCYTES NFR BLD AUTO: 22 %
MONOCYTES NFR BLD AUTO: 7 % (ref 4–12)
NEUTROPHILS # BLD AUTO: 7.26 THOUSANDS/ΜL (ref 1.85–7.62)
NEUTS SEG NFR BLD AUTO: 60 %
NEUTS SEG NFR BLD AUTO: 76 % (ref 43–75)
NITRITE UR QL STRIP: POSITIVE
NON-SQ EPI CELLS URNS QL MICRO: ABNORMAL /HPF
NRBC BLD AUTO-RTO: 0 /100 WBCS
P AXIS: 0 DEGREES
PH UR STRIP.AUTO: 5.5 [PH]
PLATELET # BLD AUTO: 115 THOUSANDS/UL (ref 149–390)
PMV BLD AUTO: 10.7 FL (ref 8.9–12.7)
POTASSIUM SERPL-SCNC: 4 MMOL/L (ref 3.5–5.3)
POTASSIUM SERPL-SCNC: 4.5 MMOL/L (ref 3.5–5.3)
POTASSIUM SERPL-SCNC: 4.6 MMOL/L (ref 3.5–5.3)
PROT FLD-MCNC: <2 G/DL
PROT SERPL-MCNC: 5.5 G/DL (ref 6.4–8.2)
PROT UR STRIP-MCNC: NEGATIVE MG/DL
PROT UR-MCNC: 49 MG/DL
PROT/CREAT UR: 0.43 MG/G{CREAT} (ref 0–0.1)
PROTHROMBIN TIME: 16.8 SECONDS (ref 11.6–14.5)
QRS AXIS: -18 DEGREES
QRSD INTERVAL: 60 MS
QT INTERVAL: 356 MS
QTC INTERVAL: 449 MS
RBC # BLD AUTO: 4.02 MILLION/UL (ref 3.81–5.12)
RBC #/AREA URNS AUTO: ABNORMAL /HPF
SITE: NORMAL
SODIUM 24H UR-SCNC: 5 MOL/L
SODIUM SERPL-SCNC: 147 MMOL/L (ref 136–145)
SODIUM SERPL-SCNC: 148 MMOL/L (ref 136–145)
SODIUM SERPL-SCNC: 148 MMOL/L (ref 136–145)
SP GR UR STRIP.AUTO: 1.02 (ref 1–1.03)
T WAVE AXIS: -31 DEGREES
TOTAL CELLS COUNTED SPEC: 100
UROBILINOGEN UR QL STRIP.AUTO: 1 E.U./DL
VENTRICULAR RATE: 96 BPM
WBC # BLD AUTO: 9.62 THOUSAND/UL (ref 4.31–10.16)
WBC # FLD MANUAL: 90 /UL
WBC #/AREA URNS AUTO: ABNORMAL /HPF

## 2020-03-13 PROCEDURE — 89051 BODY FLUID CELL COUNT: CPT | Performed by: STUDENT IN AN ORGANIZED HEALTH CARE EDUCATION/TRAINING PROGRAM

## 2020-03-13 PROCEDURE — 49083 ABD PARACENTESIS W/IMAGING: CPT | Performed by: RADIOLOGY

## 2020-03-13 PROCEDURE — 82570 ASSAY OF URINE CREATININE: CPT | Performed by: NURSE PRACTITIONER

## 2020-03-13 PROCEDURE — 97163 PT EVAL HIGH COMPLEX 45 MIN: CPT

## 2020-03-13 PROCEDURE — 93010 ELECTROCARDIOGRAM REPORT: CPT | Performed by: INTERNAL MEDICINE

## 2020-03-13 PROCEDURE — 84156 ASSAY OF PROTEIN URINE: CPT | Performed by: NURSE PRACTITIONER

## 2020-03-13 PROCEDURE — 84300 ASSAY OF URINE SODIUM: CPT | Performed by: NURSE PRACTITIONER

## 2020-03-13 PROCEDURE — 80053 COMPREHEN METABOLIC PANEL: CPT | Performed by: PHYSICIAN ASSISTANT

## 2020-03-13 PROCEDURE — 97167 OT EVAL HIGH COMPLEX 60 MIN: CPT

## 2020-03-13 PROCEDURE — 84157 ASSAY OF PROTEIN OTHER: CPT | Performed by: STUDENT IN AN ORGANIZED HEALTH CARE EDUCATION/TRAINING PROGRAM

## 2020-03-13 PROCEDURE — 80048 BASIC METABOLIC PNL TOTAL CA: CPT | Performed by: INTERNAL MEDICINE

## 2020-03-13 PROCEDURE — 81001 URINALYSIS AUTO W/SCOPE: CPT | Performed by: NURSE PRACTITIONER

## 2020-03-13 PROCEDURE — C9113 INJ PANTOPRAZOLE SODIUM, VIA: HCPCS | Performed by: PHYSICIAN ASSISTANT

## 2020-03-13 PROCEDURE — 87070 CULTURE OTHR SPECIMN AEROBIC: CPT | Performed by: STUDENT IN AN ORGANIZED HEALTH CARE EDUCATION/TRAINING PROGRAM

## 2020-03-13 PROCEDURE — 82042 OTHER SOURCE ALBUMIN QUAN EA: CPT | Performed by: STUDENT IN AN ORGANIZED HEALTH CARE EDUCATION/TRAINING PROGRAM

## 2020-03-13 PROCEDURE — 99232 SBSQ HOSP IP/OBS MODERATE 35: CPT | Performed by: INTERNAL MEDICINE

## 2020-03-13 PROCEDURE — 99233 SBSQ HOSP IP/OBS HIGH 50: CPT | Performed by: STUDENT IN AN ORGANIZED HEALTH CARE EDUCATION/TRAINING PROGRAM

## 2020-03-13 PROCEDURE — 85025 COMPLETE CBC W/AUTO DIFF WBC: CPT | Performed by: PHYSICIAN ASSISTANT

## 2020-03-13 PROCEDURE — 87205 SMEAR GRAM STAIN: CPT | Performed by: STUDENT IN AN ORGANIZED HEALTH CARE EDUCATION/TRAINING PROGRAM

## 2020-03-13 PROCEDURE — 49083 ABD PARACENTESIS W/IMAGING: CPT

## 2020-03-13 PROCEDURE — 0W9G3ZZ DRAINAGE OF PERITONEAL CAVITY, PERCUTANEOUS APPROACH: ICD-10-PCS | Performed by: RADIOLOGY

## 2020-03-13 RX ORDER — LIDOCAINE WITH 8.4% SOD BICARB 0.9%(10ML)
SYRINGE (ML) INJECTION CODE/TRAUMA/SEDATION MEDICATION
Status: COMPLETED | OUTPATIENT
Start: 2020-03-13 | End: 2020-03-13

## 2020-03-13 RX ORDER — DEXTROSE MONOHYDRATE 50 MG/ML
50 INJECTION, SOLUTION INTRAVENOUS CONTINUOUS
Status: DISCONTINUED | OUTPATIENT
Start: 2020-03-13 | End: 2020-03-13

## 2020-03-13 RX ORDER — AMLODIPINE BESYLATE 5 MG/1
5 TABLET ORAL DAILY
Status: DISCONTINUED | OUTPATIENT
Start: 2020-03-13 | End: 2020-03-14

## 2020-03-13 RX ORDER — ALBUMIN (HUMAN) 12.5 G/50ML
25 SOLUTION INTRAVENOUS ONCE
Status: DISCONTINUED | OUTPATIENT
Start: 2020-03-13 | End: 2020-03-13

## 2020-03-13 RX ORDER — ALBUMIN (HUMAN) 12.5 G/50ML
12.5 SOLUTION INTRAVENOUS ONCE
Status: COMPLETED | OUTPATIENT
Start: 2020-03-13 | End: 2020-03-13

## 2020-03-13 RX ORDER — DEXTROSE MONOHYDRATE 50 MG/ML
100 INJECTION, SOLUTION INTRAVENOUS CONTINUOUS
Status: DISCONTINUED | OUTPATIENT
Start: 2020-03-13 | End: 2020-03-14

## 2020-03-13 RX ADMIN — DEXTROSE 50 ML/HR: 5 SOLUTION INTRAVENOUS at 10:33

## 2020-03-13 RX ADMIN — TAMSULOSIN HYDROCHLORIDE 0.4 MG: 0.4 CAPSULE ORAL at 21:37

## 2020-03-13 RX ADMIN — DEXTROSE 100 ML/HR: 5 SOLUTION INTRAVENOUS at 14:27

## 2020-03-13 RX ADMIN — PANTOPRAZOLE SODIUM 40 MG: 40 INJECTION, POWDER, FOR SOLUTION INTRAVENOUS at 21:38

## 2020-03-13 RX ADMIN — Medication 5 ML: at 15:45

## 2020-03-13 RX ADMIN — PANTOPRAZOLE SODIUM 40 MG: 40 INJECTION, POWDER, FOR SOLUTION INTRAVENOUS at 09:34

## 2020-03-13 RX ADMIN — PROPRANOLOL HYDROCHLORIDE 10 MG: 10 TABLET ORAL at 09:34

## 2020-03-13 RX ADMIN — ALBUMIN (HUMAN) 12.5 G: 0.25 INJECTION, SOLUTION INTRAVENOUS at 17:43

## 2020-03-13 NOTE — SOCIAL WORK
Patient is a 30-day readmit; pt was at [facility] on 2/19/20 and assessed by CM there  At that time it was reported to CM:     "CM met with pt to discuss the role of CM  Pt lives with her  in a 1 story home which has 6STE  Pt doesn't drive and reports that she needs assistance with all ADLs  Pt owns a cane, walker, transport chair, BSC, and shower chair  Pt's pharmacy is [facility] on [address]  Pt has a living will  Pt' reports no hx of mental health or substance abuse  Pt was at [facility] in the past  Pt used [facility] in the past but is unsure of the agency  Pt is recommended for IP rehab  CM will discuss this with pt and family  "     CM reviewed d/c planning process including the following: identifying help at home, patient preference for d/c planning needs, [facility] Meds to Bed program, availability of treatment team to discuss questions or concerns patient and/or family may have regarding understanding medications and recognizing signs and symptoms once discharged  CM also encouraged patient to follow up with all recommended appointments after discharge  Patient advised of importance for patient and family to participate in managing patients medical well being  [place] [address] CM will follow for any/all d/c needs       NGOC Rodriguez  3/13/2020  1005

## 2020-03-13 NOTE — CONSULTS
Patient MRN: [id number]  Date of Service: 3/13/2020  Referring Physician:  Staff  Provider Creating Note: Chino Laguna MD  PCP: Bigg Pappas  Reason for Consult:  Liver trouble  HPI  Alexandre Cowart is a 78 y o  female who was admitted with CECIL (acute kidney injury) ([facility] [address] )  She was admitted with a change in mental status  She has been treated conservatively  She has a history of alcoholic cirrhosis  She was previously followed at [facility] ([place], [place]) but has not seen a gastroenterologist there in some time  She is responding to conservative therapy  She reportedly had melena but on initial examination the stool was brown but heme-positive she is seen to have partial nonobstructing portal vein thrombosis  Her ammonia level is normal   Past Medical History:   Diagnosis Date    Anxiety     Atrial fibrillation (HCC)     CHF (congestive heart failure) (HCC)     Depression     HTN (hypertension) 11/9/2016    [facility] dependence ([email or web address] )     Pneumonia      Past Surgical History:   Procedure Laterality Date    APPENDECTOMY      CHOLECYSTECTOMY      TONSILLECTOMY      TOTAL KNEE ARTHROPLASTY Bilateral     TUBAL LIGATION       Medications  Home Medications:   Prior to Admission medications    Medication Sig Start Date End Date Taking?  Authorizing Provider   amLODIPine (NORVASC) 5 mg tablet TAKE 1 TABLET BY MOUTH EVERY DAY 10/12/19  Yes Bigg Pappas MD   busPIRone (BUSPAR) 5 mg tablet TAKE 1 TABLET BY MOUTH THREE TIMES A DAY 11/3/19  Yes Bigg Pappas MD   docusate sodium (COLACE) 100 mg capsule every 24 hours   Yes Historical Provider, MD   famotidine (PEPCID) 20 mg tablet Take 20 mg by mouth as needed for heartburn   Yes Historical Provider, MD   furosemide (LASIX) 20 mg tablet Take 0 5 tablets (10 mg total) by mouth daily 3/5/20  Yes Marlen Brady DO   gabapentin (NEURONTIN) 100 mg capsule Take 1 capsule (100 mg total) by mouth 3 (three) times a day as needed (pain) 1/14/20  Yes Bigg aPppas MD   nystatin (MYCOSTATIN) powder APPLY 1 APPLICATION TOPICALLY 2 (TWO) TIMES A DAY 2-3 TIMES DAILY TO AFFECTED AREA(S) 11/7/19  Yes Mena Lizama MD   oxyCODONE (ROXICODONE) 5 mg immediate release tablet Take 5 mg by mouth every 4 (four) hours as needed for moderate pain   Yes Historical Provider, MD   propranolol (INDERAL) 10 mg tablet TAKE 1 TABLET BY MOUTH TWICE A DAY 1/9/20  Yes Mena Lizama MD   senna (SENOKOT) 8 6 MG tablet Take 1 tablet by mouth daily at bedtime    Yes Historical Provider, MD   sodium chloride 1 g tablet Take 2 tablets (2 g total) by mouth 4 (four) times a day (with meals and at bedtime) 3/4/20  Yes Marlen Brady DO   tamsulosin (FLOMAX) 0 4 mg every 24 hours   Yes Historical Provider, MD   acetaminophen (TYLENOL) 325 mg tablet Take 650 mg by mouth every 6 (six) hours as needed for mild pain    Historical Provider, MD   Petrolatum-Zinc Oxide (Phytoplex Z-Guard) 57-17 % PSTE Apply topically    Historical Provider, MD   polyethylene glycol (MIRALAX) 17 g packet Take 17 g by mouth daily 3/5/20   Almita Ramirez DO       Inhouse Medications    Current Facility-Administered Medications:     acetaminophen (TYLENOL) tablet 650 mg, 650 mg, Oral, Q6H PRN    amLODIPine (NORVASC) tablet 5 mg, 5 mg, Oral, Daily    famotidine (PEPCID) tablet 10 mg, 10 mg, Oral, Daily PRN    gabapentin (NEURONTIN) capsule 100 mg, 100 mg, Oral, TID PRN    ondansetron (ZOFRAN) injection 4 mg, 4 mg, Intravenous, Q6H PRN    oxyCODONE (ROXICODONE) IR tablet 5 mg, 5 mg, Oral, Q4H PRN    pantoprazole (PROTONIX) injection 40 mg, 40 mg, Intravenous, Q12H RAMONA, 40 mg at 03/13/20 0934    propranolol (INDERAL) tablet 10 mg, 10 mg, Oral, BID, 10 mg at 03/13/20 0934    tamsulosin (FLOMAX) capsule 0 4 mg, 0 4 mg, Oral, HS    Allergies  Allergies   Allergen Reactions    Aspirin GI Intolerance    Warfarin      Social History   reports that she quit smoking about 39 years ago  Her smoking use included cigarettes   She has never used smokeless tobacco  She reports that she does not drink alcohol or use drugs  Family History  Family History   Problem Relation Age of Onset    Cancer Father     Hypertension Sister      ROS  ROS: Denies CP, SOB, fever, weight loss, adenopathy, rash  All others negative except as noted in HPI  Objective   Vitals  Blood pressure 112/77, pulse 82, temperature (!) 96 1 °F (35 6 °C), temperature source Temporal, resp  rate 20, SpO2 100 %  General: Alert, no apparent distress  Eyes: No scleral icterus  ENT: MMM  Card: RRR no murmur  Lungs: Clear to ascultation b/l  No wheezes, rales, rhonchi  Abdomen: Soft  Nontender  Nondistended  Bowel sounds present and normoactive  No hepatosplenomegaly  Skin: No jaundice  Neuro: Alert and oriented x3        Laboratory Studies  Lab Results   Component Value Date    WBC 9 62 03/13/2020    HGB 11 9 03/13/2020    HCT 39 9 03/13/2020     (L) 03/13/2020    MCV 99 (H) 03/13/2020     Lab Results   Component Value Date    CREATININE 1 92 (H) 03/13/2020    BUN 57 (H) 03/13/2020    SODIUM 148 (H) 03/13/2020    K 4 5 03/13/2020     (H) 03/13/2020    CO2 27 03/13/2020    GLUCOSE 123 09/15/2014    CALCIUM 8 8 03/13/2020    ALKPHOS 681 (H) 03/13/2020     (H) 03/13/2020    ALT 90 (H) 03/13/2020     Lab Results   Component Value Date    PROTIME 16 8 (H) 03/12/2020    INR 1 34 (H) 03/12/2020       Imaging and Other Studies      Assessment and Plan:  Serum ammonia level does not always reflect hepatic encephalopathy so I do not believe that we need to start lactulose at this time  She also is known to have some dementia and has had a subdural hematoma but it seems as if both of these situations are stable  There is a partial thrombosis in portal vein which presumably is a complication of her liver disease and that we have no evidence of high hypercoagulable state  or any malignancy in the liver    Review of Care everywhere does not indicate any recent gastroenterology visits or any evidence of recent endoscopy  Also discussed with her   For now I suggest continuing ongoing conservative care and at some point elective endoscopy to assess the current situation of the esophagus  Also then to consider other causes of liver disease and Plan a screening program and at a later time to consider screening colonoscopy      Principal Problem:    [facility]  Active Problems:    Essential hypertension    Chronic indwelling Davies catheter    Chronic diastolic (congestive) heart failure (HCC)    Cirrhosis of liver (HCC)    Subdural hematoma (HCC)    History of subdural hematoma    A-fib (HCC)    Altered mental status    Transaminitis    Heme positive stool    Dysphagia      Humaira Oliav MD

## 2020-03-13 NOTE — ASSESSMENT & PLAN NOTE
Hematology consulted for second opinion   Although risks (subdural hematoma bleed) seems to outweigh the benefits at this point, despite her having atrial fibrillation and portal vein thrombosis

## 2020-03-13 NOTE — PLAN OF CARE
Problem: Prexisting or High Potential for Compromised Skin Integrity  Goal: Skin integrity is maintained or improved  Description  INTERVENTIONS:  - Identify patients at risk for skin breakdown  - Assess and monitor skin integrity  - Assess and monitor nutrition and hydration status  - Monitor labs   - Assess for incontinence   - Turn and reposition patient  - Assist with mobility/ambulation  - Relieve pressure over bony prominences  - Avoid friction and shearing  - Provide appropriate hygiene as needed including keeping skin clean and dry  - Evaluate need for skin moisturizer/barrier cream  - Collaborate with interdisciplinary team   - Patient/family teaching  - Consider wound care consult   Outcome: Progressing     Problem: PAIN - ADULT  Goal: Verbalizes/displays adequate comfort level or baseline comfort level  Description  Interventions:  - Encourage patient to monitor pain and request assistance  - Assess pain using appropriate pain scale  - Administer analgesics based on type and severity of pain and evaluate response  - Implement non-pharmacological measures as appropriate and evaluate response  - Consider cultural and social influences on pain and pain management  - Notify physician/advanced practitioner if interventions unsuccessful or patient reports new pain  Outcome: Progressing     Problem: INFECTION - ADULT  Goal: Absence or prevention of progression during hospitalization  Description  INTERVENTIONS:  - Assess and monitor for signs and symptoms of infection  - Monitor lab/diagnostic results  - Monitor all insertion sites, i e  indwelling lines, tubes, and drains  - Monitor endotracheal if appropriate and nasal secretions for changes in amount and color  - [place] appropriate cooling/warming therapies per order  - Administer medications as ordered  - Instruct and encourage patient and family to use good hand hygiene technique  - Identify and instruct in appropriate isolation precautions for identified infection/condition  Outcome: Progressing     Problem: SAFETY ADULT  Goal: Patient will remain free of falls  Description  INTERVENTIONS:  - Assess patient frequently for physical needs  -  Identify cognitive and physical deficits and behaviors that affect risk of falls    -  [place] fall precautions as indicated by assessment   - Educate patient/family on patient safety including physical limitations  - Instruct patient to call for assistance with activity based on assessment  - Modify environment to reduce risk of injury  - Consider OT/PT consult to assist with strengthening/mobility  Outcome: Progressing  Goal: Maintain or return to baseline ADL function  Description  INTERVENTIONS:  -  Assess patient's ability to carry out ADLs; assess patient's baseline for ADL function and identify physical deficits which impact ability to perform ADLs (bathing, care of mouth/teeth, toileting, grooming, dressing, etc )  - Assess/evaluate cause of self-care deficits   - Assess range of motion  - Assess patient's mobility; develop plan if impaired  - Assess patient's need for assistive devices and provide as appropriate  - Encourage maximum independence but intervene and supervise when necessary  - Involve family in performance of ADLs  - Assess for home care needs following discharge   - Consider OT consult to assist with ADL evaluation and planning for discharge  - Provide patient education as appropriate  Outcome: Progressing  Goal: Maintain or return mobility status to optimal level  Description  INTERVENTIONS:  - Assess patient's baseline mobility status (ambulation, transfers, stairs, etc )    - Identify cognitive and physical deficits and behaviors that affect mobility  - Identify mobility aids required to assist with transfers and/or ambulation (gait belt, sit-to-stand, lift, walker, cane, etc )  - [place] fall precautions as indicated by assessment  - Record patient progress and toleration of activity level on Mobility SBAR; progress patient to next Phase/Stage  - Instruct patient to call for assistance with activity based on assessment  - Consider rehabilitation consult to assist with strengthening/weightbearing, etc   Outcome: Progressing     Problem: DISCHARGE PLANNING  Goal: Discharge to home or other facility with appropriate resources  Description  INTERVENTIONS:  - Identify barriers to discharge w/patient and caregiver  - Arrange for needed discharge resources and transportation as appropriate  - Identify discharge learning needs (meds, wound care, etc )  - Arrange for interpretive services to assist at discharge as needed  - Refer to Case Management Department for coordinating discharge planning if the patient needs post-hospital services based on physician/advanced practitioner order or complex needs related to functional status, cognitive ability, or social support system  Outcome: Progressing     Problem: Knowledge Deficit  Goal: Patient/family/caregiver demonstrates understanding of disease process, treatment plan, medications, and discharge instructions  Description  Complete learning assessment and assess knowledge base    Interventions:  - Provide teaching at level of understanding  - Provide teaching via preferred learning methods  Outcome: Progressing     Problem: CARDIOVASCULAR - ADULT  Goal: Maintains optimal cardiac output and hemodynamic stability  Description  INTERVENTIONS:  - Monitor I/O, vital signs and rhythm  - Monitor for S/S and trends of decreased cardiac output  - Administer and titrate ordered vasoactive medications to optimize hemodynamic stability  - Assess quality of pulses, skin color and temperature  - Assess for signs of decreased coronary artery perfusion  - Instruct patient to report change in severity of symptoms  Outcome: Progressing  Goal: Absence of cardiac dysrhythmias or at baseline rhythm  Description  INTERVENTIONS:  - Continuous cardiac monitoring, vital signs, obtain 12 lead EKG if ordered  - Administer antiarrhythmic and heart rate control medications as ordered  - Monitor electrolytes and administer replacement therapy as ordered  Outcome: Progressing     Problem: GENITOURINARY - ADULT  Goal: Urinary catheter remains patent  Description  INTERVENTIONS:  - Assess patency of urinary catheter  - If patient has a chronic wells, consider changing catheter if non-functioning  - Follow guidelines for intermittent irrigation of non-functioning urinary catheter  Outcome: Progressing     Problem: METABOLIC, FLUID AND ELECTROLYTES - ADULT  Goal: Electrolytes maintained within normal limits  Description  INTERVENTIONS:  - Monitor labs and assess patient for signs and symptoms of electrolyte imbalances  - Administer electrolyte replacement as ordered  - Monitor response to electrolyte replacements, including repeat lab results as appropriate  - Instruct patient on fluid and nutrition as appropriate  Outcome: Progressing  Goal: Fluid balance maintained  Description  INTERVENTIONS:  - Monitor labs   - Monitor I/O and WT  - Instruct patient on fluid and nutrition as appropriate  - Assess for signs & symptoms of volume excess or deficit  Outcome: Progressing     Problem: SKIN/TISSUE INTEGRITY - ADULT  Goal: Skin integrity remains intact  Description  INTERVENTIONS  - Identify patients at risk for skin breakdown  - Assess and monitor skin integrity  - Assess and monitor nutrition and hydration status  - Monitor labs (i e  albumin)  - Assess for incontinence   - Turn and reposition patient  - Assist with mobility/ambulation  - Relieve pressure over bony prominences  - Avoid friction and shearing  - Provide appropriate hygiene as needed including keeping skin clean and dry  - Evaluate need for skin moisturizer/barrier cream  - Collaborate with interdisciplinary team (i e  Nutrition, Rehabilitation, etc )   - Patient/family teaching  Outcome: Progressing

## 2020-03-13 NOTE — PHYSICAL THERAPY NOTE
PHYSICAL THERAPY EVALUATION          Patient Name: Juan M Mazariegos  EEBYFRiteshZ Date: 3/13/2020   PT EVALUATION    78 y o     [id number]    Syncope [R55]  Altered mental status [R41 82]  CECIL (acute kidney injury) ([facility] ) [N17 9]    Past Medical History:   Diagnosis Date    Anxiety     Atrial fibrillation ([facility] )     [facility]     Depression     HTN (hypertension) 11/9/2016    Opioid dependence ([facility] )     Pneumonia      Past Surgical History:   Procedure Laterality Date    APPENDECTOMY      CHOLECYSTECTOMY      TONSILLECTOMY      TOTAL KNEE ARTHROPLASTY Bilateral     TUBAL LIGATION          03/13/20 1027   Note Type   Note type Eval only   Pain Assessment   Pain Assessment Tool 0-10   Pain Score 8   Pain Location/Orientation Location: [facility] Pain Intervention(s) Repositioned; Ambulation/increased activity   Home Living   Type of Home SNF  [facility])   Home Layout One level;Ramped entrance;Elevator   Bathroom Accessibility Accessible   Additional Comments pt comes to [facility] from [facility]   Prior Function   Level of [place] Needs assistance with ADLs and functional mobility; Needs assistance with IADLs   Lives With Facility staff   Receives Help From Personal care attendant   ADL Assistance Needs assistance   IADLs Needs assistance   Falls in the last 6 months 1 to 4   Vocational Retired   Comments pt reports use facility staff used Jason Miranda at [facility] [employer]  A for wc mobility  Restrictions/Precautions   Weight Bearing Precautions Per Order Yes   RLE Weight Bearing Per Order NWB   Braces or Orthoses LE Immobilizer  (HKB locked in ext)   Other Precautions Cognitive; Bed Alarm;Multiple lines;Telemetry;O2;Fall Risk;Pain;WBS   General   Additional Pertinent History pt admitted 3/12/20 for CECIL after presenting to ED w AMS  from [facility] home   prev admission to [facility] 2/18-20/20 for subdural hematoma, R femur fx s/o fall  given HKB at [facility] at d/c to [place] for [address]  readmitted [address] 2/27/20-3/4/20 w d/c back to [place]  chronic wells catheter  Family/Caregiver Present No   Cognition   Overall Cognitive Status Impaired   Arousal/Participation Cooperative   Orientation Level Oriented to person;Oriented to place;Oriented to time  (general to time and place w cues)   Memory Decreased short term memory;Decreased recall of recent events;Decreased recall of precautions   Following Commands Follows one step commands with increased time or repetition   Comments pt appears mildly confused 2* to decreased STM however able to recall events surrounding her first fall, as well as events at [place]  unable to recall NWB RLE precautions however did recall she was unable to bend the knee   RUE Assessment   RUE Assessment   (refer to OT)   LUE Assessment   LUE Assessment   (refer to OT)   RLE Assessment   RLE Assessment X   Strength RLE   R Hip Flexion 1/5   R Hip ABduction 1/5   R Hip ADduction 2-/5   R Knee Flexion   (HKB locked in extension)   R Knee Extension   (HKB locked in extension)   R Ankle Dorsiflexion 3/5   R Ankle Plantar Flexion 3/5   LLE Assessment   LLE Assessment X   Strength LLE   L Hip Flexion 1/5   L Hip ABduction 2-/5   L Hip ADduction 2-/5   L Knee Flexion 2-/5   L Ankle Dorsiflexion 3/5   L Ankle Plantar Flexion 3/5   Coordination   Movements are Fluid and Coordinated 0   Coordination and Movement Description guarded 2* to pain, weakness   Sensation WFL   Light Touch   RLE Light Touch Absent   RLE Light Touch Comments L4-S1   LLE Light Touch Absent   LLE Light Touch Comments L4-S1   Bed Mobility   Rolling R 2  Maximal assistance   Additional items Assist x 1;Bedrails; Increased time required;Verbal cues;LE management; Other  (trunk support)   Supine to Sit 2  Maximal assistance   Additional items Assist x 2;HOB elevated; Bedrails; Increased time required;Verbal cues;LE management; Other  (trunk control)   Sit to Supine 2  Maximal assistance   Additional items Assist x 2; Increased time required;Verbal cues;LE management; Other  (trunk support)   Additional Comments pt require max Ax2 for suping>sit for BLE and especially trunk support  Max Ax2 to return to bed via sidelying method, increased difficulty controlling trunk and BLEs  max A to reposition at EOB   Transfers   Additional Comments not appropriate for OOB mobility given poor static sitting balance   Ambulation/Elevation   Gait pattern Not appropriate  (given poor sitting balance)   Balance   Static Sitting Poor -  (EOB w use of bedrails and max Ax1 for 3', BW lean)   Dynamic Sitting Poor -   Endurance Deficit   Endurance Deficit Yes   Endurance Deficit Description upon sitting EOB O2 desaturated to low 80s- high 70s on 2L NC, improved mildly w cues for proper breathing and increased supplemental O2 to 3L NC however fluctuating at low levels  returned pt to supine position and O2 sats improved to 99% on 2L; nsg updated   Activity Tolerance   Activity Tolerance Treatment limited secondary to medical complications (Comment)  (desaturation w activity)   Medical Staff Made Aware Corinna/Kori OT   Nurse Made Aware [facility] RN; cleared for PT, updated end of session   Assessment   Prognosis Fair   Problem List Decreased strength;Decreased range of motion;Decreased endurance; Impaired balance;Decreased mobility; Decreased coordination;Decreased cognition;Decreased safety awareness; Impaired sensation;Decreased skin integrity;Orthopedic restrictions;Pain;Obesity   Assessment Nicholas Mauro is a 78 y o  female admitted to [address] [employer] on 3/12/2020 for CECIL (acute kidney injury) ([facility] )  Pt  has a past medical history of Anxiety, Atrial fibrillation ([facility] ), CHF (congestive heart failure) ([facility] ), Depression, HTN (hypertension) (11/9/2016), Opioid dependence ([facility] ), and Pneumonia    Pt was prev admitted to [facility] from 2/18-21/20 s/p fall with subsequent subdural hematoma and closed fx of distal end of R femur- conservatively managed and RLE HKB applied to be locked in extension, NWB RLE  Pt was d/c to [facility], readmitted to [facility] from 2/27-3/4/20 and d/c back to [facility]  PT was consulted and pt was seen on 3/13/2020 for mobility assessment and d/c planning  Pt presents high fall risk, monitoring abn labs, PIV, supplemental oxygen, continuous telemetry  Per pt report, she required [facility] lift for bed<>wc transf, A for ADLs and IADLs  Pt has been primarily bed bound/non ambulatory since her fall  Pt is currently functioning at a maximum assistance x1 and maximum assistance x2 level for bed mobility  Pt demonstrated poor endurance, fatigue, weakness sitting EOB; O2 sats drop to low 80s-high 70s on 2LNC, mildly improved on 3L NC  Pt not appropriate for OOB mobility at this time given poor endurance, poor static sitting balance and cognition, weakness likely to impair NWB RLE status  Pt will benefit from continued skilled IP PT to address the above mentioned impairments  in order to maximize recovery and increase functional independence when completing mobility and ADLs  Currently PT recommendations for DME include cont use of Romelia for OOB mobility  At this time PT recommendations for d/c are STR vs LTC pending progress  End of session pt repositioned in bed for comfort, bed alarm engaged  [facility] updated  Goals   Patient Goals none stated 2* to cognition   STG Expiration Date 03/27/20   Short Term Goal #1 1)  Pt will perform bed mobility with min Ax2 demonstrating appropriate technique 100% of the time in order to improve function  2)  PT to see to assess OOB mobility when appropriate  3) PT for ongoing patient and family/caregiver education, DME needs and d/c planning in order to promote highest level of function in least restrictive environment  Jeannetta Given 4)  Improve overall strength and balance 1/2 grade in order to optimize ability to perform functional tasks and reduce fall risk  5) Increase activity tolerance to 30 minutes in order to improve endurance to functional tasks   PT Treatment Day 0   Plan   Treatment/Interventions LE strengthening/ROM; Therapeutic exercise; Endurance training;Cognitive reorientation;Patient/family training;Equipment eval/education; Bed mobility; Compensatory technique education;Spoke to nursing;OT   PT Frequency 2-3x/wk   Recommendation   Recommendation Short-term skilled PT;Long-term skilled nursing home placement   Equipment Recommended Other (Comment)  Chantel Brennan for OOB mobility)   PT - OK to Discharge Yes   Additional Comments to STR or LTC   Modified [place] Scale   Modified [place] Scale 4   Barthel Index   Feeding 5   Bathing 0   Grooming Score 0   Dressing Score 5   Bladder Score 0   Bowels Score 5   Toilet Use Score 0   Transfers (Bed/Chair) Score 0   Mobility (Level Surface) Score 0   Stairs Score 0   Barthel Index Score 15   History: co - morbidities, age, social background, past experience, fall risk, use of assistive device, assist for adl's, cognition, multiple lines, WBS  Exam: impairments in systems including musculoskeletal (ROM, strength, posture), neuromuscular (balance, motor function, coordination), joint integrity (conservative management of R femur fx), integumentary (skin integrity, presence of skin breakdown and scarring of proximal inner legs seemingly due to [employer] [facility]- cloth placed to cover area), cardiopulmonary (telemetry), cognition, Barthel Index  Clinical: unstable/unpredictable  Complexity:high      Michoacano Astorga, PT

## 2020-03-13 NOTE — ASSESSMENT & PLAN NOTE
Wt Readings from Last 3 Encounters:   03/10/20 98 4 kg (217 lb)   03/04/20 98 7 kg (217 lb 9 5 oz)   02/19/20 89 7 kg (197 lb 12 oz)     Seems hypovolemic at this point  Lower extremity edema unchanged per patient    - hold diuretics for now

## 2020-03-13 NOTE — BRIEF OP NOTE (RAD/CATH)
IR PARACENTESIS Procedure Note    PATIENT NAME: Alexandre Cowart  : 1940  MRN: [id number]    Pre-op Diagnosis:   1  [facility] ([facility]    2  Syncope    3  Cirrhosis of liver ([facility] )    4  Heme positive stool    5  Transaminitis    6  Portal vein thrombosis      Post-op Diagnosis:   1  CECIL (acute kidney injury) ([facility] )    2  Syncope    3  Cirrhosis of liver ([facility] )    4  Heme positive stool    5  Transaminitis    6  Portal vein thrombosis        Surgeon:   Jacinta Nick MD  Assistants:     No qualified resident was available, Resident is only observing    Estimated Blood Loss:  Minimal  Findings:  Technically successful ultrasound-guided paracentesis      Specimens:  Sent    Complications:  None    Anesthesia: Local    Jacinta Nick MD     Date: 3/13/2020  Time: 4:56 PM

## 2020-03-13 NOTE — PLAN OF CARE
Problem: PHYSICAL THERAPY ADULT  Goal: Performs mobility at highest level of function for planned discharge setting  See evaluation for individualized goals  Description  Treatment/Interventions: LE strengthening/ROM, Therapeutic exercise, Endurance training, Cognitive reorientation, Patient/family training, Equipment eval/education, Bed mobility, Compensatory technique education, Spoke to nursing, OT  Equipment Recommended: Other (Comment)(Romelia for OOB mobility)       See flowsheet documentation for full assessment, interventions and recommendations  Note:   Prognosis: Fair  Problem List: Decreased strength, Decreased range of motion, Decreased endurance, Impaired balance, Decreased mobility, Decreased coordination, Decreased cognition, Decreased safety awareness, Impaired sensation, Decreased skin integrity, Orthopedic restrictions, Pain, Obesity  Assessment: Kacey Falcon is a 78 y o  female admitted to [facility] on 3/12/2020 for CECIL (acute kidney injury) ([facility] )  Pt  has a past medical history of Anxiety, [facility] CHF (congestive heart failure) ([facility] ), Depression, HTN (hypertension) (11/9/2016), Opioid dependence ([facility] ), and Pneumonia    Pt was prev admitted to [facility] from 2/18-21/20 s/p fall with subsequent subdural hematoma and closed fx of distal end of R femur- conservatively managed and RLE HKB applied to be locked in extension, NWB RLE  Pt was d/c to [facility], readmitted to [facility] from 2/27-3/4/20 and d/c back to [facility]  PT was consulted and pt was seen on 3/13/2020 for mobility assessment and d/c planning  Pt presents high fall risk, monitoring abn labs, PIV, supplemental oxygen, continuous telemetry  Per pt report, she required [facility] lift for bed<>wc transf, A for ADLs and IADLs  Pt has been primarily bed bound/non ambulatory since her fall  Pt is currently functioning at a maximum assistance x1 and maximum assistance x2 level for bed mobility   Pt demonstrated poor endurance, fatigue, weakness sitting EOB; O2 sats drop to low 80s-high 70s on 2LNC, mildly improved on 3L NC  Pt not appropriate for OOB mobility at this time given poor endurance, poor static sitting balance and cognition, weakness likely to impair NWB RLE status  Pt will benefit from continued skilled IP PT to address the above mentioned impairments  in order to maximize recovery and increase functional independence when completing mobility and ADLs  Currently PT recommendations for DME include cont use of Romelia for OOB mobility  At this time PT recommendations for d/c are STR vs LTC pending progress  End of session pt repositioned in bed for comfort, bed alarm engaged  Nsg updated  Recommendation: Short-term skilled PT, Long-term skilled nursing home placement     PT - OK to Discharge: Yes    See flowsheet documentation for full assessment

## 2020-03-13 NOTE — ASSESSMENT & PLAN NOTE
In setting of liver cirrhosis  This appears stable from prior  tbili 3 5  ALT 100s    This is relatively stable from 02/2020 but new otherwise  Given known cirrhosis and no evidence of US RUQ with dopplers will perform to r/o thrombus as etiology for transaminitis  -will appreciate GI evaluation as well

## 2020-03-13 NOTE — OCCUPATIONAL THERAPY NOTE
Occupational Therapy Evaluation     Patient Name: Nicholas Mauro  TVHXD'I Date: 3/13/2020  Problem List  Principal Problem:    Acute kidney injury superimposed on chronic kidney disease ([facility] )  Active Problems:    Essential hypertension    Chronic indwelling Davies catheter    Chronic diastolic (congestive) heart failure (HCC)    Cirrhosis of liver (HCC)    Subdural hematoma (HCC)    History of subdural hematoma    A-fib (HCC)    Altered mental status    Transaminitis    Heme positive stool    Dysphagia    Metabolic encephalopathy    Portal vein thrombosis    Past Medical History  Past Medical History:   Diagnosis Date    Anxiety     Atrial fibrillation ([place] [address] )     CHF (congestive heart failure) ([place] [address] )     Depression     HTN (hypertension) 11/9/2016    Opioid dependence ([place] [address] )     Pneumonia      Past Surgical History  Past Surgical History:   Procedure Laterality Date    APPENDECTOMY      CHOLECYSTECTOMY      TONSILLECTOMY      TOTAL KNEE ARTHROPLASTY Bilateral     TUBAL LIGATION               03/13/20 1028   Note Type   Note type Eval only   Restrictions/Precautions   Weight Bearing Precautions Per Order Yes   RLE Weight Bearing Per Order NWB   Braces or Orthoses LE Immobilizer  (locked in extension)   Other Precautions Cognitive; Bed Alarm;Multiple lines;Telemetry;O2;Fall Risk;Pain;WBS  (2L NC)   Pain Assessment   Pain Assessment Tool 0-10   Pain Score 8   Pain Location/Orientation Location: Buttocks   Effect of Pain on Daily Activities pain limiting activity tolerance   Hospital Pain Intervention(s) Repositioned; Ambulation/increased activity; Emotional support; Rest   Home Living   Type of Home SNF  H[facility])   Home Layout One level;Ramped entrance;Elevator   Bathroom Accessibility Accessible   Additional Comments Pt currently at [facility] for [address]  unable to obtain bathroom setup 2* pt poor historian  Pt reports A w/ sponge bathing  Prior Function   Level of [place] Needs assistance with IADLs; Needs assistance with ADLs and functional mobility   Lives With Facility staff   Receives Help From Personal care attendant   ADL Assistance Needs assistance   IADLs Needs assistance   Falls in the last 6 months 1 to 4  (1 fall)   Vocational Retired   Comments At baseline, pt requires A w/ ADLs/IADLs and reports use of [employer] [employer] for bed<>w/c transfers, A w/ w/c mobility, (-) , and reports 1 fall PTA  Lifestyle   Autonomy At baseline, pt requires A w/ ADLs/IADLs and reports use of [employer] for bed<>w/c transfers, A w/ w/c mobility, (-) , and reports 1 fall PTA  Reciprocal Relationships facility staff   Service to Others retired   Intrinsic Gratification watching TV   ADL   Where Assessed Supine, bed   Eating Assistance 5  Supervision/Setup   Grooming Assistance 4  [facility] 4  Minimal Assistance   LB [address] 2  Maximal [address] 2  Maximal [address]  2  Maximal [address] 2  Maximal Assistance   Bed Mobility   Rolling R 2  Maximal assistance   Additional items Assist x 1;Bedrails; Increased time required;Verbal cues;LE management   Supine to Sit 2  Maximal assistance   Additional items Assist x 2;HOB elevated; Bedrails; Increased time required;Verbal cues;LE management   Sit to Supine 2  Maximal assistance   Additional items Assist x 2;Bedrails; Increased time required;Verbal cues;LE management   Additional Comments Pt seated EOB 3 min w/ Mod A for steadying to maintain upright posture  Pt lying supine at end of session w/ bed alarm activated  Call bell and phone within reach  All needs met and no further questions for OT at this time     Transfers   Additional Comments not appropriate at this time 2* decreased seated balance/tolerance   Functional Mobility   Additional Comments not appropriate at this time   Balance   Static Sitting Poor -   Dynamic Sitting Poor -   Activity Tolerance   Activity Tolerance Patient limited by fatigue;Patient limited by pain  (SOB noted w/ excertion )   Medical Staff Made Aware Mora PT   Nurse Made Aware Natasha Ureña RN   RUE Assessment   RUE Assessment [facility]   RUE Strength   RUE Overall Strength Within Functional Limits - able to perform ADL tasks with strength  (4-/5)   LUE Assessment   LUE Assessment WFL   LUE Strength   LUE Overall Strength Within Functional Limits - able to perform ADL tasks with strength  (4-/5)   Hand Function   Gross Motor Coordination Functional   Fine Motor Coordination Functional   Sensation   Light Touch No apparent deficits   Proprioception   Proprioception No apparent deficits   Vision-Basic Assessment   Current Vision Wears glasses all the time  (pt reports glasses have been broken for last 5 years )   Vision - Complex Assessment   Ocular Range of Motion [facility]   Acuity Able to read employee name badge without difficulty   Perception   Inattention/Neglect Appears intact   Cognition   Overall Cognitive Status Impaired   Arousal/Participation Alert; Cooperative   Attention Attends with cues to redirect   Orientation Level Oriented to person;Oriented to place;Oriented to time  (general to place "hospital" and time "March 2020")   Memory Decreased recall of precautions;Decreased recall of recent events;Decreased short term memory   Following Commands Follows one step commands with increased time or repetition   Comments poor insight into deficits and decreased safety awareness  Pt able to recall some recent events    Assessment   Limitation Decreased ADL status; Decreased UE strength;Decreased Safe judgement during ADL;Decreased cognition;Decreased endurance;Decreased self-care trans;Decreased high-level ADLs   Prognosis Fair   Assessment Pt is 78 y o female adm to [facility] on 3/12/20 presenting w/ Altered mental status and hypoxic  Pt dx'd w/ CECIL   CT head (-) acute intracranial abnormalities  XR chest (-) acute cardiopulmonary disease  US R upper quadrant (+) partial portal vein thrombosis t/o enlarged portal veins  Pt currently on 2L O2 and no O2 at baseline  Pt w/ recent adms to [facility] from  20-3/4/20 for abnormal labs and encephalopathy and [facility] from 20-20 s/p fall at home resulting in R femor fx  WBS is NWB RLE w/ knee immobilizer locked in extension  Comorbidities affecting pt's functional performance include Anxiety, A-fib, CHF, Depression, HTN, Opioid dependence, and Pneumonia  Active OT orders and activity orders are Activity as Tolerated  Pt currently resides at [facility] for [address] since February  At baseline, pt requires A w/ ADLs/IADLs and reports use of [facility] for bed<>w/c transfers, A w/ w/c mobility, (-) , and reports 1 fall PTA  Upon evaluation, pt currently requires Min A for UB ADLS, Max A for LB ADLS, Max A for toileting, Max A x2 for bed mobility 2* WBS, weakness, decreased endurance, increased fatigue, SOB w/ exertion and decreased activity tolerance  Functional mobility/transfers not appropriate at this time 2* decreased seated balance/tolerance  These impairments, as well as pt's decreased ADL status limit pt's ability to engage in all areas of occupation  Pt scored 15/100 on the Barthel Index  Based on this OT evaluation and the pt's functional performance deficits, pt has been identified as a High complexity evaluation  Pt would benefit from continued OT services during hospital stay to address deficits and improve level of functional independence in these Occupational Performance areas: grooming, bathing/shower, dressing, toileting, and bed mobility  OT recommends STR   Pt will be on OT caseload 3-5x/wk to address the following goals to  in 10-14 days:   Goals   Patient Goals none stated 2* pt's cognition   LTG Time Frame 10-14   Long Term Goal Please refer to LTGs listed below:   Plan   Treatment Interventions ADL retraining;Functional transfer training; Endurance training;UE strengthening/ROM; Cognitive reorientation;Patient/family training;Equipment evaluation/education; Compensatory technique education; Activityengagement   Goal Expiration Date 03/27/20   OT Treatment Day 0   OT Frequency 3-5x/wk   Recommendation   OT Discharge Recommendation Short Term Rehab  (Return to Son w/ OT as appropriate)   OT - OK to Discharge Yes  (when medically cleared to rehab)   Barthel Index   Feeding 5   Bathing 0   Grooming Score 0   Dressing Score 5   Bladder Score 0   Bowels Score 5   Toilet Use Score 0   Transfers (Bed/Chair) Score 0   Mobility (Level Surface) Score 0   Stairs Score 0   Barthel Index Score 15   Modified Brooks Scale   Modified Brooks Scale 4     Goals:   1  Pt will improve activity tolerance to G for 30 min treatment session to increase activity engagement  2  Pt will increase bed mobility to Mod A and transfer EOB to participate in functional activity and decrease caregiver assistance required  3  Pt will complete light grooming task w/ Mod I w/ G sequencing  4  Pt will increase independence in UB ADLs w/ use of DME to Mod I w/ G balance/safety demonstrated to increase functional activity engagement  5  Pt will increase independence in LB ADLs w/ use of DME to Min A w/ G balance/safety demonstrated to increase functional activity engagement  6  Pt will complete toileting w/ Mod A w/ G hygiene/thoroughness w/ use of DME as needed  7  Pt will demonstrate G carryover of learned safety techniques and proper body mechanics in functional activities with use of least restrictive device  8  Pt will verbalize 3 potential fall risks in environment and carryover G safety through functional activity to decrease fall risk in home environment  9  Pt will increase BUE strength by 1 MM grade via AROM/AAROM exercises to increase independence in ADLS and functional mobility/transfers     10  Pt will demonstrate 100% attention to task during additional cognitive assessments w/ G participation to assist in d/c planning and recommendations  11  Pt will have 100% compliance of WBS of NWB RLE during functional activities w/o cues from therapist    12  Pt will tolerate continued OOB assessment and appropriate transfer goals will be established by OTR as applicable         NING Her[place]

## 2020-03-13 NOTE — ASSESSMENT & PLAN NOTE
Pt had melanotic stool noted per nursing while in ED  -On rectal exam stool is brown presently but heme positive  No anemia    -Given suspected melanotic stool will place on clears, trend h/h  -start IV PPI 40mg bid and consult GI

## 2020-03-13 NOTE — ASSESSMENT & PLAN NOTE
Wt Readings from Last 3 Encounters:   03/10/20 98 4 kg (217 lb)   03/04/20 98 7 kg (217 lb 9 5 oz)   02/19/20 89 7 kg (197 lb 12 oz)     Appears hypovolemic by mm/lung exam   Substantial LE edema which is unchanged per pt  Hold lasix, continue bb, monitor I/os and daily weights

## 2020-03-13 NOTE — ASSESSMENT & PLAN NOTE
CT head reviewed  Will take this into consideration if we will pursue anticoagulation for her portal vein thrombosis

## 2020-03-13 NOTE — PLAN OF CARE
Problem: Prexisting or High Potential for Compromised Skin Integrity  Goal: Skin integrity is maintained or improved  Description  INTERVENTIONS:  - Identify patients at risk for skin breakdown  - Assess and monitor skin integrity  - Assess and monitor nutrition and hydration status  - Monitor labs   - Assess for incontinence   - Turn and reposition patient  - Assist with mobility/ambulation  - Relieve pressure over bony prominences  - Avoid friction and shearing  - Provide appropriate hygiene as needed including keeping skin clean and dry  - Evaluate need for skin moisturizer/barrier cream  - Collaborate with interdisciplinary team   - Patient/family teaching  - Consider wound care consult   Outcome: Progressing     Problem: PAIN - ADULT  Goal: Verbalizes/displays adequate comfort level or baseline comfort level  Description  Interventions:  - Encourage patient to monitor pain and request assistance  - Assess pain using appropriate pain scale  - Administer analgesics based on type and severity of pain and evaluate response  - Implement non-pharmacological measures as appropriate and evaluate response  - Consider cultural and social influences on pain and pain management  - Notify physician/advanced practitioner if interventions unsuccessful or patient reports new pain  Outcome: Progressing     Problem: INFECTION - ADULT  Goal: Absence or prevention of progression during hospitalization  Description  INTERVENTIONS:  - Assess and monitor for signs and symptoms of infection  - Monitor lab/diagnostic results  - Monitor all insertion sites, i e  indwelling lines, tubes, and drains  - Monitor endotracheal if appropriate and nasal secretions for changes in amount and color  - [place] appropriate cooling/warming therapies per order  - Administer medications as ordered  - Instruct and encourage patient and family to use good hand hygiene technique  - Identify and instruct in appropriate isolation precautions for identified infection/condition  Outcome: Progressing     Problem: SAFETY ADULT  Goal: Patient will remain free of falls  Description  INTERVENTIONS:  - Assess patient frequently for physical needs  -  Identify cognitive and physical deficits and behaviors that affect risk of falls    -  [place] fall precautions as indicated by assessment   - Educate patient/family on patient safety including physical limitations  - Instruct patient to call for assistance with activity based on assessment  - Modify environment to reduce risk of injury  - Consider OT/PT consult to assist with strengthening/mobility  Outcome: Progressing  Goal: Maintain or return to baseline ADL function  Description  INTERVENTIONS:  -  Assess patient's ability to carry out ADLs; assess patient's baseline for ADL function and identify physical deficits which impact ability to perform ADLs (bathing, care of mouth/teeth, toileting, grooming, dressing, etc )  - Assess/evaluate cause of self-care deficits   - Assess range of motion  - Assess patient's mobility; develop plan if impaired  - Assess patient's need for assistive devices and provide as appropriate  - Encourage maximum independence but intervene and supervise when necessary  - Involve family in performance of ADLs  - Assess for home care needs following discharge   - Consider OT consult to assist with ADL evaluation and planning for discharge  - Provide patient education as appropriate  Outcome: Progressing  Goal: Maintain or return mobility status to optimal level  Description  INTERVENTIONS:  - Assess patient's baseline mobility status (ambulation, transfers, stairs, etc )    - Identify cognitive and physical deficits and behaviors that affect mobility  - Identify mobility aids required to assist with transfers and/or ambulation (gait belt, sit-to-stand, lift, walker, cane, etc )  - [place] fall precautions as indicated by assessment  - Record patient progress and toleration of activity level on Mobility SBAR; progress patient to next Phase/Stage  - Instruct patient to call for assistance with activity based on assessment  - Consider rehabilitation consult to assist with strengthening/weightbearing, etc   Outcome: Progressing     Problem: DISCHARGE PLANNING  Goal: Discharge to home or other facility with appropriate resources  Description  INTERVENTIONS:  - Identify barriers to discharge w/patient and caregiver  - Arrange for needed discharge resources and transportation as appropriate  - Identify discharge learning needs (meds, wound care, etc )  - Arrange for interpretive services to assist at discharge as needed  - Refer to Case Management Department for coordinating discharge planning if the patient needs post-hospital services based on physician/advanced practitioner order or complex needs related to functional status, cognitive ability, or social support system  Outcome: Progressing     Problem: Knowledge Deficit  Goal: Patient/family/caregiver demonstrates understanding of disease process, treatment plan, medications, and discharge instructions  Description  Complete learning assessment and assess knowledge base    Interventions:  - Provide teaching at level of understanding  - Provide teaching via preferred learning methods  Outcome: Progressing     Problem: CARDIOVASCULAR - ADULT  Goal: Maintains optimal cardiac output and hemodynamic stability  Description  INTERVENTIONS:  - Monitor I/O, vital signs and rhythm  - Monitor for S/S and trends of decreased cardiac output  - Administer and titrate ordered vasoactive medications to optimize hemodynamic stability  - Assess quality of pulses, skin color and temperature  - Assess for signs of decreased coronary artery perfusion  - Instruct patient to report change in severity of symptoms  Outcome: Progressing  Goal: Absence of cardiac dysrhythmias or at baseline rhythm  Description  INTERVENTIONS:  - Continuous cardiac monitoring, vital signs, obtain 12 lead EKG if ordered  - Administer antiarrhythmic and heart rate control medications as ordered  - Monitor electrolytes and administer replacement therapy as ordered  Outcome: Progressing     Problem: GENITOURINARY - ADULT  Goal: Urinary catheter remains patent  Description  INTERVENTIONS:  - Assess patency of urinary catheter  - If patient has a chronic wells, consider changing catheter if non-functioning  - Follow guidelines for intermittent irrigation of non-functioning urinary catheter  Outcome: Progressing     Problem: METABOLIC, FLUID AND ELECTROLYTES - ADULT  Goal: Electrolytes maintained within normal limits  Description  INTERVENTIONS:  - Monitor labs and assess patient for signs and symptoms of electrolyte imbalances  - Administer electrolyte replacement as ordered  - Monitor response to electrolyte replacements, including repeat lab results as appropriate  - Instruct patient on fluid and nutrition as appropriate  Outcome: Progressing  Goal: Fluid balance maintained  Description  INTERVENTIONS:  - Monitor labs   - Monitor I/O and WT  - Instruct patient on fluid and nutrition as appropriate  - Assess for signs & symptoms of volume excess or deficit  Outcome: Progressing     Problem: SKIN/TISSUE INTEGRITY - ADULT  Goal: Skin integrity remains intact  Description  INTERVENTIONS  - Identify patients at risk for skin breakdown  - Assess and monitor skin integrity  - Assess and monitor nutrition and hydration status  - Monitor labs (i e  albumin)  - Assess for incontinence   - Turn and reposition patient  - Assist with mobility/ambulation  - Relieve pressure over bony prominences  - Avoid friction and shearing  - Provide appropriate hygiene as needed including keeping skin clean and dry  - Evaluate need for skin moisturizer/barrier cream  - Collaborate with interdisciplinary team (i e  Nutrition, Rehabilitation, etc )   - Patient/family teaching  Outcome: Progressing     Problem: Potential for Falls  Goal: Patient will remain free of falls  Description  INTERVENTIONS:  - Assess patient frequently for physical needs  -  Identify cognitive and physical deficits and behaviors that affect risk of falls    -  [place] fall precautions as indicated by assessment   - Educate patient/family on patient safety including physical limitations  - Instruct patient to call for assistance with activity based on assessment  - Modify environment to reduce risk of injury  - Consider OT/PT consult to assist with strengthening/mobility  Outcome: Progressing

## 2020-03-13 NOTE — ASSESSMENT & PLAN NOTE
Alcoholic liver cirrhosis  Resides in few  nursing facility    - appreciate GI recommendations    Recent Labs     03/12/20  1717 03/12/20  2350 03/13/20  0448   *  --  153*   *  --  90*   *  --  115*   INR 1 20* 1 34*  --

## 2020-03-13 NOTE — ASSESSMENT & PLAN NOTE
Metabolic encephalopathy with occasional confusion, disorientation in the setting of CECIL, hypernatremia, and poor p o   Intake  - CT head negative  - continue monitoring

## 2020-03-13 NOTE — ASSESSMENT & PLAN NOTE
Pt had an episode of syncope vs unresponsivesness at nursing facility  At that time pt was felt to have perioral cyanosis as well as hypotension as well as hypothermic  She was placed on 10L NC and EMS was alerted  At time of arrival AMS had been normal tensive as well as normothermic    She was alert and oriented  -presently oriented to hospital setting,year but off one month, and to president  -continue to monitor on telemetry

## 2020-03-13 NOTE — ASSESSMENT & PLAN NOTE
2* alcohol abuse  Pt presently at [facility] 3    Ammonia wnl  -C/b thrombocytopenia   -continue propranolol

## 2020-03-13 NOTE — ASSESSMENT & PLAN NOTE
Recent diagnosis in 02/2020 Improving on CT head today  Pt still w/some confusion intermittently    Felt to have possibly underlying dementia

## 2020-03-13 NOTE — PLAN OF CARE
Problem: OCCUPATIONAL THERAPY ADULT  Goal: Performs self-care activities at highest level of function for planned discharge setting  See evaluation for individualized goals  Description  Treatment Interventions: ADL retraining, Functional transfer training, Endurance training, UE strengthening/ROM, Cognitive reorientation, Patient/family training, Equipment evaluation/education, Compensatory technique education, Activityengagement          See flowsheet documentation for full assessment, interventions and recommendations  Note:   Limitation: Decreased ADL status, Decreased UE strength, Decreased Safe judgement during ADL, Decreased cognition, Decreased endurance, Decreased self-care trans, Decreased high-level ADLs  Prognosis: Fair  Assessment: Pt is 78 y o female adm to [facility] on 3/12/20 presenting w/ Altered mental status and hypoxic  Pt dx'd w/ CECIL  CT head (-) acute intracranial abnormalities  XR chest (-) acute cardiopulmonary disease  US R upper quadrant (+) partial portal vein thrombosis t/o enlarged portal veins  Pt currently on 2L O2 and no O2 at baseline  Pt w/ recent adms to [facility] from  2/27/20-3/4/20 for abnormal labs and encephalopathy and SLB from 2/18/20-2/21/20 s/p fall at home resulting in R femor fx  WBS is NWB RLE w/ knee immobilizer locked in extension  Comorbidities affecting pt's functional performance include Anxiety, A-fib, CHF, Depression, HTN, Opioid dependence, and Pneumonia  Active OT orders and activity orders are Activity as Tolerated  Pt currently resides at [facility] for [facility] since February  At baseline, pt requires A w/ ADLs/IADLs and reports use of [employer] for bed<>w/c transfers, A w/ w/c mobility, (-) , and reports 1 fall PTA   Upon evaluation, pt currently requires Min A for UB ADLS, Max A for LB ADLS, Max A for toileting, Max A x2 for bed mobility 2* WBS, weakness, decreased endurance, increased fatigue, SOB w/ exertion and decreased activity tolerance  Functional mobility/transfers not appropriate at this time 2* decreased seated balance/tolerance  These impairments, as well as pt's decreased ADL status limit pt's ability to engage in all areas of occupation  Pt scored 15/100 on the Barthel Index  Based on this OT evaluation and the pt's functional performance deficits, pt has been identified as a High complexity evaluation  Pt would benefit from continued OT services during hospital stay to address deficits and improve level of functional independence in these Occupational Performance areas: grooming, bathing/shower, dressing, toileting, and bed mobility  OT recommends STR   Pt will be on OT caseload 3-5x/wk to address the following goals to  in 10-14 days:     OT Discharge Recommendation: Short Term Rehab(Return to Son w/ OT as appropriate)  OT - OK to Discharge: Yes(when medically cleared to rehab)

## 2020-03-13 NOTE — PROGRESS NOTES
Progress Note - Che Hewitt 1940, 78 y o  female MRN: [id number]    Unit/Bed#: [address] 222-01 Encounter: [id number]    Primary Care Provider: Dimitris Barnard MD   Date and time admitted to hospital: 3/12/2020  4:55 PM        * Acute kidney injury superimposed on chronic kidney disease [facility])  Assessment & Plan  28-year-old female with decompensated alcoholic liver cirrhosis was admitted due to metabolic encephalopathy and acute kidney injury on superimposed chronic kidney disease stage 2 possibly due to dehydration due to poor p o  Intake and diuretics in the setting of increased abdominal pressure as a result of her ascites  - appreciate renal recommendations  - IR paracentesis ordered    Metabolic encephalopathy  Assessment & Plan  Metabolic encephalopathy with occasional confusion, disorientation in the setting of CECIL, hypernatremia, and poor p o  Intake  - CT head negative  - continue monitoring    Cirrhosis of liver (HCC)  Assessment & Plan  Alcoholic liver cirrhosis  Resides in few  nursing facility  - appreciate GI recommendations    Recent Labs     03/12/20  1717 03/12/20  2350 03/13/20  0448   *  --  153*   *  --  90*   *  --  115*   INR 1 20* 1 34*  --          A-fib (HCC)  Assessment & Plan  Rate controlled on propranolol   - Not on AC due to cirrhosis/SDH    Portal vein thrombosis  Assessment & Plan  Hematology consulted for second opinion  Although risks (subdural hematoma bleed) seems to outweigh the benefits at this point, despite her having atrial fibrillation and portal vein thrombosis    Chronic diastolic (congestive) heart failure (HCC)  Assessment & Plan  Wt Readings from Last 3 Encounters:   03/10/20 98 4 kg (217 lb)   03/04/20 98 7 kg (217 lb 9 5 oz)   02/19/20 89 7 kg (197 lb 12 oz)     Seems hypovolemic at this point  Lower extremity edema unchanged per patient    - hold diuretics for now        Essential hypertension  Assessment & Plan  Continue home medications    Subdural hematoma (HCC)  Assessment & Plan  CT head reviewed  Will take this into consideration if we will pursue anticoagulation for her portal vein thrombosis  Dysphagia  Assessment & Plan  Oropharyngeal dysphagia on dysphagia level 3       VTE Pharmacologic Prophylaxis:   Pharmacologic: Pharmacologic VTE Prophylaxis contraindicated due to SDH, for paracentesis  Mechanical VTE Prophylaxis in Place: No    Patient Centered Rounds: I have performed bedside rounds with nursing staff today  Discussions with Specialists or Other Care Team Provider: Renal    Education and Discussions with Family / Patient: Patient    Time Spent for Care: 30 minutes  More than 50% of total time spent on counseling and coordination of care as described above  Current Length of Stay: 1 day(s)    Current Patient Status: Inpatient   Certification Statement: The patient will continue to require additional inpatient hospital stay due to Hema eval, paracentesis, CECIL, resuscitation    Discharge Plan: still active    Code Status: Level 3 - DNAR and DNI      Subjective:   Patient seen and examined at bedside  She was slightly lethargic at the time of examination  She eventually perked up after a while  Otherwise, no complaints  Objective:     Vitals:   Temp (24hrs), Av 4 °F (36 3 °C), Min:96 1 °F (35 6 °C), Max:97 9 °F (36 6 °C)    Temp:  [96 1 °F (35 6 °C)-97 9 °F (36 6 °C)] 96 1 °F (35 6 °C)  HR:  [73-82] 82  Resp:  [18-20] 20  BP: (108-143)/(57-79) 112/77  SpO2:  [91 %-100 %] 100 %  There is no height or weight on file to calculate BMI  Input and Output Summary (last 24 hours): Intake/Output Summary (Last 24 hours) at 3/13/2020 1214  Last data filed at 3/13/2020 0900  Gross per 24 hour   Intake 285 ml   Output 150 ml   Net 135 ml       Physical Exam:     Physical Exam   Constitutional: No distress  Elderly female   HENT:   Head: Normocephalic and atraumatic     Eyes: Pupils are equal, round, and reactive to light  EOM are normal    Neck: Neck supple  Cardiovascular: Normal rate and regular rhythm  Pulmonary/Chest: Effort normal and breath sounds normal  No stridor  No respiratory distress  She has no wheezes  She has no rales  Abdominal: Soft  Bowel sounds are normal  She exhibits distension  There is tenderness  There is no guarding  Musculoskeletal: She exhibits edema  Right leg brace   Neurological:   Lethargic   Skin: Skin is warm and dry  Vitals reviewed  Additional Data:     Labs:    Results from last 7 days   Lab Units 03/13/20  0448   WBC Thousand/uL 9 62   HEMOGLOBIN g/dL 11 9   HEMATOCRIT % 39 9   PLATELETS Thousands/uL 115*   NEUTROS PCT % 76*   LYMPHS PCT % 15   MONOS PCT % 7   EOS PCT % 0     Results from last 7 days   Lab Units 03/13/20  0448   SODIUM mmol/L 148*   POTASSIUM mmol/L 4 5   CHLORIDE mmol/L 113*   CO2 mmol/L 27   BUN mg/dL 57*   CREATININE mg/dL 1 92*   ANION GAP mmol/L 8   CALCIUM mg/dL 8 8   ALBUMIN g/dL 2 0*   TOTAL BILIRUBIN mg/dL 3 39*   ALK PHOS U/L 681*   ALT U/L 90*   AST U/L 153*   GLUCOSE RANDOM mg/dL 174*     Results from last 7 days   Lab Units 03/12/20  2350   INR  1 34*                       * I Have Reviewed All Lab Data Listed Above  * Additional Pertinent Lab Tests Reviewed:  [facility] 64 Admission Reviewed    Imaging:    Imaging Reports Reviewed Today Include: CT head wo contrast, xr chest pa, us right upper quadrant    Recent Cultures (last 7 days):           Last 24 Hours Medication List:     Current Facility-Administered Medications:  acetaminophen 650 mg Oral Q6H PRN Radha Quan PA-C    amLODIPine 5 mg Oral Daily MELVIN Lomeli    dextrose 50 mL/hr Intravenous Continuous MELVIN Lomeli Last Rate: 50 mL/hr (03/13/20 1033)   famotidine 10 mg Oral Daily PRN Radha Quan PA-C    gabapentin 100 mg Oral TID PRN Radha Quan PA-C    ondansetron 4 mg Intravenous Q6H PRN Radha Quan PA-C    oxyCODONE 5 mg Oral Q4H PRN Radha Quan PA-C    pantoprazole 40 mg Intravenous Q12H [address] Radha Quan PA-C    propranolol 10 mg Oral BID Radha Quan PA-C    tamsulosin 0 4 mg Oral HS Radha Thornton PA-C         Today, Patient Was Seen By: Klaus Mora MD    ** Please Note: Dictation voice to text software may have been used in the creation of this document   **

## 2020-03-13 NOTE — H&P
H&P- Darryl Lisa 1940, 78 y o  female MRN: [id number]    [facility] Encounter: [id number]    Primary Care Provider: Hailey Grant MD   Date and time admitted to hospital: 3/12/2020  4:55 PM        * CECIL (acute kidney injury) [facility])  Assessment & Plan  Acute onset kidney injury  Possibly multifactorial due to decreased oral intake as well as suspected GIB  -hold oral diuretics salt tablets as pt appears hypovolemic  -consult nephrology for volume management/hypernatremia as pt normally hyponatremia and is on fluid restriction as well as salt tabs and diuretics  -started on 75cc/hr in ED of NS will change to d5 1/4 at 50cc/hr x 10 hours  -hold diuretics  Repeat bmp in am, consult nephrology    Transaminitis  Assessment & Plan  In setting of liver cirrhosis  This appears stable from prior  tbili 3 5  ALT 100s  This is relatively stable from 02/2020 but new otherwise  Given known cirrhosis and no evidence of US RUQ with dopplers will perform to r/o thrombus as etiology for transaminitis  -will appreciate GI evaluation as well      Heme positive stool  Assessment & Plan  Pt had melanotic stool noted per nursing while in ED  -On rectal exam stool is brown presently but heme positive  No anemia  -Given suspected melanotic stool will place on clears, trend h/h  -start IV PPI 40mg bid and consult GI    Altered mental status  Assessment & Plan  Pt had an episode of syncope vs unresponsivesness at nursing facility  At that time pt was felt to have perioral cyanosis as well as hypotension as well as hypothermic  She was placed on 10L NC and EMS was alerted  At time of arrival AMS had been normal tensive as well as normothermic    She was alert and oriented  -presently oriented to hospital setting,year but off one month, and to president  -continue to monitor on telemetry    A-fib [facility])  Assessment & Plan  Rate controlled on propranolol   Not on AC due to cirrhosis/SDH    History of subdural hematoma  Assessment & Plan  Recent diagnosis in 02/2020 Improving on CT head today  Pt still w/some confusion intermittently  Felt to have possibly underlying dementia      Cirrhosis of liver ([address] )  Assessment & Plan  2* alcohol abuse  Pt presently at [place] [address]  Ammonia wnl  -C/b thrombocytopenia   -continue propranolol    Chronic diastolic (congestive) heart failure (HCC)  Assessment & Plan  Wt Readings from Last 3 Encounters:   03/10/20 98 4 kg (217 lb)   03/04/20 98 7 kg (217 lb 9 5 oz)   02/19/20 89 7 kg (197 lb 12 oz)     Appears hypovolemic by mm/lung exam   Substantial LE edema which is unchanged per pt  Hold lasix, continue bb, monitor I/os and daily weights        Chronic indwelling Davies catheter  Assessment & Plan  2* urinary retention      Essential hypertension  Assessment & Plan  Continue norvasc/inderal    Dysphagia   On level 3 dysphagia thin liquids at nursing facility       Closed right distal femur fx    Conservatively treated  Ortho recommended knee immobilizer and nwb status    VTE Prophylaxis: Pharmacologic VTE Prophylaxis contraindicated due to gib  / sequential compression device   Code Status: level 3 dni/dnr  POLST: There is no POLST form on file for this patient (pre-hospital)  Discussion with family:     Anticipated Length of Stay:  Patient will be admitted on an Inpatient basis with an anticipated length of stay of  Greater than 2 midnights  Justification for Hospital Stay: unresponsiveness, fox gib    Total Time for Visit, including Counseling / Coordination of Care: 45 minutes  Greater than 50% of this total time spent on direct patient counseling and coordination of care      Chief Complaint:   ams    History of Present Illness:    Emma Soni is a 78 y o  female who presents with pmh of cirrhosis secondary to alcohol abuse, subdural hematoma, 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, chronic diastolic CHF, hypertension chronic lower extremity edema coming to the hospital for altered mental status  Patient was at the nursing facility  She was just admitted and discharged from this facility for hyponatremia and encephalopathy  She was treated with IV fluids and then transition to sodium chloride which was up titrated to g q i d  As well as with 1200 cc fluid restriction and oral Lasix  Her sodium had improved but was in the upper 120s upon discharge  She also had a subdural hematoma earlier in February 2020 at [place]  This was stable during her recent admission  She had was still having word finding difficulties at time of discharge which was felt to be due to dementia and subdural hematoma  On day of admission she was found to have AMS approximately 30 minutes after being evaluated by nursing staff there  She had perioral cyanosis and was felt to be hypotensive hypothermic  She was placed on 10L NC and transported emergently to ED  She is A&O upon arrival to ED and normotensive normothermic  She does not recall the episode of unresponsiveness she had  She has no cp/sob  She has no headache, numbness, weakness  She has no abd pain  She notes no n/v/f/c/d  No worsening LE edema  In ED nursing noted that he had black smears of stool while being cleaned up  She was found to have CECIL and hypernatremia  We will admit pt for AMS concerning for possible syncope and CECIL/melena    Review of Systems:    Review of Systems   Constitutional: Negative for appetite change, chills and fever  Respiratory: Negative for shortness of breath  Cardiovascular: Negative for chest pain  Gastrointestinal: Negative for abdominal pain, diarrhea, nausea and vomiting  Musculoskeletal: Positive for back pain (chronic back pain  presently asymptomatic)  Neurological: Negative for weakness, light-headedness and numbness  All other systems reviewed and are negative        Past Medical and Surgical History:     Past Medical History:   Diagnosis Date  Anxiety     Atrial fibrillation (HCC)     CHF (congestive heart failure) (HCC)     Depression     HTN (hypertension) 11/9/2016    Opio[address] [address]     Pneumonia        Past Surgical History:   Procedure Laterality Date    APPENDECTOMY      CHOLECYSTECTOMY      TONSILLECTOMY      TOTAL KNEE ARTHROPLASTY Bilateral     TUBAL LIGATION         Meds/Allergies:    Prior to Admission medications    Medication Sig Start Date End Date Taking?  Authorizing Provider   amLODIPine (NORVASC) 5 mg tablet TAKE 1 TABLET BY MOUTH EVERY DAY 10/12/19  Yes Nguyen Le MD   busPIRone (BUSPAR) 5 mg tablet TAKE 1 TABLET BY MOUTH THREE TIMES A DAY 11/3/19  Yes Nguyen Le MD   docusate sodium (COLACE) 100 mg capsule every 24 hours   Yes Historical Provider, MD   famotidine (PEPCID) 20 mg tablet Take 20 mg by mouth as needed for heartburn   Yes Historical Provider, MD   furosemide (LASIX) 20 mg tablet Take 0 5 tablets (10 mg total) by mouth daily 3/5/20  Yes Marlen Brady DO   gabapentin (NEURONTIN) 100 mg capsule Take 1 capsule (100 mg total) by mouth 3 (three) times a day as needed (pain) 1/14/20  Yes Nguyen Le MD   nystatin (MYCOSTATIN) powder APPLY 1 APPLICATION TOPICALLY 2 (TWO) TIMES A DAY 2-3 TIMES DAILY TO AFFECTED AREA(S) 11/7/19  Yes Nguyen Le MD   oxyCODONE (ROXICODONE) 5 mg immediate release tablet Take 5 mg by mouth every 4 (four) hours as needed for moderate pain   Yes Historical Provider, MD   propranolol (INDERAL) 10 mg tablet TAKE 1 TABLET BY MOUTH TWICE A DAY 1/9/20  Yes Nguyen Le MD   senna (SENOKOT) 8 6 MG tablet Take 1 tablet by mouth daily at bedtime    Yes Historical Provider, MD   sodium chloride 1 g tablet Take 2 tablets (2 g total) by mouth 4 (four) times a day (with meals and at bedtime) 3/4/20  Yes Marlen Brady DO   tamsulosin (FLOMAX) 0 4 mg every 24 hours   Yes Historical Provider, MD   acetaminophen (TYLENOL) 325 mg tablet Take 650 mg by mouth every 6 (six) hours as needed for mild pain    Historical Provider, MD   Petrolatum-Zinc Oxide (Phytoplex Z-Guard) 57-17 % PSTE Apply topically    Historical Provider, MD   polyethylene glycol (MIRALAX) 17 g packet Take 17 g by mouth daily 3/5/20   Toby Connolly DO     I have reviewed home medications with patient personally  Allergies: Allergies   Allergen Reactions    Aspirin GI Intolerance    Warfarin        Social History:     Marital Status: /Civil Union   Occupation:   Patient Pre-hospital Living Situation:   Patient Pre-hospital Level of Mobility:   Patient Pre-hospital Diet Restrictions:   Substance Use History:   Social History     Substance and Sexual Activity   Alcohol Use Never    Frequency: Never    Binge frequency: Never     Social History     Tobacco Use   Smoking Status Former Smoker    Types: Cigarettes    Last attempt to quit: 1980    Years since quittin 7   Smokeless Tobacco Never Used     Social History     Substance and Sexual Activity   Drug Use Never       Family History:    Family History   Problem Relation Age of Onset    Cancer Father     Hypertension Sister        Physical Exam:     Vitals:   Blood Pressure: 110/76 (20)  Pulse: 74 (20)  Temperature: 97 9 °F (36 6 °C) (20)  Temp Source: Oral (20 1704)  Respirations: 20 (20)  SpO2: 91 % (20)    Physical Exam   Constitutional: She appears well-developed  No distress  Appears obese  Stated age, nad   HENT:   Head: Normocephalic and atraumatic  Right Ear: External ear normal    Left Ear: External ear normal    Mm are dry   Eyes: Conjunctivae are normal    Neck: Normal range of motion  Cardiovascular: Normal rate, regular rhythm and normal heart sounds  Exam reveals no gallop and no friction rub  No murmur heard  Pulmonary/Chest: Effort normal and breath sounds normal  No respiratory distress  She has no wheezes  She has no rales  Abdominal: Soft   She exhibits no distension and no mass  There is no tenderness  There is no guarding  Genitourinary:   Genitourinary Comments: Rectal vault feces  Is brown however hemoccult positive   Musculoskeletal: She exhibits no edema  Neurological: She is alert  Word finding difficulties alert and oriented to self, hospital setting but not city, year but not month and to president   Skin: Skin is warm and dry  She is not diaphoretic    substantial ecchymosis noted of right posterior leg below popliteal fossa   Psychiatric: She has a normal mood and affect  Vitals reviewed  (  Be Sure to Include Physical Exam: Delete this entire line when you have entered your exam)    Additional Data:     Lab Results: I have personally reviewed pertinent reports  Results from last 7 days   Lab Units 03/12/20  1717   WBC Thousand/uL 11 25*   HEMOGLOBIN g/dL 12 5   HEMATOCRIT % 42 0   PLATELETS Thousands/uL 142*   NEUTROS PCT % 75   LYMPHS PCT % 15   MONOS PCT % 8   EOS PCT % 0     Results from last 7 days   Lab Units 03/12/20  1717   SODIUM mmol/L 147*   POTASSIUM mmol/L 4 7   CHLORIDE mmol/L 112*   CO2 mmol/L 27   BUN mg/dL 57*   CREATININE mg/dL 1 99*   ANION GAP mmol/L 8   CALCIUM mg/dL 8 9   ALBUMIN g/dL 2 1*   TOTAL BILIRUBIN mg/dL 3 56*   ALK PHOS U/L 757*   ALT U/L 100*   AST U/L 189*   GLUCOSE RANDOM mg/dL 198*     Results from last 7 days   Lab Units 03/12/20  1717   INR  1 20*                   Imaging: I have personally reviewed pertinent reports  XR chest 2 views   ED Interpretation by [id number] Eugene Curtis DO (03/12 1756)   No acute disease      Final Result by Sarita Morejon MD (03/12 1755)      No acute cardiopulmonary disease  Stable cardiomegaly  Workstation performed: [id number]         CT head without contrast   Final Result by Christina Benson MD (03/12 1746)      1  Interval resolution of interhemispheric subdural hematoma    Diminished attenuation of subacute right thalamic hemorrhage, stable in size measuring 7 x 5 mm  No acute intracranial abnormality  2   Atrophy with stable severe chronic small vessel ischemic changes  Workstation performed: [id number]             EKG, Pathology, and Other Studies Reviewed on Admission:   · EKG:     Allscripts / Epic Records Reviewed: Yes     ** Please Note: This note has been constructed using a voice recognition system   **

## 2020-03-13 NOTE — ASSESSMENT & PLAN NOTE
49-year-old female with decompensated alcoholic liver cirrhosis was admitted due to metabolic encephalopathy and acute kidney injury on superimposed chronic kidney disease stage 2 possibly due to dehydration due to poor p o  Intake and diuretics in the setting of increased abdominal pressure as a result of her ascites    - appreciate renal recommendations  - IR paracentesis ordered

## 2020-03-14 ENCOUNTER — APPOINTMENT (INPATIENT)
Dept: RADIOLOGY | Facility: HOSPITAL | Age: 80
DRG: 377 | End: 2020-03-14
Payer: MEDICARE

## 2020-03-14 PROBLEM — K72.90 HEPATIC ENCEPHALOPATHY (HCC): Status: ACTIVE | Noted: 2020-03-13

## 2020-03-14 LAB
ALBUMIN SERPL BCP-MCNC: 2 G/DL (ref 3.5–5)
ALP SERPL-CCNC: 583 U/L (ref 46–116)
ALT SERPL W P-5'-P-CCNC: 79 U/L (ref 12–78)
ANION GAP SERPL CALCULATED.3IONS-SCNC: 7 MMOL/L (ref 4–13)
ANION GAP SERPL CALCULATED.3IONS-SCNC: 8 MMOL/L (ref 4–13)
ANION GAP SERPL CALCULATED.3IONS-SCNC: 9 MMOL/L (ref 4–13)
AST SERPL W P-5'-P-CCNC: 136 U/L (ref 5–45)
BASOPHILS # BLD AUTO: 0 THOUSANDS/ΜL (ref 0–0.1)
BASOPHILS NFR BLD AUTO: 0 % (ref 0–1)
BILIRUB SERPL-MCNC: 3.32 MG/DL (ref 0.2–1)
BUN SERPL-MCNC: 46 MG/DL (ref 5–25)
BUN SERPL-MCNC: 46 MG/DL (ref 5–25)
BUN SERPL-MCNC: 48 MG/DL (ref 5–25)
CALCIUM SERPL-MCNC: 7.9 MG/DL (ref 8.3–10.1)
CALCIUM SERPL-MCNC: 8.5 MG/DL (ref 8.3–10.1)
CALCIUM SERPL-MCNC: 8.6 MG/DL (ref 8.3–10.1)
CHLORIDE SERPL-SCNC: 102 MMOL/L (ref 100–108)
CHLORIDE SERPL-SCNC: 105 MMOL/L (ref 100–108)
CHLORIDE SERPL-SCNC: 107 MMOL/L (ref 100–108)
CO2 SERPL-SCNC: 26 MMOL/L (ref 21–32)
CO2 SERPL-SCNC: 26 MMOL/L (ref 21–32)
CO2 SERPL-SCNC: 27 MMOL/L (ref 21–32)
CREAT SERPL-MCNC: 1.7 MG/DL (ref 0.6–1.3)
CREAT SERPL-MCNC: 1.7 MG/DL (ref 0.6–1.3)
CREAT SERPL-MCNC: 1.74 MG/DL (ref 0.6–1.3)
EOSINOPHIL # BLD AUTO: 0.07 THOUSAND/ΜL (ref 0–0.61)
EOSINOPHIL NFR BLD AUTO: 1 % (ref 0–6)
ERYTHROCYTE [DISTWIDTH] IN BLOOD BY AUTOMATED COUNT: 18.8 % (ref 11.6–15.1)
GFR SERPL CREATININE-BSD FRML MDRD: 27 ML/MIN/1.73SQ M
GFR SERPL CREATININE-BSD FRML MDRD: 28 ML/MIN/1.73SQ M
GFR SERPL CREATININE-BSD FRML MDRD: 28 ML/MIN/1.73SQ M
GLUCOSE SERPL-MCNC: 170 MG/DL (ref 65–140)
GLUCOSE SERPL-MCNC: 184 MG/DL (ref 65–140)
GLUCOSE SERPL-MCNC: 212 MG/DL (ref 65–140)
GLUCOSE SERPL-MCNC: 430 MG/DL (ref 65–140)
HCT VFR BLD AUTO: 33.5 % (ref 34.8–46.1)
HGB BLD-MCNC: 10.1 G/DL (ref 11.5–15.4)
IMM GRANULOCYTES # BLD AUTO: 0.09 THOUSAND/UL (ref 0–0.2)
IMM GRANULOCYTES NFR BLD AUTO: 2 % (ref 0–2)
INR PPP: 1.3 (ref 0.84–1.19)
LYMPHOCYTES # BLD AUTO: 1.01 THOUSANDS/ΜL (ref 0.6–4.47)
LYMPHOCYTES NFR BLD AUTO: 18 % (ref 14–44)
MAGNESIUM SERPL-MCNC: 2.2 MG/DL (ref 1.6–2.6)
MCH RBC QN AUTO: 29.8 PG (ref 26.8–34.3)
MCHC RBC AUTO-ENTMCNC: 30.1 G/DL (ref 31.4–37.4)
MCV RBC AUTO: 99 FL (ref 82–98)
MONOCYTES # BLD AUTO: 0.46 THOUSAND/ΜL (ref 0.17–1.22)
MONOCYTES NFR BLD AUTO: 8 % (ref 4–12)
NEUTROPHILS # BLD AUTO: 4.13 THOUSANDS/ΜL (ref 1.85–7.62)
NEUTS SEG NFR BLD AUTO: 71 % (ref 43–75)
NRBC BLD AUTO-RTO: 0 /100 WBCS
PHOSPHATE SERPL-MCNC: 2.7 MG/DL (ref 2.3–4.1)
PLATELET # BLD AUTO: 94 THOUSANDS/UL (ref 149–390)
PMV BLD AUTO: 10.6 FL (ref 8.9–12.7)
POTASSIUM SERPL-SCNC: 3.5 MMOL/L (ref 3.5–5.3)
POTASSIUM SERPL-SCNC: 4.1 MMOL/L (ref 3.5–5.3)
POTASSIUM SERPL-SCNC: 4.2 MMOL/L (ref 3.5–5.3)
PROT SERPL-MCNC: 5 G/DL (ref 6.4–8.2)
PROTHROMBIN TIME: 16.4 SECONDS (ref 11.6–14.5)
RBC # BLD AUTO: 3.39 MILLION/UL (ref 3.81–5.12)
SODIUM SERPL-SCNC: 136 MMOL/L (ref 136–145)
SODIUM SERPL-SCNC: 140 MMOL/L (ref 136–145)
SODIUM SERPL-SCNC: 141 MMOL/L (ref 136–145)
WBC # BLD AUTO: 5.76 THOUSAND/UL (ref 4.31–10.16)

## 2020-03-14 PROCEDURE — 83735 ASSAY OF MAGNESIUM: CPT | Performed by: NURSE PRACTITIONER

## 2020-03-14 PROCEDURE — 99223 1ST HOSP IP/OBS HIGH 75: CPT | Performed by: INTERNAL MEDICINE

## 2020-03-14 PROCEDURE — 82948 REAGENT STRIP/BLOOD GLUCOSE: CPT

## 2020-03-14 PROCEDURE — 80048 BASIC METABOLIC PNL TOTAL CA: CPT | Performed by: NURSE PRACTITIONER

## 2020-03-14 PROCEDURE — 99232 SBSQ HOSP IP/OBS MODERATE 35: CPT | Performed by: STUDENT IN AN ORGANIZED HEALTH CARE EDUCATION/TRAINING PROGRAM

## 2020-03-14 PROCEDURE — C9113 INJ PANTOPRAZOLE SODIUM, VIA: HCPCS | Performed by: PHYSICIAN ASSISTANT

## 2020-03-14 PROCEDURE — 85025 COMPLETE CBC W/AUTO DIFF WBC: CPT | Performed by: STUDENT IN AN ORGANIZED HEALTH CARE EDUCATION/TRAINING PROGRAM

## 2020-03-14 PROCEDURE — 80053 COMPREHEN METABOLIC PANEL: CPT | Performed by: STUDENT IN AN ORGANIZED HEALTH CARE EDUCATION/TRAINING PROGRAM

## 2020-03-14 PROCEDURE — 84100 ASSAY OF PHOSPHORUS: CPT | Performed by: NURSE PRACTITIONER

## 2020-03-14 PROCEDURE — 99232 SBSQ HOSP IP/OBS MODERATE 35: CPT | Performed by: INTERNAL MEDICINE

## 2020-03-14 PROCEDURE — 80048 BASIC METABOLIC PNL TOTAL CA: CPT | Performed by: INTERNAL MEDICINE

## 2020-03-14 PROCEDURE — 85610 PROTHROMBIN TIME: CPT | Performed by: STUDENT IN AN ORGANIZED HEALTH CARE EDUCATION/TRAINING PROGRAM

## 2020-03-14 PROCEDURE — 71045 X-RAY EXAM CHEST 1 VIEW: CPT

## 2020-03-14 RX ORDER — ALBUMIN (HUMAN) 12.5 G/50ML
25 SOLUTION INTRAVENOUS ONCE
Status: COMPLETED | OUTPATIENT
Start: 2020-03-14 | End: 2020-03-14

## 2020-03-14 RX ORDER — SODIUM CHLORIDE 450 MG/100ML
50 INJECTION, SOLUTION INTRAVENOUS CONTINUOUS
Status: DISCONTINUED | OUTPATIENT
Start: 2020-03-14 | End: 2020-03-15

## 2020-03-14 RX ORDER — DEXTROSE MONOHYDRATE 50 MG/ML
75 INJECTION, SOLUTION INTRAVENOUS CONTINUOUS
Status: DISCONTINUED | OUTPATIENT
Start: 2020-03-14 | End: 2020-03-14

## 2020-03-14 RX ORDER — ALBUMIN (HUMAN) 12.5 G/50ML
12.5 SOLUTION INTRAVENOUS ONCE
Status: DISCONTINUED | OUTPATIENT
Start: 2020-03-14 | End: 2020-03-14

## 2020-03-14 RX ADMIN — DEXTROSE 75 ML/HR: 5 SOLUTION INTRAVENOUS at 13:00

## 2020-03-14 RX ADMIN — ALBUMIN (HUMAN) 25 G: 0.25 INJECTION, SOLUTION INTRAVENOUS at 12:50

## 2020-03-14 RX ADMIN — TAMSULOSIN HYDROCHLORIDE 0.4 MG: 0.4 CAPSULE ORAL at 21:19

## 2020-03-14 RX ADMIN — PANTOPRAZOLE SODIUM 40 MG: 40 INJECTION, POWDER, FOR SOLUTION INTRAVENOUS at 09:24

## 2020-03-14 RX ADMIN — PANTOPRAZOLE SODIUM 40 MG: 40 INJECTION, POWDER, FOR SOLUTION INTRAVENOUS at 21:19

## 2020-03-14 RX ADMIN — SODIUM CHLORIDE 75 ML/HR: 0.45 INJECTION, SOLUTION INTRAVENOUS at 18:25

## 2020-03-14 NOTE — ASSESSMENT & PLAN NOTE
Hematology consulted for second opinion  Although risks (subdural hematoma bleed) seems to outweigh the benefits at this point, despite her having atrial fibrillation and portal vein thrombosis  - Appreciate hemaonc recommendations  Will hold of anticoagulation  Patient understands risks and benefits

## 2020-03-14 NOTE — ASSESSMENT & PLAN NOTE
26-year-old female with decompensated alcoholic liver cirrhosis was admitted due to metabolic encephalopathy and acute kidney injury on superimposed chronic kidney disease stage 2 possibly due to dehydration due to poor p o  Intake and diuretics  She underwent paracentesis on 3/13/20, negative for SBP  SAAG less than 1 1  GI recommending repeat studies on future paracentesis    - Another order of albumin today  - GI recommendations appreciated

## 2020-03-14 NOTE — ASSESSMENT & PLAN NOTE
Alcoholic liver cirrhosis  Resides in [facility]    - appreciate GI recommendations    Recent Labs     03/12/20  1717 03/12/20  2350 03/13/20  0448 03/14/20  0502   *  --  153* 136*   *  --  90* 79*   *  --  115* 94*   INR 1 20* 1 34*  --  1 30*

## 2020-03-14 NOTE — CONSULTS
Consultation - Medical Oncology   Che Hewitt 78 y o  female MRN: [id number]  Unit/Bed#: [id number] Encounter: [id number]    Referring physician:  [facility]   Reason for Consult:  Safety of anticoagulation  HPI: Che Hewitt is a 78y o  year old female   Patient states she is here because she fell at [facility]  Patient is being treated for cirrhosis of liver, portal hypertension, partial portal vein thrombosis, ascites, atrial fibrillation, congestive heart failure, renal insufficiency, subdural hematoma, heme-positive stool, change in mental status and history of hypertension  Patient states she feels weak and tired  Exertional dyspnea  Abdominal distension   History of falls  History of arthritis  ROS:  03/14/20 Reviewed 13 systems:  Presently no other neurological, cardiac, pulmonary, GI and  symptoms other than mentioned above  No other symptoms like fever, chills, bleeding, bone pains, skin rash, weight loss,  numbness, claudication   No frequent infections  Not unusually sensitive to heat or cold  No swelling of the ankles  No swollen glands  Patient is anxious   Other symptoms are in HPI        Historical Information   Past Medical History:   Diagnosis Date    Anxiety     Atrial fibrillation ([facility] [address] )     CHF (congestive heart failure) (HCC)     Depression     HTN (hypertension) 11/9/2016    Op[facility]id dependence ([email or web address] )     Pneumonia      Past Surgical History:   Procedure Laterality Date    APPENDECTOMY      CHOLECYSTECTOMY      IR PARACENTESIS  3/13/2020    TONSILLECTOMY      TOTAL KNEE ARTHROPLASTY Bilateral     TUBAL LIGATION       Social History   Social History     Substance and Sexual Activity   Alcohol Use Never    Frequency: Never    Binge frequency: Never     Social History     Substance and Sexual Activity   Drug Use Never     Social History     Tobacco Use   Smoking Status Former Smoker    Types: Cigarettes    Last attempt to quit: 6/6/1980  Years since quittin 7   Smokeless Tobacco Never Used     Family History:   Family History   Problem Relation Age of Onset    Cancer Father     Hypertension Sister          Current Facility-Administered Medications:     acetaminophen (TYLENOL) tablet 650 mg, 650 mg, Oral, Q6H PRN, Radha Quan PA-C    albumin human (FLEXBUMIN) 25 % injection 12 5 g, 12 5 g, Intravenous, Once, Mariya Hernandez MD    amLODIPine (NORVASC) tablet 5 mg, 5 mg, Oral, Daily, MELVIN Lomeli    famotidine (PEPCID) tablet 10 mg, 10 mg, Oral, Daily PRN, Radha Quan PA-C    gabapentin (NEURONTIN) capsule 100 mg, 100 mg, Oral, TID PRN, Radha Quan PA-C    ondansetron (ZOFRAN) injection 4 mg, 4 mg, Intravenous, Q6H PRN, Radha Quan PA-C    oxyCODONE (ROXICODONE) IR tablet 5 mg, 5 mg, Oral, Q4H PRN, Radha Quan PA-C    pantoprazole (PROTONIX) injection 40 mg, 40 mg, Intravenous, Q12H [address], Radha Quan PA-C, 40 mg at 20 0924    propranolol (INDERAL) tablet 10 mg, 10 mg, Oral, BID, Radha Quan PA-C, 10 mg at 20 0934    tamsulosin (FLOMAX) capsule 0 4 mg, 0 4 mg, Oral, HS, Radha Quan PA-C, 0 4 mg at 20 2137    Allergies   Allergen Reactions    Aspirin GI Intolerance    Warfarin      @ ROS@  Physical Exam:  Vitals:    20 1705 20 2000 20 2225 20 0800   BP: 97/57  90/54 97/58   Pulse: 75  79 73   Resp:   20 17   Temp:   97 6 °F (36 4 °C) (!) 97 4 °F (36 3 °C)   TempSrc:       SpO2: 100% 100% 100% 99%     Alert, oriented, not in distress, no icterus, no oral thrush, no palpable neck mass, decreased breath sounds at lung bases,  irregular heart rate, abdomen  soft and non tender, no palpable abdominal mass, has ascites, no edema of ankles, no calf tenderness, no focal neurological deficit but has generalized weakness, no skin rash, no palpable lymphadenopathy in the neck and axillary areas, good arterial pulses, no clubbing     Patient is anxious  Performance status 3  Lab Results: I have reviewed all pertinent labs    LABS:  Results for orders placed or performed during the hospital encounter of 03/12/20   Body fluid culture and Gram stain   Result Value Ref Range    Gram Stain Result Rare Polys     Gram Stain Result No bacteria seen    Albumin, fluid   Result Value Ref Range    Albumin, Fluid 2 5 g/dL   Total Protein, Fluid   Result Value Ref Range    Protein, Fluid <2 0 g/dL   CBC and differential   Result Value Ref Range    WBC 11 25 (H) 4 31 - 10 16 Thousand/uL    RBC 4 21 3 81 - 5 12 Million/uL    Hemoglobin 12 5 11 5 - 15 4 g/dL    Hematocrit 42 0 34 8 - 46 1 %     (H) 82 - 98 fL    MCH 29 7 26 8 - 34 3 pg    MCHC 29 8 (L) 31 4 - 37 4 g/dL    RDW 18 9 (H) 11 6 - 15 1 %    MPV 11 3 8 9 - 12 7 fL    Platelets 396 (L) 784 - 390 Thousands/uL    nRBC 0 /100 WBCs    Neutrophils Relative 75 43 - 75 %    Immat GRANS % 2 0 - 2 %    Lymphocytes Relative 15 14 - 44 %    Monocytes Relative 8 4 - 12 %    Eosinophils Relative 0 0 - 6 %    Basophils Relative 0 0 - 1 %    Neutrophils Absolute 8 41 (H) 1 85 - 7 62 Thousands/µL    Immature Grans Absolute 0 21 (H) 0 00 - 0 20 Thousand/uL    Lymphocytes Absolute 1 65 0 60 - 4 47 Thousands/µL    Monocytes Absolute 0 94 0 17 - 1 22 Thousand/µL    Eosinophils Absolute 0 01 0 00 - 0 61 Thousand/µL    Basophils Absolute 0 03 0 00 - 0 10 Thousands/µL   Basic metabolic panel   Result Value Ref Range    Sodium 147 (H) 136 - 145 mmol/L    Potassium 4 7 3 5 - 5 3 mmol/L    Chloride 112 (H) 100 - 108 mmol/L    CO2 27 21 - 32 mmol/L    ANION GAP 8 4 - 13 mmol/L    BUN 57 (H) 5 - 25 mg/dL    Creatinine 1 99 (H) 0 60 - 1 30 mg/dL    Glucose 198 (H) 65 - 140 mg/dL    Calcium 8 9 8 3 - 10 1 mg/dL    eGFR 23 ml/min/1 73sq m   Troponin I   Result Value Ref Range    Troponin I <0 02 <=0 04 ng/mL   Ammonia   Result Value Ref Range    Ammonia 20 11 - 35 umol/L   Protime-INR   Result Value Ref Range    Protime 15 4 (H) 11 6 - 14 5 seconds    INR 1 20 (H) 0 84 - 1 19   APTT   Result Value Ref Range    PTT 28 23 - 37 seconds   Hepatic function panel   Result Value Ref Range    Total Bilirubin 3 56 (H) 0 20 - 1 00 mg/dL    Bilirubin, Direct 2 31 (H) 0 00 - 0 20 mg/dL    Alkaline Phosphatase 757 (H) 46 - 116 U/L     (H) 5 - 45 U/L     (H) 12 - 78 U/L    Total Protein 6 0 (L) 6 4 - 8 2 g/dL    Albumin 2 1 (L) 3 5 - 5 0 g/dL   Protime-INR   Result Value Ref Range    Protime 16 8 (H) 11 6 - 14 5 seconds    INR 1 34 (H) 0 84 - 1 19   APTT   Result Value Ref Range    PTT 29 23 - 37 seconds   Hemoglobin and hematocrit, blood   Result Value Ref Range    Hemoglobin 12 1 11 5 - 15 4 g/dL    Hematocrit 40 8 34 8 - 46 1 %   Comprehensive metabolic panel   Result Value Ref Range    Sodium 148 (H) 136 - 145 mmol/L    Potassium 4 5 3 5 - 5 3 mmol/L    Chloride 113 (H) 100 - 108 mmol/L    CO2 27 21 - 32 mmol/L    ANION GAP 8 4 - 13 mmol/L    BUN 57 (H) 5 - 25 mg/dL    Creatinine 1 92 (H) 0 60 - 1 30 mg/dL    Glucose 174 (H) 65 - 140 mg/dL    Calcium 8 8 8 3 - 10 1 mg/dL     (H) 5 - 45 U/L    ALT 90 (H) 12 - 78 U/L    Alkaline Phosphatase 681 (H) 46 - 116 U/L    Total Protein 5 5 (L) 6 4 - 8 2 g/dL    Albumin 2 0 (L) 3 5 - 5 0 g/dL    Total Bilirubin 3 39 (H) 0 20 - 1 00 mg/dL    eGFR 24 ml/min/1 73sq m   CBC and differential   Result Value Ref Range    WBC 9 62 4 31 - 10 16 Thousand/uL    RBC 4 02 3 81 - 5 12 Million/uL    Hemoglobin 11 9 11 5 - 15 4 g/dL    Hematocrit 39 9 34 8 - 46 1 %    MCV 99 (H) 82 - 98 fL    MCH 29 6 26 8 - 34 3 pg    MCHC 29 8 (L) 31 4 - 37 4 g/dL    RDW 19 5 (H) 11 6 - 15 1 %    MPV 10 7 8 9 - 12 7 fL    Platelets 205 (L) 537 - 390 Thousands/uL    nRBC 0 /100 WBCs    Neutrophils Relative 76 (H) 43 - 75 %    Immat GRANS % 2 0 - 2 %    Lymphocytes Relative 15 14 - 44 %    Monocytes Relative 7 4 - 12 %    Eosinophils Relative 0 0 - 6 %    Basophils Relative 0 0 - 1 %    Neutrophils Absolute 7 26 1 85 - 7 62 Thousands/µL    Immature Grans Absolute 0 16 0 00 - 0 20 Thousand/uL    Lymphocytes Absolute 1 46 0 60 - 4 47 Thousands/µL    Monocytes Absolute 0 69 0 17 - 1 22 Thousand/µL    Eosinophils Absolute 0 04 0 00 - 0 61 Thousand/µL    Basophils Absolute 0 01 0 00 - 0 10 Thousands/µL   Urinalysis with microscopic   Result Value Ref Range    Clarity, UA Clear     Color, UA Yellow     Specific Gravity, UA 1 025 1 003 - 1 030    pH, UA 5 5 4 5, 5 0, 5 5, 6 0, 6 5, 7 0, 7 5, 8 0    Glucose, UA Negative Negative mg/dl    Ketones, UA Negative Negative mg/dl    Blood, UA Moderate (A) Negative    Protein, UA Negative Negative mg/dl    Nitrite, UA Positive (A) Negative    Bilirubin, UA Interference- unable to analyze (A) Negative    Urobilinogen, UA 1 0 0 2, 1 0 E U /dl E U /dl    Leukocytes, UA Moderate (A) Negative    WBC, UA Innumerable (A) None Seen, 0-5, 5-55, 5-65 /hpf    RBC, UA 10-20 (A) None Seen, 0-5 /hpf    Bacteria, UA Innumerable (A) None Seen, Occasional /hpf    Epithelial Cells Occasional None Seen, Occasional /hpf   Sodium, urine, random   Result Value Ref Range    Sodium, Ur 5    Protein / creatinine ratio, urine   Result Value Ref Range    Creatinine, Ur 112 9 mg/dL    Protein Urine Random 49 mg/dL    Prot/Creat Ratio, Ur 0 43 (H) 0 00 - 1 98   Basic metabolic panel   Result Value Ref Range    Sodium 147 (H) 136 - 145 mmol/L    Potassium 4 6 3 5 - 5 3 mmol/L    Chloride 113 (H) 100 - 108 mmol/L    CO2 28 21 - 32 mmol/L    ANION GAP 6 4 - 13 mmol/L    BUN 55 (H) 5 - 25 mg/dL    Creatinine 1 78 (H) 0 60 - 1 30 mg/dL    Glucose 167 (H) 65 - 140 mg/dL    Calcium 8 9 8 3 - 10 1 mg/dL    eGFR 26 ml/min/1 73sq m   Body fluid white cell count with differential   Result Value Ref Range    Site Paracentesis     Color, Fluid Yellow Clear, Colorless,Yellow    Clarity, Fluid Clear Clear    WBC, Fluid 90 /ul   Body Fluid Diff   Result Value Ref Range    Total Counted 100     Neutrophils % (Fluid) 60 %    Lymphs % (Fluid) 16 %    Mesothelial % (Fluid) 1 %    Histiocyte % (Fluid) 1 %    Monocytes % (Fluid) 22 %   Basic metabolic panel   Result Value Ref Range    Sodium 148 (H) 136 - 145 mmol/L    Potassium 4 0 3 5 - 5 3 mmol/L    Chloride 113 (H) 100 - 108 mmol/L    CO2 26 21 - 32 mmol/L    ANION GAP 9 4 - 13 mmol/L    BUN 53 (H) 5 - 25 mg/dL    Creatinine 1 87 (H) 0 60 - 1 30 mg/dL    Glucose 202 (H) 65 - 140 mg/dL    Calcium 8 5 8 3 - 10 1 mg/dL    eGFR 25 ml/min/1 73sq m   Magnesium   Result Value Ref Range    Magnesium 2 2 1 6 - 2 6 mg/dL   Phosphorus   Result Value Ref Range    Phosphorus 2 7 2 3 - 4 1 mg/dL   CBC and differential   Result Value Ref Range    WBC 5 76 4 31 - 10 16 Thousand/uL    RBC 3 39 (L) 3 81 - 5 12 Million/uL    Hemoglobin 10 1 (L) 11 5 - 15 4 g/dL    Hematocrit 33 5 (L) 34 8 - 46 1 %    MCV 99 (H) 82 - 98 fL    MCH 29 8 26 8 - 34 3 pg    MCHC 30 1 (L) 31 4 - 37 4 g/dL    RDW 18 8 (H) 11 6 - 15 1 %    MPV 10 6 8 9 - 12 7 fL    Platelets 94 (L) 609 - 390 Thousands/uL    nRBC 0 /100 WBCs    Neutrophils Relative 71 43 - 75 %    Immat GRANS % 2 0 - 2 %    Lymphocytes Relative 18 14 - 44 %    Monocytes Relative 8 4 - 12 %    Eosinophils Relative 1 0 - 6 %    Basophils Relative 0 0 - 1 %    Neutrophils Absolute 4 13 1 85 - 7 62 Thousands/µL    Immature Grans Absolute 0 09 0 00 - 0 20 Thousand/uL    Lymphocytes Absolute 1 01 0 60 - 4 47 Thousands/µL    Monocytes Absolute 0 46 0 17 - 1 22 Thousand/µL    Eosinophils Absolute 0 07 0 00 - 0 61 Thousand/µL    Basophils Absolute 0 00 0 00 - 0 10 Thousands/µL   Protime-INR   Result Value Ref Range    Protime 16 4 (H) 11 6 - 14 5 seconds    INR 1 30 (H) 0 84 - 1 19   Comprehensive metabolic panel   Result Value Ref Range    Sodium 136 136 - 145 mmol/L    Potassium 3 5 3 5 - 5 3 mmol/L    Chloride 102 100 - 108 mmol/L    CO2 26 21 - 32 mmol/L    ANION GAP 8 4 - 13 mmol/L    BUN 46 (H) 5 - 25 mg/dL    Creatinine 1 70 (H) 0 60 - 1 30 mg/dL    Glucose 430 (H) 65 - 140 mg/dL Calcium 7 9 (L) 8 3 - 10 1 mg/dL     (H) 5 - 45 U/L    ALT 79 (H) 12 - 78 U/L    Alkaline Phosphatase 583 (H) 46 - 116 U/L    Total Protein 5 0 (L) 6 4 - 8 2 g/dL    Albumin 2 0 (L) 3 5 - 5 0 g/dL    Total Bilirubin 3 32 (H) 0 20 - 1 00 mg/dL    eGFR 28 ml/min/1 73sq m   Basic metabolic panel   Result Value Ref Range    Sodium 141 136 - 145 mmol/L    Potassium 4 1 3 5 - 5 3 mmol/L    Chloride 107 100 - 108 mmol/L    CO2 27 21 - 32 mmol/L    ANION GAP 7 4 - 13 mmol/L    BUN 48 (H) 5 - 25 mg/dL    Creatinine 1 74 (H) 0 60 - 1 30 mg/dL    Glucose 212 (H) 65 - 140 mg/dL    Calcium 8 6 8 3 - 10 1 mg/dL    eGFR 27 ml/min/1 73sq m   ECG 12 lead   Result Value Ref Range    Ventricular Rate 96 BPM    Atrial Rate 220 BPM    UT Interval  ms    QRSD Interval 60 ms    QT Interval 356 ms    QTC Interval 449 ms    P Axis 0 degrees    QRS Axis -18 degrees    T Wave Axis -31 degrees   Fingerstick Glucose (POCT)   Result Value Ref Range    POC Glucose 170 (H) 65 - 140 mg/dl         Imaging Studies: I have personally reviewed pertinent reports  Pathology, and Other Studies: I have personally reviewed pertinent reports  Assessment and Plan:   Cirrhosis of liver, portal hypertension, partial portal vein thrombosis, ascites, atrial fibrillation, congestive heart failure, renal insufficiency, subdural hematoma, heme-positive stool, change in mental status and history of hypertension  Anticoagulation therapy will  not be without risks in her case because of history of falls, subdural hematoma, cirrhosis and heme-positive stool  Try to avoid anticoagulation if you can unless it is very much necessary and patient and family would understand and accept the risks  Patient voiced understanding and agreement in the discussion  Counseling / Coordination of Care    Greater than 50% of total time was spent with the patient and / or family counseling and / or coordination of care

## 2020-03-14 NOTE — PLAN OF CARE
Problem: Prexisting or High Potential for Compromised Skin Integrity  Goal: Skin integrity is maintained or improved  Description  INTERVENTIONS:  - Identify patients at risk for skin breakdown  - Assess and monitor skin integrity  - Assess and monitor nutrition and hydration status  - Monitor labs   - Assess for incontinence   - Turn and reposition patient  - Assist with mobility/ambulation  - Relieve pressure over bony prominences  - Avoid friction and shearing  - Provide appropriate hygiene as needed including keeping skin clean and dry  - Evaluate need for skin moisturizer/barrier cream  - Collaborate with interdisciplinary team   - Patient/family teaching  - Consider wound care consult   Outcome: Progressing     Problem: PAIN - ADULT  Goal: Verbalizes/displays adequate comfort level or baseline comfort level  Description  Interventions:  - Encourage patient to monitor pain and request assistance  - Assess pain using appropriate pain scale  - Administer analgesics based on type and severity of pain and evaluate response  - Implement non-pharmacological measures as appropriate and evaluate response  - Consider cultural and social influences on pain and pain management  - Notify physician/advanced practitioner if interventions unsuccessful or patient reports new pain  Outcome: Progressing     Problem: INFECTION - ADULT  Goal: Absence or prevention of progression during hospitalization  Description  INTERVENTIONS:  - Assess and monitor for signs and symptoms of infection  - Monitor lab/diagnostic results  - Monitor all insertion sites, i e  indwelling lines, tubes, and drains  - Monitor endotracheal if appropriate and nasal secretions for changes in amount and color  - [place] appropriate cooling/warming therapies per order  - Administer medications as ordered  - Instruct and encourage patient and family to use good hand hygiene technique  - Identify and instruct in appropriate isolation precautions for identified infection/condition  Outcome: Progressing     Problem: SAFETY ADULT  Goal: Patient will remain free of falls  Description  INTERVENTIONS:  - Assess patient frequently for physical needs  -  Identify cognitive and physical deficits and behaviors that affect risk of falls    -  [place] fall precautions as indicated by assessment   - Educate patient/family on patient safety including physical limitations  - Instruct patient to call for assistance with activity based on assessment  - Modify environment to reduce risk of injury  - Consider OT/PT consult to assist with strengthening/mobility  Outcome: Progressing  Goal: Maintain or return to baseline ADL function  Description  INTERVENTIONS:  -  Assess patient's ability to carry out ADLs; assess patient's baseline for ADL function and identify physical deficits which impact ability to perform ADLs (bathing, care of mouth/teeth, toileting, grooming, dressing, etc )  - Assess/evaluate cause of self-care deficits   - Assess range of motion  - Assess patient's mobility; develop plan if impaired  - Assess patient's need for assistive devices and provide as appropriate  - Encourage maximum independence but intervene and supervise when necessary  - Involve family in performance of ADLs  - Assess for home care needs following discharge   - Consider OT consult to assist with ADL evaluation and planning for discharge  - Provide patient education as appropriate  Outcome: Progressing  Goal: Maintain or return mobility status to optimal level  Description  INTERVENTIONS:  - Assess patient's baseline mobility status (ambulation, transfers, stairs, etc )    - Identify cognitive and physical deficits and behaviors that affect mobility  - Identify mobility aids required to assist with transfers and/or ambulation (gait belt, sit-to-stand, lift, walker, cane, etc )  - [place] fall precautions as indicated by assessment  - Record patient progress and toleration of activity level on Mobility SBAR; progress patient to next Phase/Stage  - Instruct patient to call for assistance with activity based on assessment  - Consider rehabilitation consult to assist with strengthening/weightbearing, etc   Outcome: Progressing     Problem: DISCHARGE PLANNING  Goal: Discharge to home or other facility with appropriate resources  Description  INTERVENTIONS:  - Identify barriers to discharge w/patient and caregiver  - Arrange for needed discharge resources and transportation as appropriate  - Identify discharge learning needs (meds, wound care, etc )  - Arrange for interpretive services to assist at discharge as needed  - Refer to Case Management Department for coordinating discharge planning if the patient needs post-hospital services based on physician/advanced practitioner order or complex needs related to functional status, cognitive ability, or social support system  Outcome: Progressing     Problem: Knowledge Deficit  Goal: Patient/family/caregiver demonstrates understanding of disease process, treatment plan, medications, and discharge instructions  Description  Complete learning assessment and assess knowledge base    Interventions:  - Provide teaching at level of understanding  - Provide teaching via preferred learning methods  Outcome: Progressing     Problem: CARDIOVASCULAR - ADULT  Goal: Maintains optimal cardiac output and hemodynamic stability  Description  INTERVENTIONS:  - Monitor I/O, vital signs and rhythm  - Monitor for S/S and trends of decreased cardiac output  - Administer and titrate ordered vasoactive medications to optimize hemodynamic stability  - Assess quality of pulses, skin color and temperature  - Assess for signs of decreased coronary artery perfusion  - Instruct patient to report change in severity of symptoms  Outcome: Progressing  Goal: Absence of cardiac dysrhythmias or at baseline rhythm  Description  INTERVENTIONS:  - Continuous cardiac monitoring, vital signs, obtain 12 lead EKG if ordered  - Administer antiarrhythmic and heart rate control medications as ordered  - Monitor electrolytes and administer replacement therapy as ordered  Outcome: Progressing     Problem: GENITOURINARY - ADULT  Goal: Urinary catheter remains patent  Description  INTERVENTIONS:  - Assess patency of urinary catheter  - If patient has a chronic wells, consider changing catheter if non-functioning  - Follow guidelines for intermittent irrigation of non-functioning urinary catheter  Outcome: Progressing     Problem: METABOLIC, FLUID AND ELECTROLYTES - ADULT  Goal: Electrolytes maintained within normal limits  Description  INTERVENTIONS:  - Monitor labs and assess patient for signs and symptoms of electrolyte imbalances  - Administer electrolyte replacement as ordered  - Monitor response to electrolyte replacements, including repeat lab results as appropriate  - Instruct patient on fluid and nutrition as appropriate  Outcome: Progressing  Goal: Fluid balance maintained  Description  INTERVENTIONS:  - Monitor labs   - Monitor I/O and WT  - Instruct patient on fluid and nutrition as appropriate  - Assess for signs & symptoms of volume excess or deficit  Outcome: Progressing  Goal: Glucose maintained within target range  Description  INTERVENTIONS:  - Monitor Blood Glucose as ordered  - Assess for signs and symptoms of hyperglycemia and hypoglycemia  - Administer ordered medications to maintain glucose within target range  - Assess nutritional intake and initiate nutrition service referral as needed  Outcome: Progressing     Problem: SKIN/TISSUE INTEGRITY - ADULT  Goal: Skin integrity remains intact  Description  INTERVENTIONS  - Identify patients at risk for skin breakdown  - Assess and monitor skin integrity  - Assess and monitor nutrition and hydration status  - Monitor labs (i e  albumin)  - Assess for incontinence   - Turn and reposition patient  - Assist with mobility/ambulation  - Relieve pressure over bony prominences  - Avoid friction and shearing  - Provide appropriate hygiene as needed including keeping skin clean and dry  - Evaluate need for skin moisturizer/barrier cream  - Collaborate with interdisciplinary team (i e  Nutrition, Rehabilitation, etc )   - Patient/family teaching  Outcome: Progressing     Problem: Potential for Falls  Goal: Patient will remain free of falls  Description  INTERVENTIONS:  - Assess patient frequently for physical needs  -  Identify cognitive and physical deficits and behaviors that affect risk of falls    -  [place] fall precautions as indicated by assessment   - Educate patient/family on patient safety including physical limitations  - Instruct patient to call for assistance with activity based on assessment  - Modify environment to reduce risk of injury  - Consider OT/PT consult to assist with strengthening/mobility  Outcome: Progressing

## 2020-03-14 NOTE — PROGRESS NOTES
Progress Note - Destiny Chapin 78 y o  female MRN: [id number]    Unit/Bed#: [place] [facility] -01 Encounter: [id number]        Subjective:     No acute events overnight  The confusion appears better  She is aware of where she is and the year  No abdominal pain  No nausea vomiting  No fevers or chills  Tolerating a clear liquid diet  Objective:     Vitals: Blood pressure 97/58, pulse 73, temperature (!) 97 4 °F (36 3 °C), resp  rate 17, SpO2 99 %  ,There is no height or weight on file to calculate BMI  Intake/Output Summary (Last 24 hours) at 3/14/2020 1143  Last data filed at 3/14/2020 7274  Gross per 24 hour   Intake 3378 33 ml   Output 400 ml   Net 2978 33 ml       Physical Exam:     General Appearance: Alert, appears stated age and cooperative  No asterixis  Appears to be chronically ill-appearing  Lungs: Clear to auscultation bilaterally, no rales or rhonchi, no labored breathing/accessory muscle use  Heart: Regular rate and rhythm, S1, S2 normal, no murmur, click, rub or gallop  Abdomen: Soft, non-tender, non-distended; bowel sounds normal; no masses or no organomegaly  Extremities: No cyanosis, edema    Invasive Devices     Peripheral Intravenous Line            Peripheral IV 03/12/20 Dorsal (posterior); Right Hand 2 days    Peripheral IV 03/12/20 Left Antecubital 1 day          Drain            Urethral Catheter 24 days                Lab Results:    Results from last 7 days   Lab Units 03/14/20  0502   WBC Thousand/uL 5 76   HEMOGLOBIN g/dL 10 1*   HEMATOCRIT % 33 5*   PLATELETS Thousands/uL 94*   NEUTROS PCT % 71   LYMPHS PCT % 18   MONOS PCT % 8   EOS PCT % 1     Results from last 7 days   Lab Units 03/14/20  0932 03/14/20  0502   POTASSIUM mmol/L 4 1 3 5   CHLORIDE mmol/L 107 102   CO2 mmol/L 27 26   BUN mg/dL 48* 46*   CREATININE mg/dL 1 74* 1 70*   CALCIUM mg/dL 8 6 7 9*   ALK PHOS U/L  --  583*   ALT U/L  --  79*   AST U/L  --  136*     Results from last 7 days   Lab Units 03/14/20  0502 INR  1 30*           Imaging Studies: I have personally reviewed pertinent imaging studies  Xr Chest 2 Views    Result Date: 3/12/2020  Impression: No acute cardiopulmonary disease  Stable cardiomegaly  Workstation performed: [id number]     Ct Head Without Contrast    Result Date: 3/12/2020  Impression: 1  Interval resolution of interhemispheric subdural hematoma  Diminished attenuation of subacute right thalamic hemorrhage, stable in size measuring 7 x 5 mm  No acute intracranial abnormality  2   Atrophy with stable severe chronic small vessel ischemic changes  Workstation performed: [id number]     Ir Paracentesis    Result Date: 3/13/2020  Impression: Successful ultrasound-guided paracentesis PLAN: Albumin per protocol Workstation performed: [id number]     Us Right Upper Quadrant With Liver Dopplers    Result Date: 3/12/2020  Impression: 1  Partial portal vein thrombosis throughout the enlarged portal veins  2  Main portal vein and right portal vein is reversed  Main portal vein is enlarged measuring 3 cm  These findings suggest portal hypertension with partial nonocclusive portal vein thrombosis  3  Cirrhotic nodular liver  4  Moderate to large ascites throughout the abdomen  Workstation performed: [id number]       ASSESMENT/ PLAN:     1  Cirrhosis - possible alcohol  No further alcohol intake  Recent ultrasound showed partial portal venous thrombosis and the flow in the main portal vein and right portal vein was reversed  This was suggestive of portal hypertension  No lesions were seen  I would further recommend doing an AFP  She may require endoscopy in the near future for variceal screening  Check hepatitis panel and other workup for chronic liver disease  2  Ascites  Her serum ascites albumin gradient is less than 1 1 which would go against portal hypertension  However it also does not make sense that her albumin is so high at 2 5 with total protein is less than 2   I would recommend repeating paracentesis with fluid studies along with cytology  There is no evidence of SBP  Recommend doing cytology with future paracenteses  She is on diuresis as an outpatient  Will need to restart however at this time her creatinine is high  Recommend nephrology evaluation  Limit sodium intake to 2 g     3  Hepatic encephalopathy  Improved at this time  She has not been started on lactulose  If she does recur then would recommend using Xifaxan or lactulose  4  Portal venous thrombosis  Seen by Hematology  She is certainly at high risk for complications of anticoagulation from frequent falls  Recommend holding off on the anticoagulation  Recommend checking AFP levels  May even consider doing a triple phase CT to confirm that this is not a tumor thrombus  5  Malnutrition  May consider advancing her diet  Discussed with primary

## 2020-03-14 NOTE — PLAN OF CARE
Problem: Prexisting or High Potential for Compromised Skin Integrity  Goal: Skin integrity is maintained or improved  Description  INTERVENTIONS:  - Identify patients at risk for skin breakdown  - Assess and monitor skin integrity  - Assess and monitor nutrition and hydration status  - Monitor labs   - Assess for incontinence   - Turn and reposition patient  - Assist with mobility/ambulation  - Relieve pressure over bony prominences  - Avoid friction and shearing  - Provide appropriate hygiene as needed including keeping skin clean and dry  - Evaluate need for skin moisturizer/barrier cream  - Collaborate with interdisciplinary team   - Patient/family teaching  - Consider wound care consult   Outcome: Progressing     Problem: PAIN - ADULT  Goal: Verbalizes/displays adequate comfort level or baseline comfort level  Description  Interventions:  - Encourage patient to monitor pain and request assistance  - Assess pain using appropriate pain scale  - Administer analgesics based on type and severity of pain and evaluate response  - Implement non-pharmacological measures as appropriate and evaluate response  - Consider cultural and social influences on pain and pain management  - Notify physician/advanced practitioner if interventions unsuccessful or patient reports new pain  Outcome: Progressing     Problem: INFECTION - ADULT  Goal: Absence or prevention of progression during hospitalization  Description  INTERVENTIONS:  - Assess and monitor for signs and symptoms of infection  - Monitor lab/diagnostic results  - Monitor all insertion sites, i e  indwelling lines, tubes, and drains  - Monitor endotracheal if appropriate and nasal secretions for changes in amount and color  - [place] appropriate cooling/warming therapies per order  - Administer medications as ordered  - Instruct and encourage patient and family to use good hand hygiene technique  - Identify and instruct in appropriate isolation precautions for identified infection/condition  Outcome: Progressing     Problem: SAFETY ADULT  Goal: Patient will remain free of falls  Description  INTERVENTIONS:  - Assess patient frequently for physical needs  -  Identify cognitive and physical deficits and behaviors that affect risk of falls    -  [place] fall precautions as indicated by assessment   - Educate patient/family on patient safety including physical limitations  - Instruct patient to call for assistance with activity based on assessment  - Modify environment to reduce risk of injury  - Consider OT/PT consult to assist with strengthening/mobility  Outcome: Progressing  Goal: Maintain or return to baseline ADL function  Description  INTERVENTIONS:  -  Assess patient's ability to carry out ADLs; assess patient's baseline for ADL function and identify physical deficits which impact ability to perform ADLs (bathing, care of mouth/teeth, toileting, grooming, dressing, etc )  - Assess/evaluate cause of self-care deficits   - Assess range of motion  - Assess patient's mobility; develop plan if impaired  - Assess patient's need for assistive devices and provide as appropriate  - Encourage maximum independence but intervene and supervise when necessary  - Involve family in performance of ADLs  - Assess for home care needs following discharge   - Consider OT consult to assist with ADL evaluation and planning for discharge  - Provide patient education as appropriate  Outcome: Progressing  Goal: Maintain or return mobility status to optimal level  Description  INTERVENTIONS:  - Assess patient's baseline mobility status (ambulation, transfers, stairs, etc )    - Identify cognitive and physical deficits and behaviors that affect mobility  - Identify mobility aids required to assist with transfers and/or ambulation (gait belt, sit-to-stand, lift, walker, cane, etc )  - [place] fall precautions as indicated by assessment  - Record patient progress and toleration of activity level on Mobility SBAR; progress patient to next Phase/Stage  - Instruct patient to call for assistance with activity based on assessment  - Consider rehabilitation consult to assist with strengthening/weightbearing, etc   Outcome: Progressing     Problem: DISCHARGE PLANNING  Goal: Discharge to home or other facility with appropriate resources  Description  INTERVENTIONS:  - Identify barriers to discharge w/patient and caregiver  - Arrange for needed discharge resources and transportation as appropriate  - Identify discharge learning needs (meds, wound care, etc )  - Arrange for interpretive services to assist at discharge as needed  - Refer to Case Management Department for coordinating discharge planning if the patient needs post-hospital services based on physician/advanced practitioner order or complex needs related to functional status, cognitive ability, or social support system  Outcome: Progressing     Problem: Knowledge Deficit  Goal: Patient/family/caregiver demonstrates understanding of disease process, treatment plan, medications, and discharge instructions  Description  Complete learning assessment and assess knowledge base    Interventions:  - Provide teaching at level of understanding  - Provide teaching via preferred learning methods  Outcome: Progressing     Problem: CARDIOVASCULAR - ADULT  Goal: Maintains optimal cardiac output and hemodynamic stability  Description  INTERVENTIONS:  - Monitor I/O, vital signs and rhythm  - Monitor for S/S and trends of decreased cardiac output  - Administer and titrate ordered vasoactive medications to optimize hemodynamic stability  - Assess quality of pulses, skin color and temperature  - Assess for signs of decreased coronary artery perfusion  - Instruct patient to report change in severity of symptoms  Outcome: Progressing  Goal: Absence of cardiac dysrhythmias or at baseline rhythm  Description  INTERVENTIONS:  - Continuous cardiac monitoring, vital signs, obtain 12 lead EKG if ordered  - Administer antiarrhythmic and heart rate control medications as ordered  - Monitor electrolytes and administer replacement therapy as ordered  Outcome: Progressing     Problem: GENITOURINARY - ADULT  Goal: Urinary catheter remains patent  Description  INTERVENTIONS:  - Assess patency of urinary catheter  - If patient has a chronic wells, consider changing catheter if non-functioning  - Follow guidelines for intermittent irrigation of non-functioning urinary catheter  Outcome: Progressing     Problem: METABOLIC, FLUID AND ELECTROLYTES - ADULT  Goal: Electrolytes maintained within normal limits  Description  INTERVENTIONS:  - Monitor labs and assess patient for signs and symptoms of electrolyte imbalances  - Administer electrolyte replacement as ordered  - Monitor response to electrolyte replacements, including repeat lab results as appropriate  - Instruct patient on fluid and nutrition as appropriate  Outcome: Progressing  Goal: Fluid balance maintained  Description  INTERVENTIONS:  - Monitor labs   - Monitor I/O and WT  - Instruct patient on fluid and nutrition as appropriate  - Assess for signs & symptoms of volume excess or deficit  Outcome: Progressing     Problem: SKIN/TISSUE INTEGRITY - ADULT  Goal: Skin integrity remains intact  Description  INTERVENTIONS  - Identify patients at risk for skin breakdown  - Assess and monitor skin integrity  - Assess and monitor nutrition and hydration status  - Monitor labs (i e  albumin)  - Assess for incontinence   - Turn and reposition patient  - Assist with mobility/ambulation  - Relieve pressure over bony prominences  - Avoid friction and shearing  - Provide appropriate hygiene as needed including keeping skin clean and dry  - Evaluate need for skin moisturizer/barrier cream  - Collaborate with interdisciplinary team (i e  Nutrition, Rehabilitation, etc )   - Patient/family teaching  Outcome: Progressing     Problem: Potential for Falls  Goal: Patient will remain free of falls  Description  INTERVENTIONS:  - Assess patient frequently for physical needs  -  Identify cognitive and physical deficits and behaviors that affect risk of falls    -  [place] fall precautions as indicated by assessment   - Educate patient/family on patient safety including physical limitations  - Instruct patient to call for assistance with activity based on assessment  - Modify environment to reduce risk of injury  - Consider OT/PT consult to assist with strengthening/mobility  Outcome: Progressing

## 2020-03-14 NOTE — PROGRESS NOTES
NEPHROLOGY PROGRESS NOTE   Hunter Maynard 78 y o  female MRN: [id number]  Unit/Bed#: [facility] [facility] 2 -01 Encounter: [id number]      ASSESSMENT/PLAN:  Acute kidney injury (POA) on chronic kidney disease, stage II:  - acute kidney injury suspect secondary to volume depletion with poor oral intake and diuretic use + hemodynamic perturbations + ascites  - upon review of medical records, it appears that the patient has a baseline Creatinine of 0 7 - 0 8 mg/dL  - chronic kidney disease suspect secondary to hypertensive nephrosclerosis + age-related nephron loss  - patient was admitted with a creatinine of 1 99 mg/dL  - patient's creatinine today is at 1 74 mg/dL  - UA revealed urine specific gravity of 1 025, moderate blood, positive nitrite, moderate leukocytes, 10-20 RBC, innumerable WBC, innumerable bacteria  - urine creatinine 112 9, urine protein 49, urine sodium 5, urine protein creatinine ratio 0 43   - ultrasound revealed right kidney 9 6 cm    - chest x-ray revealed clear lungs with no pneumothorax or pleural effusion   - patient with chronic wells catheter  - avoid NSAIDS, nephrotoxins, IV contrast if possible  - adjust medications to appropriate GFR   - avoid hypotension/ fluctuations in blood pressure to prevent decreased renal perfusion    - check BMP, magnesium, phosphorus in a m    - await renal recovery  - monitor volume status with strict intake/output  - please perform daily weights       Blood pressure/heart rate:  - blood pressure on the lower side, however stable  - currently on:  Amlodipine 5 mg daily and propanolol 10 mg b i d    - will discontinue amlodipine now  - hold parameters placed on antihypertensives  - optimize hemodynamics  - maintain MAP > 65mmHg     - avoid hypotension/ fluctuations in blood pressure       H/H:  - most recent hemoglobin at 10 1 grams/deciliter   - maintain hemoglobin greater than 8 grams/deciliter    - management per primary team      Volume status:  - clinically patient appears to be minimally hypovolemic however with chronic lower extremity edema      Hypernatremia:  - sodium 147 upon admission   - most recent sodium 141, however glucose still elevated  - patient with history of hyponatremia  Salt tabs 2 g q i d  And Lasix 10 mg daily on hold currently  - encourage free water intake  - currently on DW5, will decrease to 75 mL  - repeat BMP in 8 hours to determine regimen adjustments  - cautious with lowering sodium too fast/drastically as patient with significant history of hyponatremia requiring salt tablets and diuretics  Mild Hyperkalemia:  - likely due to decreased distal delivery  - maintain low potassium diet  - most recent potassium 4 1    - continue to monitor with BMP  Transaminitis  - in the setting of liver cirrhosis  -ultrasound revealed partial portal vein thrombosis  Main portal vein and right portal vein is reversed  Main portal vein is enlarged  These findings suggest portal hypertension with partial nonocclusive portal vein thrombosis  Cirrhotic nodular liver  Moderate to large ascites throughout the abdomen   - management as per primary team and GI consult placed  - paracentesis completed on 3/13 for 5 1 L fluid removal     - placed on albumin infusions, discussed plan with primary team prior to paracentesis       Chronic lower extremity edema:  - unchanged per patient  - on Lasix 10 mg as outpatient  - continue to hold diuretics  - most recent EF 70%  - management as per primary team      Other:  Right femur fracture and recent subdural hematoma  SUBJECTIVE:  Patient resting in bed  Patient has significant hearing disorder  She denies shortness of breath, chest pain, nausea, vomiting, diarrhea       OBJECTIVE:  Current Weight:    Vitals:    03/14/20 0800   BP: 97/58   Pulse: 73   Resp: 17   Temp: (!) 97 4 °F (36 3 °C)   SpO2: 99%       Intake/Output Summary (Last 24 hours) at 3/14/2020 1244  Last data filed at 3/14/2020 9060  Gross per 24 hour   Intake 3378 33 ml   Output 400 ml   Net 2978 33 ml       General:  No acute distress resting in bed  Skin:  Warm, no rash  Eyes:  Sclerae anicteric  ENT:  Moist lips mucous membranes  Neck:  Supple with trachea midline  Chest:  Clear breath sounds bilaterally   CVS:  Regular rate and regular rhythm  Abdomen:  Soft, distended, normoactive bowel sounds  Extremities:  Chronic bilateral lower extremity edema    Neuro:  Alert and oriented  Psych:  Appropriate affect      Medications:  Scheduled Meds:  Current Facility-Administered Medications:  acetaminophen 650 mg Oral Q6H PRN Radha Quan PA-C   albumin human 25 g Intravenous Once Radha Patino MD   amLODIPine 5 mg Oral Daily MELVIN Lomeli   famotidine 10 mg Oral Daily PRN Radha Quan PA-C   gabapentin 100 mg Oral TID PRN Radha Quan PA-C   ondansetron 4 mg Intravenous Q6H PRN Radha Quan PA-C   oxyCODONE 5 mg Oral Q4H PRN Radha Quan PA-C   pantoprazole 40 mg Intravenous Q12H [facility] Radha Quan PA-C   propranolol 10 mg Oral BID Radha Quan PA-C   tamsulosin 0 4 mg Oral HS Radha Quan PA-C       PRN Meds:   acetaminophen    famotidine    gabapentin    ondansetron    oxyCODONE    Continuous Infusions:     Laboratory Results:  Results from last 7 days   Lab Units 03/14/20  0932 03/14/20  0502 03/13/20  2311 03/13/20  1442 03/13/20  0448 03/12/20  2350 03/12/20  1717   WBC Thousand/uL  --  5 76  --   --  9 62  --  11 25*   HEMOGLOBIN g/dL  --  10 1*  --   --  11 9 12 1 12 5   HEMATOCRIT %  --  33 5*  --   --  39 9 40 8 42 0   PLATELETS Thousands/uL  --  94*  --   --  115*  --  142*   SODIUM mmol/L 141 136 148* 147* 148*  --  147*   POTASSIUM mmol/L 4 1 3 5 4 0 4 6 4 5  --  4 7   CHLORIDE mmol/L 107 102 113* 113* 113*  --  112*   CO2 mmol/L 27 26 26 28 27  --  27   BUN mg/dL 48* 46* 53* 55* 57*  --  57*   CREATININE mg/dL 1 74* 1 70* 1 87* 1 78* 1 92*  --  1 99*   CALCIUM mg/dL 8 6 7 9* 8 5 8 9 8 8  --  8 9   MAGNESIUM mg/dL  --  2 2  --   --   --   --   --    PHOSPHORUS mg/dL  --  2 7  --   --   --   --   --

## 2020-03-14 NOTE — PROGRESS NOTES
Progress Note - Lyly Graff 1940, 78 y o  female MRN: [id number]    Unit/Bed#: [address] 2 [address] [id number] Encounter: [id number]    Primary Care Provider: Jamaal Jiménez MD   Date and time admitted to hospital: 3/12/2020  4:55 PM        * Acute kidney injury superimposed on chronic kidney disease [facility])  Assessment & Plan  49-year-old female with decompensated alcoholic liver cirrhosis was admitted due to metabolic encephalopathy and acute kidney injury on superimposed chronic kidney disease stage 2 possibly due to dehydration due to poor p o  Intake and diuretics  She underwent paracentesis on 3/13/20, negative for SBP  SAAG less than 1 1  GI recommending repeat studies on future paracentesis  - Another order of albumin today  - GI recommendations appreciated    Hepatic encephalopathy ([facility] [address] )  Assessment & Plan  - CT head negative  - continue monitoring  Will consider lactulose or Rifaximin once resuscitated appropriately  Cirrhosis of liver (HCC)  Assessment & Plan  Alcoholic liver cirrhosis  Resides in [address] [place] [address]  - appreciate GI recommendations    Recent Labs     03/12/20  1717 03/12/20  2350 03/13/20  0448 03/14/20  0502   *  --  153* 136*   *  --  90* 79*   *  --  115* 94*   INR 1 20* 1 34*  --  1 30*         A-fib (HCC)  Assessment & Plan  Rate controlled on propranolol   - Not on AC due to cirrhosis/SDH    Portal vein thrombosis  Assessment & Plan  Hematology consulted for second opinion  Although risks (subdural hematoma bleed) seems to outweigh the benefits at this point, despite her having atrial fibrillation and portal vein thrombosis  - Appreciate hemaonc recommendations  Will hold of anticoagulation  Patient understands risks and benefits      Chronic diastolic (congestive) heart failure [facility])  Assessment & Plan  Wt Readings from Last 3 Encounters:   03/10/20 98 4 kg (217 lb)   03/04/20 98 7 kg (217 lb 9 5 oz)   02/19/20 89 7 kg (197 lb 12 oz)     Seems hypovolemic at this point  Lower extremity edema unchanged per patient  - hold diuretics for now        Essential hypertension  Assessment & Plan  Continue home medications    Subdural hematoma (HCC)  Assessment & Plan  CT head reviewed  Will take this into consideration if we will pursue anticoagulation for her portal vein thrombosis  VTE Pharmacologic Prophylaxis:   Pharmacologic: Pharmacologic VTE Prophylaxis contraindicated due to Cirrhosis, thrombocytopenia, SDH  Mechanical VTE Prophylaxis in Place: Yes    Patient Centered Rounds: I have performed bedside rounds with nursing staff today  Discussions with Specialists or Other Care Team Provider: Nursing, renal    Education and Discussions with Family / Patient: Patient    Time Spent for Care: 30 minutes  More than 50% of total time spent on counseling and coordination of care as described above  Current Length of Stay: 2 day(s)    Current Patient Status: Inpatient   Certification Statement: The patient will continue to require additional inpatient hospital stay due to Acute kidney injury management, IV albumin, renal eval    Discharge Plan: Still active    Code Status: Level 3 - DNAR and DNI      Subjective:   Patient seen examined at bedside  States she feels much better after she received her paracentesis yesterday  No other acute complaints overnight  She seems a little bit more congested today  X-ray ordered  Will consider Lasix if she gets short of breath  Objective:     Vitals:   Temp (24hrs), Av 6 °F (36 4 °C), Min:97 4 °F (36 3 °C), Max:97 9 °F (36 6 °C)    Temp:  [97 4 °F (36 3 °C)-97 9 °F (36 6 °C)] 97 4 °F (36 3 °C)  HR:  [70-82] 73  Resp:  [16-20] 17  BP: ()/(54-77) 97/58  SpO2:  [95 %-100 %] 99 %  There is no height or weight on file to calculate BMI  Input and Output Summary (last 24 hours):        Intake/Output Summary (Last 24 hours) at 3/14/2020 0859  Last data filed at 3/14/2020 0101  Gross per 24 hour   Intake 960 ml Output 400 ml   Net 560 ml       Physical Exam:     Physical Exam   Constitutional: No distress  HENT:   Head: Normocephalic and atraumatic  Eyes: Pupils are equal, round, and reactive to light  EOM are normal    Neck: Neck supple  Cardiovascular: Normal rate and regular rhythm  Pulmonary/Chest: Effort normal  No stridor  No respiratory distress  She has no wheezes  She has rales  Abdominal: Soft  Bowel sounds are normal  She exhibits distension  There is no tenderness  There is no rebound and no guarding  Neurological: She is alert  Not oriented to year, month, or day  Oriented to place and self   Skin: Skin is warm and dry  Vitals reviewed  Additional Data:     Labs:    Results from last 7 days   Lab Units 03/14/20  0502   WBC Thousand/uL 5 76   HEMOGLOBIN g/dL 10 1*   HEMATOCRIT % 33 5*   PLATELETS Thousands/uL 94*   NEUTROS PCT % 71   LYMPHS PCT % 18   MONOS PCT % 8   EOS PCT % 1     Results from last 7 days   Lab Units 03/14/20  0502   SODIUM mmol/L 136   POTASSIUM mmol/L 3 5   CHLORIDE mmol/L 102   CO2 mmol/L 26   BUN mg/dL 46*   CREATININE mg/dL 1 70*   ANION GAP mmol/L 8   CALCIUM mg/dL 7 9*   ALBUMIN g/dL 2 0*   TOTAL BILIRUBIN mg/dL 3 32*   ALK PHOS U/L 583*   ALT U/L 79*   AST U/L 136*   GLUCOSE RANDOM mg/dL 430*     Results from last 7 days   Lab Units 03/14/20  0502   INR  1 30*     Results from last 7 days   Lab Units 03/14/20  0843   POC GLUCOSE mg/dl 170*                   * I Have Reviewed All Lab Data Listed Above  * Additional Pertinent Lab Tests Reviewed:  [address] Admission Reviewed    Imaging:    Imaging Reports Reviewed Today Include: IR paracentesis    Recent Cultures (last 7 days):     Results from last 7 days   Lab Units 03/13/20  1629   GRAM STAIN RESULT  Rare Polys  No bacteria seen       Last 24 Hours Medication List:     Current Facility-Administered Medications:  acetaminophen 650 mg Oral Q6H PRN Radha Quan PA-C   albumin human 12 5 g Intravenous Once Kylee Pérez MD   amLODIPine 5 mg Oral Daily MELVIN Lomeli   famotidine 10 mg Oral Daily PRN Radha Quan PA-C   gabapentin 100 mg Oral TID PRN Radha Quan PA-C   ondansetron 4 mg Intravenous Q6H PRN Radha Quan PA-C   oxyCODONE 5 mg Oral Q4H PRN Radha Quan PA-C   pantoprazole 40 mg Intravenous Q12H [address] Radha Quan PA-C   propranolol 10 mg Oral BID Radha Quan PA-C   tamsulosin 0 4 mg Oral HS Radha Coon PA-C        Today, Patient Was Seen By: Shanice Dow MD    ** Please Note: Dictation voice to text software may have been used in the creation of this document   **

## 2020-03-15 ENCOUNTER — TELEPHONE (OUTPATIENT)
Dept: FAMILY MEDICINE CLINIC | Facility: CLINIC | Age: 80
End: 2020-03-15

## 2020-03-15 LAB
AFP-TM SERPL-MCNC: 492.6 NG/ML (ref 0.5–8)
ALBUMIN SERPL BCP-MCNC: 2.9 G/DL (ref 3.5–5)
ALP SERPL-CCNC: 672 U/L (ref 46–116)
ALT SERPL W P-5'-P-CCNC: 100 U/L (ref 12–78)
ANION GAP SERPL CALCULATED.3IONS-SCNC: 10 MMOL/L (ref 4–13)
AST SERPL W P-5'-P-CCNC: 187 U/L (ref 5–45)
BILIRUB DIRECT SERPL-MCNC: 2.9 MG/DL (ref 0–0.2)
BILIRUB SERPL-MCNC: 4.93 MG/DL (ref 0.2–1)
BUN SERPL-MCNC: 43 MG/DL (ref 5–25)
CALCIUM SERPL-MCNC: 8.8 MG/DL (ref 8.3–10.1)
CHLORIDE SERPL-SCNC: 105 MMOL/L (ref 100–108)
CO2 SERPL-SCNC: 24 MMOL/L (ref 21–32)
CREAT SERPL-MCNC: 1.59 MG/DL (ref 0.6–1.3)
GFR SERPL CREATININE-BSD FRML MDRD: 30 ML/MIN/1.73SQ M
GLUCOSE SERPL-MCNC: 151 MG/DL (ref 65–140)
MAGNESIUM SERPL-MCNC: 2.4 MG/DL (ref 1.6–2.6)
PHOSPHATE SERPL-MCNC: 2.4 MG/DL (ref 2.3–4.1)
POTASSIUM SERPL-SCNC: 4.7 MMOL/L (ref 3.5–5.3)
PROT SERPL-MCNC: 5.8 G/DL (ref 6.4–8.2)
SODIUM SERPL-SCNC: 139 MMOL/L (ref 136–145)

## 2020-03-15 PROCEDURE — C9113 INJ PANTOPRAZOLE SODIUM, VIA: HCPCS | Performed by: PHYSICIAN ASSISTANT

## 2020-03-15 PROCEDURE — 84100 ASSAY OF PHOSPHORUS: CPT | Performed by: NURSE PRACTITIONER

## 2020-03-15 PROCEDURE — 84165 PROTEIN E-PHORESIS SERUM: CPT | Performed by: PATHOLOGY

## 2020-03-15 PROCEDURE — 86704 HEP B CORE ANTIBODY TOTAL: CPT | Performed by: INTERNAL MEDICINE

## 2020-03-15 PROCEDURE — 83735 ASSAY OF MAGNESIUM: CPT | Performed by: NURSE PRACTITIONER

## 2020-03-15 PROCEDURE — 86803 HEPATITIS C AB TEST: CPT | Performed by: INTERNAL MEDICINE

## 2020-03-15 PROCEDURE — 86256 FLUORESCENT ANTIBODY TITER: CPT | Performed by: INTERNAL MEDICINE

## 2020-03-15 PROCEDURE — 80048 BASIC METABOLIC PNL TOTAL CA: CPT | Performed by: INTERNAL MEDICINE

## 2020-03-15 PROCEDURE — 82105 ALPHA-FETOPROTEIN SERUM: CPT | Performed by: INTERNAL MEDICINE

## 2020-03-15 PROCEDURE — 80076 HEPATIC FUNCTION PANEL: CPT | Performed by: PHYSICIAN ASSISTANT

## 2020-03-15 PROCEDURE — 86705 HEP B CORE ANTIBODY IGM: CPT | Performed by: INTERNAL MEDICINE

## 2020-03-15 PROCEDURE — 87340 HEPATITIS B SURFACE AG IA: CPT | Performed by: INTERNAL MEDICINE

## 2020-03-15 PROCEDURE — 99232 SBSQ HOSP IP/OBS MODERATE 35: CPT | Performed by: INTERNAL MEDICINE

## 2020-03-15 PROCEDURE — 99232 SBSQ HOSP IP/OBS MODERATE 35: CPT | Performed by: STUDENT IN AN ORGANIZED HEALTH CARE EDUCATION/TRAINING PROGRAM

## 2020-03-15 PROCEDURE — 84165 PROTEIN E-PHORESIS SERUM: CPT | Performed by: INTERNAL MEDICINE

## 2020-03-15 PROCEDURE — 86235 NUCLEAR ANTIGEN ANTIBODY: CPT | Performed by: INTERNAL MEDICINE

## 2020-03-15 RX ADMIN — OXYCODONE HYDROCHLORIDE 5 MG: 5 TABLET ORAL at 16:38

## 2020-03-15 RX ADMIN — SODIUM CHLORIDE 75 ML/HR: 0.45 INJECTION, SOLUTION INTRAVENOUS at 08:50

## 2020-03-15 RX ADMIN — Medication 40 MG: at 21:35

## 2020-03-15 RX ADMIN — PANTOPRAZOLE SODIUM 40 MG: 40 INJECTION, POWDER, FOR SOLUTION INTRAVENOUS at 08:50

## 2020-03-15 RX ADMIN — TAMSULOSIN HYDROCHLORIDE 0.4 MG: 0.4 CAPSULE ORAL at 21:21

## 2020-03-15 RX ADMIN — PROPRANOLOL HYDROCHLORIDE 10 MG: 10 TABLET ORAL at 17:59

## 2020-03-15 RX ADMIN — OXYCODONE HYDROCHLORIDE 5 MG: 5 TABLET ORAL at 05:29

## 2020-03-15 NOTE — PROGRESS NOTES
The pantoprazole has / have been converted to Oral omeprazole per [address] IV-to-PO Auto-Conversion Protocol for Adults as approved by the Pharmacy and Therapeutics Committee  The patient met all eligible criteria:  3 Age = 25years old   2) Received at least one dose of the IV form   3) Receiving at least one other scheduled oral/enteral medication   4) Tolerating an oral/enteral diet   and did not have any exclusions:   1) Critical care patient   2) Active GI bleed (IF assessing H2RAs or PPIs)   3) Continuous tube feeding (IF assessing cipro, doxycycline, levofloxacin, minocycline, rifampin, or voriconazole)   4) Receiving PO vancomycin (IF assessing metronidazole)   5) Persistent nausea and/or vomiting   6) Ileus or gastrointestinal obstruction   7) [place]/nasogastric tube set for continuous suction   8) Specific order not to automatically convert to PO (in the order's comments or if discussed in the most recent Infectious Disease or primary team's progress notes)       Homa Hawk, DeuceD

## 2020-03-15 NOTE — ASSESSMENT & PLAN NOTE
- CT head negative  - continue monitoring  Will consider lactulose or Rifaximin once resuscitated appropriately

## 2020-03-15 NOTE — PROGRESS NOTES
NEPHROLOGY PROGRESS NOTE   Kassi Alejandra 78 y o  female MRN: [id number]  Unit/Bed#: [facility] [address] 2 -01 Encounter: [id number]      ASSESSMENT/PLAN:  Acute kidney injury (POA) on chronic kidney disease, stage II:  - acute kidney injury suspect secondary to volume depletion with poor oral intake and diuretic use + hemodynamic perturbations + ascites    - upon review of medical records, it appears that the patient has a baseline Creatinine of 0 7 - 0 8 mg/dL  - chronic kidney disease suspect secondary to hypertensive nephrosclerosis + age-related nephron loss  - patient was admitted with a creatinine of 1 99 mg/dL  - patient's creatinine today is at 1 59 mg/dL  - will discontinue IVF now and monitor renal indices off IVF  - UA revealed urine specific gravity of 1 025, moderate blood, positive nitrite, moderate leukocytes, 10-20 RBC, innumerable WBC, innumerable bacteria  - urine creatinine 112 9, urine protein 49, urine sodium 5, urine protein creatinine ratio 0 43   - ultrasound revealed right kidney 9 6 cm    - chest x-ray revealed clear lungs with no pneumothorax or pleural effusion   - patient with chronic wells catheter  - avoid NSAIDS, nephrotoxins, IV contrast if possible  - adjust medications to appropriate GFR   - avoid hypotension/ fluctuations in blood pressure to prevent decreased renal perfusion    - check BMP in a m   - await renal recovery  - monitor volume status with strict intake/output  - please perform daily weights       Blood pressure/heart rate:  - blood pressure on the lower side, however stable  - currently on:  Amlodipine 5 mg daily and propanolol 10 mg b i d               - will discontinue amlodipine now  - hold parameters placed on antihypertensives  - optimize hemodynamics  - maintain MAP > 65mmHg     - avoid hypotension/ fluctuations in blood pressure       H/H:  - most recent hemoglobin at 10 1 grams/deciliter   - maintain hemoglobin greater than 8 grams/deciliter    - management per primary team      Volume status:  - clinically patient appears to be minimally hypovolemic however with chronic lower extremity edema      Hypernatremia:  - sodium 147 upon admission   - improved, most recent sodium 139                - patient with history of hyponatremia   Salt tabs 2 g q i d  And Lasix 10 mg daily on hold currently  - continue to monitor  Mild Hyperkalemia:  - most recent potassium 4 7 today  - likely due to decreased distal delivery  - maintain low potassium diet    - continue to monitor with BMP       Transaminitis  - in the setting of liver cirrhosis  - ultrasound revealed partial portal vein thrombosis   Main portal vein and right portal vein is reversed   Main portal vein is enlarged   These findings suggest portal hypertension with partial nonocclusive portal vein thrombosis   Cirrhotic nodular liver   Moderate to large ascites throughout the abdomen  - paracentesis completed on 3/13 for 5 1 L fluid removal    - management as per primary team and GI       Chronic lower extremity edema:  - unchanged per patient  - on Lasix 10 mg as outpatient  - continue to hold diuretics for now  - most recent EF 70%  - management as per primary team      Other:  Right femur fracture and recent subdural hematoma  SUBJECTIVE:  Patient resting in bed  Patient ate most of her breakfast this a m  Patient denies nausea, vomiting, diarrhea      OBJECTIVE:  Current Weight:    Vitals:    03/15/20 0728   BP: 102/68   Pulse: 84   Resp: 16   Temp: (!) 97 3 °F (36 3 °C)   SpO2: 97%       Intake/Output Summary (Last 24 hours) at 3/15/2020 1242  Last data filed at 3/15/2020 0930  Gross per 24 hour   Intake 1897 5 ml   Output 925 ml   Net 972 5 ml       General:  No acute distress resting in bed  Skin:  Warm, no rash  Eyes:  Sclerae anicteric  ENT:  Moist lips and mucous membranes  Neck:  Supple trachea midline  Chest:  Some fine crackles at the bases, diminished bilaterally   CVS:  Regular rate and rhythm  Abdomen:  Soft, distended, normoactive bowel sounds  Extremities:  Trace edema bilaterally, right leg in brace   Neuro:  Alert and oriented  Psych:  Appropriate affect      Medications:  Scheduled Meds:  Current Facility-Administered Medications:  acetaminophen 650 mg Oral Q6H PRN Radha Quan PA-C    famotidine 10 mg Oral Daily PRN Radha Quan PA-C    gabapentin 100 mg Oral TID PRN Radha Quan PA-C    ondansetron 4 mg Intravenous Q6H PRN Radha Quan PA-C    oxyCODONE 5 mg Oral Q4H PRN Radha Quan PA-C    pantoprazole 40 mg Intravenous Q12H [facility] Radha Quan PA-C    propranolol 10 mg Oral BID Radha Quan PA-C    sodium chloride 75 mL/hr Intravenous Continuous Marie Jordan MD Last Rate: Stopped (03/15/20 0930)   tamsulosin 0 4 mg Oral HS Radha Quan PA-C        PRN Meds:   acetaminophen    famotidine    gabapentin    ondansetron    oxyCODONE    Continuous Infusions:  sodium chloride 75 mL/hr Last Rate: Stopped (03/15/20 0930)       Laboratory Results:  Results from last 7 days   Lab Units 03/15/20  0450 03/14/20  1505 03/14/20  0932 03/14/20  0502 03/13/20  2311 03/13/20  1442 03/13/20  0448 03/12/20  2350 03/12/20  1717   WBC Thousand/uL  --   --   --  5 76  --   --  9 62  --  11 25*   HEMOGLOBIN g/dL  --   --   --  10 1*  --   --  11 9 12 1 12 5   HEMATOCRIT %  --   --   --  33 5*  --   --  39 9 40 8 42 0   PLATELETS Thousands/uL  --   --   --  94*  --   --  115*  --  142*   SODIUM mmol/L 139 140 141 136 148* 147* 148*  --  147*   POTASSIUM mmol/L 4 7 4 2 4 1 3 5 4 0 4 6 4 5  --  4 7   CHLORIDE mmol/L 105 105 107 102 113* 113* 113*  --  112*   CO2 mmol/L 24 26 27 26 26 28 27  --  27   BUN mg/dL 43* 46* 48* 46* 53* 55* 57*  --  57*   CREATININE mg/dL 1 59* 1 70* 1 74* 1 70* 1 87* 1 78* 1 92*  --  1 99*   CALCIUM mg/dL 8 8 8 5 8 6 7 9* 8 5 8 9 8 8  --  8 9   MAGNESIUM mg/dL 2 4  --   --  2 2  --   --   --   --   -- PHOSPHORUS mg/dL 2 4  --   --  2 7  --   --   --   --   --

## 2020-03-15 NOTE — TELEPHONE ENCOUNTER
Looks like pt admitted 3/12-confirm with  whether will still be in hospital Thursday and if being discharge dis she at home or a rehab facility after her fall-looked to me that she was at [place]

## 2020-03-15 NOTE — ASSESSMENT & PLAN NOTE
75-year-old female with decompensated alcoholic liver cirrhosis was admitted due to metabolic encephalopathy and acute kidney injury on superimposed chronic kidney disease stage 2 possibly due to dehydration due to poor p o  Intake and diuretics  She underwent paracentesis on 3/13/20, negative for SBP  SAAG less than 1 1  GI recommending repeat studies on future paracentesis    - GI / Renal recommendations appreciated

## 2020-03-15 NOTE — PLAN OF CARE
Problem: Prexisting or High Potential for Compromised Skin Integrity  Goal: Skin integrity is maintained or improved  Description  INTERVENTIONS:  - Identify patients at risk for skin breakdown  - Assess and monitor skin integrity  - Assess and monitor nutrition and hydration status  - Monitor labs   - Assess for incontinence   - Turn and reposition patient  - Assist with mobility/ambulation  - Relieve pressure over bony prominences  - Avoid friction and shearing  - Provide appropriate hygiene as needed including keeping skin clean and dry  - Evaluate need for skin moisturizer/barrier cream  - Collaborate with interdisciplinary team   - Patient/family teaching  - Consider wound care consult   Outcome: Progressing     Problem: PAIN - ADULT  Goal: Verbalizes/displays adequate comfort level or baseline comfort level  Description  Interventions:  - Encourage patient to monitor pain and request assistance  - Assess pain using appropriate pain scale  - Administer analgesics based on type and severity of pain and evaluate response  - Implement non-pharmacological measures as appropriate and evaluate response  - Consider cultural and social influences on pain and pain management  - Notify physician/advanced practitioner if interventions unsuccessful or patient reports new pain  Outcome: Progressing     Problem: INFECTION - ADULT  Goal: Absence or prevention of progression during hospitalization  Description  INTERVENTIONS:  - Assess and monitor for signs and symptoms of infection  - Monitor lab/diagnostic results  - Monitor all insertion sites, i e  indwelling lines, tubes, and drains  - Monitor endotracheal if appropriate and nasal secretions for changes in amount and color  - [place] appropriate cooling/warming therapies per order  - Administer medications as ordered  - Instruct and encourage patient and family to use good hand hygiene technique  - Identify and instruct in appropriate isolation precautions for identified infection/condition  Outcome: Progressing     Problem: SAFETY ADULT  Goal: Patient will remain free of falls  Description  INTERVENTIONS:  - Assess patient frequently for physical needs  -  Identify cognitive and physical deficits and behaviors that affect risk of falls    -  [place] fall precautions as indicated by assessment   - Educate patient/family on patient safety including physical limitations  - Instruct patient to call for assistance with activity based on assessment  - Modify environment to reduce risk of injury  - Consider OT/PT consult to assist with strengthening/mobility  Outcome: Progressing  Goal: Maintain or return to baseline ADL function  Description  INTERVENTIONS:  -  Assess patient's ability to carry out ADLs; assess patient's baseline for ADL function and identify physical deficits which impact ability to perform ADLs (bathing, care of mouth/teeth, toileting, grooming, dressing, etc )  - Assess/evaluate cause of self-care deficits   - Assess range of motion  - Assess patient's mobility; develop plan if impaired  - Assess patient's need for assistive devices and provide as appropriate  - Encourage maximum independence but intervene and supervise when necessary  - Involve family in performance of ADLs  - Assess for home care needs following discharge   - Consider OT consult to assist with ADL evaluation and planning for discharge  - Provide patient education as appropriate  Outcome: Progressing  Goal: Maintain or return mobility status to optimal level  Description  INTERVENTIONS:  - Assess patient's baseline mobility status (ambulation, transfers, stairs, etc )    - Identify cognitive and physical deficits and behaviors that affect mobility  - Identify mobility aids required to assist with transfers and/or ambulation (gait belt, sit-to-stand, lift, walker, cane, etc )  - [place] fall precautions as indicated by assessment  - Record patient progress and toleration of activity level on Mobility SBAR; progress patient to next Phase/Stage  - Instruct patient to call for assistance with activity based on assessment  - Consider rehabilitation consult to assist with strengthening/weightbearing, etc   Outcome: Progressing     Problem: DISCHARGE PLANNING  Goal: Discharge to home or other facility with appropriate resources  Description  INTERVENTIONS:  - Identify barriers to discharge w/patient and caregiver  - Arrange for needed discharge resources and transportation as appropriate  - Identify discharge learning needs (meds, wound care, etc )  - Arrange for interpretive services to assist at discharge as needed  - Refer to Case Management Department for coordinating discharge planning if the patient needs post-hospital services based on physician/advanced practitioner order or complex needs related to functional status, cognitive ability, or social support system  Outcome: Progressing     Problem: Knowledge Deficit  Goal: Patient/family/caregiver demonstrates understanding of disease process, treatment plan, medications, and discharge instructions  Description  Complete learning assessment and assess knowledge base    Interventions:  - Provide teaching at level of understanding  - Provide teaching via preferred learning methods  Outcome: Progressing     Problem: CARDIOVASCULAR - ADULT  Goal: Maintains optimal cardiac output and hemodynamic stability  Description  INTERVENTIONS:  - Monitor I/O, vital signs and rhythm  - Monitor for S/S and trends of decreased cardiac output  - Administer and titrate ordered vasoactive medications to optimize hemodynamic stability  - Assess quality of pulses, skin color and temperature  - Assess for signs of decreased coronary artery perfusion  - Instruct patient to report change in severity of symptoms  Outcome: Progressing  Goal: Absence of cardiac dysrhythmias or at baseline rhythm  Description  INTERVENTIONS:  - Continuous cardiac monitoring, vital signs, obtain 12 lead EKG if ordered  - Administer antiarrhythmic and heart rate control medications as ordered  - Monitor electrolytes and administer replacement therapy as ordered  Outcome: Progressing     Problem: GENITOURINARY - ADULT  Goal: Urinary catheter remains patent  Description  INTERVENTIONS:  - Assess patency of urinary catheter  - If patient has a chronic wells, consider changing catheter if non-functioning  - Follow guidelines for intermittent irrigation of non-functioning urinary catheter  Outcome: Progressing     Problem: METABOLIC, FLUID AND ELECTROLYTES - ADULT  Goal: Electrolytes maintained within normal limits  Description  INTERVENTIONS:  - Monitor labs and assess patient for signs and symptoms of electrolyte imbalances  - Administer electrolyte replacement as ordered  - Monitor response to electrolyte replacements, including repeat lab results as appropriate  - Instruct patient on fluid and nutrition as appropriate  Outcome: Progressing  Goal: Fluid balance maintained  Description  INTERVENTIONS:  - Monitor labs   - Monitor I/O and WT  - Instruct patient on fluid and nutrition as appropriate  - Assess for signs & symptoms of volume excess or deficit  Outcome: Progressing  Goal: Glucose maintained within target range  Description  INTERVENTIONS:  - Monitor Blood Glucose as ordered  - Assess for signs and symptoms of hyperglycemia and hypoglycemia  - Administer ordered medications to maintain glucose within target range  - Assess nutritional intake and initiate nutrition service referral as needed  Outcome: Progressing     Problem: SKIN/TISSUE INTEGRITY - ADULT  Goal: Skin integrity remains intact  Description  INTERVENTIONS  - Identify patients at risk for skin breakdown  - Assess and monitor skin integrity  - Assess and monitor nutrition and hydration status  - Monitor labs (i e  albumin)  - Assess for incontinence   - Turn and reposition patient  - Assist with mobility/ambulation  - Relieve pressure over bony prominences  - Avoid friction and shearing  - Provide appropriate hygiene as needed including keeping skin clean and dry  - Evaluate need for skin moisturizer/barrier cream  - Collaborate with interdisciplinary team (i e  Nutrition, Rehabilitation, etc )   - Patient/family teaching  Outcome: Progressing     Problem: Potential for Falls  Goal: Patient will remain free of falls  Description  INTERVENTIONS:  - Assess patient frequently for physical needs  -  Identify cognitive and physical deficits and behaviors that affect risk of falls    -  [place] fall precautions as indicated by assessment   - Educate patient/family on patient safety including physical limitations  - Instruct patient to call for assistance with activity based on assessment  - Modify environment to reduce risk of injury  - Consider OT/PT consult to assist with strengthening/mobility  Outcome: Progressing

## 2020-03-15 NOTE — PLAN OF CARE
Problem: Prexisting or High Potential for Compromised Skin Integrity  Goal: Skin integrity is maintained or improved  Description  INTERVENTIONS:  - Identify patients at risk for skin breakdown  - Assess and monitor skin integrity  - Assess and monitor nutrition and hydration status  - Monitor labs   - Assess for incontinence   - Turn and reposition patient  - Assist with mobility/ambulation  - Relieve pressure over bony prominences  - Avoid friction and shearing  - Provide appropriate hygiene as needed including keeping skin clean and dry  - Evaluate need for skin moisturizer/barrier cream  - Collaborate with interdisciplinary team   - Patient/family teaching  - Consider wound care consult   Outcome: Progressing     Problem: PAIN - ADULT  Goal: Verbalizes/displays adequate comfort level or baseline comfort level  Description  Interventions:  - Encourage patient to monitor pain and request assistance  - Assess pain using appropriate pain scale  - Administer analgesics based on type and severity of pain and evaluate response  - Implement non-pharmacological measures as appropriate and evaluate response  - Consider cultural and social influences on pain and pain management  - Notify physician/advanced practitioner if interventions unsuccessful or patient reports new pain  Outcome: Progressing     Problem: INFECTION - ADULT  Goal: Absence or prevention of progression during hospitalization  Description  INTERVENTIONS:  - Assess and monitor for signs and symptoms of infection  - Monitor lab/diagnostic results  - Monitor all insertion sites, i e  indwelling lines, tubes, and drains  - Monitor endotracheal if appropriate and nasal secretions for changes in amount and color  - [place] appropriate cooling/warming therapies per order  - Administer medications as ordered  - Instruct and encourage patient and family to use good hand hygiene technique  - Identify and instruct in appropriate isolation precautions for identified infection/condition  Outcome: Progressing     Problem: SAFETY ADULT  Goal: Patient will remain free of falls  Description  INTERVENTIONS:  - Assess patient frequently for physical needs  -  Identify cognitive and physical deficits and behaviors that affect risk of falls    -  [place] fall precautions as indicated by assessment   - Educate patient/family on patient safety including physical limitations  - Instruct patient to call for assistance with activity based on assessment  - Modify environment to reduce risk of injury  - Consider OT/PT consult to assist with strengthening/mobility  Outcome: Progressing  Goal: Maintain or return to baseline ADL function  Description  INTERVENTIONS:  -  Assess patient's ability to carry out ADLs; assess patient's baseline for ADL function and identify physical deficits which impact ability to perform ADLs (bathing, care of mouth/teeth, toileting, grooming, dressing, etc )  - Assess/evaluate cause of self-care deficits   - Assess range of motion  - Assess patient's mobility; develop plan if impaired  - Assess patient's need for assistive devices and provide as appropriate  - Encourage maximum independence but intervene and supervise when necessary  - Involve family in performance of ADLs  - Assess for home care needs following discharge   - Consider OT consult to assist with ADL evaluation and planning for discharge  - Provide patient education as appropriate  Outcome: Progressing  Goal: Maintain or return mobility status to optimal level  Description  INTERVENTIONS:  - Assess patient's baseline mobility status (ambulation, transfers, stairs, etc )    - Identify cognitive and physical deficits and behaviors that affect mobility  - Identify mobility aids required to assist with transfers and/or ambulation (gait belt, sit-to-stand, lift, walker, cane, etc )  - [place] fall precautions as indicated by assessment  - Record patient progress and toleration of activity level on Mobility SBAR; progress patient to next Phase/Stage  - Instruct patient to call for assistance with activity based on assessment  - Consider rehabilitation consult to assist with strengthening/weightbearing, etc   Outcome: Progressing     Problem: DISCHARGE PLANNING  Goal: Discharge to home or other facility with appropriate resources  Description  INTERVENTIONS:  - Identify barriers to discharge w/patient and caregiver  - Arrange for needed discharge resources and transportation as appropriate  - Identify discharge learning needs (meds, wound care, etc )  - Arrange for interpretive services to assist at discharge as needed  - Refer to Case Management Department for coordinating discharge planning if the patient needs post-hospital services based on physician/advanced practitioner order or complex needs related to functional status, cognitive ability, or social support system  Outcome: Progressing     Problem: Knowledge Deficit  Goal: Patient/family/caregiver demonstrates understanding of disease process, treatment plan, medications, and discharge instructions  Description  Complete learning assessment and assess knowledge base    Interventions:  - Provide teaching at level of understanding  - Provide teaching via preferred learning methods  Outcome: Progressing     Problem: CARDIOVASCULAR - ADULT  Goal: Maintains optimal cardiac output and hemodynamic stability  Description  INTERVENTIONS:  - Monitor I/O, vital signs and rhythm  - Monitor for S/S and trends of decreased cardiac output  - Administer and titrate ordered vasoactive medications to optimize hemodynamic stability  - Assess quality of pulses, skin color and temperature  - Assess for signs of decreased coronary artery perfusion  - Instruct patient to report change in severity of symptoms  Outcome: Progressing  Goal: Absence of cardiac dysrhythmias or at baseline rhythm  Description  INTERVENTIONS:  - Continuous cardiac monitoring, vital signs, obtain 12 lead EKG if ordered  - Administer antiarrhythmic and heart rate control medications as ordered  - Monitor electrolytes and administer replacement therapy as ordered  Outcome: Progressing     Problem: GENITOURINARY - ADULT  Goal: Urinary catheter remains patent  Description  INTERVENTIONS:  - Assess patency of urinary catheter  - If patient has a chronic wells, consider changing catheter if non-functioning  - Follow guidelines for intermittent irrigation of non-functioning urinary catheter  Outcome: Progressing     Problem: METABOLIC, FLUID AND ELECTROLYTES - ADULT  Goal: Electrolytes maintained within normal limits  Description  INTERVENTIONS:  - Monitor labs and assess patient for signs and symptoms of electrolyte imbalances  - Administer electrolyte replacement as ordered  - Monitor response to electrolyte replacements, including repeat lab results as appropriate  - Instruct patient on fluid and nutrition as appropriate  Outcome: Progressing  Goal: Fluid balance maintained  Description  INTERVENTIONS:  - Monitor labs   - Monitor I/O and WT  - Instruct patient on fluid and nutrition as appropriate  - Assess for signs & symptoms of volume excess or deficit  Outcome: Progressing  Goal: Glucose maintained within target range  Description  INTERVENTIONS:  - Monitor Blood Glucose as ordered  - Assess for signs and symptoms of hyperglycemia and hypoglycemia  - Administer ordered medications to maintain glucose within target range  - Assess nutritional intake and initiate nutrition service referral as needed  Outcome: Progressing     Problem: SKIN/TISSUE INTEGRITY - ADULT  Goal: Skin integrity remains intact  Description  INTERVENTIONS  - Identify patients at risk for skin breakdown  - Assess and monitor skin integrity  - Assess and monitor nutrition and hydration status  - Monitor labs (i e  albumin)  - Assess for incontinence   - Turn and reposition patient  - Assist with mobility/ambulation  - Relieve pressure over bony prominences  - Avoid friction and shearing  - Provide appropriate hygiene as needed including keeping skin clean and dry  - Evaluate need for skin moisturizer/barrier cream  - Collaborate with interdisciplinary team (i e  Nutrition, Rehabilitation, etc )   - Patient/family teaching  Outcome: Progressing     Problem: Potential for Falls  Goal: Patient will remain free of falls  Description  INTERVENTIONS:  - Assess patient frequently for physical needs  -  Identify cognitive and physical deficits and behaviors that affect risk of falls    -  [place] fall precautions as indicated by assessment   - Educate patient/family on patient safety including physical limitations  - Instruct patient to call for assistance with activity based on assessment  - Modify environment to reduce risk of injury  - Consider OT/PT consult to assist with strengthening/mobility  Outcome: Progressing

## 2020-03-15 NOTE — ASSESSMENT & PLAN NOTE
Alcoholic liver cirrhosis  Resides in [address] [facility]    - appreciate GI recommendations    Recent Labs     03/12/20  1717 03/12/20  2350 03/13/20  0448 03/14/20  0502   *  --  153* 136*   *  --  90* 79*   *  --  115* 94*   INR 1 20* 1 34*  --  1 30*

## 2020-03-15 NOTE — PROGRESS NOTES
Progress Note - Dorn Koyanagi 1940, 78 y o  female MRN: [id number]    Unit/Bed#: [facility] [address] 222[address]01 Encounter: [id number]    Primary Care Provider: Chadd Ortiz MD   Date and time admitted to hospital: 3/12/2020  4:55 PM        * Acute kidney injury superimposed on chronic kidney disease [facility])  Assessment & Plan  55-year-old female with decompensated alcoholic liver cirrhosis was admitted due to metabolic encephalopathy and acute kidney injury on superimposed chronic kidney disease stage 2 possibly due to dehydration due to poor p o  Intake and diuretics  She underwent paracentesis on 3/13/20, negative for SBP  SAAG less than 1 1  GI recommending repeat studies on future paracentesis  - GI / Renal recommendations appreciated    Hepatic encephalopathy ([facility] [address] )  Assessment & Plan  - CT head negative  - continue monitoring  Will consider lactulose or Rifaximin once resuscitated appropriately  Cirrhosis of liver (HCC)  Assessment & Plan  Alcoholic liver cirrhosis  Resides in [address] [place] [address]  - appreciate GI recommendations    Recent Labs     03/12/20  1717 03/12/20  2350 03/13/20  0448 03/14/20  0502   *  --  153* 136*   *  --  90* 79*   *  --  115* 94*   INR 1 20* 1 34*  --  1 30*         A-fib (HCC)  Assessment & Plan  Rate controlled on propranolol   - Not on AC due to cirrhosis/SDH    Chronic diastolic (congestive) heart failure (HCC)  Assessment & Plan  Wt Readings from Last 3 Encounters:   03/10/20 98 4 kg (217 lb)   03/04/20 98 7 kg (217 lb 9 5 oz)   02/19/20 89 7 kg (197 lb 12 oz)     Seems hypovolemic at this point  Lower extremity edema unchanged per patient    - hold diuretics for now        Essential hypertension  Assessment & Plan  Continue home medications    Chronic indwelling Davies catheter  Assessment & Plan  2* urinary retention        VTE Pharmacologic Prophylaxis:   Pharmacologic: Pharmacologic VTE Prophylaxis contraindicated due to Cirrhosis, SDH, thrombocytopenia  Mechanical VTE Prophylaxis in Place: Yes    Patient Centered Rounds: I have performed bedside rounds with nursing staff today  Discussions with Specialists or Other Care Team Provider: Nursing    Education and Discussions with Family / Patient: Patient    Time Spent for Care: 30 minutes  More than 50% of total time spent on counseling and coordination of care as described above  Current Length of Stay: 3 day(s)    Current Patient Status: Inpatient   Certification Statement: The patient will continue to require additional inpatient hospital stay due to CECIL management, Diuretics reeiniation    Discharge Plan: Still active    Code Status: Level 3 - DNAR and DNI      Subjective:   Patient seen examined at bedside  Spoke to GI yesterday regarding patient's treatment plan  AFB coming back significantly elevated  Patient currently denies any complaints and is resting comfortably  Objective:     Vitals:   Temp (24hrs), Av 5 °F (36 4 °C), Min:97 3 °F (36 3 °C), Max:97 6 °F (36 4 °C)    Temp:  [97 3 °F (36 3 °C)-97 6 °F (36 4 °C)] 97 5 °F (36 4 °C)  HR:  [70-84] 81  Resp:  [16-19] 19  BP: (102-117)/(45-68) 107/65  SpO2:  [95 %-98 %] 98 %  Body mass index is 39 69 kg/m²  Input and Output Summary (last 24 hours): Intake/Output Summary (Last 24 hours) at 3/15/2020 1545  Last data filed at 3/15/2020 0930  Gross per 24 hour   Intake 1717 5 ml   Output 425 ml   Net 1292 5 ml       Physical Exam:     Physical Exam   Constitutional: No distress  HENT:   Head: Normocephalic and atraumatic  Eyes: Pupils are equal, round, and reactive to light  EOM are normal    Neck: Neck supple  Cardiovascular: Normal rate and regular rhythm  Pulmonary/Chest: Effort normal  No stridor  She has no wheezes  She has rales  Abdominal: Soft  Bowel sounds are normal  She exhibits distension  There is no tenderness  There is no rebound and no guarding  Neurological: She is alert     Oriented to place and self, not to date     Skin: Skin is warm and dry  Vitals reviewed  Additional Data:     Labs:    Results from last 7 days   Lab Units 03/14/20  0502   WBC Thousand/uL 5 76   HEMOGLOBIN g/dL 10 1*   HEMATOCRIT % 33 5*   PLATELETS Thousands/uL 94*   NEUTROS PCT % 71   LYMPHS PCT % 18   MONOS PCT % 8   EOS PCT % 1     Results from last 7 days   Lab Units 03/15/20  0450  03/14/20  0502   SODIUM mmol/L 139   < > 136   POTASSIUM mmol/L 4 7   < > 3 5   CHLORIDE mmol/L 105   < > 102   CO2 mmol/L 24   < > 26   BUN mg/dL 43*   < > 46*   CREATININE mg/dL 1 59*   < > 1 70*   ANION GAP mmol/L 10   < > 8   CALCIUM mg/dL 8 8   < > 7 9*   ALBUMIN g/dL  --   --  2 0*   TOTAL BILIRUBIN mg/dL  --   --  3 32*   ALK PHOS U/L  --   --  583*   ALT U/L  --   --  79*   AST U/L  --   --  136*   GLUCOSE RANDOM mg/dL 151*   < > 430*    < > = values in this interval not displayed  Results from last 7 days   Lab Units 03/14/20  0502   INR  1 30*     Results from last 7 days   Lab Units 03/14/20  0843   POC GLUCOSE mg/dl 170*                   * I Have Reviewed All Lab Data Listed Above  * Additional Pertinent Lab Tests Reviewed:  [address] [facility] Admission Reviewed    Imaging:    Imaging Reports Reviewed Today Include: No new imaging    Recent Cultures (last 7 days):     Results from last 7 days   Lab Units 03/13/20  1629   GRAM STAIN RESULT  Rare Polys  No bacteria seen   BODY FLUID CULTURE, STERILE  No growth       Last 24 Hours Medication List:     Current Facility-Administered Medications:  acetaminophen 650 mg Oral Q6H PRN Radha Quan PA-C   famotidine 10 mg Oral Daily PRN Radha Quan PA-C   gabapentin 100 mg Oral TID PRN Radha Quan PA-C   ondansetron 4 mg Intravenous Q6H PRN Radha Quan PA-C   oxyCODONE 5 mg Oral Q4H PRN Radha Quan PA-C   pantoprazole 40 mg Intravenous Q12H [address] Radha M Delabre, PA-C   propranolol 10 mg Oral BID Radha Quan PA-C   tamsulosin 0 4 mg Oral HS Shawna Bae, NIKOLAS        Today, Patient Was Seen By: Janet Gómez MD    ** Please Note: Dictation voice to text software may have been used in the creation of this document   **

## 2020-03-16 ENCOUNTER — APPOINTMENT (INPATIENT)
Dept: RADIOLOGY | Facility: HOSPITAL | Age: 80
DRG: 377 | End: 2020-03-16
Payer: MEDICARE

## 2020-03-16 PROBLEM — E44.0 MODERATE PROTEIN-CALORIE MALNUTRITION (HCC): Status: ACTIVE | Noted: 2020-03-16

## 2020-03-16 LAB
ALBUMIN SERPL ELPH-MCNC: 3.09 G/DL (ref 3.5–5)
ALBUMIN SERPL ELPH-MCNC: 56.2 % (ref 52–65)
ALPHA1 GLOB SERPL ELPH-MCNC: 0.39 G/DL (ref 0.1–0.4)
ALPHA1 GLOB SERPL ELPH-MCNC: 7.1 % (ref 2.5–5)
ALPHA2 GLOB SERPL ELPH-MCNC: 0.52 G/DL (ref 0.4–1.2)
ALPHA2 GLOB SERPL ELPH-MCNC: 9.5 % (ref 7–13)
ANION GAP SERPL CALCULATED.3IONS-SCNC: 5 MMOL/L (ref 4–13)
BACTERIA SPEC BFLD CULT: NO GROWTH
BETA GLOB ABNORMAL SERPL ELPH-MCNC: 0.28 G/DL (ref 0.4–0.8)
BETA1 GLOB SERPL ELPH-MCNC: 5 % (ref 5–13)
BETA2 GLOB SERPL ELPH-MCNC: 6.5 % (ref 2–8)
BETA2+GAMMA GLOB SERPL ELPH-MCNC: 0.36 G/DL (ref 0.2–0.5)
BUN SERPL-MCNC: 40 MG/DL (ref 5–25)
CALCIUM SERPL-MCNC: 8.6 MG/DL (ref 8.3–10.1)
CHLORIDE SERPL-SCNC: 104 MMOL/L (ref 100–108)
CO2 SERPL-SCNC: 28 MMOL/L (ref 21–32)
CREAT SERPL-MCNC: 1.4 MG/DL (ref 0.6–1.3)
GAMMA GLOB ABNORMAL SERPL ELPH-MCNC: 0.86 G/DL (ref 0.5–1.6)
GAMMA GLOB SERPL ELPH-MCNC: 15.7 % (ref 12–22)
GFR SERPL CREATININE-BSD FRML MDRD: 35 ML/MIN/1.73SQ M
GLUCOSE SERPL-MCNC: 158 MG/DL (ref 65–140)
GRAM STN SPEC: NORMAL
GRAM STN SPEC: NORMAL
HBV CORE AB SER QL: NORMAL
HBV CORE IGM SER QL: NORMAL
HBV SURFACE AG SER QL: NORMAL
HCV AB SER QL: NORMAL
IGG/ALB SER: 1.28 {RATIO} (ref 1.1–1.8)
POTASSIUM SERPL-SCNC: 4.9 MMOL/L (ref 3.5–5.3)
PROT PATTERN SERPL ELPH-IMP: ABNORMAL
PROT SERPL-MCNC: 5.5 G/DL (ref 6.4–8.2)
SODIUM SERPL-SCNC: 137 MMOL/L (ref 136–145)

## 2020-03-16 PROCEDURE — 99232 SBSQ HOSP IP/OBS MODERATE 35: CPT | Performed by: INTERNAL MEDICINE

## 2020-03-16 PROCEDURE — 80048 BASIC METABOLIC PNL TOTAL CA: CPT | Performed by: NURSE PRACTITIONER

## 2020-03-16 PROCEDURE — 97530 THERAPEUTIC ACTIVITIES: CPT

## 2020-03-16 PROCEDURE — 97110 THERAPEUTIC EXERCISES: CPT

## 2020-03-16 PROCEDURE — 0W9G3ZZ DRAINAGE OF PERITONEAL CAVITY, PERCUTANEOUS APPROACH: ICD-10-PCS | Performed by: RADIOLOGY

## 2020-03-16 PROCEDURE — 76705 ECHO EXAM OF ABDOMEN: CPT | Performed by: RADIOLOGY

## 2020-03-16 PROCEDURE — 49083 ABD PARACENTESIS W/IMAGING: CPT

## 2020-03-16 RX ORDER — MICONAZOLE NITRATE 20 MG/G
CREAM TOPICAL 2 TIMES DAILY
Status: DISCONTINUED | OUTPATIENT
Start: 2020-03-16 | End: 2020-03-20 | Stop reason: HOSPADM

## 2020-03-16 RX ADMIN — TAMSULOSIN HYDROCHLORIDE 0.4 MG: 0.4 CAPSULE ORAL at 21:32

## 2020-03-16 RX ADMIN — PROPRANOLOL HYDROCHLORIDE 10 MG: 10 TABLET ORAL at 08:33

## 2020-03-16 RX ADMIN — MICONAZOLE NITRATE: 20 CREAM TOPICAL at 17:14

## 2020-03-16 RX ADMIN — RIFAXIMIN 550 MG: 550 TABLET ORAL at 21:32

## 2020-03-16 RX ADMIN — Medication 40 MG: at 08:32

## 2020-03-16 RX ADMIN — OXYCODONE HYDROCHLORIDE 5 MG: 5 TABLET ORAL at 17:12

## 2020-03-16 RX ADMIN — Medication 40 MG: at 21:32

## 2020-03-16 NOTE — PROGRESS NOTES
NEPHROLOGY PROGRESS NOTE   Emma Soni 78 y o  female MRN: [id number]  Unit/Bed#: [facility] [address] 222[address]01 Encounter: [id number]  Reason for Consult:  Acute kidney injury    ASSESSMENT/PLAN:  1  Acute kidney injury, likely due to prerenal azotemia given improvement IV fluids  2  Previous CKD 3, baseline creatinine 0 7- 0 8  3  Liver cirrhosis with associated ascites  4  Hypernatremia, resolved, currently off sodium chloride tablets  5  History of hypertension, blood pressure is stable  6  Remote history of hyponatremia, previously on sodium chloride tablets     PLAN:  · Overall renal function remains stable, continues to improve  · IV fluids discontinued yesterday  · Blood pressure acceptable, amlodipine discontinued    SUBJECTIVE:  Seen examined  Patient awake and alert  Appears confused at times  Does not appear short of breath  No active chest pain or pressure  Denies abdominal pain  Review of Systems    OBJECTIVE:  Current Weight:    Vitals:    03/15/20 1530 03/15/20 1754 03/15/20 2219 03/16/20 0735   BP: 107/65 115/67 98/63 98/64   BP Location: Right arm      Pulse: 81 81 76 72   Resp: 19  18 18   Temp: 97 5 °F (36 4 °C)  (!) 97 3 °F (36 3 °C) (!) 97 3 °F (36 3 °C)   TempSrc: Oral      SpO2: 98% 95% 97% 96%   Height:           Intake/Output Summary (Last 24 hours) at 3/16/2020 1147  Last data filed at 3/16/2020 0519  Gross per 24 hour   Intake    Output 400 ml   Net -400 ml       Physical Exam   Constitutional: No distress  HENT:   Head: Normocephalic  Neck: Neck supple  Cardiovascular: Normal rate  An irregular rhythm present  Pulmonary/Chest: Effort normal  She has decreased breath sounds in the right lower field and the left lower field  Abdominal: Soft  There is no tenderness  Musculoskeletal: She exhibits edema  Neurological: She is alert  Skin: Skin is warm  Right leg wrapped   Psychiatric: She has a normal mood and affect         Medications:    Current Facility-Administered Medications:     acetaminophen (TYLENOL) tablet 650 mg, 650 mg, Oral, Q6H PRN, Radha Quan PA-C    famotidine (PEPCID) tablet 10 mg, 10 mg, Oral, Daily PRN, Radha Quan PA-C    gabapentin (NEURONTIN) capsule 100 mg, 100 mg, Oral, TID PRN, Radha Quan PA-C    omeprazole (PRILOSEC) suspension 2 mg/mL, 40 mg, Oral, Q12H [facility], Radha Quan PA-C, 40 mg at 03/16/20 0832    ondansetron (ZOFRAN) injection 4 mg, 4 mg, Intravenous, Q6H PRN, Radha Quan PA-C    oxyCODONE (ROXICODONE) IR tablet 5 mg, 5 mg, Oral, Q4H PRN, Radha Quan PA-C, 5 mg at 03/15/20 1638    propranolol (INDERAL) tablet 10 mg, 10 mg, Oral, BID, Radha Quan PA-C, 10 mg at 03/16/20 7742    tamsulosin (FLOMAX) capsule 0 4 mg, 0 4 mg, Oral, HS, Radha Quan PA-C, 0 4 mg at 03/15/20 2121    Laboratory Results:  Results from last 7 days   Lab Units 03/16/20  0457 03/15/20  0450 03/14/20  1505 03/14/20  0932 03/14/20  0502 03/13/20  2311 03/13/20  1442 03/13/20  0448 03/12/20  2350 03/12/20  1717   WBC Thousand/uL  --   --   --   --  5 76  --   --  9 62  --  11 25*   HEMOGLOBIN g/dL  --   --   --   --  10 1*  --   --  11 9 12 1 12 5   HEMATOCRIT %  --   --   --   --  33 5*  --   --  39 9 40 8 42 0   PLATELETS Thousands/uL  --   --   --   --  94*  --   --  115*  --  142*   POTASSIUM mmol/L 4 9 4 7 4 2 4 1 3 5 4 0 4 6 4 5  --  4 7   CHLORIDE mmol/L 104 105 105 107 102 113* 113* 113*  --  112*   CO2 mmol/L 28 24 26 27 26 26 28 27  --  27   BUN mg/dL 40* 43* 46* 48* 46* 53* 55* 57*  --  57*   CREATININE mg/dL 1 40* 1 59* 1 70* 1 74* 1 70* 1 87* 1 78* 1 92*  --  1 99*   CALCIUM mg/dL 8 6 8 8 8 5 8 6 7 9* 8 5 8 9 8 8  --  8 9   MAGNESIUM mg/dL  --  2 4  --   --  2 2  --   --   --   --   --    PHOSPHORUS mg/dL  --  2 4  --   --  2 7  --   --   --   --   --

## 2020-03-16 NOTE — PLAN OF CARE
Problem: PHYSICAL THERAPY ADULT  Goal: Performs mobility at highest level of function for planned discharge setting  See evaluation for individualized goals  Description  Treatment/Interventions: LE strengthening/ROM, Therapeutic exercise, Endurance training, Cognitive reorientation, Patient/family training, Equipment eval/education, Bed mobility, Compensatory technique education, Spoke to nursing, OT  Equipment Recommended: Other (Comment)(Romelia for OOB mobility)       See flowsheet documentation for full assessment, interventions and recommendations  Note:   Prognosis: Poor  Problem List: Decreased strength, Decreased range of motion, Decreased endurance, Impaired balance, Decreased mobility, Decreased coordination, Decreased cognition, Decreased safety awareness, Impaired sensation, Decreased skin integrity, Orthopedic restrictions, Pain, Obesity  Assessment: Betty Soni was seen for a follow up session today, pt reporting pain in RLE however agreeable to PT at this time  Pt able to perform supine LE therex w A from PT and frequent cuing for technique, pacing  Pt reporting fatigue w bed level exercise, occ rest breaks needed  Pt able to roll w mod Ax2 for repositioning and skin care needs  Attempted to reposition HKB for improved fit however difficult to adjust 2* to body habitus and multiple areas of skin irritation/breakdown  End of session pt repositioned in bed for comfort, call bell and all needs in reach  Attempted to position pt with BLE in neutral rotation (noted excessive ER bilaterally), heels elevated off bed  Recommendation: Short-term skilled PT, Long-term skilled nursing home placement(pending progress)     PT - OK to Discharge: Yes    See flowsheet documentation for full assessment

## 2020-03-16 NOTE — DISCHARGE INSTR - OTHER ORDERS
Wound Care Plan:   1-Check skin integrity/circulation under right leg brace every 2 hours  2-Low air-loss mattress  3-Sacrum--cleanse with soap and water, pat dry  Apply Maxorb to wound bed  Cover with Allevyn Life foam dressing  Change dressing every other day and as needed with soilage  4-Raul antifungal cream to bilateral buttocks and johnny-rectal area twice daily  Skin care plans:  1-Turn/reposition every 2 hours for pressure re-distribution on skin--use positioning wedges  2-Elevate heels to offload pressure  3-Offloading air cushion in chair when out of bed  4-Moisturize skin daily with lotion  5-Apply Hydraguard lotion to bilateral heels twice daily for skin protection

## 2020-03-16 NOTE — PHYSICAL THERAPY NOTE
PHYSICAL THERAPY NOTE          Patient Name: Juan M Mazariegos  FQQFT'Y Date: 3/16/2020   Time In: 1355 Time Out: 3539       03/16/20 1422   Pain Assessment   Pain Assessment Tool FLACC   Pain Score 8   Pain Location/Orientation Orientation: Right;Location: Leg;Location: Knee   Pain Rating: FLACC (Rest) - Face 0   Pain Rating: FLACC (Rest) - Legs 0   Pain Rating: FLACC (Rest) - Activity 0   Pain Rating: FLACC (Rest) - Cry 1   Pain Rating: FLACC (Rest) - Consolability 0   Score: FLACC (Rest) 1   Pain Rating: FLACC (Activity) - Face 0   Pain Rating: FLACC (Activity) - Legs 0   Pain Rating: FLACC (Activity) - Activity 0   Pain Rating: FLACC (Activity) - Cry 1   Pain Rating: FLACC (Activity) - Consolability 0   Score: FLACC (Activity) 1   Restrictions/Precautions   Weight Bearing Precautions Per Order Yes   RLE Weight Bearing Per Order NWB   Braces or Orthoses LE Immobilizer  (HKB locked in extension)   Other Precautions Cognitive; Fall Risk;Pain;Telemetry;WBS   General   Chart Reviewed Yes   Additional Pertinent History Dahlia Darby, wound care RN present during part of session   Response to Previous Treatment Patient with no complaints from previous session  Family/Caregiver Present No   Cognition   Overall Cognitive Status Impaired   Arousal/Participation Cooperative   Attention Attends with cues to redirect   Orientation Level Oriented to person;Disoriented to place; Disoriented to time  (oriented to year but not date, month)   Memory Decreased recall of precautions;Decreased recall of recent events;Decreased short term memory   Following Commands Follows one step commands with increased time or repetition   Comments pt appears confused, limited engagement in conversation   difficulty describing pain including location   Subjective   Subjective pt agreeable to PT at this time   Bed Mobility   Rolling L 3  Moderate assistance   Additional items Assist x 2;Bedrails; Increased time required;Verbal cues (use of bedrails)   Endurance Deficit   Endurance Deficit Yes   Endurance Deficit Description pt reporting fatigue w bedlevel therex   Activity Tolerance   Activity Tolerance Patient limited by fatigue;Treatment limited secondary to medical complications (Comment); Other (Comment)  (cognition)   Medical Staff Made Aware Heidy Bustillo RN wound care   Nurse Made Aware [address] Mo[address] RN; cleared for PT   Exercises   Heelslides Supine;10 reps;AAROM; Left  (2x10)   Hip Abduction Supine;10 reps;AAROM; Bilateral  (2x10)   Ankle Pumps Supine;10 reps;AAROM; Bilateral  (2x10)   Assessment   Prognosis Poor   Problem List Decreased strength;Decreased range of motion;Decreased endurance; Impaired balance;Decreased mobility; Decreased coordination;Decreased cognition;Decreased safety awareness; Impaired sensation;Decreased skin integrity;Orthopedic restrictions;Pain;Obesity   Assessment Virgin Cl was seen for a follow up session today, pt reporting pain in RLE however agreeable to PT at this time  Pt able to perform supine LE therex w A from PT and frequent cuing for technique, pacing  Pt reporting fatigue w bed level exercise, occ rest breaks needed  Pt able to roll w mod Ax2 for repositioning and skin care needs  Attempted to reposition HKB for improved fit however difficult to adjust 2* to body habitus and multiple areas of skin irritation/breakdown  End of session pt repositioned in bed for comfort, call bell and all needs in reach  Attempted to position pt with BLE in neutral rotation (noted excessive ER bilaterally), heels elevated off bed  Goals   STG Expiration Date 03/27/20   Short Term Goal #1 )  Pt will perform bed mobility with min Ax2 demonstrating appropriate technique 100% of the time in order to improve function  2)  PT to see to assess OOB mobility when appropriate   3) PT for ongoing patient and family/caregiver education, DME needs and d/c planning in order to promote highest level of function in least restrictive environment  Ashley Serum 4)  Improve overall strength and balance 1/2 grade in order to optimize ability to perform functional tasks and reduce fall risk  5) Increase activity tolerance to 30 minutes in order to improve endurance to functional tasks   PT Treatment Day 1   Plan   Treatment/Interventions LE strengthening/ROM; Therapeutic exercise; Endurance training;Cognitive reorientation;Patient/family training;Equipment eval/education; Bed mobility; Compensatory technique education;Spoke to nursing   Progress Slow progress, decreased activity tolerance   PT Frequency 2-3x/wk   Recommendation   Recommendation Short-term skilled PT;Long-term skilled nursing home placement  (pending progress)   Equipment Recommended Other (Comment)  Ernestine Mckeon for OOB mobility)   PT - OK to Discharge Yes   Additional Comments to STR or LTC     Kin Gone, PT

## 2020-03-16 NOTE — CONSULTS
Consult Note - Wound   Bandar Car 78 y o  female MRN: [id number]  Unit/Bed#: [facility] 2 -01 Encounter: [id number]      History and Present Illness:  78year old female presented to the hospital from [facility] with a period of unresponsiveness  Patient's history significant for alcohol related cirrhosis, CHF, and recent subdural hematoma  Patient is primarily bed bound with rosetta lift transfer  Chronic wells catheter in place  Assessment Findings:   Patient seen for wound care consultation, agreeable to assessment  Patient is pale and jaundice  Moderate lower extremity edema  Wells catheter in place  Patient occasionally incontinent of stool  Nutrition team following, supplements ordered  Bilateral heels intact with preventative foam dressings  Right lower extremity immobilizer in place  1  MASD/ITD with candidiasis to bilateral distal buttocks, tonia-rectal area, and gluteal folds  2   Hospital acquired fully evolved deep tissue injury to stage 2 pressure injury--wound bed pink  Tonia-wound erythematous and fragile  No induration or fluctuance at this time  Original wound photo from 3/14/20 reveals wound was dark purple suggesting deep tissue injury  See flowsheet and media for wound details  Wound Care Plan:   1-Check skin integrity/circulation under right leg brace every 4 hours  2-Specialty P500 low air-loss mattress  3-Sacrum--cleanse with soap and water, pat dry  Apply Maxorb to wound bed  Cover with Allevyn Life foam dressing  Bear with T   Change dressing every other day and PRN with soilage  4-Raul antifungal cream to bilateral buttocks and tonia-rectal area BID  Skin care plans:  1-Turn/reposition q2h or when medically stable for pressure re-distribution on skin--use positioning wedges  2-Elevate heels to offload pressure  3-Ehob cushion in chair when out of bed    4-Moisturize skin daily with skin nourishing cream   5-Apply Allevyn Life foam dressing to bilateral heels for prevention  Bear with P   Peel back at least daily for skin assessment and re-apply  Change dressing every 3 days and PRN  Wound care team to follow-up  Plan of care reviewed with primary RN  Dr Devon Child and clinical coordinator made aware of hospital acquired wound  Wound 03/14/20 Pressure Injury Sacrum (Active)   Wound Image   3/16/2020 11:57 AM   Wound Description Pink 3/16/2020 11:57 AM   Staging Stage II 3/16/2020 11:57 AM   Tonia-wound Assessment Erythema 3/16/2020 11:57 AM   Wound Length (cm) 2 5 cm 3/16/2020 11:57 AM   Wound Width (cm) 2 5 cm 3/16/2020 11:57 AM   Wound Depth (cm) 0 1 3/16/2020 11:57 AM   Calculated Wound Area (cm^2) 6 25 cm^2 3/16/2020 11:57 AM   Calculated Wound Volume (cm^3) 0 63 cm^3 3/16/2020 11:57 AM   Closure Open to air 3/15/2020  2:08 PM   Drainage Amount Scant 3/16/2020 11:57 AM   Drainage Description Serosanguineous 3/16/2020 11:57 AM   Non-staged Wound Description Partial thickness 3/16/2020 11:57 AM   Treatments Cleansed 3/16/2020 11:57 AM   Dressing Foam, Silicon (eg  Allevyn, etc); Calcium Alginate 3/16/2020 11:57 AM   Wound packed? No 3/15/2020  2:08 PM   Dressing Changed Changed 3/16/2020 11:57 AM   Patient Tolerance Tolerated well 3/16/2020 11:57 AM   Dressing Status Clean;Dry; Intact 3/16/2020 11:57 AM       Wound 03/16/20 Moisture associated skin damage Buttocks Left;Right;Distal (Active)   Wound Description Pink 3/16/2020 11:57 AM   Tonia-wound Assessment Clean;Dry; Intact 3/16/2020 11:57 AM   Drainage Amount None 3/16/2020 11:57 AM   Treatments Cleansed 3/16/2020 11:57 AM   Dressing Protective barrier 3/16/2020 11:57 AM   Patient Tolerance Tolerated well 3/16/2020 11:57 AM     Chris SCHMIDN, RN, [employer]

## 2020-03-16 NOTE — TELEPHONE ENCOUNTER
Spoke with  and he confirms that pt is currently in rehab at [address] [place] [address]  He expects she will still be there on Thursday  He will call when she is discharged to reschedule home visit

## 2020-03-16 NOTE — PROGRESS NOTES
[facility] Internal Medicine Progress Note  Patient: Aleah Summers 78 y o  female   MRN: [id number]  PCP: Alex Castillo MD  [facility] [address] [facility] Encounter: [id number]  Date Of Visit: 03/16/20      Assessment/plan  1  Errol/ckd3- appreciate nephrology recommendations  Baseline is 0 7-0 8  It was likely due to pre renal azotemia  Creatinine is improving  Monitoring off of iv fluids  2  Cirrhosis of liver with ascites and hepatic encephalopathy- appreciate gi recommendations  Pt for paracentesis today  Will check ammonia level in am and restart xifaxan today  Will consider restarting lactulose tomorrow  Currently holding diuretics due to number 1    3  Abnormally elevated AFP- pt will need either triple phase ct liver protocol or mri  Will await repeat bmp in am to assess stability of creatinine before deciding on which imagining route  4  Chronic afib- continue rate control with propranolol  No ac due to cirrhosis and history of subdural hematoma    5  Thrombocytopenia- due to cirrhosis  Will monitor    6  Stage 2 pressure injury- continue wound care    7  Closed fracture of right distal femur- continue knee immobilizer  Discussed with ortho that pt will still require this at all times    8  Chronic indwelling wells catheter- continue wells  dispo- pt will need str once stable  Subjective:   Pt seen and examined  She has minimal confusion  No f/c no cp no sob no n/v/d no abd pain  Objective:     Vitals: Blood pressure 102/64, pulse 83, temperature (!) 97 4 °F (36 3 °C), resp  rate 17, height 5' 2" (1 575 m), SpO2 98 %  ,Body mass index is 39 69 kg/m²      Lab, Imaging and other studies:  Results from last 7 days   Lab Units 03/14/20  0502   WBC Thousand/uL 5 76   HEMOGLOBIN g/dL 10 1*   HEMATOCRIT % 33 5*   PLATELETS Thousands/uL 94*   INR  1 30*     Results from last 7 days   Lab Units 03/16/20  0457 03/15/20  0450   POTASSIUM mmol/L 4 9 4 7   CHLORIDE mmol/L 104 105   CO2 mmol/L 28 24   BUN mg/dL 40* 43*   CREATININE mg/dL 1 40* 1 59*   CALCIUM mg/dL 8 6 8 8   ALK PHOS U/L  --  672*   ALT U/L  --  100*   AST U/L  --  187*     Results from last 7 days   Lab Units 03/12/20  1717   TROPONIN I ng/mL <0 02     Lab Results   Component Value Date    BLOODCX No Growth After 5 Days  06/05/2017    BLOODCX No Growth After 5 Days  06/05/2017    URINECX >100,000 cfu/ml Escherichia coli 06/05/2017    URINECX >100,000 cfu/ml Klebsiella pneumoniae 11/01/2016     Scheduled Meds:   Current Facility-Administered Medications:  acetaminophen 650 mg Oral Q6H PRN Radha Quan PA-C   famotidine 10 mg Oral Daily PRN Radha Quan PA-C   gabapentin 100 mg Oral TID PRN Radha Quan PA-C   JOSE ANTIFUNGAL  Topical BID Marlen Brady DO   omeprazole (PRILOSEC) suspension 2 mg/mL 40 mg Oral Q12H [address] Radha Qaun PA-C   ondansetron 4 mg Intravenous Q6H PRN Radha Quan PA-C   oxyCODONE 5 mg Oral Q4H PRN Radha Quan PA-C   propranolol 10 mg Oral BID Radha Quan PA-C   rifaximin 550 mg Oral Q12H [address] Marlen Brady DO   tamsulosin 0 4 mg Oral HS Radha Quan PA-C     Continuous Infusions:    PRN Meds:   acetaminophen    famotidine    gabapentin    ondansetron    oxyCODONE      Physical exam:  Physical Exam  General appearance: alert  Head: Normocephalic, without obvious abnormality, atraumatic  Eyes: conjunctivae/corneas clear  PERRL, EOM's intact  Fundi benign    Neck: no adenopathy, no carotid bruit, no JVD, supple, symmetrical, trachea midline and thyroid not enlarged, symmetric, no tenderness/mass/nodules  Lungs: clear to auscultation bilaterally  Heart: regular rate and rhythm, S1, S2 normal, no murmur, click, rub or gallop  Abdomen: soft slight distended nt +Bs  Extremities: extremities normal, warm and well-perfused; no cyanosis, clubbing, or edema  Pulses: 2+ and symmetric  Skin: Skin color, texture, turgor normal  No rashes or lesions  Neurologic: Mental status: awake alert oriented x2 person, place      VTE Pharmacologic Prophylaxis: Reason for no pharmacologic prophylaxis h/o subdural hematoma  VTE Mechanical Prophylaxis: sequential compression device    Counseling / Coordination of Care  Total floor / unit time spent today 20 minutes    Current Length of Stay: 4 day(s)    Current Patient Status: Inpatient     Code Status: Level 3 - DNAR and DNI

## 2020-03-16 NOTE — WOUND OSTOMY CARE
Progress Note - Wound   Shelley Car 78 y o  female MRN: [id number]  Unit/Bed#: [facility] 2 -01 Encounter: [id number]        Assessment:   Returned to assess skin under right leg immobilizer along with physical therapist   Blanchable redness with pain on palpation to right achilles area  Recently healed wounds to right medial upper thigh likely from brace pressing into swollen thigh  Significant bruising to right posterior knee  Bruising and blanchable erythema to right upper lateral thigh  No evidence of pressure injury under right leg immobilizer at this time  However, patient is at very high risk for developing skin breakdown under brace as it appears to be too tight in thigh area and has no further room to be adjusted  Allevyn life foam dressings placed to right achilles and right medial upper thigh for prevention  Plan:   Physical therapy to contact orthopedics about bruising to posterior knee and immobilizer fit  Wound care will follow      Sabino Scheuermann BSN, RN, [employer]

## 2020-03-16 NOTE — BRIEF OP NOTE (RAD/CATH)
IR LIMITED ULTRASOUND    PATIENT NAME: Shahrzad Wilhelm  : 1940  MRN: [id number]    Pre-op Diagnosis:   1  CECIL (acute kidney injury) ([facility] [address] [facility] )    2  Syncope    3  Cirrhosis of liver ([facility] [address] [facility] )    4  Heme positive stool    5  Transaminitis    6  Portal vein thrombosis      Post-op Diagnosis:   1  CECIL (acute kidney injury) ([facility] [address] )    2  Syncope    3  Cirrhosis of liver ([facility] [address] )    4  Heme positive stool    5  Transaminitis    6   Portal vein thrombosis        Surgeon:   Angie Sandoval MD  Assistants:     No qualified resident was available, Resident is only observing    Estimated Blood Loss: minimal  Findings: small ascites  No safe window  No paracentesis    Anticipate more fluid later in the week, can attempt again thu/fri    Specimens: none    Complications:  None immediate    Anesthesia: None    Angie Sandoval MD     Date: 3/16/2020  Time: 4:43 PM

## 2020-03-16 NOTE — PROGRESS NOTES
Progress Note - Kevin Jack 78 y o  female MRN: [id number]    Unit/Bed#: [facility] -01 Encounter: [id number]         Assessment/ Plan:  Cirrhosis  Ascites  Hepatic encephalopathy  PVT    Patient was admitted from a nursing home after having an episode of unresponsiveness and difficulty breathing  We were consulted for her cirrhosis  It sounds as this was known but not followed  Etiology is not certain, AMA, anti smooth muscle antibody and chronic hepatitis panel are currently pending  It is complicated by ascites and possible hepatic encephalopathy however she does have an underlying history of mild dementia  There is no mention of varices, also she has not undergone endoscopy  She did have an ultrasound on admission that showed partial portal vein thrombosis as well as portal hypertension, cirrhosis and moderate to large ascites  She then underwent a paracentesis on 03/13, where 5 L were removed  She has not been on diuretics since admission due to acute kidney injury  It appears that her ascites has returned  Would recommend repeating paracentesis with fluid analysis  Also was found to have an elevated AFP of 492  She needs a triple phase CT with liver protocol versus MRI for further evaluation and exclude HCC  -repeat paracentesis with fluid analysis  -check triple phase CT versus MRI to look for HCC given elevated AFP  -continue to follow LFTs as her numbers have slowly been increasing  -check INR daily  -follow-up on AMA, antismooth muscle antibody, chronic hepatitis panel  -consider restarting diuretics at low-dose  -consider starting lactulose and rifaximin with a goal of 2-3 bowel movements per day        Subjective:   Pt seen & examined  Tolerating diet  Objective:     Vitals: Blood pressure 98/64, pulse 72, temperature (!) 97 3 °F (36 3 °C), resp  rate 18, height 5' 2" (1 575 m), SpO2 96 %  ,Body mass index is 39 69 kg/m²          Physical Exam: General appearance: alert and oriented, in no acute distress  Lungs: clear to auscultation bilaterally  Heart: regular rate and rhythm  Abdomen: +BS, distended/ ascites, firm/ tense  Skin: Jaundice     Invasive Devices     Peripheral Intravenous Line            Peripheral IV 03/12/20 Dorsal (posterior); Right Hand 4 days    Peripheral IV 03/12/20 Left Antecubital 3 days          Drain            Urethral Catheter 26 days                Lab, Imaging and other studies: I have personally reviewed pertinent reports       CBC: No results found for: WBC, HGB, HCT, MCV, PLT, ADJUSTEDWBC, MCH, MCHC, RDW, MPV, NRBC,   CMP:   Lab Results   Component Value Date    K 4 9 03/16/2020     03/16/2020    CO2 28 03/16/2020    BUN 40 (H) 03/16/2020    CREATININE 1 40 (H) 03/16/2020    CALCIUM 8 6 03/16/2020    EGFR 35 03/16/2020   ,

## 2020-03-17 ENCOUNTER — TELEPHONE (OUTPATIENT)
Dept: NEUROSURGERY | Facility: CLINIC | Age: 80
End: 2020-03-17

## 2020-03-17 LAB
ACTIN IGG SERPL-ACNC: 6 UNITS (ref 0–19)
ALBUMIN SERPL BCP-MCNC: 2.2 G/DL (ref 3.5–5)
ALP SERPL-CCNC: 703 U/L (ref 46–116)
ALT SERPL W P-5'-P-CCNC: 89 U/L (ref 12–78)
AMMONIA PLAS-SCNC: <10 UMOL/L (ref 11–35)
ANION GAP SERPL CALCULATED.3IONS-SCNC: 7 MMOL/L (ref 4–13)
AST SERPL W P-5'-P-CCNC: 166 U/L (ref 5–45)
BILIRUB DIRECT SERPL-MCNC: 2.79 MG/DL (ref 0–0.2)
BILIRUB SERPL-MCNC: 4.07 MG/DL (ref 0.2–1)
BUN SERPL-MCNC: 41 MG/DL (ref 5–25)
CALCIUM SERPL-MCNC: 8.7 MG/DL (ref 8.3–10.1)
CHLORIDE SERPL-SCNC: 105 MMOL/L (ref 100–108)
CO2 SERPL-SCNC: 27 MMOL/L (ref 21–32)
CREAT SERPL-MCNC: 1.4 MG/DL (ref 0.6–1.3)
ERYTHROCYTE [DISTWIDTH] IN BLOOD BY AUTOMATED COUNT: 19.4 % (ref 11.6–15.1)
GFR SERPL CREATININE-BSD FRML MDRD: 35 ML/MIN/1.73SQ M
GLUCOSE SERPL-MCNC: 173 MG/DL (ref 65–140)
HCT VFR BLD AUTO: 38.1 % (ref 34.8–46.1)
HGB BLD-MCNC: 11.9 G/DL (ref 11.5–15.4)
INR PPP: 1.22 (ref 0.84–1.19)
MCH RBC QN AUTO: 29.9 PG (ref 26.8–34.3)
MCHC RBC AUTO-ENTMCNC: 31.2 G/DL (ref 31.4–37.4)
MCV RBC AUTO: 96 FL (ref 82–98)
MITOCHONDRIA M2 IGG SER-ACNC: <20 UNITS (ref 0–20)
PLATELET # BLD AUTO: 94 THOUSANDS/UL (ref 149–390)
PMV BLD AUTO: 10.6 FL (ref 8.9–12.7)
POTASSIUM SERPL-SCNC: 4.9 MMOL/L (ref 3.5–5.3)
PROT SERPL-MCNC: 5.6 G/DL (ref 6.4–8.2)
PROTHROMBIN TIME: 15.6 SECONDS (ref 11.6–14.5)
RBC # BLD AUTO: 3.98 MILLION/UL (ref 3.81–5.12)
SODIUM SERPL-SCNC: 139 MMOL/L (ref 136–145)
WBC # BLD AUTO: 7.49 THOUSAND/UL (ref 4.31–10.16)

## 2020-03-17 PROCEDURE — 99232 SBSQ HOSP IP/OBS MODERATE 35: CPT | Performed by: INTERNAL MEDICINE

## 2020-03-17 PROCEDURE — 85027 COMPLETE CBC AUTOMATED: CPT | Performed by: INTERNAL MEDICINE

## 2020-03-17 PROCEDURE — 97110 THERAPEUTIC EXERCISES: CPT

## 2020-03-17 PROCEDURE — 85610 PROTHROMBIN TIME: CPT | Performed by: PHYSICIAN ASSISTANT

## 2020-03-17 PROCEDURE — 97535 SELF CARE MNGMENT TRAINING: CPT

## 2020-03-17 PROCEDURE — 97530 THERAPEUTIC ACTIVITIES: CPT

## 2020-03-17 PROCEDURE — 80076 HEPATIC FUNCTION PANEL: CPT | Performed by: PHYSICIAN ASSISTANT

## 2020-03-17 PROCEDURE — 82140 ASSAY OF AMMONIA: CPT | Performed by: INTERNAL MEDICINE

## 2020-03-17 PROCEDURE — 80048 BASIC METABOLIC PNL TOTAL CA: CPT | Performed by: INTERNAL MEDICINE

## 2020-03-17 RX ORDER — LACTULOSE 20 G/30ML
20 SOLUTION ORAL 3 TIMES DAILY
Status: DISCONTINUED | OUTPATIENT
Start: 2020-03-17 | End: 2020-03-18

## 2020-03-17 RX ORDER — FUROSEMIDE 10 MG/ML
40 INJECTION INTRAMUSCULAR; INTRAVENOUS ONCE
Status: COMPLETED | OUTPATIENT
Start: 2020-03-17 | End: 2020-03-17

## 2020-03-17 RX ORDER — ALBUMIN (HUMAN) 12.5 G/50ML
12.5 SOLUTION INTRAVENOUS ONCE
Status: COMPLETED | OUTPATIENT
Start: 2020-03-17 | End: 2020-03-17

## 2020-03-17 RX ADMIN — LACTULOSE 20 G: 10 SOLUTION ORAL at 16:13

## 2020-03-17 RX ADMIN — FUROSEMIDE 40 MG: 10 INJECTION, SOLUTION INTRAMUSCULAR; INTRAVENOUS at 12:30

## 2020-03-17 RX ADMIN — OXYCODONE HYDROCHLORIDE 5 MG: 5 TABLET ORAL at 03:58

## 2020-03-17 RX ADMIN — MICONAZOLE NITRATE: 20 CREAM TOPICAL at 16:13

## 2020-03-17 RX ADMIN — Medication 40 MG: at 21:33

## 2020-03-17 RX ADMIN — RIFAXIMIN 550 MG: 550 TABLET ORAL at 21:33

## 2020-03-17 RX ADMIN — LACTULOSE 20 G: 10 SOLUTION ORAL at 11:19

## 2020-03-17 RX ADMIN — PROPRANOLOL HYDROCHLORIDE 10 MG: 10 TABLET ORAL at 08:35

## 2020-03-17 RX ADMIN — TAMSULOSIN HYDROCHLORIDE 0.4 MG: 0.4 CAPSULE ORAL at 22:30

## 2020-03-17 RX ADMIN — ALBUMIN (HUMAN) 12.5 G: 0.25 INJECTION, SOLUTION INTRAVENOUS at 12:30

## 2020-03-17 RX ADMIN — LACTULOSE 20 G: 10 SOLUTION ORAL at 21:33

## 2020-03-17 RX ADMIN — RIFAXIMIN 550 MG: 550 TABLET ORAL at 08:35

## 2020-03-17 RX ADMIN — OXYCODONE HYDROCHLORIDE 5 MG: 5 TABLET ORAL at 14:32

## 2020-03-17 RX ADMIN — PROPRANOLOL HYDROCHLORIDE 10 MG: 10 TABLET ORAL at 16:13

## 2020-03-17 RX ADMIN — MICONAZOLE NITRATE: 20 CREAM TOPICAL at 08:36

## 2020-03-17 RX ADMIN — Medication 40 MG: at 08:35

## 2020-03-17 NOTE — PLAN OF CARE
Problem: OCCUPATIONAL THERAPY ADULT  Goal: Performs self-care activities at highest level of function for planned discharge setting  See evaluation for individualized goals  Description  Treatment Interventions: ADL retraining, Functional transfer training, Endurance training, UE strengthening/ROM, Cognitive reorientation, Patient/family training, Equipment evaluation/education, Compensatory technique education, Activityengagement          See flowsheet documentation for full assessment, interventions and recommendations  Outcome: Progressing  Note:   Limitation: Decreased ADL status, Decreased UE strength, Decreased Safe judgement during ADL, Decreased cognition, Decreased endurance, Decreased self-care trans, Decreased high-level ADLs  Prognosis: Fair  Assessment: Pt was seen for skilled OT with focus on completion of self care tasks, BUE ROM exercises, trunk flexion/extension exercises, bed mobility and review of current plan of care  See above levels of A required for all functional tasks  Max A overall to roll for staff to A with johnny hygiene  Stabilization of affected RLE required with Max A x2 to again roll to R side with LE bathing  Pt with increased fatigue with activity  Pt able to be repositioned with Max A x2 and moderate verbal cues to assist with full usage of BUE  Pt able to tolerate bed positioned as chair for more than 20 mins to complete UE bathing/dressing and light grooming activity  Increased fatigue noted with sustained use of BUE to cleanse hair and towel dry  Min A required overall due to noted fatigue levels  Reported all findings to nursing staff  Pt may benefit from further rehab with focus on achieving optimal performance levels with all functional tasks   Continue to recommend Inpatient rehab     OT Discharge Recommendation: Short Term Rehab  OT - OK to Discharge: Yes(when medically cleared  )

## 2020-03-17 NOTE — OCCUPATIONAL THERAPY NOTE
Occupational Therapy Treatment Note:         03/17/20 1424   Restrictions/Precautions   RLE Weight Bearing Per Order NWB   Braces or Orthoses LE Immobilizer  (HKB locked in extension)   Other Precautions Cognitive; Chair Alarm; Bed Alarm; Fall Risk;Pain;WBS   Pain Assessment   Pain Assessment Tool 0-10   Pain Score 6   Pain Location/Orientation Orientation: Right;Location: Leg   ADL   Where Assessed Supine, bed   Grooming Assistance 4  Minimal Assistance   Grooming Deficit Setup;Steadying;Verbal cueing;Supervision/safety; Increased time to complete;Wash/dry face; Wash/dry hands;Brushing hair   Grooming Comments increased fatigue noted with activities  UB Bathing Assistance 4  Minimal Assistance   UB Bathing Deficit Setup   LB Bathing Assistance 2  Maximal Assistance   LB Bathing Deficit Setup; Increased time to complete;Supervision/safety;Verbal cueing;Perineal area; Buttocks; Left lower leg including foot;Right lower leg including foot   UB Dressing Assistance 4  Minimal Assistance   UB Dressing Deficit Setup   LB Dressing Assistance 2  Maximal Assistance   LB Dressing Deficit Setup;Steadying;Verbal cueing;Supervision/safety; Increased time to complete; Don/doff R sock; Don/doff L sock   Toileting Assistance  Unable to assess   Toileting Comments Pt has catheter in place  Increased a for johnny anal hygiene due to incontinence of BM noted  Bed Mobility   Rolling R 2  Maximal assistance   Additional items Assist x 2; Increased time required;Verbal cues;LE management; Bedrails   Rolling L 2  Maximal assistance   Additional items Assist x 2;Bedrails; Increased time required;Verbal cues;LE management   Additional Comments Pt dk to long sit with be positioned as chair  Transfers   Sit to Stand Unable to assess   Cognition   Overall Cognitive Status Impaired   Arousal/Participation Cooperative;Responsive   Attention Attends with cues to redirect   Orientation Level Oriented to person;Disoriented to place; Disoriented to time;Disoriented to situation   Memory Decreased recall of precautions;Decreased recall of recent events;Decreased short term memory   Following Commands Follows one step commands without difficulty   Additional Activities   Additional Activities Other (Comment)  (reviewed current plan of care  )   Additional Activities Comments Pt with limited recall/ST memory loss noted  Activity Tolerance   Activity Tolerance Patient limited by fatigue;Patient limited by pain   Medical Staff Made Aware reported all findings to nursing staff  Assessment   Assessment Pt was seen for skilled OT with focus on completion of self care tasks, BUE ROM exercises, trunk flexion/extension exercises, bed mobility and review of current plan of care  See above levels of A required for all functional tasks  Max A overall to roll for staff to A with johnny hygiene  Stabilization of affected RLE required with Max A x2 to again roll to R side with LE bathing  Pt with increased fatigue with activity  Pt able to be repositioned with Max A x2 and moderate verbal cues to assist with full usage of BUE  Pt able to tolerate bed positioned as chair for more than 20 mins to complete UE bathing/dressing and light grooming activity  Increased fatigue noted with sustained use of BUE to cleanse hair and towel dry  Min A required overall due to noted fatigue levels  Reported all findings to nursing staff  Pt may benefit from further rehab with focus on achieving optimal performance levels with all functional tasks  Continue to recommend Inpatient rehab   Plan   Treatment Interventions ADL retraining;Functional transfer training;Cognitive reorientation; Endurance training;UE strengthening/ROM   Goal Expiration Date 03/27/20   OT Treatment Day 1   OT Frequency 3-5x/wk   Recommendation   OT Discharge Recommendation Short Term Rehab   OT - OK to Discharge Yes  (when medically cleared  )   Barthel Index   Feeding 5   Bathing 0   Grooming Score 0   Dressing Score 5   Bladder Score 0   Bowels Score 5   Toilet Use Score 0   Transfers (Bed/Chair) Score 0   Mobility (Level Surface) Score 0   Stairs Score 0   Barthel Index Score 15   Modified Agustín Scale   Modified [place] Scale 4   Alona Mendieta, [address]

## 2020-03-17 NOTE — PROGRESS NOTES
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- appreciate gi recommendations  Unable to obtain paracentesis yesterday due to minimal ascties but pt did have anasarca  Pt was given albumin and IV lasix today  She was restarted on xifaxan and lactulose was added today  Will continue to monitor       3  Abnormally elevated AFP- pt will need either triple phase ct liver protocol or mri  Will await repeat bmp in am to assess stability of creatinine before deciding on which imagining route  Likely will be able to obtain mri but looking to obtain this outpt       4  Chronic afib- continue rate control with propranolol  No ac due to cirrhosis and history of subdural hematoma     5  Thrombocytopenia- due to cirrhosis  Will monitor     6  Stage 2 pressure injury- continue wound care     7  Closed fracture of right distal femur- continue knee immobilizer  Discussed with ortho that pt will still require this at all times     8  Chronic indwelling wells catheter- continue wells       dispo- pt will need str once stable      Subjective:   Pt seen and examined  Pt is oriented x3 with prompting  No f/c no cp no sob no n/v/d no abd pain  Objective:     Vitals: Blood pressure 115/57, pulse 71, temperature 98 °F (36 7 °C), temperature source Oral, resp  rate 16, height 5' 2" (1 575 m), SpO2 98 %  ,Body mass index is 39 69 kg/m²      Lab, Imaging and other studies:  Results from last 7 days   Lab Units 03/17/20  0539   WBC Thousand/uL 7 49   HEMOGLOBIN g/dL 11 9   HEMATOCRIT % 38 1   PLATELETS Thousands/uL 94*   INR  1 22*     Results from last 7 days   Lab Units 03/17/20  0539   POTASSIUM mmol/L 4 9   CHLORIDE mmol/L 105   CO2 mmol/L 27   BUN mg/dL 41*   CREATININE mg/dL 1 40*   CALCIUM mg/dL 8 7   ALK PHOS U/L 703*   ALT U/L 89*   AST U/L 166*     Results from last 7 days   Lab Units 03/12/20  1717   TROPONIN I ng/mL <0 02     Lab Results   Component Value Date    BLOODCX No Growth After 5 Days  06/05/2017    BLOODCX No Growth After 5 Days  06/05/2017    URINECX >100,000 cfu/ml Escherichia coli 06/05/2017    URINECX >100,000 cfu/ml Klebsiella pneumoniae 11/01/2016     Scheduled Meds:   Current Facility-Administered Medications:  acetaminophen 650 mg Oral Q6H PRN Radha Quan PA-C   famotidine 10 mg Oral Daily PRN Radha Quan PA-C   gabapentin 100 mg Oral TID PRN Radha Quan PA-C   lactulose 20 g Oral TID Maya Villanueva PA-C   JOSE ANTIFUNGAL  Topical BID Marlen Brady DO   omeprazole (PRILOSEC) suspension 2 mg/mL 40 mg Oral Q12H [facility] Radha Quan PA-C   ondansetron 4 mg Intravenous Q6H PRN Radha Quan PA-C   oxyCODONE 5 mg Oral Q4H PRN Radha Quan PA-C   propranolol 10 mg Oral BID Radha Quan PA-C   rifaximin 550 mg Oral Q12H [facility] Marlen Brady DO   tamsulosin 0 4 mg Oral HS Radha Quan PA-C     Continuous Infusions:    PRN Meds:   acetaminophen    famotidine    gabapentin    ondansetron    oxyCODONE      Physical exam:  Physical Exam  General appearance: alert  Head: Normocephalic, without obvious abnormality, atraumatic  Eyes: conjunctivae/corneas clear  PERRL, EOM's intact  Fundi benign    Neck: no adenopathy, no carotid bruit, no JVD, supple, symmetrical, trachea midline and thyroid not enlarged, symmetric, no tenderness/mass/nodules  Lungs: clear to auscultation bilaterally  Heart: regular rate and rhythm, S1, S2 normal, no murmur, click, rub or gallop  Abdomen: soft, non-tender; bowel sounds normal; no masses,  no organomegaly  Extremities: extremities normal, warm and well-perfused; no cyanosis, clubbing, or edema  Pulses: 2+ and symmetric  Skin: Skin color, texture, turgor normal  No rashes or lesions  Neurologic: Mental status: awake alert oriented x3 with prompting      VTE Pharmacologic Prophylaxis: Reason for no pharmacologic prophylaxis subdural hematoma  VTE Mechanical Prophylaxis: sequential compression device    Counseling / Coordination of Care  Total floor / unit time spent today 20 minutes      Current Length of Stay: 5 day(s)    Current Patient Status: Inpatient       Code Status: Level 3 - DNAR and DNI

## 2020-03-17 NOTE — PLAN OF CARE
Problem: Prexisting or High Potential for Compromised Skin Integrity  Goal: Skin integrity is maintained or improved  Description  INTERVENTIONS:  - Identify patients at risk for skin breakdown  - Assess and monitor skin integrity  - Assess and monitor nutrition and hydration status  - Monitor labs   - Assess for incontinence   - Turn and reposition patient  - Assist with mobility/ambulation  - Relieve pressure over bony prominences  - Avoid friction and shearing  - Provide appropriate hygiene as needed including keeping skin clean and dry  - Evaluate need for skin moisturizer/barrier cream  - Collaborate with interdisciplinary team   - Patient/family teaching  - Consider wound care consult   Outcome: Progressing     Problem: PAIN - ADULT  Goal: Verbalizes/displays adequate comfort level or baseline comfort level  Description  Interventions:  - Encourage patient to monitor pain and request assistance  - Assess pain using appropriate pain scale  - Administer analgesics based on type and severity of pain and evaluate response  - Implement non-pharmacological measures as appropriate and evaluate response  - Consider cultural and social influences on pain and pain management  - Notify physician/advanced practitioner if interventions unsuccessful or patient reports new pain  Outcome: Progressing     Problem: INFECTION - ADULT  Goal: Absence or prevention of progression during hospitalization  Description  INTERVENTIONS:  - Assess and monitor for signs and symptoms of infection  - Monitor lab/diagnostic results  - Monitor all insertion sites, i e  indwelling lines, tubes, and drains  - Monitor endotracheal if appropriate and nasal secretions for changes in amount and color  - [place] appropriate cooling/warming therapies per order  - Administer medications as ordered  - Instruct and encourage patient and family to use good hand hygiene technique  - Identify and instruct in appropriate isolation precautions for identified infection/condition  Outcome: Progressing     Problem: SAFETY ADULT  Goal: Patient will remain free of falls  Description  INTERVENTIONS:  - Assess patient frequently for physical needs  -  Identify cognitive and physical deficits and behaviors that affect risk of falls    -  [place] fall precautions as indicated by assessment   - Educate patient/family on patient safety including physical limitations  - Instruct patient to call for assistance with activity based on assessment  - Modify environment to reduce risk of injury  - Consider OT/PT consult to assist with strengthening/mobility  Outcome: Progressing  Goal: Maintain or return to baseline ADL function  Description  INTERVENTIONS:  -  Assess patient's ability to carry out ADLs; assess patient's baseline for ADL function and identify physical deficits which impact ability to perform ADLs (bathing, care of mouth/teeth, toileting, grooming, dressing, etc )  - Assess/evaluate cause of self-care deficits   - Assess range of motion  - Assess patient's mobility; develop plan if impaired  - Assess patient's need for assistive devices and provide as appropriate  - Encourage maximum independence but intervene and supervise when necessary  - Involve family in performance of ADLs  - Assess for home care needs following discharge   - Consider OT consult to assist with ADL evaluation and planning for discharge  - Provide patient education as appropriate  Outcome: Progressing  Goal: Maintain or return mobility status to optimal level  Description  INTERVENTIONS:  - Assess patient's baseline mobility status (ambulation, transfers, stairs, etc )    - Identify cognitive and physical deficits and behaviors that affect mobility  - Identify mobility aids required to assist with transfers and/or ambulation (gait belt, sit-to-stand, lift, walker, cane, etc )  - [place] fall precautions as indicated by assessment  - Record patient progress and toleration of activity level on Mobility SBAR; progress patient to next Phase/Stage  - Instruct patient to call for assistance with activity based on assessment  - Consider rehabilitation consult to assist with strengthening/weightbearing, etc   Outcome: Progressing     Problem: DISCHARGE PLANNING  Goal: Discharge to home or other facility with appropriate resources  Description  INTERVENTIONS:  - Identify barriers to discharge w/patient and caregiver  - Arrange for needed discharge resources and transportation as appropriate  - Identify discharge learning needs (meds, wound care, etc )  - Arrange for interpretive services to assist at discharge as needed  - Refer to Case Management Department for coordinating discharge planning if the patient needs post-hospital services based on physician/advanced practitioner order or complex needs related to functional status, cognitive ability, or social support system  Outcome: Progressing     Problem: Knowledge Deficit  Goal: Patient/family/caregiver demonstrates understanding of disease process, treatment plan, medications, and discharge instructions  Description  Complete learning assessment and assess knowledge base    Interventions:  - Provide teaching at level of understanding  - Provide teaching via preferred learning methods  Outcome: Progressing     Problem: CARDIOVASCULAR - ADULT  Goal: Maintains optimal cardiac output and hemodynamic stability  Description  INTERVENTIONS:  - Monitor I/O, vital signs and rhythm  - Monitor for S/S and trends of decreased cardiac output  - Administer and titrate ordered vasoactive medications to optimize hemodynamic stability  - Assess quality of pulses, skin color and temperature  - Assess for signs of decreased coronary artery perfusion  - Instruct patient to report change in severity of symptoms  Outcome: Progressing  Goal: Absence of cardiac dysrhythmias or at baseline rhythm  Description  INTERVENTIONS:  - Continuous cardiac monitoring, vital signs, obtain 12 lead EKG if ordered  - Administer antiarrhythmic and heart rate control medications as ordered  - Monitor electrolytes and administer replacement therapy as ordered  Outcome: Progressing     Problem: GENITOURINARY - ADULT  Goal: Urinary catheter remains patent  Description  INTERVENTIONS:  - Assess patency of urinary catheter  - If patient has a chronic wells, consider changing catheter if non-functioning  - Follow guidelines for intermittent irrigation of non-functioning urinary catheter  Outcome: Progressing     Problem: METABOLIC, FLUID AND ELECTROLYTES - ADULT  Goal: Electrolytes maintained within normal limits  Description  INTERVENTIONS:  - Monitor labs and assess patient for signs and symptoms of electrolyte imbalances  - Administer electrolyte replacement as ordered  - Monitor response to electrolyte replacements, including repeat lab results as appropriate  - Instruct patient on fluid and nutrition as appropriate  Outcome: Progressing  Goal: Fluid balance maintained  Description  INTERVENTIONS:  - Monitor labs   - Monitor I/O and WT  - Instruct patient on fluid and nutrition as appropriate  - Assess for signs & symptoms of volume excess or deficit  Outcome: Progressing  Goal: Glucose maintained within target range  Description  INTERVENTIONS:  - Monitor Blood Glucose as ordered  - Assess for signs and symptoms of hyperglycemia and hypoglycemia  - Administer ordered medications to maintain glucose within target range  - Assess nutritional intake and initiate nutrition service referral as needed  Outcome: Progressing     Problem: SKIN/TISSUE INTEGRITY - ADULT  Goal: Skin integrity remains intact  Description  INTERVENTIONS  - Identify patients at risk for skin breakdown  - Assess and monitor skin integrity  - Assess and monitor nutrition and hydration status  - Monitor labs (i e  albumin)  - Assess for incontinence   - Turn and reposition patient  - Assist with mobility/ambulation  - Relieve pressure over bony prominences  - Avoid friction and shearing  - Provide appropriate hygiene as needed including keeping skin clean and dry  - Evaluate need for skin moisturizer/barrier cream  - Collaborate with interdisciplinary team (i e  Nutrition, Rehabilitation, etc )   - Patient/family teaching  Outcome: Progressing     Problem: Potential for Falls  Goal: Patient will remain free of falls  Description  INTERVENTIONS:  - Assess patient frequently for physical needs  -  Identify cognitive and physical deficits and behaviors that affect risk of falls  -  [place] fall precautions as indicated by assessment   - Educate patient/family on patient safety including physical limitations  - Instruct patient to call for assistance with activity based on assessment  - Modify environment to reduce risk of injury  - Consider OT/PT consult to assist with strengthening/mobility  Outcome: Progressing     Problem: Nutrition/Hydration-ADULT  Goal: Nutrient/Hydration intake appropriate for improving, restoring or maintaining nutritional needs  Description  Monitor and assess patient's nutrition/hydration status for malnutrition  Collaborate with interdisciplinary team and initiate plan and interventions as ordered  Monitor patient's weight and dietary intake as ordered or per policy  Utilize nutrition screening tool and intervene as necessary  Determine patient's food preferences and provide high-protein, high-caloric foods as appropriate       INTERVENTIONS:  - Monitor oral intake, urinary output, labs, and treatment plans  - Assess nutrition and hydration status and recommend course of action  - Evaluate amount of meals eaten  - Assist patient with eating if necessary   - Allow adequate time for meals  - Recommend/ encourage appropriate diets, oral nutritional supplements, and vitamin/mineral supplements  - Order, calculate, and assess calorie counts as needed  - Recommend, monitor, and adjust tube feedings and TPN/PPN based on assessed needs  - Assess need for intravenous fluids  - Provide specific nutrition/hydration education as appropriate  - Include patient/family/caregiver in decisions related to nutrition  Outcome: Progressing

## 2020-03-17 NOTE — PROGRESS NOTES
NEPHROLOGY PROGRESS NOTE   Julio Yusuf 78 y o  female MRN: [id number]  Unit/Bed#: [facility] [address] [id number] Encounter: [id number]  Reason for Consult:  Acute kidney injury    ASSESSMENT/PLAN:  1  Acute kidney injury, likely due to prerenal azotemia given improvement IV fluids  2  Previous CKD 3, baseline creatinine 0 7- 0 8  3  Liver cirrhosis with associated ascites   4  Likely degree of volume overload, significant weight gain over last several weeks  5  Hypernatremia, resolved, currently off sodium chloride tablets  6  History of hypertension, blood pressure is stable  7  Remote history of hyponatremia, previously on sodium chloride tablets     PLAN:  · Currently off IV fluids however weight continues to rise  · Recommend albumin with Lasix x1  · Hopeful continued stability/improvement in renal function  · Discussed with GI, anticipating MRI as an outpatient    SUBJECTIVE:  Seen examined  Patient awake alert  , is confused at times  Complains of abdominal fullness  Appears mildly short of breath  No active chest pain  Review of Systems    OBJECTIVE:  Current Weight:    Vitals:    03/16/20 2226 03/16/20 2230 03/17/20 0742 03/17/20 0744   BP: 99/64 99/64 113/73 113/73   Pulse:  82 73 77   Resp: 20 20 17 18   Temp: 97 6 °F (36 4 °C) 97 6 °F (36 4 °C) (!) 97 4 °F (36 3 °C) (!) 97 4 °F (36 3 °C)   TempSrc:  Oral     SpO2:  99% 97% 98%   Height:         No intake or output data in the 24 hours ending 03/17/20 1124    Physical Exam   Constitutional: No distress  HENT:   Head: Normocephalic  Neck: Neck supple  Cardiovascular: Normal rate and regular rhythm  Pulmonary/Chest: Effort normal  Tachypnea noted  She has rales in the right lower field and the left lower field  Abdominal: Soft  She exhibits distension  There is no guarding  Musculoskeletal: She exhibits edema  Neurological: She is alert  Skin: Skin is warm and dry  No rash noted         Medications:    Current Facility-Administered Medications:   acetaminophen (TYLENOL) tablet 650 mg, 650 mg, Oral, Q6H PRN, Radha Quan PA-C    albumin human (FLEXBUMIN) 25 % injection 12 5 g, 12 5 g, Intravenous, Once, Franki Singleton DO    famotidine (PEPCID) tablet 10 mg, 10 mg, Oral, Daily PRN, Radha Quan PA-C    furosemide (LASIX) injection 40 mg, 40 mg, Intravenous, Once, Franki Singleton DO    gabapentin (NEURONTIN) capsule 100 mg, 100 mg, Oral, TID PRN, Radha Quan PA-C    lactulose 20 g/30 mL oral solution 20 g, 20 g, Oral, TID, Maya Villanueva PA-C, 20 g at 03/17/20 1119    moisture barrier miconazole 2% cream (aka JOSE MOISTURE BARRIER ANTIFUNGAL CREAM), , Topical, BID, Marlen Brady DO    omeprazole (PRILOSEC) suspension 2 mg/mL, 40 mg, Oral, Q12H [facility], Radha Quan PA-C, 40 mg at 03/17/20 0835    ondansetron (ZOFRAN) injection 4 mg, 4 mg, Intravenous, Q6H PRN, Radha Quan PA-C    oxyCODONE (ROXICODONE) IR tablet 5 mg, 5 mg, Oral, Q4H PRN, Radha Quan PA-C, 5 mg at 03/17/20 0358    propranolol (INDERAL) tablet 10 mg, 10 mg, Oral, BID, Radha Quan PA-C, 10 mg at 03/17/20 0835    rifaximin (XIFAXAN) tablet 550 mg, 550 mg, Oral, Q12H [facility], Marlen Brady DO, 550 mg at 03/17/20 0835    tamsulosin (FLOMAX) capsule 0 4 mg, 0 4 mg, Oral, HS, Radha Quan PA-C, 0 4 mg at 03/16/20 2132    Laboratory Results:  Results from last 7 days   Lab Units 03/17/20  0539 03/16/20  0457 03/15/20  0450 03/14/20  1505 03/14/20  0932 03/14/20  0502 03/13/20  2311  03/13/20  0448 03/12/20  2350 03/12/20  1717   WBC Thousand/uL 7 49  --   --   --   --  5 76  --   --  9 62  --  11 25*   HEMOGLOBIN g/dL 11 9  --   --   --   --  10 1*  --   --  11 9 12 1 12 5   HEMATOCRIT % 38 1  --   --   --   --  33 5*  --   --  39 9 40 8 42 0   PLATELETS Thousands/uL 94*  --   --   --   --  94*  --   --  115*  --  142*   POTASSIUM mmol/L 4 9 4 9 4 7 4 2 4 1 3 5 4 0   < > 4 5  --  4 7   CHLORIDE mmol/L 105 104 105 105 107 102 113*   < > 113*  --  112*   CO2 mmol/L 27 28 24 26 27 26 26   < > 27  --  27   BUN mg/dL 41* 40* 43* 46* 48* 46* 53*   < > 57*  --  57*   CREATININE mg/dL 1 40* 1 40* 1 59* 1 70* 1 74* 1 70* 1 87*   < > 1 92*  --  1 99*   CALCIUM mg/dL 8 7 8 6 8 8 8 5 8 6 7 9* 8 5   < > 8 8  --  8 9   MAGNESIUM mg/dL  --   --  2 4  --   --  2 2  --   --   --   --   --    PHOSPHORUS mg/dL  --   --  2 4  --   --  2 7  --   --   --   --   --     < > = values in this interval not displayed

## 2020-03-17 NOTE — PLAN OF CARE
Problem: Prexisting or High Potential for Compromised Skin Integrity  Goal: Skin integrity is maintained or improved  Description  INTERVENTIONS:  - Identify patients at risk for skin breakdown  - Assess and monitor skin integrity  - Assess and monitor nutrition and hydration status  - Monitor labs   - Assess for incontinence   - Turn and reposition patient  - Assist with mobility/ambulation  - Relieve pressure over bony prominences  - Avoid friction and shearing  - Provide appropriate hygiene as needed including keeping skin clean and dry  - Evaluate need for skin moisturizer/barrier cream  - Collaborate with interdisciplinary team   - Patient/family teaching  - Consider wound care consult   Outcome: Progressing     Problem: PAIN - ADULT  Goal: Verbalizes/displays adequate comfort level or baseline comfort level  Description  Interventions:  - Encourage patient to monitor pain and request assistance  - Assess pain using appropriate pain scale  - Administer analgesics based on type and severity of pain and evaluate response  - Implement non-pharmacological measures as appropriate and evaluate response  - Consider cultural and social influences on pain and pain management  - Notify physician/advanced practitioner if interventions unsuccessful or patient reports new pain  Outcome: Progressing     Problem: INFECTION - ADULT  Goal: Absence or prevention of progression during hospitalization  Description  INTERVENTIONS:  - Assess and monitor for signs and symptoms of infection  - Monitor lab/diagnostic results  - Monitor all insertion sites, i e  indwelling lines, tubes, and drains  - Monitor endotracheal if appropriate and nasal secretions for changes in amount and color  - [place] appropriate cooling/warming therapies per order  - Administer medications as ordered  - Instruct and encourage patient and family to use good hand hygiene technique  - Identify and instruct in appropriate isolation precautions for identified infection/condition  Outcome: Progressing     Problem: SAFETY ADULT  Goal: Patient will remain free of falls  Description  INTERVENTIONS:  - Assess patient frequently for physical needs  -  Identify cognitive and physical deficits and behaviors that affect risk of falls    -  [place] fall precautions as indicated by assessment   - Educate patient/family on patient safety including physical limitations  - Instruct patient to call for assistance with activity based on assessment  - Modify environment to reduce risk of injury  - Consider OT/PT consult to assist with strengthening/mobility  Outcome: Progressing  Goal: Maintain or return to baseline ADL function  Description  INTERVENTIONS:  -  Assess patient's ability to carry out ADLs; assess patient's baseline for ADL function and identify physical deficits which impact ability to perform ADLs (bathing, care of mouth/teeth, toileting, grooming, dressing, etc )  - Assess/evaluate cause of self-care deficits   - Assess range of motion  - Assess patient's mobility; develop plan if impaired  - Assess patient's need for assistive devices and provide as appropriate  - Encourage maximum independence but intervene and supervise when necessary  - Involve family in performance of ADLs  - Assess for home care needs following discharge   - Consider OT consult to assist with ADL evaluation and planning for discharge  - Provide patient education as appropriate  Outcome: Progressing  Goal: Maintain or return mobility status to optimal level  Description  INTERVENTIONS:  - Assess patient's baseline mobility status (ambulation, transfers, stairs, etc )    - Identify cognitive and physical deficits and behaviors that affect mobility  - Identify mobility aids required to assist with transfers and/or ambulation (gait belt, sit-to-stand, lift, walker, cane, etc )  - [place] fall precautions as indicated by assessment  - Record patient progress and toleration of activity level on Mobility SBAR; progress patient to next Phase/Stage  - Instruct patient to call for assistance with activity based on assessment  - Consider rehabilitation consult to assist with strengthening/weightbearing, etc   Outcome: Progressing     Problem: DISCHARGE PLANNING  Goal: Discharge to home or other facility with appropriate resources  Description  INTERVENTIONS:  - Identify barriers to discharge w/patient and caregiver  - Arrange for needed discharge resources and transportation as appropriate  - Identify discharge learning needs (meds, wound care, etc )  - Arrange for interpretive services to assist at discharge as needed  - Refer to Case Management Department for coordinating discharge planning if the patient needs post-hospital services based on physician/advanced practitioner order or complex needs related to functional status, cognitive ability, or social support system  Outcome: Progressing     Problem: Knowledge Deficit  Goal: Patient/family/caregiver demonstrates understanding of disease process, treatment plan, medications, and discharge instructions  Description  Complete learning assessment and assess knowledge base    Interventions:  - Provide teaching at level of understanding  - Provide teaching via preferred learning methods  Outcome: Progressing     Problem: CARDIOVASCULAR - ADULT  Goal: Maintains optimal cardiac output and hemodynamic stability  Description  INTERVENTIONS:  - Monitor I/O, vital signs and rhythm  - Monitor for S/S and trends of decreased cardiac output  - Administer and titrate ordered vasoactive medications to optimize hemodynamic stability  - Assess quality of pulses, skin color and temperature  - Assess for signs of decreased coronary artery perfusion  - Instruct patient to report change in severity of symptoms  Outcome: Progressing  Goal: Absence of cardiac dysrhythmias or at baseline rhythm  Description  INTERVENTIONS:  - Continuous cardiac monitoring, vital signs, obtain 12 lead EKG if ordered  - Administer antiarrhythmic and heart rate control medications as ordered  - Monitor electrolytes and administer replacement therapy as ordered  Outcome: Progressing     Problem: GENITOURINARY - ADULT  Goal: Urinary catheter remains patent  Description  INTERVENTIONS:  - Assess patency of urinary catheter  - If patient has a chronic wells, consider changing catheter if non-functioning  - Follow guidelines for intermittent irrigation of non-functioning urinary catheter  Outcome: Progressing     Problem: METABOLIC, FLUID AND ELECTROLYTES - ADULT  Goal: Electrolytes maintained within normal limits  Description  INTERVENTIONS:  - Monitor labs and assess patient for signs and symptoms of electrolyte imbalances  - Administer electrolyte replacement as ordered  - Monitor response to electrolyte replacements, including repeat lab results as appropriate  - Instruct patient on fluid and nutrition as appropriate  Outcome: Progressing  Goal: Fluid balance maintained  Description  INTERVENTIONS:  - Monitor labs   - Monitor I/O and WT  - Instruct patient on fluid and nutrition as appropriate  - Assess for signs & symptoms of volume excess or deficit  Outcome: Progressing  Goal: Glucose maintained within target range  Description  INTERVENTIONS:  - Monitor Blood Glucose as ordered  - Assess for signs and symptoms of hyperglycemia and hypoglycemia  - Administer ordered medications to maintain glucose within target range  - Assess nutritional intake and initiate nutrition service referral as needed  Outcome: Progressing     Problem: SKIN/TISSUE INTEGRITY - ADULT  Goal: Skin integrity remains intact  Description  INTERVENTIONS  - Identify patients at risk for skin breakdown  - Assess and monitor skin integrity  - Assess and monitor nutrition and hydration status  - Monitor labs (i e  albumin)  - Assess for incontinence   - Turn and reposition patient  - Assist with mobility/ambulation  - Relieve pressure over bony prominences  - Avoid friction and shearing  - Provide appropriate hygiene as needed including keeping skin clean and dry  - Evaluate need for skin moisturizer/barrier cream  - Collaborate with interdisciplinary team (i e  Nutrition, Rehabilitation, etc )   - Patient/family teaching  Outcome: Progressing     Problem: Potential for Falls  Goal: Patient will remain free of falls  Description  INTERVENTIONS:  - Assess patient frequently for physical needs  -  Identify cognitive and physical deficits and behaviors that affect risk of falls  -  [place] fall precautions as indicated by assessment   - Educate patient/family on patient safety including physical limitations  - Instruct patient to call for assistance with activity based on assessment  - Modify environment to reduce risk of injury  - Consider OT/PT consult to assist with strengthening/mobility  Outcome: Progressing     Problem: Nutrition/Hydration-ADULT  Goal: Nutrient/Hydration intake appropriate for improving, restoring or maintaining nutritional needs  Description  Monitor and assess patient's nutrition/hydration status for malnutrition  Collaborate with interdisciplinary team and initiate plan and interventions as ordered  Monitor patient's weight and dietary intake as ordered or per policy  Utilize nutrition screening tool and intervene as necessary  Determine patient's food preferences and provide high-protein, high-caloric foods as appropriate       INTERVENTIONS:  - Monitor oral intake, urinary output, labs, and treatment plans  - Assess nutrition and hydration status and recommend course of action  - Evaluate amount of meals eaten  - Assist patient with eating if necessary   - Allow adequate time for meals  - Recommend/ encourage appropriate diets, oral nutritional supplements, and vitamin/mineral supplements  - Order, calculate, and assess calorie counts as needed  - Recommend, monitor, and adjust tube feedings and TPN/PPN based on assessed needs  - Assess need for intravenous fluids  - Provide specific nutrition/hydration education as appropriate  - Include patient/family/caregiver in decisions related to nutrition  Outcome: Progressing

## 2020-03-17 NOTE — PROGRESS NOTES
Progress Note - Hunter Maynard 78 y o  female MRN: [id number]    Unit/Bed#: [facility] 2 -01 Encounter: [id number]    Subjective:     Patient seen and examined at bedside  She can answer some questions appropriately, but is still confused  She complains of abdominal pain and back pain and leg pain  She denies bowel movements  Objective:     Vitals: Blood pressure 113/73, pulse 77, temperature (!) 97 4 °F (36 3 °C), resp  rate 18, height 5' 2" (1 575 m), SpO2 98 %  ,Body mass index is 39 69 kg/m²  No intake or output data in the 24 hours ending 03/17/20 1016    Physical Exam:     General Appearance: Alert and oriented to person and place - she states year is 1920, no acute distress  Lungs: Clear to auscultation bilaterally  Heart: Regular rate and rhythm  Abdomen:  Obese  Hypoactive bowel sounds  Soft  Nontender to palpation  Neuro: Minimal asterixis  Extremities:  Anasarca    Invasive Devices     Peripheral Intravenous Line            Peripheral IV 03/12/20 Dorsal (posterior); Right Hand 5 days    Peripheral IV 03/12/20 Left Antecubital 4 days          Drain            Urethral Catheter 27 days                Lab Results:  Results from last 7 days   Lab Units 03/17/20  0539 03/14/20  0502   WBC Thousand/uL 7 49 5 76   HEMOGLOBIN g/dL 11 9 10 1*   HEMATOCRIT % 38 1 33 5*   PLATELETS Thousands/uL 94* 94*   NEUTROS PCT %  --  71   LYMPHS PCT %  --  18   MONOS PCT %  --  8   EOS PCT %  --  1     Results from last 7 days   Lab Units 03/17/20  0539   POTASSIUM mmol/L 4 9   CHLORIDE mmol/L 105   CO2 mmol/L 27   BUN mg/dL 41*   CREATININE mg/dL 1 40*   CALCIUM mg/dL 8 7   ALK PHOS U/L 703*   ALT U/L 89*   AST U/L 166*     Results from last 7 days   Lab Units 03/17/20  0539   INR  1 22*           Imaging Studies: I have personally reviewed pertinent imaging studies  Xr Chest Portable    Result Date: 3/14/2020  Impression: No acute cardiopulmonary disease   Workstation performed: [id number]     Xr Chest 2 Views    Result Date: 3/12/2020  Impression: No acute cardiopulmonary disease  Stable cardiomegaly  Workstation performed: [id number]     Ct Head Without Contrast    Result Date: 3/12/2020  Impression: 1  Interval resolution of interhemispheric subdural hematoma  Diminished attenuation of subacute right thalamic hemorrhage, stable in size measuring 7 x 5 mm  No acute intracranial abnormality  2   Atrophy with stable severe chronic small vessel ischemic changes  Workstation performed: [id number]     Ir Paracentesis    Result Date: 3/13/2020  Impression: Successful ultrasound-guided paracentesis PLAN: Albumin per protocol Workstation performed: [id number]     Us Right Upper Quadrant With Liver Dopplers    Result Date: 3/12/2020  Impression: 1  Partial portal vein thrombosis throughout the enlarged portal veins  2  Main portal vein and right portal vein is reversed  Main portal vein is enlarged measuring 3 cm  These findings suggest portal hypertension with partial nonocclusive portal vein thrombosis  3  Cirrhotic nodular liver  4  Moderate to large ascites throughout the abdomen  Workstation performed: [id number]       Assessment and Plan:    Discussed plan with SLIM and Nephrology    1) Cirrhosis:  Possibly secondary to alcohol  Autoimmune studies pending, chronic hepatitis panel negative  MELD score 17  Her cirrhosis is complicated by ascites, partial portal vein thrombosis, and hepatic encephalopathy     -Monitor liver enzymes  -Follow up on autoimmune studies    2) Ascites:  Paracentesis canceled yesterday due to small ascites and lack of safe window     -Diuretics on hold due to CECIL  -We could re-attempt paracentesis later in the week if patient is still admitted    3) Elevated AFP:  AFP elevated 493  This is highly suspicious for [address]  Unfortunately, RUQ ultrasound images are limited and we have been unable to give IV contrast due to CECIL       -Discussed with Nephrology, her GFR must be greater than 35 (ideally 40) to give gadolinium contrast  -Hopefully, as long as renal function remains stable/improves we can get MRI as outpatient  -We will plan for close outpatient follow-up and continue to monitor her renal function    4) Hepatic encephalopathy:  She is alert and oriented to person and place, although is confused about year  She has minimal asterixis      -Start lactulose 20 g 3 times daily, titrate for 3 BMs per day  -Continue Xifaxan 550 mg twice daily

## 2020-03-17 NOTE — PHYSICAL THERAPY NOTE
PHYSICAL THERAPY NOTE          Patient Name: Hunter Maynard  WLOWV'R Date: 3/17/2020   Time In: 0526 Time Out: 6868       03/17/20 1423   Pain Assessment   Pain Location/Orientation Orientation: Right;Location: Leg   Hospital Pain Intervention(s) Repositioned; Rest   Pain Rating: FLACC (Rest) - Face 0   Pain Rating: FLACC (Rest) - Legs 0   Pain Rating: FLACC (Rest) - Activity 0   Pain Rating: FLACC (Rest) - Cry 0   Pain Rating: FLACC (Rest) - Consolability 0   Score: FLACC (Rest) 0   Pain Rating: FLACC (Activity) - Face 1   Pain Rating: FLACC (Activity) - Legs 0   Pain Rating: FLACC (Activity) - Activity 0   Pain Rating: FLACC (Activity) - Cry 1   Pain Rating: FLACC (Activity) - Consolability 0   Score: FLACC (Activity) 2   Restrictions/Precautions   RLE Weight Bearing Per Order NWB   Braces or Orthoses LE Immobilizer  (HKB locked in extension)   Other Precautions Cognitive; Bed Alarm;WBS;Multiple lines;Telemetry; Fall Risk;Pain   General   Chart Reviewed Yes   Additional Pertinent History pt with staple in posterior aspect of head visible and palpable to touch  also noted bloody around catheter entry site, nsg updated   Cognition   Overall Cognitive Status Impaired   Arousal/Participation Cooperative   Attention Attends with cues to redirect   Orientation Level Oriented to person;Disoriented to place; Disoriented to time;Disoriented to situation   Memory Decreased recall of precautions;Decreased recall of recent events;Decreased short term memory   Bed Mobility   Rolling R 2  Maximal assistance   Additional items Assist x 1;Assist x 2;Bedrails; Increased time required;Verbal cues; Other  (trunk support)   Rolling L 2  Maximal assistance   Additional items Assist x 1;Assist x 2;Bedrails; Increased time required;Verbal cues; Other  (trunk support)   Supine to Sit 2  Maximal assistance   Additional items Assist x 2; Increased time required;Verbal cues; Other  (trunk support)   Additional Comments max Ax2 pull to long sit, unable to sustain position >5-10"   Balance   Static Sitting Poor -   Dynamic Sitting Poor -   Endurance Deficit   Endurance Deficit Yes   Endurance Deficit Description pt reporting fatigue w bedlevel therex   Activity Tolerance   Activity Tolerance Patient limited by fatigue;Treatment limited secondary to medical complications (Comment); Other (Comment)  (WBS, cognition)   Medical Staff Made Aware Jareth Fernandez   Nurse Made Aware Fabby Hickey RN   Exercises   Heelslides Supine;10 reps;AAROM; Left   Hip Abduction Supine;10 reps;AAROM; Bilateral  (1x10)   Ankle Pumps Supine;10 reps;AROM; Bilateral  (1x10)   Balance training  see above for long sit, max Ax2 for 5-10"   Assessment   Prognosis Poor   Problem List Decreased strength;Decreased range of motion;Decreased endurance; Impaired balance;Decreased mobility; Decreased coordination;Decreased cognition;Decreased safety awareness; Impaired sensation;Decreased skin integrity;Orthopedic restrictions;Pain;Obesity   Assessment Mian Grace was seen for a follow up session  Pt reporting fatigue with bedlevel/supine therex, AAROM bilaterally to promote greater ROM and cues for pacing and to incorporate cognitive task of counting  Frequent cuing needed throughout session to redirect attention  Pt requires max Ax1-2 for bed mobility to assist w personal hygiene and johnny care needs following episode of bowel incontinence, cues for technique including use of bedrails however pt w poor follow through  End of session pt repositioned in bed for comfort, bed alarm engaged and call bell in reach  Pt demonstrate understanding of call bell  Goals   STG Expiration Date 03/27/20   Short Term Goal #1 1)  Pt will perform bed mobility with min Ax2 demonstrating appropriate technique 100% of the time in order to improve function  2)  PT to see to assess OOB mobility when appropriate   3) PT for ongoing patient and family/caregiver education, DME needs and d/c planning in order to promote highest level of function in least restrictive environment  Al Saenz 4)  Improve overall strength and balance 1/2 grade in order to optimize ability to perform functional tasks and reduce fall risk  5) Increase activity tolerance to 30 minutes in order to improve endurance to functional tasks   PT Treatment Day 2   Plan   Treatment/Interventions LE strengthening/ROM; Therapeutic exercise; Endurance training;Cognitive reorientation;Patient/family training;Equipment eval/education; Bed mobility; Compensatory technique education;Spoke to nursing;OT   Progress Slow progress, decreased activity tolerance   PT Frequency 2-3x/wk   Recommendation   Recommendation Short-term skilled PT;Long-term skilled nursing home placement   Equipment Recommended Other (Comment)  Selma Lozoya for OOB mobility)   PT - OK to Discharge Yes     Chip Mouse, PT

## 2020-03-17 NOTE — TELEPHONE ENCOUNTER
Lm for Arlin Redmond in regards to spouse appoint  On 3/20/20 Per AP Appointment should be rescheduled 2-3 weeks out with repeat Studies

## 2020-03-18 ENCOUNTER — TELEPHONE (OUTPATIENT)
Dept: GASTROENTEROLOGY | Facility: MEDICAL CENTER | Age: 80
End: 2020-03-18

## 2020-03-18 LAB
ANION GAP SERPL CALCULATED.3IONS-SCNC: 6 MMOL/L (ref 4–13)
BUN SERPL-MCNC: 42 MG/DL (ref 5–25)
CALCIUM SERPL-MCNC: 8.8 MG/DL (ref 8.3–10.1)
CHLORIDE SERPL-SCNC: 107 MMOL/L (ref 100–108)
CO2 SERPL-SCNC: 28 MMOL/L (ref 21–32)
CREAT SERPL-MCNC: 1.44 MG/DL (ref 0.6–1.3)
GFR SERPL CREATININE-BSD FRML MDRD: 34 ML/MIN/1.73SQ M
GLUCOSE SERPL-MCNC: 165 MG/DL (ref 65–140)
POTASSIUM SERPL-SCNC: 4.1 MMOL/L (ref 3.5–5.3)
SODIUM SERPL-SCNC: 141 MMOL/L (ref 136–145)

## 2020-03-18 PROCEDURE — 99232 SBSQ HOSP IP/OBS MODERATE 35: CPT | Performed by: INTERNAL MEDICINE

## 2020-03-18 PROCEDURE — 97535 SELF CARE MNGMENT TRAINING: CPT

## 2020-03-18 PROCEDURE — 80048 BASIC METABOLIC PNL TOTAL CA: CPT | Performed by: INTERNAL MEDICINE

## 2020-03-18 RX ORDER — MIDODRINE HYDROCHLORIDE 5 MG/1
5 TABLET ORAL
Status: DISCONTINUED | OUTPATIENT
Start: 2020-03-18 | End: 2020-03-20 | Stop reason: HOSPADM

## 2020-03-18 RX ORDER — LACTULOSE 20 G/30ML
20 SOLUTION ORAL 2 TIMES DAILY
Status: DISCONTINUED | OUTPATIENT
Start: 2020-03-18 | End: 2020-03-20

## 2020-03-18 RX ADMIN — MIDODRINE HYDROCHLORIDE 5 MG: 5 TABLET ORAL at 17:13

## 2020-03-18 RX ADMIN — RIFAXIMIN 550 MG: 550 TABLET ORAL at 21:20

## 2020-03-18 RX ADMIN — LACTULOSE 20 G: 10 SOLUTION ORAL at 09:22

## 2020-03-18 RX ADMIN — Medication 40 MG: at 09:22

## 2020-03-18 RX ADMIN — MICONAZOLE NITRATE: 20 CREAM TOPICAL at 17:13

## 2020-03-18 RX ADMIN — MICONAZOLE NITRATE: 20 CREAM TOPICAL at 09:23

## 2020-03-18 RX ADMIN — RIFAXIMIN 550 MG: 550 TABLET ORAL at 09:22

## 2020-03-18 RX ADMIN — Medication 40 MG: at 21:24

## 2020-03-18 RX ADMIN — OXYCODONE HYDROCHLORIDE 5 MG: 5 TABLET ORAL at 21:20

## 2020-03-18 RX ADMIN — TAMSULOSIN HYDROCHLORIDE 0.4 MG: 0.4 CAPSULE ORAL at 21:20

## 2020-03-18 RX ADMIN — PROPRANOLOL HYDROCHLORIDE 10 MG: 10 TABLET ORAL at 09:22

## 2020-03-18 RX ADMIN — MIDODRINE HYDROCHLORIDE 5 MG: 5 TABLET ORAL at 12:53

## 2020-03-18 RX ADMIN — PROPRANOLOL HYDROCHLORIDE 10 MG: 10 TABLET ORAL at 17:13

## 2020-03-18 RX ADMIN — GABAPENTIN 100 MG: 100 CAPSULE ORAL at 21:20

## 2020-03-18 NOTE — QUICK NOTE
I called and spoke to Nora Wang   I gave him an update on her overall care  I also discussed her elevated AFP level and our concern for underlying liver cancer  I explained that we haven't been able to get a contrast enhanced study due to CECIL  I explained that if he wants to pursue MRI, we could discuss with Nephrology and potentially order this while she is inpatient  However, I explained this could worsen her kidney function  He expressed understanding about our concern for liver cancer but wants to do what is best for [facility]  Ultimately, he feels she is going through enough right now and does not want to pursue further liver imaging  I told him this is very reasonable and we will treat her immediate medical issues and she can follow-up outpatient to discuss further

## 2020-03-18 NOTE — PLAN OF CARE
Problem: Prexisting or High Potential for Compromised Skin Integrity  Goal: Skin integrity is maintained or improved  Description  INTERVENTIONS:  - Identify patients at risk for skin breakdown  - Assess and monitor skin integrity  - Assess and monitor nutrition and hydration status  - Monitor labs   - Assess for incontinence   - Turn and reposition patient  - Assist with mobility/ambulation  - Relieve pressure over bony prominences  - Avoid friction and shearing  - Provide appropriate hygiene as needed including keeping skin clean and dry  - Evaluate need for skin moisturizer/barrier cream  - Collaborate with interdisciplinary team   - Patient/family teaching  - Consider wound care consult   Outcome: Progressing     Problem: PAIN - ADULT  Goal: Verbalizes/displays adequate comfort level or baseline comfort level  Description  Interventions:  - Encourage patient to monitor pain and request assistance  - Assess pain using appropriate pain scale  - Administer analgesics based on type and severity of pain and evaluate response  - Implement non-pharmacological measures as appropriate and evaluate response  - Consider cultural and social influences on pain and pain management  - Notify physician/advanced practitioner if interventions unsuccessful or patient reports new pain  Outcome: Progressing     Problem: INFECTION - ADULT  Goal: Absence or prevention of progression during hospitalization  Description  INTERVENTIONS:  - Assess and monitor for signs and symptoms of infection  - Monitor lab/diagnostic results  - Monitor all insertion sites, i e  indwelling lines, tubes, and drains  - Monitor endotracheal if appropriate and nasal secretions for changes in amount and color  - [place] appropriate cooling/warming therapies per order  - Administer medications as ordered  - Instruct and encourage patient and family to use good hand hygiene technique  - Identify and instruct in appropriate isolation precautions for identified infection/condition  Outcome: Progressing     Problem: SAFETY ADULT  Goal: Patient will remain free of falls  Description  INTERVENTIONS:  - Assess patient frequently for physical needs  -  Identify cognitive and physical deficits and behaviors that affect risk of falls    -  [place] fall precautions as indicated by assessment   - Educate patient/family on patient safety including physical limitations  - Instruct patient to call for assistance with activity based on assessment  - Modify environment to reduce risk of injury  - Consider OT/PT consult to assist with strengthening/mobility  Outcome: Progressing  Goal: Maintain or return to baseline ADL function  Description  INTERVENTIONS:  -  Assess patient's ability to carry out ADLs; assess patient's baseline for ADL function and identify physical deficits which impact ability to perform ADLs (bathing, care of mouth/teeth, toileting, grooming, dressing, etc )  - Assess/evaluate cause of self-care deficits   - Assess range of motion  - Assess patient's mobility; develop plan if impaired  - Assess patient's need for assistive devices and provide as appropriate  - Encourage maximum independence but intervene and supervise when necessary  - Involve family in performance of ADLs  - Assess for home care needs following discharge   - Consider OT consult to assist with ADL evaluation and planning for discharge  - Provide patient education as appropriate  Outcome: Progressing  Goal: Maintain or return mobility status to optimal level  Description  INTERVENTIONS:  - Assess patient's baseline mobility status (ambulation, transfers, stairs, etc )    - Identify cognitive and physical deficits and behaviors that affect mobility  - Identify mobility aids required to assist with transfers and/or ambulation (gait belt, sit-to-stand, lift, walker, cane, etc )  - [place] fall precautions as indicated by assessment  - Record patient progress and toleration of activity level on Mobility SBAR; progress patient to next Phase/Stage  - Instruct patient to call for assistance with activity based on assessment  - Consider rehabilitation consult to assist with strengthening/weightbearing, etc   Outcome: Progressing     Problem: DISCHARGE PLANNING  Goal: Discharge to home or other facility with appropriate resources  Description  INTERVENTIONS:  - Identify barriers to discharge w/patient and caregiver  - Arrange for needed discharge resources and transportation as appropriate  - Identify discharge learning needs (meds, wound care, etc )  - Arrange for interpretive services to assist at discharge as needed  - Refer to Case Management Department for coordinating discharge planning if the patient needs post-hospital services based on physician/advanced practitioner order or complex needs related to functional status, cognitive ability, or social support system  Outcome: Progressing     Problem: Knowledge Deficit  Goal: Patient/family/caregiver demonstrates understanding of disease process, treatment plan, medications, and discharge instructions  Description  Complete learning assessment and assess knowledge base    Interventions:  - Provide teaching at level of understanding  - Provide teaching via preferred learning methods  Outcome: Progressing     Problem: CARDIOVASCULAR - ADULT  Goal: Maintains optimal cardiac output and hemodynamic stability  Description  INTERVENTIONS:  - Monitor I/O, vital signs and rhythm  - Monitor for S/S and trends of decreased cardiac output  - Administer and titrate ordered vasoactive medications to optimize hemodynamic stability  - Assess quality of pulses, skin color and temperature  - Assess for signs of decreased coronary artery perfusion  - Instruct patient to report change in severity of symptoms  Outcome: Progressing  Goal: Absence of cardiac dysrhythmias or at baseline rhythm  Description  INTERVENTIONS:  - Continuous cardiac monitoring, vital signs, obtain 12 lead EKG if ordered  - Administer antiarrhythmic and heart rate control medications as ordered  - Monitor electrolytes and administer replacement therapy as ordered  Outcome: Progressing     Problem: GENITOURINARY - ADULT  Goal: Urinary catheter remains patent  Description  INTERVENTIONS:  - Assess patency of urinary catheter  - If patient has a chronic wells, consider changing catheter if non-functioning  - Follow guidelines for intermittent irrigation of non-functioning urinary catheter  Outcome: Progressing     Problem: METABOLIC, FLUID AND ELECTROLYTES - ADULT  Goal: Electrolytes maintained within normal limits  Description  INTERVENTIONS:  - Monitor labs and assess patient for signs and symptoms of electrolyte imbalances  - Administer electrolyte replacement as ordered  - Monitor response to electrolyte replacements, including repeat lab results as appropriate  - Instruct patient on fluid and nutrition as appropriate  Outcome: Progressing  Goal: Fluid balance maintained  Description  INTERVENTIONS:  - Monitor labs   - Monitor I/O and WT  - Instruct patient on fluid and nutrition as appropriate  - Assess for signs & symptoms of volume excess or deficit  Outcome: Progressing  Goal: Glucose maintained within target range  Description  INTERVENTIONS:  - Monitor Blood Glucose as ordered  - Assess for signs and symptoms of hyperglycemia and hypoglycemia  - Administer ordered medications to maintain glucose within target range  - Assess nutritional intake and initiate nutrition service referral as needed  Outcome: Progressing     Problem: SKIN/TISSUE INTEGRITY - ADULT  Goal: Skin integrity remains intact  Description  INTERVENTIONS  - Identify patients at risk for skin breakdown  - Assess and monitor skin integrity  - Assess and monitor nutrition and hydration status  - Monitor labs (i e  albumin)  - Assess for incontinence   - Turn and reposition patient  - Assist with mobility/ambulation  - Relieve pressure over bony prominences  - Avoid friction and shearing  - Provide appropriate hygiene as needed including keeping skin clean and dry  - Evaluate need for skin moisturizer/barrier cream  - Collaborate with interdisciplinary team (i e  Nutrition, Rehabilitation, etc )   - Patient/family teaching  Outcome: Progressing     Problem: Potential for Falls  Goal: Patient will remain free of falls  Description  INTERVENTIONS:  - Assess patient frequently for physical needs  -  Identify cognitive and physical deficits and behaviors that affect risk of falls  -  [place] fall precautions as indicated by assessment   - Educate patient/family on patient safety including physical limitations  - Instruct patient to call for assistance with activity based on assessment  - Modify environment to reduce risk of injury  - Consider OT/PT consult to assist with strengthening/mobility  Outcome: Progressing     Problem: Nutrition/Hydration-ADULT  Goal: Nutrient/Hydration intake appropriate for improving, restoring or maintaining nutritional needs  Description  Monitor and assess patient's nutrition/hydration status for malnutrition  Collaborate with interdisciplinary team and initiate plan and interventions as ordered  Monitor patient's weight and dietary intake as ordered or per policy  Utilize nutrition screening tool and intervene as necessary  Determine patient's food preferences and provide high-protein, high-caloric foods as appropriate       INTERVENTIONS:  - Monitor oral intake, urinary output, labs, and treatment plans  - Assess nutrition and hydration status and recommend course of action  - Evaluate amount of meals eaten  - Assist patient with eating if necessary   - Allow adequate time for meals  - Recommend/ encourage appropriate diets, oral nutritional supplements, and vitamin/mineral supplements  - Order, calculate, and assess calorie counts as needed  - Recommend, monitor, and adjust tube feedings and TPN/PPN based on assessed needs  - Assess need for intravenous fluids  - Provide specific nutrition/hydration education as appropriate  - Include patient/family/caregiver in decisions related to nutrition  Outcome: Progressing

## 2020-03-18 NOTE — PLAN OF CARE
Problem: Prexisting or High Potential for Compromised Skin Integrity  Goal: Skin integrity is maintained or improved  Description  INTERVENTIONS:  - Identify patients at risk for skin breakdown  - Assess and monitor skin integrity  - Assess and monitor nutrition and hydration status  - Monitor labs   - Assess for incontinence   - Turn and reposition patient  - Assist with mobility/ambulation  - Relieve pressure over bony prominences  - Avoid friction and shearing  - Provide appropriate hygiene as needed including keeping skin clean and dry  - Evaluate need for skin moisturizer/barrier cream  - Collaborate with interdisciplinary team   - Patient/family teaching  - Consider wound care consult   Outcome: Progressing     Problem: PAIN - ADULT  Goal: Verbalizes/displays adequate comfort level or baseline comfort level  Description  Interventions:  - Encourage patient to monitor pain and request assistance  - Assess pain using appropriate pain scale  - Administer analgesics based on type and severity of pain and evaluate response  - Implement non-pharmacological measures as appropriate and evaluate response  - Consider cultural and social influences on pain and pain management  - Notify physician/advanced practitioner if interventions unsuccessful or patient reports new pain  Outcome: Progressing     Problem: INFECTION - ADULT  Goal: Absence or prevention of progression during hospitalization  Description  INTERVENTIONS:  - Assess and monitor for signs and symptoms of infection  - Monitor lab/diagnostic results  - Monitor all insertion sites, i e  indwelling lines, tubes, and drains  - Monitor endotracheal if appropriate and nasal secretions for changes in amount and color  - [place] appropriate cooling/warming therapies per order  - Administer medications as ordered  - Instruct and encourage patient and family to use good hand hygiene technique  - Identify and instruct in appropriate isolation precautions for identified infection/condition  Outcome: Progressing     Problem: SAFETY ADULT  Goal: Patient will remain free of falls  Description  INTERVENTIONS:  - Assess patient frequently for physical needs  -  Identify cognitive and physical deficits and behaviors that affect risk of falls    -  [place] fall precautions as indicated by assessment   - Educate patient/family on patient safety including physical limitations  - Instruct patient to call for assistance with activity based on assessment  - Modify environment to reduce risk of injury  - Consider OT/PT consult to assist with strengthening/mobility  Outcome: Progressing  Goal: Maintain or return to baseline ADL function  Description  INTERVENTIONS:  -  Assess patient's ability to carry out ADLs; assess patient's baseline for ADL function and identify physical deficits which impact ability to perform ADLs (bathing, care of mouth/teeth, toileting, grooming, dressing, etc )  - Assess/evaluate cause of self-care deficits   - Assess range of motion  - Assess patient's mobility; develop plan if impaired  - Assess patient's need for assistive devices and provide as appropriate  - Encourage maximum independence but intervene and supervise when necessary  - Involve family in performance of ADLs  - Assess for home care needs following discharge   - Consider OT consult to assist with ADL evaluation and planning for discharge  - Provide patient education as appropriate  Outcome: Progressing  Goal: Maintain or return mobility status to optimal level  Description  INTERVENTIONS:  - Assess patient's baseline mobility status (ambulation, transfers, stairs, etc )    - Identify cognitive and physical deficits and behaviors that affect mobility  - Identify mobility aids required to assist with transfers and/or ambulation (gait belt, sit-to-stand, lift, walker, cane, etc )  - [place] fall precautions as indicated by assessment  - Record patient progress and toleration of activity level on Mobility SBAR; progress patient to next Phase/Stage  - Instruct patient to call for assistance with activity based on assessment  - Consider rehabilitation consult to assist with strengthening/weightbearing, etc   Outcome: Progressing     Problem: DISCHARGE PLANNING  Goal: Discharge to home or other facility with appropriate resources  Description  INTERVENTIONS:  - Identify barriers to discharge w/patient and caregiver  - Arrange for needed discharge resources and transportation as appropriate  - Identify discharge learning needs (meds, wound care, etc )  - Arrange for interpretive services to assist at discharge as needed  - Refer to Case Management Department for coordinating discharge planning if the patient needs post-hospital services based on physician/advanced practitioner order or complex needs related to functional status, cognitive ability, or social support system  Outcome: Progressing     Problem: Knowledge Deficit  Goal: Patient/family/caregiver demonstrates understanding of disease process, treatment plan, medications, and discharge instructions  Description  Complete learning assessment and assess knowledge base    Interventions:  - Provide teaching at level of understanding  - Provide teaching via preferred learning methods  Outcome: Progressing     Problem: CARDIOVASCULAR - ADULT  Goal: Maintains optimal cardiac output and hemodynamic stability  Description  INTERVENTIONS:  - Monitor I/O, vital signs and rhythm  - Monitor for S/S and trends of decreased cardiac output  - Administer and titrate ordered vasoactive medications to optimize hemodynamic stability  - Assess quality of pulses, skin color and temperature  - Assess for signs of decreased coronary artery perfusion  - Instruct patient to report change in severity of symptoms  Outcome: Progressing  Goal: Absence of cardiac dysrhythmias or at baseline rhythm  Description  INTERVENTIONS:  - Continuous cardiac monitoring, vital signs, obtain 12 lead EKG if ordered  - Administer antiarrhythmic and heart rate control medications as ordered  - Monitor electrolytes and administer replacement therapy as ordered  Outcome: Progressing     Problem: GENITOURINARY - ADULT  Goal: Urinary catheter remains patent  Description  INTERVENTIONS:  - Assess patency of urinary catheter  - If patient has a chronic wells, consider changing catheter if non-functioning  - Follow guidelines for intermittent irrigation of non-functioning urinary catheter  Outcome: Progressing     Problem: METABOLIC, FLUID AND ELECTROLYTES - ADULT  Goal: Electrolytes maintained within normal limits  Description  INTERVENTIONS:  - Monitor labs and assess patient for signs and symptoms of electrolyte imbalances  - Administer electrolyte replacement as ordered  - Monitor response to electrolyte replacements, including repeat lab results as appropriate  - Instruct patient on fluid and nutrition as appropriate  Outcome: Progressing  Goal: Fluid balance maintained  Description  INTERVENTIONS:  - Monitor labs   - Monitor I/O and WT  - Instruct patient on fluid and nutrition as appropriate  - Assess for signs & symptoms of volume excess or deficit  Outcome: Progressing  Goal: Glucose maintained within target range  Description  INTERVENTIONS:  - Monitor Blood Glucose as ordered  - Assess for signs and symptoms of hyperglycemia and hypoglycemia  - Administer ordered medications to maintain glucose within target range  - Assess nutritional intake and initiate nutrition service referral as needed  Outcome: Progressing     Problem: SKIN/TISSUE INTEGRITY - ADULT  Goal: Skin integrity remains intact  Description  INTERVENTIONS  - Identify patients at risk for skin breakdown  - Assess and monitor skin integrity  - Assess and monitor nutrition and hydration status  - Monitor labs (i e  albumin)  - Assess for incontinence   - Turn and reposition patient  - Assist with mobility/ambulation  - Relieve pressure over bony prominences  - Avoid friction and shearing  - Provide appropriate hygiene as needed including keeping skin clean and dry  - Evaluate need for skin moisturizer/barrier cream  - Collaborate with interdisciplinary team (i e  Nutrition, Rehabilitation, etc )   - Patient/family teaching  Outcome: Progressing     Problem: Potential for Falls  Goal: Patient will remain free of falls  Description  INTERVENTIONS:  - Assess patient frequently for physical needs  -  Identify cognitive and physical deficits and behaviors that affect risk of falls  -  [place] fall precautions as indicated by assessment   - Educate patient/family on patient safety including physical limitations  - Instruct patient to call for assistance with activity based on assessment  - Modify environment to reduce risk of injury  - Consider OT/PT consult to assist with strengthening/mobility  Outcome: Progressing     Problem: Nutrition/Hydration-ADULT  Goal: Nutrient/Hydration intake appropriate for improving, restoring or maintaining nutritional needs  Description  Monitor and assess patient's nutrition/hydration status for malnutrition  Collaborate with interdisciplinary team and initiate plan and interventions as ordered  Monitor patient's weight and dietary intake as ordered or per policy  Utilize nutrition screening tool and intervene as necessary  Determine patient's food preferences and provide high-protein, high-caloric foods as appropriate       INTERVENTIONS:  - Monitor oral intake, urinary output, labs, and treatment plans  - Assess nutrition and hydration status and recommend course of action  - Evaluate amount of meals eaten  - Assist patient with eating if necessary   - Allow adequate time for meals  - Recommend/ encourage appropriate diets, oral nutritional supplements, and vitamin/mineral supplements  - Order, calculate, and assess calorie counts as needed  - Recommend, monitor, and adjust tube feedings and TPN/PPN based on assessed needs  - Assess need for intravenous fluids  - Provide specific nutrition/hydration education as appropriate  - Include patient/family/caregiver in decisions related to nutrition  Outcome: Progressing

## 2020-03-18 NOTE — OCCUPATIONAL THERAPY NOTE
Occupational Therapy Treatment Note:         03/18/20 1530   Restrictions/Precautions   Weight Bearing Precautions Per Order Yes   RLE Weight Bearing Per Order NWB   Braces or Orthoses LE Braces  (HKB locked in extension)   Other Precautions WBS; Fall Risk;Pain;Cognitive; Chair Alarm; Bed Alarm   Pain Assessment   Pain Assessment Tool FLACC   Pain Score 5   Pain Location/Orientation Orientation: Right;Location: Leg   Pain Rating: FLACC (Rest) - Face 0   Pain Rating: FLACC (Rest) - Legs 0   Pain Rating: FLACC (Rest) - Activity 0   Pain Rating: FLACC (Rest) - Cry 0   Pain Rating: FLACC (Rest) - Consolability 0   Score: FLACC (Rest) 0   Pain Rating: FLACC (Activity) - Face 1   Pain Rating: FLACC (Activity) - Legs 1   Pain Rating: FLACC (Activity) - Activity 1   Pain Rating: FLACC (Activity) - Cry 1   Pain Rating: FLACC (Activity) - Consolability 1   Score: FLACC (Activity) 5   ADL   Where Assessed Supine, bed  (HOB)   Grooming Assistance 4  Minimal Assistance   Grooming Deficit Setup   LB Bathing Assistance 2  Maximal Assistance   LB Bathing Deficit Setup;Verbal cueing;Supervision/safety; Increased time to complete; Buttocks; Perineal area; Left lower leg including foot;Right lower leg including foot   LB Bathing Comments hands on A required due to limited functional reach  UB Dressing Assistance 4  Minimal Assistance   UB Dressing Deficit Setup   LB Dressing Assistance 2  Maximal Assistance   LB Dressing Deficit Setup;Verbal cueing; Increased time to complete;Supervision/safety; Don/doff R sock; Don/doff L sock   LB Dressing Comments Pt with noted increase in pain with movement of BLE  Toileting Comments Pt with loose stools noted  Functional Standing Tolerance   Time unable to assess      Bed Mobility   Supine to Sit Unable to assess   Transfers   Sit to Stand Unable to assess   Therapeutic Exercise - ROM   UE-ROM Yes   ROM- Right Upper Extremities   R Shoulder AAROM   R Elbow AAROM   R Wrist AAROM   R Hand AAROM   R Position Supine  (HOB elevated  )   R Weight/Reps/Sets 3 sets of 5 reps  ROM - Left Upper Extremities    L Shoulder AAROM   L Elbow AAROM   L Wrist AAROM   L Hand AAROM   L Position Supine  (HOB elevated  )   LUE ROM Comment 3 sets of 5 reps  Cognition   Overall Cognitive Status Impaired   Arousal/Participation Cooperative;Responsive   Attention Attends with cues to redirect   Orientation Level Oriented to person;Oriented to place; Disoriented to time;Disoriented to situation   Memory Decreased recall of recent events;Decreased short term memory;Decreased recall of precautions   Following Commands Follows one step commands without difficulty   Comments Pt pleasant and cooperative through out tx session  Additional Activities   Additional Activities Other (Comment)  (reviewed current plan of care/basic orientation  )   Additional Activities Comments Pt was able to state current location with immediate recall  Activity Tolerance   Activity Tolerance Patient limited by fatigue;Patient limited by pain   Medical Staff Made Aware reported all findings to nursing staff  Assessment   Assessment Pt was seen for skilled OT with focus on completion of light grooming activity, bed mobility, basic orientation and review of current plan of care  MONISHA Tucker reports Pt with loose stools several times today, soiling hinged knee brace  RN requested new brace if possible  Spoke with PT regarding hinged knee brace  Nursing staff instructed to open and remove brace for cleaning  Mora PT to follow up with bracing  Pt unable to recall current location  Pt reports knowing when she needs A for self toileting  Pt had been ringing for A with need for BM  See above levels of A required for all functional tasks  Pt may benefit from further rehab with focus on achieving optimal performance levels with all functional tasks   Continue to recommend Inpatient rehab   Plan   Treatment Interventions ADL retraining;Functional transfer training;Cognitive reorientation; Endurance training;UE strengthening/ROM   Goal Expiration Date 03/27/20   OT Treatment Day 2   OT Frequency 3-5x/wk   Recommendation   OT Discharge Recommendation Short Term Rehab   OT - OK to Discharge Yes  (when medically cleared  )   Barthel Index   Feeding 5   Bathing 0   Grooming Score 0   Dressing Score 5   Bladder Score 0   Bowels Score 5   Toilet Use Score 0   Transfers (Bed/Chair) Score 0   Mobility (Level Surface) Score 0   Stairs Score 0   Barthel Index Score 15   Modified Colrain Scale   Modified Colrain Scale 4   [place], [place]

## 2020-03-18 NOTE — PROGRESS NOTES
NEPHROLOGY PROGRESS NOTE   Jenelle Ortiz 78 y o  female MRN: [id number]  Unit/Bed#: [facility] [address] 222-02 Encounter: [id number]  Reason for Consult:  Acute kidney injury    ASSESSMENT/PLAN:  1  Acute kidney injury, likely due to prerenal azotemia given improvement IV fluids, possible component of hepatorenal syndrome  2  Previous CKD 3, baseline creatinine 0 7- 0 8, creatinine has plateaued at 1 4  3  Liver cirrhosis with associated ascites   4  Likely degree of volume overload, significant weight gain over last several weeks  5  Hypernatremia, resolved, currently off sodium chloride tablets  6  Labile blood pressures, start midodrine 5 mg 3 times daily  7  Remote history of hyponatremia, previously on sodium chloride tablets     PLAN:  · Overall renal function remains fairly stable however blood pressure significantly labile, start midodrine 5 mg 3 times daily  · Assuming blood pressure improves, removed creatinine remains stable, would like to start maintenance diuretic therapy given component of volume overload    SUBJECTIVE:  Seen examined  Patient remains confused at times  No acute distress  Does not appear to be short of breath  Some abdominal distension no significant pain  Review of Systems    OBJECTIVE:  Current Weight:    Vitals:    03/17/20 1508 03/17/20 2233 03/17/20 2255 03/18/20 0745   BP: 115/57  108/62 (!) 74/51   BP Location: Left arm      Pulse: 71  81 78   Resp: 16  20 16   Temp: 98 °F (36 7 °C)  97 8 °F (36 6 °C) (!) 97 4 °F (36 3 °C)   TempSrc: Oral      SpO2: 98% 98% 97% 99%   Height:           Intake/Output Summary (Last 24 hours) at 3/18/2020 1113  Last data filed at 3/18/2020 0501  Gross per 24 hour   Intake    Output 250 ml   Net -250 ml       Physical Exam   Constitutional: No distress  HENT:   Head: Normocephalic  Cardiovascular: Normal rate and regular rhythm  Pulmonary/Chest: Effort normal  She has decreased breath sounds in the right lower field and the left lower field  Abdominal: Soft  She exhibits distension  There is no tenderness  Musculoskeletal: She exhibits edema (2+ edema)  Neurological: She is alert  Skin: Skin is warm and dry  No rash noted         Medications:    Current Facility-Administered Medications:     acetaminophen (TYLENOL) tablet 650 mg, 650 mg, Oral, Q6H PRN, Radha Quan PA-C    famotidine (PEPCID) tablet 10 mg, 10 mg, Oral, Daily PRN, Radha Quan PA-C    gabapentin (NEURONTIN) capsule 100 mg, 100 mg, Oral, TID PRN, Radha Quan PA-C    lactulose 20 g/30 mL oral solution 20 g, 20 g, Oral, TID, Maya Villanueva PA-C, 20 g at 03/18/20 4457    midodrine (PROAMATINE) tablet 5 mg, 5 mg, Oral, TID AC, Vania Singleton DO    moisture barrier miconazole 2% cream (aka JOSE MOISTURE BARRIER ANTIFUNGAL CREAM), , Topical, BID, Marlen Brady DO    omeprazole (PRILOSEC) suspension 2 mg/mL, 40 mg, Oral, Q12H [address], Radha Quan PA-C, 40 mg at 03/18/20 0922    ondansetron (ZOFRAN) injection 4 mg, 4 mg, Intravenous, Q6H PRN, Radha Quan PA-C    oxyCODONE (ROXICODONE) IR tablet 5 mg, 5 mg, Oral, Q4H PRN, Radha Quan PA-C, 5 mg at 03/17/20 1432    propranolol (INDERAL) tablet 10 mg, 10 mg, Oral, BID, Radha Quan PA-C, 10 mg at 03/18/20 7565    rifaximin (XIFAXAN) tablet 550 mg, 550 mg, Oral, Q12H RAMONA, Marlen Brady DO, 550 mg at 03/18/20 0922    tamsulosin (FLOMAX) capsule 0 4 mg, 0 4 mg, Oral, HS, Radha Quan PA-C, 0 4 mg at 03/17/20 2230    Laboratory Results:  Results from last 7 days   Lab Units 03/18/20  0540 03/17/20  0539 03/16/20  0457 03/15/20  0450 03/14/20  1505 03/14/20  0932 03/14/20  0502  03/13/20  0448 03/12/20  2350 03/12/20  1717   WBC Thousand/uL  --  7 49  --   --   --   --  5 76  --  9 62  --  11 25*   HEMOGLOBIN g/dL  --  11 9  --   --   --   --  10 1*  --  11 9 12 1 12 5   HEMATOCRIT %  --  38 1  --   --   --   --  33 5*  --  39 9 40 8 42 0   PLATELETS Thousands/uL  --  94*  -- --   --   --  94*  --  115*  --  142*   POTASSIUM mmol/L 4 1 4 9 4 9 4 7 4 2 4 1 3 5   < > 4 5  --  4 7   CHLORIDE mmol/L 107 105 104 105 105 107 102   < > 113*  --  112*   CO2 mmol/L 28 27 28 24 26 27 26   < > 27  --  27   BUN mg/dL 42* 41* 40* 43* 46* 48* 46*   < > 57*  --  57*   CREATININE mg/dL 1 44* 1 40* 1 40* 1 59* 1 70* 1 74* 1 70*   < > 1 92*  --  1 99*   CALCIUM mg/dL 8 8 8 7 8 6 8 8 8 5 8 6 7 9*   < > 8 8  --  8 9   MAGNESIUM mg/dL  --   --   --  2 4  --   --  2 2  --   --   --   --    PHOSPHORUS mg/dL  --   --   --  2 4  --   --  2 7  --   --   --   --     < > = values in this interval not displayed

## 2020-03-18 NOTE — PROGRESS NOTES
Progress Note - Apolonia Race 78 y o  female MRN: [id number]    Unit/Bed#: [facility] 222-02 Encounter: [id number]    Subjective:     Patient seen and examined at bedside  She ate a good breakfast this morning  She mostly complains of low back pain  She has mild right-sided abdominal pain  No nausea or vomiting  She had 1 bowel movement yesterday  Objective:     Vitals: Blood pressure (!) 74/51, pulse 78, temperature (!) 97 4 °F (36 3 °C), resp  rate 16, height 5' 2" (1 575 m), SpO2 99 %  ,Body mass index is 39 69 kg/m²  Intake/Output Summary (Last 24 hours) at 3/18/2020 0931  Last data filed at 3/18/2020 0501  Gross per 24 hour   Intake    Output 250 ml   Net -250 ml       Physical Exam:     General Appearance: Alert and oriented x 3, appears stated age and cooperative  Lungs: Clear to auscultation bilaterally  Heart: Regular rate and rhythm  Abdomen:  Obese, distended upper abdomen, normal bowel sounds  Soft  Nontender to palpation  Neuro: Difficulty with word finding  Extremities: No cyanosis, edema    Invasive Devices     Peripheral Intravenous Line            Peripheral IV 03/17/20 Right Antecubital less than 1 day          Drain            Urethral Catheter 28 days                Lab Results:  Results from last 7 days   Lab Units 03/17/20  0539 03/14/20  0502   WBC Thousand/uL 7 49 5 76   HEMOGLOBIN g/dL 11 9 10 1*   HEMATOCRIT % 38 1 33 5*   PLATELETS Thousands/uL 94* 94*   NEUTROS PCT %  --  71   LYMPHS PCT %  --  18   MONOS PCT %  --  8   EOS PCT %  --  1     Results from last 7 days   Lab Units 03/18/20  0540 03/17/20  0539   POTASSIUM mmol/L 4 1 4 9   CHLORIDE mmol/L 107 105   CO2 mmol/L 28 27   BUN mg/dL 42* 41*   CREATININE mg/dL 1 44* 1 40*   CALCIUM mg/dL 8 8 8 7   ALK PHOS U/L  --  703*   ALT U/L  --  89*   AST U/L  --  166*     Results from last 7 days   Lab Units 03/17/20  0539   INR  1 22*           Imaging Studies: I have personally reviewed pertinent imaging studies      Meme Nichols Chest Portable    Result Date: 3/14/2020  Impression: No acute cardiopulmonary disease  Workstation performed: [id number]     Xr Chest 2 Views    Result Date: 3/12/2020  Impression: No acute cardiopulmonary disease  Stable cardiomegaly  Workstation performed: [id number]     Ct Head Without Contrast    Result Date: 3/12/2020  Impression: 1  Interval resolution of interhemispheric subdural hematoma  Diminished attenuation of subacute right thalamic hemorrhage, stable in size measuring 7 x 5 mm  No acute intracranial abnormality  2   Atrophy with stable severe chronic small vessel ischemic changes  Workstation performed: [id number]     Ir Paracentesis    Result Date: 3/17/2020  Impression: Impression: Limited ultrasound revealing trace to small volume ascites, insufficient for paracentesis  Worsening anasarca  Workstation performed: [id number]     Ir Paracentesis    Result Date: 3/13/2020  Impression: Successful ultrasound-guided paracentesis PLAN: Albumin per protocol Workstation performed: [id number]     Us Right Upper Quadrant With Liver Dopplers    Result Date: 3/12/2020  Impression: 1  Partial portal vein thrombosis throughout the enlarged portal veins  2  Main portal vein and right portal vein is reversed  Main portal vein is enlarged measuring 3 cm  These findings suggest portal hypertension with partial nonocclusive portal vein thrombosis  3  Cirrhotic nodular liver  4  Moderate to large ascites throughout the abdomen  Workstation performed: [id number]       Assessment and Plan:    Discussed plan with SLIM    I tried calling patient's  Leyla Rin but had to leave a voice message     1) Cirrhosis:  Possibly secondary to alcohol  Autoimmune studies and chronic hepatitis panel negative  MELD score 17   Her cirrhosis is complicated by ascites, partial portal vein thrombosis, and hepatic encephalopathy      -Monitor liver enzymes  -She needs close outpatient GI follow-up after discharge     2) Ascites: Paracentesis canceled 3/16 due to small ascites and lack of safe window  She was given a 40 mg IV dose of Lasix yesterday due to weight gain and volume overload      -Diuretics as per Nephrology  -We could re-attempt paracentesis later in the week if patient is still admitted     3) Elevated AFP:  AFP elevated 493  This is highly suspicious for [address]  Unfortunately, RUQ ultrasound images are limited and we have been unable to give IV contrast due to CECIL       -Discussed with Nephrology, her GFR must be greater than 35 (ideally 40) to give gadolinium contrast  -We need to discuss with patient and her  if they even want to pursue MRI given advanced age, advanced liver disease, and multiple comorbidities     4) Hepatic encephalopathy: Her mentals status seems a bit improved today  She had a bowel movement yesterday  She is alert and oriented x 3  She is responding quicker to questions   She still does have some word finding difficulties - perhaps there is a component of dementia       -Continue lactulose 20 g 3 times daily, titrate for 3 BMs per day  -Continue Xifaxan 550 mg twice daily

## 2020-03-18 NOTE — PROGRESS NOTES
[facility] Progress Note  Patient: Aleah Summers 78 y o  female   MRN: [id number]  PCP: Alex Castillo MD  Unit[facility] [address] 222-02 Encounter: [id number]  Date Of Visit: 03/18/20      Assessment/plan  1  Errol/ckd3- appreciate nephrology recommendations  Baseline is 0 7-0 8  It was likely due to pre renal azotemia  Creatinine stable  Pt was given IV lasix and albumin  If creatinine remains stable tomorrow will start po diuretic  Check bmp in am    2  Hypotension- pt was started on midodrine  Will continue to monitor       3  Cirrhosis of liver with ascites and hepatic encephalopathy- appreciate gi recommendations  Unable to obtain paracentesis yesterday due to minimal ascites but pt did have anasarca  Pt was given albumin and IV lasix yesterday  Monitoring pt to see if can start po diuretics  Will continue to monitor   continue xifaxan and lactulose       4  Abnormally elevated AFP- pt will need either triple phase ct liver protocol or mri  Gi spoke with her  who has elected not to pursue mri or ct at this time  Please refer to gi note from [address] on 3/18/2020     5  Chronic afib- continue rate control with propranolol  No ac due to cirrhosis and history of subdural hematoma     6  Thrombocytopenia- due to cirrhosis  Will monitor     7  Stage 2 pressure injury- continue wound care     8  Closed fracture of right distal femur- continue knee immobilizer  Discussed with ortho that pt will still require this at all times     9  Chronic indwelling wells catheter- continue wells       dispo- pt will need str once stable      Subjective:   Pt seen and examined  Pt is having multiple bm from lactulose  No other problems today  No f/c no cp no sob no n/v no abd pain  Objective:     Vitals: Blood pressure (!) 74/51, pulse 78, temperature (!) 97 4 °F (36 3 °C), resp  rate 16, height 5' 2" (1 575 m), SpO2 99 %  ,Body mass index is 39 69 kg/m²      Lab, Imaging and other studies:  Results from last 7 days   Lab Units 03/17/20  0539   WBC Thousand/uL 7 49   HEMOGLOBIN g/dL 11 9   HEMATOCRIT % 38 1   PLATELETS Thousands/uL 94*   INR  1 22*     Results from last 7 days   Lab Units 03/18/20  0540 03/17/20  0539   POTASSIUM mmol/L 4 1 4 9   CHLORIDE mmol/L 107 105   CO2 mmol/L 28 27   BUN mg/dL 42* 41*   CREATININE mg/dL 1 44* 1 40*   CALCIUM mg/dL 8 8 8 7   ALK PHOS U/L  --  703*   ALT U/L  --  89*   AST U/L  --  166*     Results from last 7 days   Lab Units 03/12/20  1717   TROPONIN I ng/mL <0 02     Lab Results   Component Value Date    BLOODCX No Growth After 5 Days  06/05/2017    BLOODCX No Growth After 5 Days  06/05/2017    URINECX >100,000 cfu/ml Escherichia coli 06/05/2017    URINECX >100,000 cfu/ml Klebsiella pneumoniae 11/01/2016     Scheduled Meds:   Current Facility-Administered Medications:  acetaminophen 650 mg Oral Q6H PRN Radha Quan PA-C   famotidine 10 mg Oral Daily PRN Radha Quan PA-C   gabapentin 100 mg Oral TID PRN Radha Quan PA-C   lactulose 20 g Oral TID Maya Villanueva PA-C   midodrine 5 mg Oral TID LLUVIA Singleton DO   JOSE ANTIFUNGAL  Topical BID Marlen Jose Antonio, DO   omeprazole (PRILOSEC) suspension 2 mg/mL 40 mg Oral Q12H [address] Radha Quan PA-C   ondansetron 4 mg Intravenous Q6H PRN Radha Quan PA-C   oxyCODONE 5 mg Oral Q4H PRN Radha Quan PA-C   propranolol 10 mg Oral BID Radha Quan PA-C   rifaximin 550 mg Oral Q12H [address] Marlen Ambron, DO   tamsulosin 0 4 mg Oral HS Radha Quan PA-C     Continuous Infusions:    PRN Meds:   acetaminophen    famotidine    gabapentin    ondansetron    oxyCODONE      Physical exam:  Physical Exam  General appearance: alert and oriented, in no acute distress  Head: Normocephalic, without obvious abnormality, atraumatic  Eyes: conjunctivae/corneas clear  PERRL, EOM's intact  Fundi benign    Neck: no adenopathy, no carotid bruit, no JVD, supple, symmetrical, trachea midline and thyroid not enlarged, symmetric, no tenderness/mass/nodules  Lungs: clear to auscultation bilaterally  Heart: regular rate and rhythm, S1, S2 normal, no murmur, click, rub or gallop  Abdomen: soft nt mild distention +bs  Extremities: extremities normal, warm and well-perfused; no cyanosis, clubbing, or edema  Pulses: 2+ and symmetric  Skin: Skin color, texture, turgor normal  No rashes or lesions  Neurologic: Mental status: Alert, oriented, thought content appropriate, with some confusion      VTE Pharmacologic Prophylaxis: Reason for no pharmacologic prophylaxis subdural hematoma  VTE Mechanical Prophylaxis: sequential compression device    Counseling / Coordination of Care  Total floor / unit time spent today 20 minutes      Current Length of Stay: 6 day(s)    Current Patient Status: Inpatient     Code Status: Level 3 - DNAR and DNI

## 2020-03-18 NOTE — PLAN OF CARE
Problem: OCCUPATIONAL THERAPY ADULT  Goal: Performs self-care activities at highest level of function for planned discharge setting  See evaluation for individualized goals  Description  Treatment Interventions: ADL retraining, Functional transfer training, Endurance training, UE strengthening/ROM, Cognitive reorientation, Patient/family training, Equipment evaluation/education, Compensatory technique education, Activityengagement          See flowsheet documentation for full assessment, interventions and recommendations  Outcome: Progressing  Note:   Limitation: Decreased ADL status, Decreased UE strength, Decreased Safe judgement during ADL, Decreased cognition, Decreased endurance, Decreased self-care trans, Decreased high-level ADLs  Prognosis: Fair  Assessment: Pt was seen for skilled OT with focus on completion of light grooming activity, bed mobility, basic orientation and review of current plan of care  MONISHA Fernandez reports Pt with loose stools several times today, soiling hinged knee brace  RN requested new brace if possible  Spoke with PT regarding hinged knee brace  Nursing staff instructed to open and remove brace for cleaning  Mora PT to follow up with bracing  Pt unable to recall current location  Pt reports knowing when she needs A for self toileting  Pt had been ringing for A with need for BM  See above levels of A required for all functional tasks  Pt may benefit from further rehab with focus on achieving optimal performance levels with all functional tasks  Continue to recommend Inpatient rehab     OT Discharge Recommendation: Short Term Rehab  OT - OK to Discharge: Yes(when medically cleared  )       Problem: OCCUPATIONAL THERAPY ADULT  Goal: Performs self-care activities at highest level of function for planned discharge setting  See evaluation for individualized goals    Description  Treatment Interventions: ADL retraining, Functional transfer training, Endurance training, UE strengthening/ROM, Cognitive reorientation, Patient/family training, Equipment evaluation/education, Compensatory technique education, Activityengagement          See flowsheet documentation for full assessment, interventions and recommendations  Outcome: Progressing  Note:   Limitation: Decreased ADL status, Decreased UE strength, Decreased Safe judgement during ADL, Decreased cognition, Decreased endurance, Decreased self-care trans, Decreased high-level ADLs  Prognosis: Fair  Assessment: Pt was seen for skilled OT with focus on completion of light grooming activity, bed mobility, basic orientation and review of current plan of care  MONISHA Yen reports Pt with loose stools several times today, soiling hinged knee brace  RN requested new brace if possible  Spoke with PT regarding hinged knee brace  Nursing staff instructed to open and remove brace for cleaning  Mora PT to follow up with bracing  Pt unable to recall current location  Pt reports knowing when she needs A for self toileting  Pt had been ringing for A with need for BM  See above levels of A required for all functional tasks  Pt may benefit from further rehab with focus on achieving optimal performance levels with all functional tasks   Continue to recommend Inpatient rehab     OT Discharge Recommendation: Short Term Rehab  OT - OK to Discharge: Yes(when medically cleared  )

## 2020-03-19 ENCOUNTER — APPOINTMENT (INPATIENT)
Dept: RADIOLOGY | Facility: HOSPITAL | Age: 80
DRG: 377 | End: 2020-03-19
Attending: INTERNAL MEDICINE
Payer: MEDICARE

## 2020-03-19 LAB
ANION GAP SERPL CALCULATED.3IONS-SCNC: 6 MMOL/L (ref 4–13)
BUN SERPL-MCNC: 48 MG/DL (ref 5–25)
CALCIUM SERPL-MCNC: 8.9 MG/DL (ref 8.3–10.1)
CHLORIDE SERPL-SCNC: 108 MMOL/L (ref 100–108)
CO2 SERPL-SCNC: 29 MMOL/L (ref 21–32)
CREAT SERPL-MCNC: 1.62 MG/DL (ref 0.6–1.3)
GFR SERPL CREATININE-BSD FRML MDRD: 30 ML/MIN/1.73SQ M
GLUCOSE SERPL-MCNC: 169 MG/DL (ref 65–140)
HISTIOCYTES NFR FLD: 4 %
LYMPHOCYTES NFR BLD AUTO: 9 %
MONO+MESO NFR FLD MANUAL: 1 %
MONOCYTES NFR BLD AUTO: 15 %
NEUTS SEG NFR BLD AUTO: 71 %
PHOSPHATE SERPL-MCNC: 3 MG/DL (ref 2.3–4.1)
POTASSIUM SERPL-SCNC: 4.3 MMOL/L (ref 3.5–5.3)
PROT FLD-MCNC: <2 G/DL
SITE: NORMAL
SODIUM 24H UR-SCNC: <5 MOL/L
SODIUM SERPL-SCNC: 143 MMOL/L (ref 136–145)
TOTAL CELLS COUNTED SPEC: 100
WBC # FLD MANUAL: 212 /UL

## 2020-03-19 PROCEDURE — 88305 TISSUE EXAM BY PATHOLOGIST: CPT | Performed by: PATHOLOGY

## 2020-03-19 PROCEDURE — 88112 CYTOPATH CELL ENHANCE TECH: CPT | Performed by: PATHOLOGY

## 2020-03-19 PROCEDURE — 0W9G3ZZ DRAINAGE OF PERITONEAL CAVITY, PERCUTANEOUS APPROACH: ICD-10-PCS | Performed by: RADIOLOGY

## 2020-03-19 PROCEDURE — 49083 ABD PARACENTESIS W/IMAGING: CPT | Performed by: RADIOLOGY

## 2020-03-19 PROCEDURE — 49083 ABD PARACENTESIS W/IMAGING: CPT

## 2020-03-19 PROCEDURE — 84157 ASSAY OF PROTEIN OTHER: CPT | Performed by: INTERNAL MEDICINE

## 2020-03-19 PROCEDURE — 84100 ASSAY OF PHOSPHORUS: CPT | Performed by: INTERNAL MEDICINE

## 2020-03-19 PROCEDURE — 99232 SBSQ HOSP IP/OBS MODERATE 35: CPT | Performed by: INTERNAL MEDICINE

## 2020-03-19 PROCEDURE — 87205 SMEAR GRAM STAIN: CPT | Performed by: INTERNAL MEDICINE

## 2020-03-19 PROCEDURE — 87070 CULTURE OTHR SPECIMN AEROBIC: CPT | Performed by: INTERNAL MEDICINE

## 2020-03-19 PROCEDURE — 84300 ASSAY OF URINE SODIUM: CPT | Performed by: INTERNAL MEDICINE

## 2020-03-19 PROCEDURE — 89051 BODY FLUID CELL COUNT: CPT | Performed by: INTERNAL MEDICINE

## 2020-03-19 PROCEDURE — 80048 BASIC METABOLIC PNL TOTAL CA: CPT | Performed by: INTERNAL MEDICINE

## 2020-03-19 RX ORDER — OCTREOTIDE ACETATE 100 UG/ML
100 INJECTION, SOLUTION INTRAVENOUS; SUBCUTANEOUS EVERY 8 HOURS SCHEDULED
Status: DISCONTINUED | OUTPATIENT
Start: 2020-03-19 | End: 2020-03-20 | Stop reason: HOSPADM

## 2020-03-19 RX ADMIN — MIDODRINE HYDROCHLORIDE 5 MG: 5 TABLET ORAL at 16:39

## 2020-03-19 RX ADMIN — OXYCODONE HYDROCHLORIDE 5 MG: 5 TABLET ORAL at 09:03

## 2020-03-19 RX ADMIN — LACTULOSE 20 G: 10 SOLUTION ORAL at 18:18

## 2020-03-19 RX ADMIN — Medication 40 MG: at 09:04

## 2020-03-19 RX ADMIN — RIFAXIMIN 550 MG: 550 TABLET ORAL at 21:55

## 2020-03-19 RX ADMIN — Medication 40 MG: at 21:55

## 2020-03-19 RX ADMIN — TAMSULOSIN HYDROCHLORIDE 0.4 MG: 0.4 CAPSULE ORAL at 21:55

## 2020-03-19 RX ADMIN — OCTREOTIDE ACETATE 100 MCG: 100 INJECTION, SOLUTION INTRAVENOUS; SUBCUTANEOUS at 13:30

## 2020-03-19 RX ADMIN — MICONAZOLE NITRATE: 20 CREAM TOPICAL at 18:18

## 2020-03-19 RX ADMIN — MICONAZOLE NITRATE: 20 CREAM TOPICAL at 09:03

## 2020-03-19 RX ADMIN — MIDODRINE HYDROCHLORIDE 5 MG: 5 TABLET ORAL at 13:04

## 2020-03-19 RX ADMIN — MIDODRINE HYDROCHLORIDE 5 MG: 5 TABLET ORAL at 06:13

## 2020-03-19 RX ADMIN — PROPRANOLOL HYDROCHLORIDE 10 MG: 10 TABLET ORAL at 09:02

## 2020-03-19 RX ADMIN — OCTREOTIDE ACETATE 100 MCG: 100 INJECTION, SOLUTION INTRAVENOUS; SUBCUTANEOUS at 21:55

## 2020-03-19 RX ADMIN — LACTULOSE 20 G: 10 SOLUTION ORAL at 09:03

## 2020-03-19 RX ADMIN — RIFAXIMIN 550 MG: 550 TABLET ORAL at 09:02

## 2020-03-19 NOTE — PLAN OF CARE
Problem: Prexisting or High Potential for Compromised Skin Integrity  Goal: Skin integrity is maintained or improved  Description  INTERVENTIONS:  - Identify patients at risk for skin breakdown  - Assess and monitor skin integrity  - Assess and monitor nutrition and hydration status  - Monitor labs   - Assess for incontinence   - Turn and reposition patient  - Assist with mobility/ambulation  - Relieve pressure over bony prominences  - Avoid friction and shearing  - Provide appropriate hygiene as needed including keeping skin clean and dry  - Evaluate need for skin moisturizer/barrier cream  - Collaborate with interdisciplinary team   - Patient/family teaching  - Consider wound care consult   Outcome: Progressing     Problem: PAIN - ADULT  Goal: Verbalizes/displays adequate comfort level or baseline comfort level  Description  Interventions:  - Encourage patient to monitor pain and request assistance  - Assess pain using appropriate pain scale  - Administer analgesics based on type and severity of pain and evaluate response  - Implement non-pharmacological measures as appropriate and evaluate response  - Consider cultural and social influences on pain and pain management  - Notify physician/advanced practitioner if interventions unsuccessful or patient reports new pain  Outcome: Progressing     Problem: INFECTION - ADULT  Goal: Absence or prevention of progression during hospitalization  Description  INTERVENTIONS:  - Assess and monitor for signs and symptoms of infection  - Monitor lab/diagnostic results  - Monitor all insertion sites, i e  indwelling lines, tubes, and drains  - Monitor endotracheal if appropriate and nasal secretions for changes in amount and color  - [place] appropriate cooling/warming therapies per order  - Administer medications as ordered  - Instruct and encourage patient and family to use good hand hygiene technique  - Identify and instruct in appropriate isolation precautions for identified infection/condition  Outcome: Progressing     Problem: SAFETY ADULT  Goal: Patient will remain free of falls  Description  INTERVENTIONS:  - Assess patient frequently for physical needs  -  Identify cognitive and physical deficits and behaviors that affect risk of falls    -  [place] fall precautions as indicated by assessment   - Educate patient/family on patient safety including physical limitations  - Instruct patient to call for assistance with activity based on assessment  - Modify environment to reduce risk of injury  - Consider OT/PT consult to assist with strengthening/mobility  Outcome: Progressing  Goal: Maintain or return to baseline ADL function  Description  INTERVENTIONS:  -  Assess patient's ability to carry out ADLs; assess patient's baseline for ADL function and identify physical deficits which impact ability to perform ADLs (bathing, care of mouth/teeth, toileting, grooming, dressing, etc )  - Assess/evaluate cause of self-care deficits   - Assess range of motion  - Assess patient's mobility; develop plan if impaired  - Assess patient's need for assistive devices and provide as appropriate  - Encourage maximum independence but intervene and supervise when necessary  - Involve family in performance of ADLs  - Assess for home care needs following discharge   - Consider OT consult to assist with ADL evaluation and planning for discharge  - Provide patient education as appropriate  Outcome: Progressing  Goal: Maintain or return mobility status to optimal level  Description  INTERVENTIONS:  - Assess patient's baseline mobility status (ambulation, transfers, stairs, etc )    - Identify cognitive and physical deficits and behaviors that affect mobility  - Identify mobility aids required to assist with transfers and/or ambulation (gait belt, sit-to-stand, lift, walker, cane, etc )  - [place] fall precautions as indicated by assessment  - Record patient progress and toleration of activity level on Mobility SBAR; progress patient to next Phase/Stage  - Instruct patient to call for assistance with activity based on assessment  - Consider rehabilitation consult to assist with strengthening/weightbearing, etc   Outcome: Progressing     Problem: DISCHARGE PLANNING  Goal: Discharge to home or other facility with appropriate resources  Description  INTERVENTIONS:  - Identify barriers to discharge w/patient and caregiver  - Arrange for needed discharge resources and transportation as appropriate  - Identify discharge learning needs (meds, wound care, etc )  - Arrange for interpretive services to assist at discharge as needed  - Refer to Case Management Department for coordinating discharge planning if the patient needs post-hospital services based on physician/advanced practitioner order or complex needs related to functional status, cognitive ability, or social support system  Outcome: Progressing     Problem: Knowledge Deficit  Goal: Patient/family/caregiver demonstrates understanding of disease process, treatment plan, medications, and discharge instructions  Description  Complete learning assessment and assess knowledge base    Interventions:  - Provide teaching at level of understanding  - Provide teaching via preferred learning methods  Outcome: Progressing     Problem: CARDIOVASCULAR - ADULT  Goal: Maintains optimal cardiac output and hemodynamic stability  Description  INTERVENTIONS:  - Monitor I/O, vital signs and rhythm  - Monitor for S/S and trends of decreased cardiac output  - Administer and titrate ordered vasoactive medications to optimize hemodynamic stability  - Assess quality of pulses, skin color and temperature  - Assess for signs of decreased coronary artery perfusion  - Instruct patient to report change in severity of symptoms  Outcome: Progressing  Goal: Absence of cardiac dysrhythmias or at baseline rhythm  Description  INTERVENTIONS:  - Continuous cardiac monitoring, vital signs, obtain 12 lead EKG if ordered  - Administer antiarrhythmic and heart rate control medications as ordered  - Monitor electrolytes and administer replacement therapy as ordered  Outcome: Progressing     Problem: GENITOURINARY - ADULT  Goal: Urinary catheter remains patent  Description  INTERVENTIONS:  - Assess patency of urinary catheter  - If patient has a chronic wells, consider changing catheter if non-functioning  - Follow guidelines for intermittent irrigation of non-functioning urinary catheter  Outcome: Progressing     Problem: METABOLIC, FLUID AND ELECTROLYTES - ADULT  Goal: Electrolytes maintained within normal limits  Description  INTERVENTIONS:  - Monitor labs and assess patient for signs and symptoms of electrolyte imbalances  - Administer electrolyte replacement as ordered  - Monitor response to electrolyte replacements, including repeat lab results as appropriate  - Instruct patient on fluid and nutrition as appropriate  Outcome: Progressing  Goal: Fluid balance maintained  Description  INTERVENTIONS:  - Monitor labs   - Monitor I/O and WT  - Instruct patient on fluid and nutrition as appropriate  - Assess for signs & symptoms of volume excess or deficit  Outcome: Progressing  Goal: Glucose maintained within target range  Description  INTERVENTIONS:  - Monitor Blood Glucose as ordered  - Assess for signs and symptoms of hyperglycemia and hypoglycemia  - Administer ordered medications to maintain glucose within target range  - Assess nutritional intake and initiate nutrition service referral as needed  Outcome: Progressing     Problem: SKIN/TISSUE INTEGRITY - ADULT  Goal: Skin integrity remains intact  Description  INTERVENTIONS  - Identify patients at risk for skin breakdown  - Assess and monitor skin integrity  - Assess and monitor nutrition and hydration status  - Monitor labs (i e  albumin)  - Assess for incontinence   - Turn and reposition patient  - Assist with mobility/ambulation  - Relieve pressure over bony prominences  - Avoid friction and shearing  - Provide appropriate hygiene as needed including keeping skin clean and dry  - Evaluate need for skin moisturizer/barrier cream  - Collaborate with interdisciplinary team (i e  Nutrition, Rehabilitation, etc )   - Patient/family teaching  Outcome: Progressing     Problem: Potential for Falls  Goal: Patient will remain free of falls  Description  INTERVENTIONS:  - Assess patient frequently for physical needs  -  Identify cognitive and physical deficits and behaviors that affect risk of falls  -  [place] fall precautions as indicated by assessment   - Educate patient/family on patient safety including physical limitations  - Instruct patient to call for assistance with activity based on assessment  - Modify environment to reduce risk of injury  - Consider OT/PT consult to assist with strengthening/mobility  Outcome: Progressing     Problem: Nutrition/Hydration-ADULT  Goal: Nutrient/Hydration intake appropriate for improving, restoring or maintaining nutritional needs  Description  Monitor and assess patient's nutrition/hydration status for malnutrition  Collaborate with interdisciplinary team and initiate plan and interventions as ordered  Monitor patient's weight and dietary intake as ordered or per policy  Utilize nutrition screening tool and intervene as necessary  Determine patient's food preferences and provide high-protein, high-caloric foods as appropriate       INTERVENTIONS:  - Monitor oral intake, urinary output, labs, and treatment plans  - Assess nutrition and hydration status and recommend course of action  - Evaluate amount of meals eaten  - Assist patient with eating if necessary   - Allow adequate time for meals  - Recommend/ encourage appropriate diets, oral nutritional supplements, and vitamin/mineral supplements  - Order, calculate, and assess calorie counts as needed  - Recommend, monitor, and adjust tube feedings and TPN/PPN based on assessed needs  - Assess need for intravenous fluids  - Provide specific nutrition/hydration education as appropriate  - Include patient/family/caregiver in decisions related to nutrition  Outcome: Progressing

## 2020-03-19 NOTE — BRIEF OP NOTE (RAD/CATH)
IR PARACENTESIS Procedure Note    PATIENT NAME: Emma Soni  : 1940  MRN: [id number]    Pre-op Diagnosis:   1  CECIL (acute kidney injury) ([facility] )    2  Syncope    3  Cirrhosis of liver ([facility] )    4  Heme positive stool    5  Transaminitis    6  Portal vein thrombosis      Post-op Diagnosis:   1  CECIL (acute kidney injury) ([facility] )    2  Syncope    3  Cirrhosis of liver ([facility] )    4  Heme positive stool    5  Transaminitis    6   Portal vein thrombosis        Surgeon:   Daniel Tan MD  Assistants:     No qualified resident was available, Resident is only observing    Estimated Blood Loss: minimal  Findings: 5L ascites RLQ    Specimens: multiple    Complications:  None immediate    Anesthesia: Local    Daniel Tan MD     Date: 3/19/2020  Time: 2:41 PM

## 2020-03-19 NOTE — PROGRESS NOTES
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- appreciate gi recommendations  Pt had paracentesis today for 5 liters  Will continue xifaxan and lactulose       4  Abnormally elevated AFP- pt will need either triple phase ct liver protocol or mri  Gi spoke with her  who has elected not to pursue mri or ct at this time  Please refer to gi note from [address] on 3/18/2020      5  Chronic afib- continue rate control with propranolol  No ac due to cirrhosis and history of subdural hematoma     6  Thrombocytopenia- due to cirrhosis  Will monitor     7  Stage 2 pressure injury- continue wound care     8  Closed fracture of right distal femur- continue knee immobilizer  Discussed with ortho that pt will still require this at all times     9  Chronic indwelling wells catheter- continue wells       dispo- spoke to pts  neo mckee at length  Discussed pts cirrhosis and errol  Discussed the difficulty in stabilizing fluid status  Discussed goals of care  pts  stated that she has suffered so much lately he doesn't want her to suffer  We discussed comfort care and he said that sounds like a better plan  We are going to give her 24 hours and if she has not improved will plan on starting hospice   I stated that I would call him tomorrow to let him know how she is doing and if hospice is started we will allow him to visit her in the hospital  If hospice is started the plan would be discharge to [facility] [place]       Subjective:   Pt seen and examined  Pt oriented with mild confusion  She had a paracentesis today for 5 liters  Spoke with her nurse who stated she is eating and drinking very little  No other problems reported    Objective:     Vitals: Blood pressure 104/68, pulse 64, temperature 97 9 °F (36 6 °C), resp  rate 19, height 5' 2" (1 575 m), SpO2 98 %  ,Body mass index is 39 69 kg/m²  Lab, Imaging and other studies:  Results from last 7 days   Lab Units 03/17/20  0539   WBC Thousand/uL 7 49   HEMOGLOBIN g/dL 11 9   HEMATOCRIT % 38 1   PLATELETS Thousands/uL 94*   INR  1 22*     Results from last 7 days   Lab Units 03/19/20  0541  03/17/20  0539   POTASSIUM mmol/L 4 3   < > 4 9   CHLORIDE mmol/L 108   < > 105   CO2 mmol/L 29   < > 27   BUN mg/dL 48*   < > 41*   CREATININE mg/dL 1 62*   < > 1 40*   CALCIUM mg/dL 8 9   < > 8 7   ALK PHOS U/L  --   --  703*   ALT U/L  --   --  89*   AST U/L  --   --  166*    < > = values in this interval not displayed  Results from last 7 days   Lab Units 03/12/20  1717   TROPONIN I ng/mL <0 02     Lab Results   Component Value Date    BLOODCX No Growth After 5 Days  06/05/2017    BLOODCX No Growth After 5 Days   06/05/2017    URINECX >100,000 cfu/ml Escherichia coli 06/05/2017    URINECX >100,000 cfu/ml Klebsiella pneumoniae 11/01/2016     Scheduled Meds:   Current Facility-Administered Medications:  acetaminophen 650 mg Oral Q6H PRN Radha Quan PA-C   famotidine 10 mg Oral Daily PRN Radha Quan PA-C   gabapentin 100 mg Oral TID PRN Radha Quan PA-C   lactulose 20 g Oral BID Marlen Brady, DO   midodrine 5 mg Oral TID AC Isidra Singleton DO   JOSE ANTIFUNGAL  Topical BID Drake Patrick, DO   octreotide 100 mcg Subcutaneous [facility] Isidra Singleton,    omeprazole (PRILOSEC) suspension 2 mg/mL 40 mg Oral Q12H [facility] Radha Quan PA-C   ondansetron 4 mg Intravenous Q6H PRN Radha Quan PA-C   oxyCODONE 5 mg Oral Q4H PRN Radha Quan PA-C   propranolol 10 mg Oral BID Radha Quan PA-C   rifaximin 550 mg Oral Q12H [facility] Marlen [employer]    tamsulosin 0 4 mg Oral HS Radha Quan PA-C     Continuous Infusions:    PRN Meds:   acetaminophen    famotidine    gabapentin    ondansetron    oxyCODONE      Physical exam:  Physical Exam  General appearance: alert  Head: Normocephalic, without obvious abnormality, atraumatic  Eyes: conjunctivae/corneas clear  PERRL, EOM's intact  Fundi benign  Neck: no adenopathy, no carotid bruit, no JVD, supple, symmetrical, trachea midline and thyroid not enlarged, symmetric, no tenderness/mass/nodules  Lungs: clear to auscultation bilaterally  Heart: regular rate and rhythm, S1, S2 normal, no murmur, click, rub or gallop  Abdomen: soft mild distention nt +bs  Extremities: edema +1 edema bilateral lower ext  Pulses: 2+ and symmetric  Skin: Skin color, texture, turgor normal  No rashes or lesions  Neurologic: Mental status: awake alert oriented x3 with prompting with mild confusion      VTE Pharmacologic Prophylaxis: Reason for no pharmacologic prophylaxis subdural hematoma  VTE Mechanical Prophylaxis: sequential compression device    Counseling / Coordination of Care  Total floor / unit time spent today 30 minutes  minutes  Greater than 50% of total time was spent with the patient and / or family counseling and / or coordination of care  A description of the counseling / coordination of care: discussing goals of care with her  Geremias Granado  Talking with her nurse         Current Length of Stay: 7 day(s)    Current Patient Status: Inpatient     Code Status: Level 3 - DNAR and DNI

## 2020-03-19 NOTE — PROGRESS NOTES
NEPHROLOGY PROGRESS NOTE   Carly Donato 78 y o  female MRN: [id number]  Unit/Bed#: [facility] [address] 222-02 Encounter: [id number]  Reason for Consult:  Acute kidney injury    ASSESSMENT/PLAN:  1  Acute kidney injury, likely initially due to prerenal azotemia given improvement IV fluids, possible component of hepatorenal syndrome currently  2  Previous CKD 3, baseline creatinine 0 7- 0 8, creatinine has plateaued at 1 4  3  Liver cirrhosis with associated ascites   4  Likely degree of volume overload, significant weight gain over last several weeks  5  Hypernatremia, resolved, currently off sodium chloride tablets  6  Labile blood pressures, start midodrine 5 mg 3 times daily  7  Remote history of hyponatremia, previously on sodium chloride tablets      PLAN:  · Check urine sodium  · Empirically add octreotide 100 q 8 hours  · Attempted diuresis given significant volume overload, albumin plus Lasix  · Hopeful improvement in renal function  · However given age, liver failure and associated renal failure, overall prognosis poor    SUBJECTIVE:  Seen examined  Patient awake alert  Denies any chest pain or shortness of breath  Still complains of abdominal distension appetite remains poor  Review of Systems    OBJECTIVE:  Current Weight:    Vitals:    03/18/20 0745 03/18/20 1520 03/18/20 2253 03/19/20 0723   BP: (!) 74/51 133/67 104/74 111/72   Pulse: 78 80 88 88   Resp: 16 18 19 18   Temp: (!) 97 4 °F (36 3 °C) 97 6 °F (36 4 °C) 97 9 °F (36 6 °C) (!) 97 3 °F (36 3 °C)   TempSrc:       SpO2: 99% 97% 93% 96%   Height:           Intake/Output Summary (Last 24 hours) at 3/19/2020 0957  Last data filed at 3/19/2020 0428  Gross per 24 hour   Intake    Output 200 ml   Net -200 ml       Physical Exam   Constitutional: No distress  HENT:   Head: Normocephalic  Neck: Neck supple  Cardiovascular: Normal rate and regular rhythm     Pulmonary/Chest: Effort normal  She has decreased breath sounds in the right lower field and the left lower field  Abdominal: Soft  She exhibits distension  There is no tenderness  Musculoskeletal: She exhibits edema  Neurological: She is alert  Skin: Skin is warm and dry  No rash noted         Medications:    Current Facility-Administered Medications:     acetaminophen (TYLENOL) tablet 650 mg, 650 mg, Oral, Q6H PRN, Radha Quan PA-C    famotidine (PEPCID) tablet 10 mg, 10 mg, Oral, Daily PRN, Radha Quan PA-C    gabapentin (NEURONTIN) capsule 100 mg, 100 mg, Oral, TID PRN, Radha Quan PA-C, 100 mg at 03/18/20 2120    lactulose 20 g/30 mL oral solution 20 g, 20 g, Oral, BID, Marlen Jose Antonio, DO, 20 g at 03/19/20 0903    midodrine (PROAMATINE) tablet 5 mg, 5 mg, Oral, TID AC, Milind Maze Singleton, DO, 5 mg at 03/19/20 4512    moisture barrier miconazole 2% cream (aka JOSE MOISTURE BARRIER ANTIFUNGAL CREAM), , Topical, BID, Marlen Ambron, DO    omeprazole (PRILOSEC) suspension 2 mg/mL, 40 mg, Oral, Q12H [facility], Radha Quan PA-C, 40 mg at 03/19/20 0904    ondansetron (ZOFRAN) injection 4 mg, 4 mg, Intravenous, Q6H PRN, Radha Quan PA-C    oxyCODONE (ROXICODONE) IR tablet 5 mg, 5 mg, Oral, Q4H PRN, Radha Quan PA-C, 5 mg at 03/19/20 0903    propranolol (INDERAL) tablet 10 mg, 10 mg, Oral, BID, Radha Quan PA-C, 10 mg at 03/19/20 0902    rifaximin (XIFAXAN) tablet 550 mg, 550 mg, Oral, Q12H RAMONA, Marlen Garciaron, DO, 550 mg at 03/19/20 0902    tamsulosin (FLOMAX) capsule 0 4 mg, 0 4 mg, Oral, HS, Radha Quan PA-C, 0 4 mg at 03/18/20 2120    Laboratory Results:  Results from last 7 days   Lab Units 03/19/20  0541 03/18/20  0540 03/17/20  0539 03/16/20  0457 03/15/20  0450 03/14/20  1505 03/14/20  0932 03/14/20  0502  03/13/20  0448 03/12/20  2350 03/12/20  1717   WBC Thousand/uL  --   --  7 49  --   --   --   --  5 76  --  9 62  --  11 25*   HEMOGLOBIN g/dL  --   --  11 9  --   --   --   --  10 1*  --  11 9 12 1 12 5   HEMATOCRIT %  --   -- 38 1  --   --   --   --  33 5*  --  39 9 40 8 42 0   PLATELETS Thousands/uL  --   --  94*  --   --   --   --  94*  --  115*  --  142*   POTASSIUM mmol/L 4 3 4 1 4 9 4 9 4 7 4 2 4 1 3 5   < > 4 5  --  4 7   CHLORIDE mmol/L 108 107 105 104 105 105 107 102   < > 113*  --  112*   CO2 mmol/L 29 28 27 28 24 26 27 26   < > 27  --  27   BUN mg/dL 48* 42* 41* 40* 43* 46* 48* 46*   < > 57*  --  57*   CREATININE mg/dL 1 62* 1 44* 1 40* 1 40* 1 59* 1 70* 1 74* 1 70*   < > 1 92*  --  1 99*   CALCIUM mg/dL 8 9 8 8 8 7 8 6 8 8 8 5 8 6 7 9*   < > 8 8  --  8 9   MAGNESIUM mg/dL  --   --   --   --  2 4  --   --  2 2  --   --   --   --    PHOSPHORUS mg/dL 3 0  --   --   --  2 4  --   --  2 7  --   --   --   --     < > = values in this interval not displayed

## 2020-03-20 VITALS
TEMPERATURE: 97.5 F | SYSTOLIC BLOOD PRESSURE: 111 MMHG | RESPIRATION RATE: 19 BRPM | OXYGEN SATURATION: 98 % | HEART RATE: 89 BPM | HEIGHT: 62 IN | DIASTOLIC BLOOD PRESSURE: 64 MMHG | BODY MASS INDEX: 39.69 KG/M2

## 2020-03-20 PROBLEM — R19.5 HEME POSITIVE STOOL: Status: RESOLVED | Noted: 2020-03-12 | Resolved: 2020-03-20

## 2020-03-20 PROBLEM — R13.10 DYSPHAGIA: Status: RESOLVED | Noted: 2020-03-12 | Resolved: 2020-03-20

## 2020-03-20 LAB
ANION GAP SERPL CALCULATED.3IONS-SCNC: 9 MMOL/L (ref 4–13)
BUN SERPL-MCNC: 56 MG/DL (ref 5–25)
CALCIUM SERPL-MCNC: 8.7 MG/DL (ref 8.3–10.1)
CHLORIDE SERPL-SCNC: 110 MMOL/L (ref 100–108)
CO2 SERPL-SCNC: 26 MMOL/L (ref 21–32)
CREAT SERPL-MCNC: 2.17 MG/DL (ref 0.6–1.3)
GFR SERPL CREATININE-BSD FRML MDRD: 21 ML/MIN/1.73SQ M
GLUCOSE SERPL-MCNC: 193 MG/DL (ref 65–140)
POTASSIUM SERPL-SCNC: 4.1 MMOL/L (ref 3.5–5.3)
SODIUM SERPL-SCNC: 145 MMOL/L (ref 136–145)

## 2020-03-20 PROCEDURE — 99239 HOSP IP/OBS DSCHRG MGMT >30: CPT | Performed by: INTERNAL MEDICINE

## 2020-03-20 PROCEDURE — NC001 PR NO CHARGE: Performed by: INTERNAL MEDICINE

## 2020-03-20 PROCEDURE — 80048 BASIC METABOLIC PNL TOTAL CA: CPT | Performed by: INTERNAL MEDICINE

## 2020-03-20 RX ORDER — LACTULOSE 20 G/30ML
20 SOLUTION ORAL 2 TIMES DAILY
Refills: 0
Start: 2020-03-20 | End: 2020-03-20 | Stop reason: HOSPADM

## 2020-03-20 RX ORDER — OXYCODONE HYDROCHLORIDE 5 MG/1
5 TABLET ORAL EVERY 4 HOURS PRN
Qty: 10 TABLET | Refills: 0
Start: 2020-03-20 | End: 2020-03-30

## 2020-03-20 RX ORDER — MIDODRINE HYDROCHLORIDE 5 MG/1
5 TABLET ORAL
Refills: 0
Start: 2020-03-20

## 2020-03-20 RX ADMIN — OCTREOTIDE ACETATE 100 MCG: 100 INJECTION, SOLUTION INTRAVENOUS; SUBCUTANEOUS at 05:21

## 2020-03-20 RX ADMIN — RIFAXIMIN 550 MG: 550 TABLET ORAL at 08:39

## 2020-03-20 RX ADMIN — Medication 40 MG: at 08:39

## 2020-03-20 RX ADMIN — MIDODRINE HYDROCHLORIDE 5 MG: 5 TABLET ORAL at 15:14

## 2020-03-20 RX ADMIN — LACTULOSE 20 G: 10 SOLUTION ORAL at 08:39

## 2020-03-20 RX ADMIN — MICONAZOLE NITRATE: 20 CREAM TOPICAL at 08:43

## 2020-03-20 RX ADMIN — MIDODRINE HYDROCHLORIDE 5 MG: 5 TABLET ORAL at 13:09

## 2020-03-20 RX ADMIN — OCTREOTIDE ACETATE 100 MCG: 100 INJECTION, SOLUTION INTRAVENOUS; SUBCUTANEOUS at 15:00

## 2020-03-20 RX ADMIN — MIDODRINE HYDROCHLORIDE 5 MG: 5 TABLET ORAL at 05:21

## 2020-03-20 NOTE — NURSING NOTE
Pt transported to [place] via [facility] called with update and discharge instructions  Discharge packet given to [facility] team  All belongings taken with patient

## 2020-03-20 NOTE — PROGRESS NOTES
Discussed with Dr Alyssa Patricia, patient to be discharged with comfort measures  Will sign off at this time, please call for questions or concerns or situation changes

## 2020-03-20 NOTE — SOCIAL WORK
Pt to be d/c to [place] on comfort care- transportation arranged via [address] at [facility]  Attending/RN/Facility made aware of same  CMN completed and in pt's folder  No further dc needs identified

## 2020-03-20 NOTE — PLAN OF CARE
Problem: Prexisting or High Potential for Compromised Skin Integrity  Goal: Skin integrity is maintained or improved  Description  INTERVENTIONS:  - Identify patients at risk for skin breakdown  - Assess and monitor skin integrity  - Assess and monitor nutrition and hydration status  - Monitor labs   - Assess for incontinence   - Turn and reposition patient  - Assist with mobility/ambulation  - Relieve pressure over bony prominences  - Avoid friction and shearing  - Provide appropriate hygiene as needed including keeping skin clean and dry  - Evaluate need for skin moisturizer/barrier cream  - Collaborate with interdisciplinary team   - Patient/family teaching  - Consider wound care consult   Outcome: Progressing     Problem: PAIN - ADULT  Goal: Verbalizes/displays adequate comfort level or baseline comfort level  Description  Interventions:  - Encourage patient to monitor pain and request assistance  - Assess pain using appropriate pain scale  - Administer analgesics based on type and severity of pain and evaluate response  - Implement non-pharmacological measures as appropriate and evaluate response  - Consider cultural and social influences on pain and pain management  - Notify physician/advanced practitioner if interventions unsuccessful or patient reports new pain  Outcome: Progressing     Problem: INFECTION - ADULT  Goal: Absence or prevention of progression during hospitalization  Description  INTERVENTIONS:  - Assess and monitor for signs and symptoms of infection  - Monitor lab/diagnostic results  - Monitor all insertion sites, i e  indwelling lines, tubes, and drains  - Monitor endotracheal if appropriate and nasal secretions for changes in amount and color  - [place] appropriate cooling/warming therapies per order  - Administer medications as ordered  - Instruct and encourage patient and family to use good hand hygiene technique  - Identify and instruct in appropriate isolation precautions for identified infection/condition  Outcome: Progressing     Problem: SAFETY ADULT  Goal: Patient will remain free of falls  Description  INTERVENTIONS:  - Assess patient frequently for physical needs  -  Identify cognitive and physical deficits and behaviors that affect risk of falls    -  [place] fall precautions as indicated by assessment   - Educate patient/family on patient safety including physical limitations  - Instruct patient to call for assistance with activity based on assessment  - Modify environment to reduce risk of injury  - Consider OT/PT consult to assist with strengthening/mobility  Outcome: Progressing  Goal: Maintain or return to baseline ADL function  Description  INTERVENTIONS:  -  Assess patient's ability to carry out ADLs; assess patient's baseline for ADL function and identify physical deficits which impact ability to perform ADLs (bathing, care of mouth/teeth, toileting, grooming, dressing, etc )  - Assess/evaluate cause of self-care deficits   - Assess range of motion  - Assess patient's mobility; develop plan if impaired  - Assess patient's need for assistive devices and provide as appropriate  - Encourage maximum independence but intervene and supervise when necessary  - Involve family in performance of ADLs  - Assess for home care needs following discharge   - Consider OT consult to assist with ADL evaluation and planning for discharge  - Provide patient education as appropriate  Outcome: Progressing  Goal: Maintain or return mobility status to optimal level  Description  INTERVENTIONS:  - Assess patient's baseline mobility status (ambulation, transfers, stairs, etc )    - Identify cognitive and physical deficits and behaviors that affect mobility  - Identify mobility aids required to assist with transfers and/or ambulation (gait belt, sit-to-stand, lift, walker, cane, etc )  - [place] fall precautions as indicated by assessment  - Record patient progress and toleration of activity level on Mobility SBAR; progress patient to next Phase/Stage  - Instruct patient to call for assistance with activity based on assessment  - Consider rehabilitation consult to assist with strengthening/weightbearing, etc   Outcome: Progressing     Problem: DISCHARGE PLANNING  Goal: Discharge to home or other facility with appropriate resources  Description  INTERVENTIONS:  - Identify barriers to discharge w/patient and caregiver  - Arrange for needed discharge resources and transportation as appropriate  - Identify discharge learning needs (meds, wound care, etc )  - Arrange for interpretive services to assist at discharge as needed  - Refer to Case Management Department for coordinating discharge planning if the patient needs post-hospital services based on physician/advanced practitioner order or complex needs related to functional status, cognitive ability, or social support system  Outcome: Progressing     Problem: Knowledge Deficit  Goal: Patient/family/caregiver demonstrates understanding of disease process, treatment plan, medications, and discharge instructions  Description  Complete learning assessment and assess knowledge base    Interventions:  - Provide teaching at level of understanding  - Provide teaching via preferred learning methods  Outcome: Progressing     Problem: CARDIOVASCULAR - ADULT  Goal: Maintains optimal cardiac output and hemodynamic stability  Description  INTERVENTIONS:  - Monitor I/O, vital signs and rhythm  - Monitor for S/S and trends of decreased cardiac output  - Administer and titrate ordered vasoactive medications to optimize hemodynamic stability  - Assess quality of pulses, skin color and temperature  - Assess for signs of decreased coronary artery perfusion  - Instruct patient to report change in severity of symptoms  Outcome: Progressing  Goal: Absence of cardiac dysrhythmias or at baseline rhythm  Description  INTERVENTIONS:  - Continuous cardiac monitoring, vital signs, obtain 12 lead EKG if ordered  - Administer antiarrhythmic and heart rate control medications as ordered  - Monitor electrolytes and administer replacement therapy as ordered  Outcome: Progressing     Problem: GENITOURINARY - ADULT  Goal: Urinary catheter remains patent  Description  INTERVENTIONS:  - Assess patency of urinary catheter  - If patient has a chronic wells, consider changing catheter if non-functioning  - Follow guidelines for intermittent irrigation of non-functioning urinary catheter  Outcome: Progressing     Problem: METABOLIC, FLUID AND ELECTROLYTES - ADULT  Goal: Electrolytes maintained within normal limits  Description  INTERVENTIONS:  - Monitor labs and assess patient for signs and symptoms of electrolyte imbalances  - Administer electrolyte replacement as ordered  - Monitor response to electrolyte replacements, including repeat lab results as appropriate  - Instruct patient on fluid and nutrition as appropriate  Outcome: Progressing  Goal: Fluid balance maintained  Description  INTERVENTIONS:  - Monitor labs   - Monitor I/O and WT  - Instruct patient on fluid and nutrition as appropriate  - Assess for signs & symptoms of volume excess or deficit  Outcome: Progressing  Goal: Glucose maintained within target range  Description  INTERVENTIONS:  - Monitor Blood Glucose as ordered  - Assess for signs and symptoms of hyperglycemia and hypoglycemia  - Administer ordered medications to maintain glucose within target range  - Assess nutritional intake and initiate nutrition service referral as needed  Outcome: Progressing     Problem: SKIN/TISSUE INTEGRITY - ADULT  Goal: Skin integrity remains intact  Description  INTERVENTIONS  - Identify patients at risk for skin breakdown  - Assess and monitor skin integrity  - Assess and monitor nutrition and hydration status  - Monitor labs (i e  albumin)  - Assess for incontinence   - Turn and reposition patient  - Assist with mobility/ambulation  - Relieve pressure over bony prominences  - Avoid friction and shearing  - Provide appropriate hygiene as needed including keeping skin clean and dry  - Evaluate need for skin moisturizer/barrier cream  - Collaborate with interdisciplinary team (i e  Nutrition, Rehabilitation, etc )   - Patient/family teaching  Outcome: Progressing     Problem: Potential for Falls  Goal: Patient will remain free of falls  Description  INTERVENTIONS:  - Assess patient frequently for physical needs  -  Identify cognitive and physical deficits and behaviors that affect risk of falls  -  [place] fall precautions as indicated by assessment   - Educate patient/family on patient safety including physical limitations  - Instruct patient to call for assistance with activity based on assessment  - Modify environment to reduce risk of injury  - Consider OT/PT consult to assist with strengthening/mobility  Outcome: Progressing     Problem: Nutrition/Hydration-ADULT  Goal: Nutrient/Hydration intake appropriate for improving, restoring or maintaining nutritional needs  Description  Monitor and assess patient's nutrition/hydration status for malnutrition  Collaborate with interdisciplinary team and initiate plan and interventions as ordered  Monitor patient's weight and dietary intake as ordered or per policy  Utilize nutrition screening tool and intervene as necessary  Determine patient's food preferences and provide high-protein, high-caloric foods as appropriate       INTERVENTIONS:  - Monitor oral intake, urinary output, labs, and treatment plans  - Assess nutrition and hydration status and recommend course of action  - Evaluate amount of meals eaten  - Assist patient with eating if necessary   - Allow adequate time for meals  - Recommend/ encourage appropriate diets, oral nutritional supplements, and vitamin/mineral supplements  - Order, calculate, and assess calorie counts as needed  - Recommend, monitor, and adjust tube feedings and TPN/PPN based on assessed needs  - Assess need for intravenous fluids  - Provide specific nutrition/hydration education as appropriate  - Include patient/family/caregiver in decisions related to nutrition  Outcome: Progressing

## 2020-03-20 NOTE — PROGRESS NOTES
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- appreciate gi recommendations  Pt had paracentesis today for 5 liters  Will continue xifaxan and lactulose       4  Abnormally elevated AFP- pt will need either triple phase ct liver protocol or mri  Gi spoke with her  who has elected not to pursue mri or ct at this time  Please refer to gi note from [address] on 3/18/2020      5  Chronic afib- continue rate control with propranolol  No ac due to cirrhosis and history of subdural hematoma     6  Thrombocytopenia- due to cirrhosis  Will monitor     7  Stage 2 pressure injury- continue wound care     8  Closed fracture of right distal femur- continue knee immobilizer  Discussed with ortho that pt will still require this at all times     9  Chronic indwelling wells catheter- continue wells       dispo- spoke to pts  Robert Vasquez again  Discussed that she is not improving  Discussed the severity of her disease  Lloyd Morning agreed to comfort care  She will go back to [facility] [place] [facility] on comfort care    Subjective:   Pt seen and examined  Pt states she is tired  She states she doesn't feel well  No f/c no cp no sob no n/v no abd pain  She is still having bm from the lactulose  She is still eating very little  Objective:     Vitals: Blood pressure 95/65, pulse 78, temperature (!) 97 2 °F (36 2 °C), temperature source Oral, resp  rate 17, height 5' 2" (1 575 m), SpO2 97 %  ,Body mass index is 39 69 kg/m²  Lab, Imaging and other studies:  Results from last 7 days   Lab Units 03/17/20  0539   WBC Thousand/uL 7 49   HEMOGLOBIN g/dL 11 9   HEMATOCRIT % 38 1   PLATELETS Thousands/uL 94*   INR  1 22*     Results from last 7 days   Lab Units 03/20/20  0501  03/17/20  0539   POTASSIUM mmol/L 4 1   < > 4 9   CHLORIDE mmol/L 110*   < > 105   CO2 mmol/L 26   < > 27   BUN mg/dL 56*   < > 41*   CREATININE mg/dL 2 17*   < > 1 40*   CALCIUM mg/dL 8 7   < > 8 7   ALK PHOS U/L  --   --  703*   ALT U/L  --   --  89*   AST U/L  --   --  166*    < > = values in this interval not displayed  Lab Results   Component Value Date    BLOODCX No Growth After 5 Days  06/05/2017    BLOODCX No Growth After 5 Days  06/05/2017    URINECX >100,000 cfu/ml Escherichia coli 06/05/2017    URINECX >100,000 cfu/ml Klebsiella pneumoniae 11/01/2016     Scheduled Meds:   Current Facility-Administered Medications:  acetaminophen 650 mg Oral Q6H PRN Radha Quan PA-C   famotidine 10 mg Oral Daily PRN Radha Quan PA-C   gabapentin 100 mg Oral TID PRN Radha Quan PA-C   lactulose 20 g Oral BID Marlen Brady,    midodrine 5 mg Oral TID AC Lanny Terry Singleton DO   JOSE ANTIFUNGAL  Topical BID Eunice Dubose DO   octreotide 100 mcg Subcutaneous [facility] Lanny [facility],    omeprazole (PRILOSEC) suspension 2 mg/mL 40 mg Oral Q12H [address] Radha Quan PA-C   ondansetron 4 mg Intravenous Q6H PRN Radha Qaun PA-C   oxyCODONE 5 mg Oral Q4H PRN Radha Quan PA-C   propranolol 10 mg Oral BID Radha Quan PA-C   rifaximin 550 mg Oral Q12H [address] Marlen Brady,    tamsulosin 0 4 mg Oral HS Radha Quan PA-C     Continuous Infusions:    PRN Meds:   acetaminophen    famotidine    gabapentin    ondansetron    oxyCODONE      Physical exam:  Physical Exam  General appearance: alert  Head: Normocephalic, without obvious abnormality, atraumatic  Eyes: conjunctivae/corneas clear  PERRL, EOM's intact  Fundi benign    Neck: no adenopathy, no carotid bruit, no JVD, supple, symmetrical, trachea midline and thyroid not enlarged, symmetric, no tenderness/mass/nodules  Lungs: clear to auscultation bilaterally  Heart: regular rate and rhythm, S1, S2 normal, no murmur, click, rub or gallop  Abdomen: soft distended nt +Bs  Extremities: edema +1 edema bilateral lower ext  Pulses: 2+ and symmetric  Skin: Skin color, texture, turgor normal  No rashes or lesions  Neurologic: Mental status: awake alert oriented x2 person, place

## 2020-03-20 NOTE — DISCHARGE SUMMARY
Discharge Summary - [address] [facility]    Patient Information: Fracisco Angelo 78 y o  female MRN: [id number]  Unit/Bed#: [place] [address] -[id number] Encounter: [id number]    Discharging Physician / Practitioner: Giovanni Hanson DO  PCP: Nader Soto MD  Admission Date: 3/12/2020  Discharge Date: 03/20/20    Disposition:   Discharged to [facility] [place]     Reason for Admission: andi/ckd3    Discharge Diagnoses:     Principal Problem:    Acute kidney injury superimposed on chronic kidney disease ([facility] [address] )  Active Problems:    Essential hypertension    Chronic indwelling Davies catheter    Chronic diastolic (congestive) heart failure (HCC)    Cirrhosis of liver (HCC)    Subdural hematoma ([facility] [address] )    History of subdural hematoma    A-fib (HCC)    Altered mental status    Transaminitis    Hepatic encephalopathy ([facility] [address] )    Portal vein thrombosis    Moderate protein-calorie malnutrition ([facility] [address] )  Resolved Problems:    Heme positive stool    Dysphagia      Consultations During Hospital Stay:  · Nephrology  · Gi    Procedures Performed:     · paracentesis x2    Significant Findings / Test Results:   Xr Chest Portable  Result Date: 3/14/2020  Impression: No acute cardiopulmonary disease  Xr Chest 2 Views  Result Date: 3/12/2020  Impression: No acute cardiopulmonary disease  Stable cardiomegaly  Ct Head Without Contrast  Result Date: 3/12/2020  Impression: 1  Interval resolution of interhemispheric subdural hematoma  Diminished attenuation of subacute right thalamic hemorrhage, stable in size measuring 7 x 5 mm  No acute intracranial abnormality  2   Atrophy with stable severe chronic small vessel ischemic changes  Us Right Upper Quadrant With Liver Dopplers  Result Date: 3/12/2020  Impression: 1  Partial portal vein thrombosis throughout the enlarged portal veins  2  Main portal vein and right portal vein is reversed  Main portal vein is enlarged measuring 3 cm   These findings suggest portal hypertension with partial nonocclusive portal vein thrombosis  3  Cirrhotic nodular liver  4  Moderate to large ascites throughout the abdomen  Incidental Findings:   · none    Test Results Pending at Discharge (will require follow up):   · none     Outpatient Tests Requested:  None    Hospital Course:     Theron Clancy is a 78 y o  female patient who originally presented to the hospital on 3/12/2020 due to  Errol/ckd3  She also has cirrhosis of liver with ascites and hepatic encephalopathy  Pt was treated by nephrology and gi  She had multiple paracentesis  She was treated with fluid and would become fluid overloaded  However would try diuretics and creatinine would elevate  Spoke with nephrology at length and stated will be very difficult to get her to a euvolemic state and have a normal creatinine  Pt failed with diuresis multiple times  Pts creatinine elevated again to 2 17  She was also found to have an elevated afp  This was all expressed to her  who stated she had been through a lot lately  He wanted did not to pursue further aggressive care and would like to pursue comfort care  She was discharged to [facility] [place] [place] on comfort care  She did have hypotension during her hospital stay in which her propanolol was d/bj and she was started on midodrine  appreciate nephrology recommendations  Baseline is 0 7-0 8  It was likely due to pre renal azotemia  Creatinine increased to 1 62  She was started on octreotide  Creatinine has worsened to 2 17   Pt has a stage 2 pressure injury of sacrum which was not poa in which she will continue wound care  She also has closed fracture of right distal femur  She will continue with a knee immobilizer  She was discharged on [facility] on comfort care  Discharge Day Visit / Exam:     * Please refer to separate progress note for these details *    Discharge instructions/Information to patient and family:   See after visit summary for information provided to patient and family        Provisions for Follow-Up Care:  See after visit summary for information related to follow-up care and any pertinent home health orders  Discharge Statement:  I spent 35 minutes discharging the patient  This time was spent on the day of discharge  I had direct contact with the patient on the day of discharge  Greater than 50% of the total time was spent examining patient, answering all patient questions, arranging and discussing plan of care with patient as well as directly providing post-discharge instructions  Additional time then spent on discharge activities  Discharge Medications:  See after visit summary for reconciled discharge medications provided to patient and family

## 2020-03-20 NOTE — PLAN OF CARE
Problem: Prexisting or High Potential for Compromised Skin Integrity  Goal: Skin integrity is maintained or improved  Description  INTERVENTIONS:  - Identify patients at risk for skin breakdown  - Assess and monitor skin integrity  - Assess and monitor nutrition and hydration status  - Monitor labs   - Assess for incontinence   - Turn and reposition patient  - Assist with mobility/ambulation  - Relieve pressure over bony prominences  - Avoid friction and shearing  - Provide appropriate hygiene as needed including keeping skin clean and dry  - Evaluate need for skin moisturizer/barrier cream  - Collaborate with interdisciplinary team   - Patient/family teaching  - Consider wound care consult   Outcome: Completed     Problem: PAIN - ADULT  Goal: Verbalizes/displays adequate comfort level or baseline comfort level  Description  Interventions:  - Encourage patient to monitor pain and request assistance  - Assess pain using appropriate pain scale  - Administer analgesics based on type and severity of pain and evaluate response  - Implement non-pharmacological measures as appropriate and evaluate response  - Consider cultural and social influences on pain and pain management  - Notify physician/advanced practitioner if interventions unsuccessful or patient reports new pain  Outcome: Completed     Problem: INFECTION - ADULT  Goal: Absence or prevention of progression during hospitalization  Description  INTERVENTIONS:  - Assess and monitor for signs and symptoms of infection  - Monitor lab/diagnostic results  - Monitor all insertion sites, i e  indwelling lines, tubes, and drains  - Monitor endotracheal if appropriate and nasal secretions for changes in amount and color  - [place] appropriate cooling/warming therapies per order  - Administer medications as ordered  - Instruct and encourage patient and family to use good hand hygiene technique  - Identify and instruct in appropriate isolation precautions for identified infection/condition  Outcome: Completed     Problem: SAFETY ADULT  Goal: Patient will remain free of falls  Description  INTERVENTIONS:  - Assess patient frequently for physical needs  -  Identify cognitive and physical deficits and behaviors that affect risk of falls    -  [place] fall precautions as indicated by assessment   - Educate patient/family on patient safety including physical limitations  - Instruct patient to call for assistance with activity based on assessment  - Modify environment to reduce risk of injury  - Consider OT/PT consult to assist with strengthening/mobility  Outcome: Completed  Goal: Maintain or return to baseline ADL function  Description  INTERVENTIONS:  -  Assess patient's ability to carry out ADLs; assess patient's baseline for ADL function and identify physical deficits which impact ability to perform ADLs (bathing, care of mouth/teeth, toileting, grooming, dressing, etc )  - Assess/evaluate cause of self-care deficits   - Assess range of motion  - Assess patient's mobility; develop plan if impaired  - Assess patient's need for assistive devices and provide as appropriate  - Encourage maximum independence but intervene and supervise when necessary  - Involve family in performance of ADLs  - Assess for home care needs following discharge   - Consider OT consult to assist with ADL evaluation and planning for discharge  - Provide patient education as appropriate  Outcome: Completed  Goal: Maintain or return mobility status to optimal level  Description  INTERVENTIONS:  - Assess patient's baseline mobility status (ambulation, transfers, stairs, etc )    - Identify cognitive and physical deficits and behaviors that affect mobility  - Identify mobility aids required to assist with transfers and/or ambulation (gait belt, sit-to-stand, lift, walker, cane, etc )  - [place] fall precautions as indicated by assessment  - Record patient progress and toleration of activity level on Mobility SBAR; progress patient to next Phase/Stage  - Instruct patient to call for assistance with activity based on assessment  - Consider rehabilitation consult to assist with strengthening/weightbearing, etc   Outcome: Completed     Problem: DISCHARGE PLANNING  Goal: Discharge to home or other facility with appropriate resources  Description  INTERVENTIONS:  - Identify barriers to discharge w/patient and caregiver  - Arrange for needed discharge resources and transportation as appropriate  - Identify discharge learning needs (meds, wound care, etc )  - Arrange for interpretive services to assist at discharge as needed  - Refer to Case Management Department for coordinating discharge planning if the patient needs post-hospital services based on physician/advanced practitioner order or complex needs related to functional status, cognitive ability, or social support system  Outcome: Completed     Problem: Knowledge Deficit  Goal: Patient/family/caregiver demonstrates understanding of disease process, treatment plan, medications, and discharge instructions  Description  Complete learning assessment and assess knowledge base    Interventions:  - Provide teaching at level of understanding  - Provide teaching via preferred learning methods  Outcome: Completed     Problem: CARDIOVASCULAR - ADULT  Goal: Maintains optimal cardiac output and hemodynamic stability  Description  INTERVENTIONS:  - Monitor I/O, vital signs and rhythm  - Monitor for S/S and trends of decreased cardiac output  - Administer and titrate ordered vasoactive medications to optimize hemodynamic stability  - Assess quality of pulses, skin color and temperature  - Assess for signs of decreased coronary artery perfusion  - Instruct patient to report change in severity of symptoms  Outcome: Completed  Goal: Absence of cardiac dysrhythmias or at baseline rhythm  Description  INTERVENTIONS:  - Continuous cardiac monitoring, vital signs, obtain 12 lead EKG if ordered  - Administer antiarrhythmic and heart rate control medications as ordered  - Monitor electrolytes and administer replacement therapy as ordered  Outcome: Completed     Problem: GENITOURINARY - ADULT  Goal: Urinary catheter remains patent  Description  INTERVENTIONS:  - Assess patency of urinary catheter  - If patient has a chronic wells, consider changing catheter if non-functioning  - Follow guidelines for intermittent irrigation of non-functioning urinary catheter  Outcome: Completed     Problem: METABOLIC, FLUID AND ELECTROLYTES - ADULT  Goal: Electrolytes maintained within normal limits  Description  INTERVENTIONS:  - Monitor labs and assess patient for signs and symptoms of electrolyte imbalances  - Administer electrolyte replacement as ordered  - Monitor response to electrolyte replacements, including repeat lab results as appropriate  - Instruct patient on fluid and nutrition as appropriate  Outcome: Completed  Goal: Fluid balance maintained  Description  INTERVENTIONS:  - Monitor labs   - Monitor I/O and WT  - Instruct patient on fluid and nutrition as appropriate  - Assess for signs & symptoms of volume excess or deficit  Outcome: Completed  Goal: Glucose maintained within target range  Description  INTERVENTIONS:  - Monitor Blood Glucose as ordered  - Assess for signs and symptoms of hyperglycemia and hypoglycemia  - Administer ordered medications to maintain glucose within target range  - Assess nutritional intake and initiate nutrition service referral as needed  Outcome: Completed     Problem: SKIN/TISSUE INTEGRITY - ADULT  Goal: Skin integrity remains intact  Description  INTERVENTIONS  - Identify patients at risk for skin breakdown  - Assess and monitor skin integrity  - Assess and monitor nutrition and hydration status  - Monitor labs (i e  albumin)  - Assess for incontinence   - Turn and reposition patient  - Assist with mobility/ambulation  - Relieve pressure over bony prominences  - Avoid friction and shearing  - Provide appropriate hygiene as needed including keeping skin clean and dry  - Evaluate need for skin moisturizer/barrier cream  - Collaborate with interdisciplinary team (i e  Nutrition, Rehabilitation, etc )   - Patient/family teaching  Outcome: Completed     Problem: Potential for Falls  Goal: Patient will remain free of falls  Description  INTERVENTIONS:  - Assess patient frequently for physical needs  -  Identify cognitive and physical deficits and behaviors that affect risk of falls  -  [place] fall precautions as indicated by assessment   - Educate patient/family on patient safety including physical limitations  - Instruct patient to call for assistance with activity based on assessment  - Modify environment to reduce risk of injury  - Consider OT/PT consult to assist with strengthening/mobility  Outcome: Completed     Problem: Nutrition/Hydration-ADULT  Goal: Nutrient/Hydration intake appropriate for improving, restoring or maintaining nutritional needs  Description  Monitor and assess patient's nutrition/hydration status for malnutrition  Collaborate with interdisciplinary team and initiate plan and interventions as ordered  Monitor patient's weight and dietary intake as ordered or per policy  Utilize nutrition screening tool and intervene as necessary  Determine patient's food preferences and provide high-protein, high-caloric foods as appropriate       INTERVENTIONS:  - Monitor oral intake, urinary output, labs, and treatment plans  - Assess nutrition and hydration status and recommend course of action  - Evaluate amount of meals eaten  - Assist patient with eating if necessary   - Allow adequate time for meals  - Recommend/ encourage appropriate diets, oral nutritional supplements, and vitamin/mineral supplements  - Order, calculate, and assess calorie counts as needed  - Recommend, monitor, and adjust tube feedings and TPN/PPN based on assessed needs  - Assess need for intravenous fluids  - Provide specific nutrition/hydration education as appropriate  - Include patient/family/caregiver in decisions related to nutrition  Outcome: Completed

## 2020-03-20 NOTE — PHYSICAL THERAPY NOTE
Physical Therapy Cancellation Note      PT ORDERS REMAINS ACTIVE  CHART REVIEWED  PER PHYSICIANS PROGRESS NOTE ON 3/19/2020: "We discussed comfort care and he said that sounds like a better plan  We are going to give her 24 hours and if she has not improved will plan on starting hospice " PT WILL HOLD PERFORMANCE OF SERVICES/MOBILITY AT THIS TIME PENDING DECISION ON GOALS OF CARE   [id number] [facility] PT, DPT

## 2020-03-22 LAB
BACTERIA SPEC BFLD CULT: NO GROWTH
GRAM STN SPEC: NORMAL
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
## 2021-01-21 NOTE — ASSESSMENT & PLAN NOTE
- CT head negative  - continue monitoring  Will consider lactulose or Rifaximin once resuscitated appropriately  Rituxan Counseling:  I discussed with the patient the risks of Rituxan infusions. Side effects can include infusion reactions, severe drug rashes including mucocutaneous reactions, reactivation of latent hepatitis and other infections and rarely progressive multifocal leukoencephalopathy.  All of the patient's questions and concerns were addressed.

## 2023-03-28 NOTE — PLAN OF CARE
Problem: Prexisting or High Potential for Compromised Skin Integrity  Goal: Skin integrity is maintained or improved  Description  INTERVENTIONS:  - Identify patients at risk for skin breakdown  - Assess and monitor skin integrity  - Assess and monitor nutrition and hydration status  - Monitor labs   - Assess for incontinence   - Turn and reposition patient  - Assist with mobility/ambulation  - Relieve pressure over bony prominences  - Avoid friction and shearing  - Provide appropriate hygiene as needed including keeping skin clean and dry  - Evaluate need for skin moisturizer/barrier cream  - Collaborate with interdisciplinary team   - Patient/family teaching  - Consider wound care consult   Outcome: Progressing     Problem: PAIN - ADULT  Goal: Verbalizes/displays adequate comfort level or baseline comfort level  Description  Interventions:  - Encourage patient to monitor pain and request assistance  - Assess pain using appropriate pain scale  - Administer analgesics based on type and severity of pain and evaluate response  - Implement non-pharmacological measures as appropriate and evaluate response  - Consider cultural and social influences on pain and pain management  - Notify physician/advanced practitioner if interventions unsuccessful or patient reports new pain  Outcome: Progressing     Problem: INFECTION - ADULT  Goal: Absence or prevention of progression during hospitalization  Description  INTERVENTIONS:  - Assess and monitor for signs and symptoms of infection  - Monitor lab/diagnostic results  - Monitor all insertion sites, i e  indwelling lines, tubes, and drains  - Monitor endotracheal if appropriate and nasal secretions for changes in amount and color  - [place] appropriate cooling/warming therapies per order  - Administer medications as ordered  - Instruct and encourage patient and family to use good hand hygiene technique  - Identify and instruct in appropriate isolation precautions for identified infection/condition  Outcome: Progressing     Problem: SAFETY ADULT  Goal: Patient will remain free of falls  Description  INTERVENTIONS:  - Assess patient frequently for physical needs  -  Identify cognitive and physical deficits and behaviors that affect risk of falls    -  [place] fall precautions as indicated by assessment   - Educate patient/family on patient safety including physical limitations  - Instruct patient to call for assistance with activity based on assessment  - Modify environment to reduce risk of injury  - Consider OT/PT consult to assist with strengthening/mobility  Outcome: Progressing  Goal: Maintain or return to baseline ADL function  Description  INTERVENTIONS:  -  Assess patient's ability to carry out ADLs; assess patient's baseline for ADL function and identify physical deficits which impact ability to perform ADLs (bathing, care of mouth/teeth, toileting, grooming, dressing, etc )  - Assess/evaluate cause of self-care deficits   - Assess range of motion  - Assess patient's mobility; develop plan if impaired  - Assess patient's need for assistive devices and provide as appropriate  - Encourage maximum independence but intervene and supervise when necessary  - Involve family in performance of ADLs  - Assess for home care needs following discharge   - Consider OT consult to assist with ADL evaluation and planning for discharge  - Provide patient education as appropriate  Outcome: Progressing  Goal: Maintain or return mobility status to optimal level  Description  INTERVENTIONS:  - Assess patient's baseline mobility status (ambulation, transfers, stairs, etc )    - Identify cognitive and physical deficits and behaviors that affect mobility  - Identify mobility aids required to assist with transfers and/or ambulation (gait belt, sit-to-stand, lift, walker, cane, etc )  - [place] fall precautions as indicated by assessment  - Record patient progress and toleration of activity level on Mobility SBAR; progress patient to next Phase/Stage  - Instruct patient to call for assistance with activity based on assessment  - Consider rehabilitation consult to assist with strengthening/weightbearing, etc   Outcome: Progressing     Problem: DISCHARGE PLANNING  Goal: Discharge to home or other facility with appropriate resources  Description  INTERVENTIONS:  - Identify barriers to discharge w/patient and caregiver  - Arrange for needed discharge resources and transportation as appropriate  - Identify discharge learning needs (meds, wound care, etc )  - Arrange for interpretive services to assist at discharge as needed  - Refer to Case Management Department for coordinating discharge planning if the patient needs post-hospital services based on physician/advanced practitioner order or complex needs related to functional status, cognitive ability, or social support system  Outcome: Progressing     Problem: Knowledge Deficit  Goal: Patient/family/caregiver demonstrates understanding of disease process, treatment plan, medications, and discharge instructions  Description  Complete learning assessment and assess knowledge base    Interventions:  - Provide teaching at level of understanding  - Provide teaching via preferred learning methods  Outcome: Progressing     Problem: CARDIOVASCULAR - ADULT  Goal: Maintains optimal cardiac output and hemodynamic stability  Description  INTERVENTIONS:  - Monitor I/O, vital signs and rhythm  - Monitor for S/S and trends of decreased cardiac output  - Administer and titrate ordered vasoactive medications to optimize hemodynamic stability  - Assess quality of pulses, skin color and temperature  - Assess for signs of decreased coronary artery perfusion  - Instruct patient to report change in severity of symptoms  Outcome: Progressing  Goal: Absence of cardiac dysrhythmias or at baseline rhythm  Description  INTERVENTIONS:  - Continuous cardiac monitoring, vital signs, obtain 12 lead EKG if ordered  - Administer antiarrhythmic and heart rate control medications as ordered  - Monitor electrolytes and administer replacement therapy as ordered  Outcome: Progressing     Problem: GENITOURINARY - ADULT  Goal: Urinary catheter remains patent  Description  INTERVENTIONS:  - Assess patency of urinary catheter  - If patient has a chronic wells, consider changing catheter if non-functioning  - Follow guidelines for intermittent irrigation of non-functioning urinary catheter  Outcome: Progressing     Problem: METABOLIC, FLUID AND ELECTROLYTES - ADULT  Goal: Electrolytes maintained within normal limits  Description  INTERVENTIONS:  - Monitor labs and assess patient for signs and symptoms of electrolyte imbalances  - Administer electrolyte replacement as ordered  - Monitor response to electrolyte replacements, including repeat lab results as appropriate  - Instruct patient on fluid and nutrition as appropriate  Outcome: Progressing  Goal: Fluid balance maintained  Description  INTERVENTIONS:  - Monitor labs   - Monitor I/O and WT  - Instruct patient on fluid and nutrition as appropriate  - Assess for signs & symptoms of volume excess or deficit  Outcome: Progressing  Goal: Glucose maintained within target range  Description  INTERVENTIONS:  - Monitor Blood Glucose as ordered  - Assess for signs and symptoms of hyperglycemia and hypoglycemia  - Administer ordered medications to maintain glucose within target range  - Assess nutritional intake and initiate nutrition service referral as needed  Outcome: Progressing     Problem: SKIN/TISSUE INTEGRITY - ADULT  Goal: Skin integrity remains intact  Description  INTERVENTIONS  - Identify patients at risk for skin breakdown  - Assess and monitor skin integrity  - Assess and monitor nutrition and hydration status  - Monitor labs (i e  albumin)  - Assess for incontinence   - Turn and reposition patient  - Assist with mobility/ambulation  - Relieve pressure over bony prominences  - Avoid friction and shearing  - Provide appropriate hygiene as needed including keeping skin clean and dry  - Evaluate need for skin moisturizer/barrier cream  - Collaborate with interdisciplinary team (i e  Nutrition, Rehabilitation, etc )   - Patient/family teaching  Outcome: Progressing     Problem: Potential for Falls  Goal: Patient will remain free of falls  Description  INTERVENTIONS:  - Assess patient frequently for physical needs  -  Identify cognitive and physical deficits and behaviors that affect risk of falls    -  [place] fall precautions as indicated by assessment   - Educate patient/family on patient safety including physical limitations  - Instruct patient to call for assistance with activity based on assessment  - Modify environment to reduce risk of injury  - Consider OT/PT consult to assist with strengthening/mobility  Outcome: Progressing none required

## 2023-05-19 NOTE — TELEPHONE ENCOUNTER
Lakeshia ANDRES called in stating Vinod Hallman needs an order for PT to evaluate her because she is complaining of left lower extremity pain and weakness  Please advice  Alert-The patient is alert, awake and responds to voice. The patient is oriented to time, place, and person. The triage nurse is able to obtain subjective information.

## 2025-04-12 NOTE — PROGRESS NOTES
Progress Note - Nephrology   Ashley Mcclure 78 y o  female MRN: [id number]  Unit/Bed#: E2 -01 Encounter: [id number]      Assessment / Plan:  1  Hyponatremia, multifactorial in the setting of hypovolemia bulimia, recent use of DDAVP, cirrhosis and possibly underlying SIADH due to chronic pain and recent subdural/parenchymal hematoma  -serum sodium on admission 116, improved to 121 with saline bolus, now improved to 124 on salt tablets  -will increase salt tablets from 2 grams t i d  Issue grams q i d  And continue Lasix 10 milligrams p o  Daily  -f/u am BMP    2  Hypertension-continue amlodipine 5 milligrams p o  Daily, propranolol 10 milligrams p o  B i d  With hold parameters    3  Mild anemia - Hgb 11 3, monitor CBC    4  Right femur fracture-conservative management    5  History of cirrhosis    6  Recent subdural hematoma    Subjective:   Denies any chest pain or shortness of breath, nausea or pain anywhere  I note she is slow to respond to questions  Objective:     Vitals: Blood pressure 144/66, pulse 66, temperature (!) 96 3 °F (35 7 °C), temperature source Tympanic, resp  rate 18, height 5' 2" (1 575 m), weight 96 8 kg (213 lb 6 5 oz), SpO2 97 %  ,Body mass index is 39 03 kg/m²  Temp (24hrs), Av 8 °F (36 °C), Min:96 3 °F (35 7 °C), Max:97 1 °F (36 2 °C)      Weight (last 2 days)     Date/Time   Weight    20 0551   96 8 (213 41)    20 0534   97 4 (214 73)    20 0531   97 8 (215 61)                Intake/Output Summary (Last 24 hours) at 3/2/2020 1131  Last data filed at 3/2/2020 0510  Gross per 24 hour   Intake 420 ml   Output 500 ml   Net -80 ml     I/O last 24 hours: In: 5 [P O :420]  Out: 650 [Urine:650]        Physical Exam:   Physical Exam   Constitutional: She appears well-developed and well-nourished  No distress  +temporal wasting   HENT:   Head: Normocephalic and atraumatic  Mouth/Throat: No oropharyngeal exudate  Eyes: Right eye exhibits no discharge   Left eye exhibits no discharge  No scleral icterus  Neck: Normal range of motion  Neck supple  No thyromegaly present  Cardiovascular: Normal rate and regular rhythm  No murmur heard  Pulmonary/Chest: Effort normal and breath sounds normal  No respiratory distress  She has no wheezes  Abdominal: Soft  Bowel sounds are normal  She exhibits no distension  Genitourinary:   Genitourinary Comments: +wells   Musculoskeletal: She exhibits no edema  RLE brace   Neurological: She is alert  She exhibits normal muscle tone  Awake, slow to respond to questions   Skin: Skin is warm and dry  No rash noted  She is not diaphoretic  There is pallor  Psychiatric: She has a normal mood and affect  Her behavior is normal    Tangential when answering questions   Nursing note and vitals reviewed        Invasive Devices     Peripheral Intravenous Line            Peripheral IV 03/02/20 Right Forearm less than 1 day          Drain            Urethral Catheter 12 days                Medications:    Scheduled Meds:  Current Facility-Administered Medications:  amLODIPine 5 mg Oral Daily Yunior Etienne MD   busPIRone 5 mg Oral TID Yunior Etienne MD   docusate sodium 100 mg Oral Daily Yunior Etienne MD   famotidine 20 mg Oral PRN Yunior Etienne MD   furosemide 10 mg Oral Daily Mariana K Christina, DO   gabapentin 100 mg Oral TID PRN Yunior Etienne MD   oxyCODONE 5 mg Oral Q4H PRN Yunior Etienne MD   propranolol 10 mg Oral BID Yunior Etienne MD   senna 1 tablet Oral HS Yunior Etienne MD   sodium chloride 2 g Oral 4x Daily (with meals and at bedtime) Mikey Padgett, DO   tamsulosin 0 4 mg Oral Daily With Jd Rodríguez MD       PRN Meds: famotidine    gabapentin    oxyCODONE    Continuous Infusions:         LAB RESULTS:      Results from last 7 days   Lab Units 03/02/20  0427 03/01/20  1242 03/01/20  0427 03/01/20  0350 02/29/20  2354 02/29/20  1955 02/29/20  1631 02/29/20  0853  02/28/20  0632  02/27/20  1254 02/27/20  1145   WBC Thousand/uL 9 03  --   -- --   --   --   --   --   --  7 62  --   --  9 28   HEMOGLOBIN g/dL 11 3*  --   --   --   --   --   --   --   --  12 8  --   --  12 0   HEMATOCRIT % 33 6*  --   --   --   --   --   --   --   --  38 9  --   --  35 8   PLATELETS Thousands/uL 256  --   --   --   --   --   --   --   --  144*  --   --  131*   NEUTROS PCT % 64  --   --   --   --   --   --   --   --  71  --   --  69   LYMPHS PCT % 18  --   --   --   --   --   --   --   --  15  --   --  15   MONOS PCT % 13*  --   --   --   --   --   --   --   --  10  --   --  12   EOS PCT % 2  --   --   --   --   --   --   --   --  2  --   --  2   POTASSIUM mmol/L 4 8 4 7  --  4 3 4 1 4 0 4 3 4 1   < > 4 4   < > 4 2 5 7*   CHLORIDE mmol/L 89* 87*  --  87* 89* 87* 86* 86*   < > 84*   < > 83* 83*   CO2 mmol/L 28 30  --  28 30 29 29 30   < > 31   < > 30 28   BUN mg/dL 14 12  --  11 12 11 12 13   < > 11   < > 13 14   CREATININE mg/dL 0 67 0 80  --  0 74 0 69 0 79 0 80 0 89   < > 0 81   < > 0 79 0 77   CALCIUM mg/dL 9 0 8 6  --  8 8 8 7 8 5 8 6 8 6   < > 9 0   < > 8 8 8 6   ALK PHOS U/L  --   --   --   --   --   --   --   --   --  392*  --  342* 337*   ALT U/L  --   --   --   --   --   --   --   --   --  71  --  61 64   AST U/L  --   --   --   --   --   --   --   --   --  224*  --  174* 195*   MAGNESIUM mg/dL  --   --  1 8  --   --   --   --   --   --  1 8  --   --   --    PHOSPHORUS mg/dL  --   --  3 1  --   --   --   --   --   --   --   --   --   --     < > = values in this interval not displayed  CUTURES:  Lab Results   Component Value Date    BLOODCX No Growth After 5 Days  06/05/2017    BLOODCX No Growth After 5 Days  06/05/2017    URINECX >100,000 cfu/ml Escherichia coli 06/05/2017    URINECX >100,000 cfu/ml Klebsiella pneumoniae 11/01/2016                 Portions of the record may have been created with voice recognition software  Occasional wrong word or "sound a like" substitutions may have occurred due to the inherent limitations of voice recognition software  Read the chart carefully and recognize, using context, where substitutions have occurred  If you have any questions, please contact the dictating provider  No